# Patient Record
Sex: MALE | Race: WHITE | NOT HISPANIC OR LATINO | Employment: PART TIME | URBAN - METROPOLITAN AREA
[De-identification: names, ages, dates, MRNs, and addresses within clinical notes are randomized per-mention and may not be internally consistent; named-entity substitution may affect disease eponyms.]

---

## 2018-12-05 ENCOUNTER — OFFICE VISIT (OUTPATIENT)
Dept: PODIATRY | Facility: CLINIC | Age: 63
End: 2018-12-05
Payer: MEDICARE

## 2018-12-05 VITALS
BODY MASS INDEX: 31.15 KG/M2 | DIASTOLIC BLOOD PRESSURE: 83 MMHG | SYSTOLIC BLOOD PRESSURE: 146 MMHG | HEART RATE: 93 BPM | HEIGHT: 72 IN | WEIGHT: 230 LBS

## 2018-12-05 DIAGNOSIS — L84 CALLUS: Primary | ICD-10-CM

## 2018-12-05 PROCEDURE — 99999 PR PBB SHADOW E&M-NEW PATIENT-LVL III: CPT | Mod: PBBFAC,,, | Performed by: PODIATRIST

## 2018-12-05 PROCEDURE — 3008F BODY MASS INDEX DOCD: CPT | Mod: CPTII,,, | Performed by: PODIATRIST

## 2018-12-05 PROCEDURE — 17999 UNLISTD PX SKN MUC MEMB SUBQ: CPT | Mod: CSM,S$GLB,, | Performed by: PODIATRIST

## 2018-12-05 PROCEDURE — 99203 OFFICE O/P NEW LOW 30 MIN: CPT | Mod: ,,, | Performed by: PODIATRIST

## 2018-12-05 RX ORDER — CICLOPIROX 80 MG/ML
SOLUTION TOPICAL NIGHTLY
Qty: 6.6 ML | Refills: 11 | Status: SHIPPED | OUTPATIENT
Start: 2018-12-05 | End: 2024-02-29

## 2018-12-05 NOTE — PROGRESS NOTES
Subjective:      Patient ID: Harrison Haywood is a 63 y.o. male.    Chief Complaint: Ingrown Toenail and Callouses    Thick hard callus right forefoot.  Gradual onset, worsening over past several weeks, aggravated by increased weight bearing, shoe gear, pressure.  No previous medical treatment.  OTC pain med not helping. Denies trauma, surgery.    Review of Systems   Constitution: Negative for chills, diaphoresis, fever, malaise/fatigue and night sweats.   Cardiovascular: Negative for claudication, cyanosis, leg swelling and syncope.   Skin: Positive for suspicious lesions. Negative for color change, dry skin, nail changes, rash and unusual hair distribution.   Musculoskeletal: Negative for falls, joint pain, joint swelling, muscle cramps, muscle weakness and stiffness.   Gastrointestinal: Negative for constipation, diarrhea, nausea and vomiting.   Neurological: Negative for brief paralysis, disturbances in coordination, focal weakness, numbness, paresthesias, sensory change and tremors.           Objective:      Physical Exam   Constitutional: He is oriented to person, place, and time. He appears well-developed and well-nourished. He is cooperative. No distress.   Cardiovascular:   Pulses:       Popliteal pulses are 2+ on the right side, and 2+ on the left side.        Dorsalis pedis pulses are 2+ on the right side, and 2+ on the left side.        Posterior tibial pulses are 2+ on the right side, and 2+ on the left side.   Capillary refill 3 seconds all toes/distal feet, all toes/both feet warm to touch.      Negative lymphadenopathy bilateral popliteal fossa and tarsal tunnel.      Negavie lower extremity edema bilateral.     Musculoskeletal:        Right ankle: He exhibits normal range of motion, no swelling, no ecchymosis, no deformity, no laceration and normal pulse. Achilles tendon normal. Achilles tendon exhibits no pain, no defect and normal Flores's test results.   Right hemiparesis.    Uses motorized chair  for mobility.    Otherwise, All ten toes without clubbing, cyanosis, or signs of ischemia.  No pain to palpation bilateral lower extremities.  Range of motion, stability,  normal bilateral feet and legs.     Strength tone normal lle below knee.   Lymphadenopathy: No inguinal adenopathy noted on the right or left side.   Negative lymphadenopathy bilateral popliteal fossa and tarsal tunnel.    Negative lymphangitic streaking bilateral feet/ankles/legs.   Neurological: He is alert and oriented to person, place, and time. He has normal strength. He displays no atrophy and no tremor. No sensory deficit. He exhibits normal muscle tone. Gait normal.   Reflex Scores:       Patellar reflexes are 2+ on the right side and 2+ on the left side.       Achilles reflexes are 2+ on the right side and 2+ on the left side.  Negative tinel sign to percussion sural, superficial peroneal, deep peroneal, saphenous, and posterior tibial nerves right and left ankles and feet.     Skin: Skin is warm, dry and intact. Capillary refill takes 2 to 3 seconds. No abrasion, no bruising, no burn, no ecchymosis, no laceration, no lesion and no rash noted. He is not diaphoretic. No cyanosis or erythema. No pallor. Nails show no clubbing.     Skin is normal age and health appropriate color, turgor, texture, and temperature bilateral lower extremities without ulceration, hyperpigmentation, discoloration, masses nodules or cords palpated.  No ecchymosis, erythema, edema, or cardinal signs of infection bilateral lower extremities.     Psychiatric: He has a normal mood and affect.             Assessment:       Encounter Diagnosis   Name Primary?    Callus Yes         Plan:       Harrison was seen today for ingrown toenail and callouses.    Diagnoses and all orders for this visit:    Callus  -     ORTHOTIC DEVICE (DME)    Other orders  -     ciclopirox (PENLAC) 8 % Soln; Apply topically nightly.      I counseled the patient on his conditions, their  implications and medical management.    Non covered foot care:    With the patient's permission, I debrided hyperkeratotic lesion(s) as above totaling      1          to, not  Including dermis with sterile #15 blade.  Patient tolerated the procedure well and related significant relief.    Rx custom orthotics, lac hydrin.    Recommend pedicures, otc pumice stone prn.          Follow-up if symptoms worsen or fail to improve.

## 2024-02-29 ENCOUNTER — HOSPITAL ENCOUNTER (INPATIENT)
Facility: HOSPITAL | Age: 69
LOS: 2 days | Discharge: HOME | DRG: 193 | End: 2024-03-03
Attending: EMERGENCY MEDICINE | Admitting: INTERNAL MEDICINE
Payer: MEDICARE

## 2024-02-29 DIAGNOSIS — J96.01 ACUTE RESPIRATORY FAILURE WITH HYPOXIA: ICD-10-CM

## 2024-02-29 DIAGNOSIS — R07.9 CHEST PAIN: ICD-10-CM

## 2024-02-29 DIAGNOSIS — R06.02 SHORTNESS OF BREATH: ICD-10-CM

## 2024-02-29 DIAGNOSIS — C90.00 MULTIPLE MYELOMA NOT HAVING ACHIEVED REMISSION: ICD-10-CM

## 2024-02-29 DIAGNOSIS — J18.9 PNEUMONIA: ICD-10-CM

## 2024-02-29 DIAGNOSIS — R09.02 HYPOXIA: Primary | ICD-10-CM

## 2024-02-29 DIAGNOSIS — R79.89 ELEVATED TROPONIN: ICD-10-CM

## 2024-02-29 PROBLEM — E87.6 HYPOKALEMIA: Status: ACTIVE | Noted: 2024-02-29

## 2024-02-29 PROBLEM — E78.49 OTHER HYPERLIPIDEMIA: Status: ACTIVE | Noted: 2024-02-29

## 2024-02-29 PROBLEM — L89.154 SACRAL DECUBITUS ULCER, STAGE IV: Status: ACTIVE | Noted: 2024-02-29

## 2024-02-29 PROBLEM — I10 PRIMARY HYPERTENSION: Status: ACTIVE | Noted: 2024-02-29

## 2024-02-29 PROBLEM — J45.902 REACTIVE AIRWAY DISEASE WITH STATUS ASTHMATICUS: Status: ACTIVE | Noted: 2024-02-29

## 2024-02-29 LAB
ADENOVIRUS: NOT DETECTED
ALBUMIN SERPL BCP-MCNC: 2.7 G/DL (ref 3.5–5.2)
ALP SERPL-CCNC: 112 U/L (ref 55–135)
ALT SERPL W/O P-5'-P-CCNC: 13 U/L (ref 10–44)
ANION GAP SERPL CALC-SCNC: 13 MMOL/L (ref 8–16)
ANISOCYTOSIS BLD QL SMEAR: SLIGHT
AST SERPL-CCNC: 39 U/L (ref 10–40)
BASOPHILS NFR BLD: 0 % (ref 0–1.9)
BILIRUB SERPL-MCNC: 0.6 MG/DL (ref 0.1–1)
BNP SERPL-MCNC: 95 PG/ML (ref 0–99)
BORDETELLA PARAPERTUSSIS (IS1001): NOT DETECTED
BORDETELLA PERTUSSIS (PTXP): NOT DETECTED
BUN SERPL-MCNC: 18 MG/DL (ref 8–23)
CALCIUM SERPL-MCNC: 8.6 MG/DL (ref 8.7–10.5)
CHLAMYDIA PNEUMONIAE: NOT DETECTED
CHLORIDE SERPL-SCNC: 104 MMOL/L (ref 95–110)
CHOLEST SERPL-MCNC: 121 MG/DL (ref 120–199)
CHOLEST/HDLC SERPL: 3 {RATIO} (ref 2–5)
CO2 SERPL-SCNC: 21 MMOL/L (ref 23–29)
CORONAVIRUS 229E, COMMON COLD VIRUS: NOT DETECTED
CORONAVIRUS HKU1, COMMON COLD VIRUS: NOT DETECTED
CORONAVIRUS NL63, COMMON COLD VIRUS: NOT DETECTED
CORONAVIRUS OC43, COMMON COLD VIRUS: NOT DETECTED
CREAT SERPL-MCNC: 0.8 MG/DL (ref 0.5–1.4)
CTP QC/QA: YES
CTP QC/QA: YES
DIFFERENTIAL METHOD BLD: ABNORMAL
EOSINOPHIL NFR BLD: 0 % (ref 0–8)
ERYTHROCYTE [DISTWIDTH] IN BLOOD BY AUTOMATED COUNT: 16.5 % (ref 11.5–14.5)
EST. GFR  (NO RACE VARIABLE): >60 ML/MIN/1.73 M^2
ESTIMATED AVG GLUCOSE: 103 MG/DL (ref 68–131)
FIO2: 21 %
FLUBV RNA NPH QL NAA+NON-PROBE: NOT DETECTED
GLUCOSE SERPL-MCNC: 102 MG/DL (ref 70–110)
HBA1C MFR BLD: 5.2 % (ref 4–5.6)
HCT VFR BLD AUTO: 25.6 % (ref 40–54)
HDLC SERPL-MCNC: 41 MG/DL (ref 40–75)
HDLC SERPL: 33.9 % (ref 20–50)
HGB BLD-MCNC: 8.5 G/DL (ref 14–18)
HPIV1 RNA NPH QL NAA+NON-PROBE: NOT DETECTED
HPIV2 RNA NPH QL NAA+NON-PROBE: NOT DETECTED
HPIV3 RNA NPH QL NAA+NON-PROBE: NOT DETECTED
HPIV4 RNA NPH QL NAA+NON-PROBE: NOT DETECTED
HUMAN METAPNEUMOVIRUS: NOT DETECTED
HYPOCHROMIA BLD QL SMEAR: ABNORMAL
IMM GRANULOCYTES # BLD AUTO: ABNORMAL K/UL (ref 0–0.04)
IMM GRANULOCYTES NFR BLD AUTO: ABNORMAL % (ref 0–0.5)
INFLUENZA A (SUBTYPES H1,H1-2009,H3): NOT DETECTED
LACTATE SERPL-SCNC: 1.2 MMOL/L (ref 0.5–2.2)
LDLC SERPL CALC-MCNC: 49.2 MG/DL (ref 63–159)
LYMPHOCYTES NFR BLD: 11 % (ref 18–48)
MAGNESIUM SERPL-MCNC: 2 MG/DL (ref 1.6–2.6)
MCH RBC QN AUTO: 32 PG (ref 27–31)
MCHC RBC AUTO-ENTMCNC: 33.2 G/DL (ref 32–36)
MCV RBC AUTO: 96 FL (ref 82–98)
MONOCYTES NFR BLD: 3 % (ref 4–15)
MYCOPLASMA PNEUMONIAE: NOT DETECTED
NEUTROPHILS NFR BLD: 83 % (ref 38–73)
NEUTS BAND NFR BLD MANUAL: 3 %
NONHDLC SERPL-MCNC: 80 MG/DL
NRBC BLD-RTO: 0 /100 WBC
OHS QRS DURATION: 106 MS
OHS QTC CALCULATION: 501 MS
PCO2 BLDA: 41.9 MMHG (ref 35–45)
PH SMN: 7.32 [PH] (ref 7.35–7.45)
PLATELET # BLD AUTO: 95 K/UL (ref 150–450)
PLATELET BLD QL SMEAR: ABNORMAL
PMV BLD AUTO: 13 FL (ref 9.2–12.9)
PO2 BLDA: 33.6 MMHG (ref 40–60)
POC BASE DEFICIT: -4.1 MMOL/L (ref -2–2)
POC HCO3: 21.7 MMOL/L (ref 24–28)
POC MOLECULAR INFLUENZA A AGN: NEGATIVE
POC MOLECULAR INFLUENZA B AGN: NEGATIVE
POC PERFORMED BY: ABNORMAL
POC SATURATED O2: 56.5 % (ref 95–100)
POLYCHROMASIA BLD QL SMEAR: ABNORMAL
POTASSIUM SERPL-SCNC: 3.1 MMOL/L (ref 3.5–5.1)
PROCALCITONIN SERPL IA-MCNC: 0.6 NG/ML
PROT SERPL-MCNC: 6.2 G/DL (ref 6–8.4)
RBC # BLD AUTO: 2.66 M/UL (ref 4.6–6.2)
RESPIRATORY INFECTION PANEL SOURCE: ABNORMAL
RSV RNA NPH QL NAA+NON-PROBE: DETECTED
RV+EV RNA NPH QL NAA+NON-PROBE: NOT DETECTED
SARS-COV-2 RDRP RESP QL NAA+PROBE: NEGATIVE
SARS-COV-2 RNA RESP QL NAA+PROBE: NOT DETECTED
SODIUM SERPL-SCNC: 138 MMOL/L (ref 136–145)
SPECIMEN SOURCE: ABNORMAL
TRIGL SERPL-MCNC: 154 MG/DL (ref 30–150)
TROPONIN I SERPL DL<=0.01 NG/ML-MCNC: 0.03 NG/ML (ref 0–0.03)
TROPONIN I SERPL DL<=0.01 NG/ML-MCNC: 0.04 NG/ML (ref 0–0.03)
WBC # BLD AUTO: 2.7 K/UL (ref 3.9–12.7)

## 2024-02-29 PROCEDURE — 99900035 HC TECH TIME PER 15 MIN (STAT)

## 2024-02-29 PROCEDURE — 96365 THER/PROPH/DIAG IV INF INIT: CPT

## 2024-02-29 PROCEDURE — 85027 COMPLETE CBC AUTOMATED: CPT | Performed by: EMERGENCY MEDICINE

## 2024-02-29 PROCEDURE — 80053 COMPREHEN METABOLIC PANEL: CPT | Performed by: EMERGENCY MEDICINE

## 2024-02-29 PROCEDURE — 87077 CULTURE AEROBIC IDENTIFY: CPT | Performed by: STUDENT IN AN ORGANIZED HEALTH CARE EDUCATION/TRAINING PROGRAM

## 2024-02-29 PROCEDURE — G0378 HOSPITAL OBSERVATION PER HR: HCPCS

## 2024-02-29 PROCEDURE — 93005 ELECTROCARDIOGRAM TRACING: CPT

## 2024-02-29 PROCEDURE — 83735 ASSAY OF MAGNESIUM: CPT | Performed by: EMERGENCY MEDICINE

## 2024-02-29 PROCEDURE — 96366 THER/PROPH/DIAG IV INF ADDON: CPT

## 2024-02-29 PROCEDURE — 25000003 PHARM REV CODE 250: Performed by: INTERNAL MEDICINE

## 2024-02-29 PROCEDURE — 84145 PROCALCITONIN (PCT): CPT | Performed by: STUDENT IN AN ORGANIZED HEALTH CARE EDUCATION/TRAINING PROGRAM

## 2024-02-29 PROCEDURE — 96372 THER/PROPH/DIAG INJ SC/IM: CPT | Performed by: INTERNAL MEDICINE

## 2024-02-29 PROCEDURE — 25000003 PHARM REV CODE 250: Performed by: STUDENT IN AN ORGANIZED HEALTH CARE EDUCATION/TRAINING PROGRAM

## 2024-02-29 PROCEDURE — 87070 CULTURE OTHR SPECIMN AEROBIC: CPT | Performed by: STUDENT IN AN ORGANIZED HEALTH CARE EDUCATION/TRAINING PROGRAM

## 2024-02-29 PROCEDURE — 94644 CONT INHLJ TX 1ST HOUR: CPT

## 2024-02-29 PROCEDURE — 25000003 PHARM REV CODE 250: Performed by: EMERGENCY MEDICINE

## 2024-02-29 PROCEDURE — 99285 EMERGENCY DEPT VISIT HI MDM: CPT | Mod: 25

## 2024-02-29 PROCEDURE — 80061 LIPID PANEL: CPT | Performed by: STUDENT IN AN ORGANIZED HEALTH CARE EDUCATION/TRAINING PROGRAM

## 2024-02-29 PROCEDURE — 93010 ELECTROCARDIOGRAM REPORT: CPT | Mod: ,,, | Performed by: STUDENT IN AN ORGANIZED HEALTH CARE EDUCATION/TRAINING PROGRAM

## 2024-02-29 PROCEDURE — 87798 DETECT AGENT NOS DNA AMP: CPT | Performed by: STUDENT IN AN ORGANIZED HEALTH CARE EDUCATION/TRAINING PROGRAM

## 2024-02-29 PROCEDURE — 25000242 PHARM REV CODE 250 ALT 637 W/ HCPCS: Performed by: EMERGENCY MEDICINE

## 2024-02-29 PROCEDURE — 96367 TX/PROPH/DG ADDL SEQ IV INF: CPT

## 2024-02-29 PROCEDURE — 63600175 PHARM REV CODE 636 W HCPCS: Performed by: EMERGENCY MEDICINE

## 2024-02-29 PROCEDURE — 83605 ASSAY OF LACTIC ACID: CPT | Performed by: EMERGENCY MEDICINE

## 2024-02-29 PROCEDURE — 63600175 PHARM REV CODE 636 W HCPCS: Performed by: STUDENT IN AN ORGANIZED HEALTH CARE EDUCATION/TRAINING PROGRAM

## 2024-02-29 PROCEDURE — 87040 BLOOD CULTURE FOR BACTERIA: CPT | Performed by: EMERGENCY MEDICINE

## 2024-02-29 PROCEDURE — 27000221 HC OXYGEN, UP TO 24 HOURS

## 2024-02-29 PROCEDURE — 83036 HEMOGLOBIN GLYCOSYLATED A1C: CPT | Performed by: STUDENT IN AN ORGANIZED HEALTH CARE EDUCATION/TRAINING PROGRAM

## 2024-02-29 PROCEDURE — 25000242 PHARM REV CODE 250 ALT 637 W/ HCPCS: Performed by: STUDENT IN AN ORGANIZED HEALTH CARE EDUCATION/TRAINING PROGRAM

## 2024-02-29 PROCEDURE — 94640 AIRWAY INHALATION TREATMENT: CPT | Mod: XB

## 2024-02-29 PROCEDURE — 25500020 PHARM REV CODE 255: Performed by: INTERNAL MEDICINE

## 2024-02-29 PROCEDURE — 63600175 PHARM REV CODE 636 W HCPCS: Performed by: INTERNAL MEDICINE

## 2024-02-29 PROCEDURE — 94645 CONT INHLJ TX EACH ADDL HOUR: CPT

## 2024-02-29 PROCEDURE — 87635 SARS-COV-2 COVID-19 AMP PRB: CPT | Performed by: EMERGENCY MEDICINE

## 2024-02-29 PROCEDURE — 96376 TX/PRO/DX INJ SAME DRUG ADON: CPT

## 2024-02-29 PROCEDURE — 96365 THER/PROPH/DIAG IV INF INIT: CPT | Mod: 59

## 2024-02-29 PROCEDURE — 83880 ASSAY OF NATRIURETIC PEPTIDE: CPT | Performed by: EMERGENCY MEDICINE

## 2024-02-29 PROCEDURE — 97163 PT EVAL HIGH COMPLEX 45 MIN: CPT

## 2024-02-29 PROCEDURE — 96375 TX/PRO/DX INJ NEW DRUG ADDON: CPT

## 2024-02-29 PROCEDURE — 85007 BL SMEAR W/DIFF WBC COUNT: CPT | Performed by: EMERGENCY MEDICINE

## 2024-02-29 PROCEDURE — 87186 SC STD MICRODIL/AGAR DIL: CPT | Performed by: STUDENT IN AN ORGANIZED HEALTH CARE EDUCATION/TRAINING PROGRAM

## 2024-02-29 PROCEDURE — 84484 ASSAY OF TROPONIN QUANT: CPT | Mod: 91 | Performed by: EMERGENCY MEDICINE

## 2024-02-29 PROCEDURE — 93010 ELECTROCARDIOGRAM REPORT: CPT | Mod: ,,, | Performed by: INTERNAL MEDICINE

## 2024-02-29 PROCEDURE — 97165 OT EVAL LOW COMPLEX 30 MIN: CPT

## 2024-02-29 PROCEDURE — 63600175 PHARM REV CODE 636 W HCPCS

## 2024-02-29 PROCEDURE — 87502 INFLUENZA DNA AMP PROBE: CPT

## 2024-02-29 PROCEDURE — 84484 ASSAY OF TROPONIN QUANT: CPT | Performed by: STUDENT IN AN ORGANIZED HEALTH CARE EDUCATION/TRAINING PROGRAM

## 2024-02-29 RX ORDER — BISACODYL 5 MG
10 TABLET, DELAYED RELEASE (ENTERIC COATED) ORAL DAILY PRN
COMMUNITY

## 2024-02-29 RX ORDER — ENOXAPARIN SODIUM 100 MG/ML
40 INJECTION SUBCUTANEOUS EVERY 24 HOURS
Status: DISCONTINUED | OUTPATIENT
Start: 2024-02-29 | End: 2024-02-29

## 2024-02-29 RX ORDER — CELECOXIB 100 MG/1
200 CAPSULE ORAL DAILY
Status: DISCONTINUED | OUTPATIENT
Start: 2024-03-01 | End: 2024-03-03 | Stop reason: HOSPADM

## 2024-02-29 RX ORDER — CALCIUM CARBONATE 600 MG
1 TABLET ORAL 2 TIMES DAILY WITH MEALS
COMMUNITY
Start: 2023-11-09 | End: 2024-02-29 | Stop reason: SDUPTHER

## 2024-02-29 RX ORDER — DIPHENHYDRAMINE HYDROCHLORIDE 12.5 MG/5ML
LIQUID ORAL
COMMUNITY
Start: 2023-11-22 | End: 2024-02-29

## 2024-02-29 RX ORDER — PANTOPRAZOLE SODIUM 40 MG/1
40 TABLET, DELAYED RELEASE ORAL DAILY
Status: DISCONTINUED | OUTPATIENT
Start: 2024-02-29 | End: 2024-03-03 | Stop reason: HOSPADM

## 2024-02-29 RX ORDER — CHOLECALCIFEROL (VITAMIN D3) 10 MCG
800 TABLET ORAL NIGHTLY
COMMUNITY
Start: 2024-02-02

## 2024-02-29 RX ORDER — ROSUVASTATIN CALCIUM 20 MG/1
20 TABLET, COATED ORAL DAILY
Status: ON HOLD | COMMUNITY
End: 2024-03-03 | Stop reason: HOSPADM

## 2024-02-29 RX ORDER — ZINC SULFATE 50(220)MG
220 CAPSULE ORAL DAILY
COMMUNITY

## 2024-02-29 RX ORDER — ALBUTEROL SULFATE 2.5 MG/.5ML
5 SOLUTION RESPIRATORY (INHALATION)
Status: DISCONTINUED | OUTPATIENT
Start: 2024-02-29 | End: 2024-02-29

## 2024-02-29 RX ORDER — TIZANIDINE 4 MG/1
2 TABLET ORAL
COMMUNITY
Start: 2023-11-02 | End: 2024-02-29 | Stop reason: DRUGHIGH

## 2024-02-29 RX ORDER — SULFAMETHOXAZOLE AND TRIMETHOPRIM 800; 160 MG/1; MG/1
1 TABLET ORAL
COMMUNITY
Start: 2024-01-18 | End: 2024-02-29

## 2024-02-29 RX ORDER — POTASSIUM CHLORIDE 20 MEQ/1
20 TABLET, EXTENDED RELEASE ORAL
Status: COMPLETED | OUTPATIENT
Start: 2024-02-29 | End: 2024-02-29

## 2024-02-29 RX ORDER — IPRATROPIUM BROMIDE AND ALBUTEROL SULFATE 2.5; .5 MG/3ML; MG/3ML
3 SOLUTION RESPIRATORY (INHALATION) EVERY 4 HOURS PRN
Status: DISCONTINUED | OUTPATIENT
Start: 2024-02-29 | End: 2024-03-03 | Stop reason: HOSPADM

## 2024-02-29 RX ORDER — METFORMIN HYDROCHLORIDE 500 MG/1
500 TABLET ORAL 2 TIMES DAILY
COMMUNITY
Start: 2023-10-06 | End: 2024-02-29

## 2024-02-29 RX ORDER — ENOXAPARIN SODIUM 100 MG/ML
40 INJECTION SUBCUTANEOUS EVERY 24 HOURS
Status: DISCONTINUED | OUTPATIENT
Start: 2024-02-29 | End: 2024-03-03 | Stop reason: HOSPADM

## 2024-02-29 RX ORDER — TIZANIDINE 2 MG/1
2 TABLET ORAL 2 TIMES DAILY
Status: ON HOLD | COMMUNITY
End: 2024-03-03 | Stop reason: HOSPADM

## 2024-02-29 RX ORDER — PANTOPRAZOLE SODIUM 40 MG/1
40 TABLET, DELAYED RELEASE ORAL
Status: ON HOLD | COMMUNITY
Start: 2023-11-10 | End: 2024-03-03 | Stop reason: HOSPADM

## 2024-02-29 RX ORDER — ROCURONIUM BROMIDE 10 MG/ML
INJECTION, SOLUTION INTRAVENOUS
Status: DISPENSED
Start: 2024-02-29 | End: 2024-03-01

## 2024-02-29 RX ORDER — ALBUTEROL SULFATE 2.5 MG/.5ML
15 SOLUTION RESPIRATORY (INHALATION)
Status: COMPLETED | OUTPATIENT
Start: 2024-02-29 | End: 2024-02-29

## 2024-02-29 RX ORDER — OXYCODONE HYDROCHLORIDE 5 MG/1
TABLET ORAL
COMMUNITY
Start: 2023-11-09 | End: 2024-02-29

## 2024-02-29 RX ORDER — MULTIVITAMIN
1 TABLET ORAL DAILY
COMMUNITY

## 2024-02-29 RX ORDER — AMLODIPINE BESYLATE 5 MG/1
5 TABLET ORAL DAILY
Status: DISCONTINUED | OUTPATIENT
Start: 2024-02-29 | End: 2024-03-03 | Stop reason: HOSPADM

## 2024-02-29 RX ORDER — MENTHOL AND ZINC OXIDE .44; 20.625 G/100G; G/100G
OINTMENT TOPICAL EVERY OTHER DAY
Status: ON HOLD | COMMUNITY
End: 2024-03-03 | Stop reason: HOSPADM

## 2024-02-29 RX ORDER — GLUCAGON 1 MG
1 KIT INJECTION
Status: DISCONTINUED | OUTPATIENT
Start: 2024-02-29 | End: 2024-02-29

## 2024-02-29 RX ORDER — CELECOXIB 200 MG/1
1 CAPSULE ORAL 2 TIMES DAILY
COMMUNITY
End: 2024-02-29

## 2024-02-29 RX ORDER — TIZANIDINE 2 MG/1
2 TABLET ORAL 2 TIMES DAILY
Status: DISCONTINUED | OUTPATIENT
Start: 2024-02-29 | End: 2024-03-03 | Stop reason: HOSPADM

## 2024-02-29 RX ORDER — VALACYCLOVIR HYDROCHLORIDE 500 MG/1
500 TABLET, FILM COATED ORAL DAILY
COMMUNITY

## 2024-02-29 RX ORDER — FENOFIBRATE 145 MG/1
145 TABLET, FILM COATED ORAL DAILY
Status: ON HOLD | COMMUNITY
End: 2024-03-03 | Stop reason: HOSPADM

## 2024-02-29 RX ORDER — ALBUTEROL SULFATE 2.5 MG/.5ML
2.5 SOLUTION RESPIRATORY (INHALATION) EVERY 4 HOURS PRN
Status: DISCONTINUED | OUTPATIENT
Start: 2024-03-01 | End: 2024-03-03 | Stop reason: HOSPADM

## 2024-02-29 RX ORDER — FENOFIBRATE 145 MG/1
1 TABLET, FILM COATED ORAL DAILY
COMMUNITY
End: 2024-02-29 | Stop reason: DRUGHIGH

## 2024-02-29 RX ORDER — FLUOXETINE 10 MG/1
1 CAPSULE ORAL DAILY
COMMUNITY
End: 2024-02-29 | Stop reason: DRUGHIGH

## 2024-02-29 RX ORDER — ASCORBIC ACID 500 MG
500 TABLET ORAL 2 TIMES DAILY
COMMUNITY

## 2024-02-29 RX ORDER — GUAIFENESIN 100 MG/5ML
200 SOLUTION ORAL EVERY 4 HOURS PRN
COMMUNITY

## 2024-02-29 RX ORDER — PREDNISONE 20 MG/1
TABLET ORAL
COMMUNITY
End: 2024-02-29

## 2024-02-29 RX ORDER — ATORVASTATIN CALCIUM 20 MG/1
TABLET, FILM COATED ORAL
COMMUNITY
End: 2024-02-29

## 2024-02-29 RX ORDER — ALENDRONATE SODIUM 70 MG/1
70 TABLET ORAL
COMMUNITY
Start: 2023-11-02 | End: 2024-02-29

## 2024-02-29 RX ORDER — FLUOXETINE HYDROCHLORIDE 40 MG/1
40 CAPSULE ORAL DAILY
COMMUNITY
Start: 2024-02-14

## 2024-02-29 RX ORDER — AMLODIPINE BESYLATE 2.5 MG/1
1 TABLET ORAL NIGHTLY
COMMUNITY
End: 2024-02-29 | Stop reason: DRUGHIGH

## 2024-02-29 RX ORDER — LEVETIRACETAM 750 MG/1
500 TABLET ORAL DAILY
Status: ON HOLD | COMMUNITY
End: 2024-03-03 | Stop reason: HOSPADM

## 2024-02-29 RX ORDER — METHYLPREDNISOLONE SOD SUCC 125 MG
125 VIAL (EA) INJECTION ONCE
Status: COMPLETED | OUTPATIENT
Start: 2024-02-29 | End: 2024-02-29

## 2024-02-29 RX ORDER — LIDOCAINE HYDROCHLORIDE 20 MG/ML
SOLUTION ORAL; TOPICAL
COMMUNITY
Start: 2023-11-22 | End: 2024-02-29 | Stop reason: ALTCHOICE

## 2024-02-29 RX ORDER — ALLOPURINOL 100 MG/1
300 TABLET ORAL DAILY
Status: DISCONTINUED | OUTPATIENT
Start: 2024-03-01 | End: 2024-03-03 | Stop reason: HOSPADM

## 2024-02-29 RX ORDER — ALBUTEROL SULFATE 2.5 MG/.5ML
10 SOLUTION RESPIRATORY (INHALATION) CONTINUOUS
Status: DISCONTINUED | OUTPATIENT
Start: 2024-02-29 | End: 2024-02-29

## 2024-02-29 RX ORDER — FUROSEMIDE 10 MG/ML
INJECTION INTRAMUSCULAR; INTRAVENOUS
Status: COMPLETED
Start: 2024-02-29 | End: 2024-02-29

## 2024-02-29 RX ORDER — FUROSEMIDE 10 MG/ML
40 INJECTION INTRAMUSCULAR; INTRAVENOUS ONCE
Status: COMPLETED | OUTPATIENT
Start: 2024-02-29 | End: 2024-02-29

## 2024-02-29 RX ORDER — IPRATROPIUM BROMIDE AND ALBUTEROL SULFATE 2.5; .5 MG/3ML; MG/3ML
3 SOLUTION RESPIRATORY (INHALATION)
Status: COMPLETED | OUTPATIENT
Start: 2024-02-29 | End: 2024-02-29

## 2024-02-29 RX ORDER — SODIUM CHLORIDE 0.9 % (FLUSH) 0.9 %
10 SYRINGE (ML) INJECTION EVERY 12 HOURS PRN
Status: DISCONTINUED | OUTPATIENT
Start: 2024-02-29 | End: 2024-03-03 | Stop reason: HOSPADM

## 2024-02-29 RX ORDER — VALSARTAN AND HYDROCHLOROTHIAZIDE 320; 12.5 MG/1; MG/1
TABLET, FILM COATED ORAL
COMMUNITY
End: 2024-02-29

## 2024-02-29 RX ORDER — IBUPROFEN 200 MG
16 TABLET ORAL
Status: DISCONTINUED | OUTPATIENT
Start: 2024-02-29 | End: 2024-02-29

## 2024-02-29 RX ORDER — IBUPROFEN 200 MG
24 TABLET ORAL
Status: DISCONTINUED | OUTPATIENT
Start: 2024-02-29 | End: 2024-02-29

## 2024-02-29 RX ORDER — HYDROCODONE BITARTRATE AND ACETAMINOPHEN 5; 325 MG/1; MG/1
1 TABLET ORAL EVERY 6 HOURS PRN
COMMUNITY
End: 2024-02-29

## 2024-02-29 RX ORDER — VALACYCLOVIR HYDROCHLORIDE 500 MG/1
500 TABLET, FILM COATED ORAL DAILY
Status: DISCONTINUED | OUTPATIENT
Start: 2024-02-29 | End: 2024-03-03 | Stop reason: HOSPADM

## 2024-02-29 RX ORDER — CELECOXIB 200 MG/1
200 CAPSULE ORAL DAILY
COMMUNITY
End: 2024-02-29

## 2024-02-29 RX ORDER — TALC
6 POWDER (GRAM) TOPICAL NIGHTLY PRN
Status: DISCONTINUED | OUTPATIENT
Start: 2024-02-29 | End: 2024-03-03 | Stop reason: HOSPADM

## 2024-02-29 RX ORDER — ALBUTEROL SULFATE 2.5 MG/.5ML
SOLUTION RESPIRATORY (INHALATION)
Status: DISPENSED
Start: 2024-02-29 | End: 2024-03-01

## 2024-02-29 RX ORDER — AMLODIPINE BESYLATE 5 MG/1
5 TABLET ORAL DAILY
COMMUNITY

## 2024-02-29 RX ORDER — RISPERIDONE 1 MG/ML
SOLUTION ORAL
COMMUNITY
Start: 2023-11-22 | End: 2024-02-29 | Stop reason: ALTCHOICE

## 2024-02-29 RX ORDER — MAGNESIUM SULFATE HEPTAHYDRATE 40 MG/ML
2 INJECTION, SOLUTION INTRAVENOUS ONCE
Status: COMPLETED | OUTPATIENT
Start: 2024-02-29 | End: 2024-02-29

## 2024-02-29 RX ORDER — ALBUTEROL SULFATE 2.5 MG/.5ML
2.5 SOLUTION RESPIRATORY (INHALATION) EVERY 4 HOURS
Status: DISCONTINUED | OUTPATIENT
Start: 2024-02-29 | End: 2024-02-29

## 2024-02-29 RX ORDER — FLUOXETINE HYDROCHLORIDE 20 MG/1
40 CAPSULE ORAL DAILY
Status: DISCONTINUED | OUTPATIENT
Start: 2024-02-29 | End: 2024-03-03 | Stop reason: HOSPADM

## 2024-02-29 RX ORDER — FENOFIBRATE 145 MG/1
145 TABLET, FILM COATED ORAL DAILY
Status: DISCONTINUED | OUTPATIENT
Start: 2024-03-01 | End: 2024-03-03 | Stop reason: HOSPADM

## 2024-02-29 RX ORDER — ALLOPURINOL 300 MG/1
300 TABLET ORAL DAILY
COMMUNITY
Start: 2024-02-02

## 2024-02-29 RX ORDER — FLUOXETINE HYDROCHLORIDE 20 MG/1
1 CAPSULE ORAL DAILY
COMMUNITY
End: 2024-02-29 | Stop reason: DRUGHIGH

## 2024-02-29 RX ORDER — CALCIUM CARBONATE 200(500)MG
1500 TABLET,CHEWABLE ORAL NIGHTLY
Status: DISCONTINUED | OUTPATIENT
Start: 2024-02-29 | End: 2024-03-03 | Stop reason: HOSPADM

## 2024-02-29 RX ORDER — TRAMADOL HYDROCHLORIDE 50 MG/1
50 TABLET ORAL EVERY 8 HOURS PRN
Status: ON HOLD | COMMUNITY
Start: 2024-02-22 | End: 2024-03-03 | Stop reason: HOSPADM

## 2024-02-29 RX ORDER — ACETAMINOPHEN 650 MG/1
650 SUPPOSITORY RECTAL EVERY 4 HOURS PRN
COMMUNITY
End: 2024-02-29

## 2024-02-29 RX ORDER — ONDANSETRON 4 MG/1
4 TABLET, FILM COATED ORAL 4 TIMES DAILY PRN
COMMUNITY

## 2024-02-29 RX ORDER — TIZANIDINE HYDROCHLORIDE 2 MG/1
1 CAPSULE, GELATIN COATED ORAL 2 TIMES DAILY
COMMUNITY
End: 2024-02-29 | Stop reason: SDUPTHER

## 2024-02-29 RX ORDER — ETOMIDATE 2 MG/ML
INJECTION INTRAVENOUS
Status: DISPENSED
Start: 2024-02-29 | End: 2024-03-01

## 2024-02-29 RX ORDER — CARVEDILOL 3.12 MG/1
1 TABLET ORAL 2 TIMES DAILY
COMMUNITY
End: 2024-02-29

## 2024-02-29 RX ORDER — CHOLECALCIFEROL (VITAMIN D3) 25 MCG
1000 TABLET ORAL DAILY
Status: DISCONTINUED | OUTPATIENT
Start: 2024-03-01 | End: 2024-03-03 | Stop reason: HOSPADM

## 2024-02-29 RX ORDER — SODIUM CHLORIDE 9 MG/ML
INJECTION, SOLUTION INTRAVENOUS
Status: DISCONTINUED | OUTPATIENT
Start: 2024-02-29 | End: 2024-03-03 | Stop reason: HOSPADM

## 2024-02-29 RX ORDER — CHOLECALCIFEROL (VITAMIN D3) 25 MCG
1000 TABLET ORAL DAILY
COMMUNITY
Start: 2023-11-09 | End: 2024-02-29 | Stop reason: DRUGHIGH

## 2024-02-29 RX ORDER — LENALIDOMIDE 25 MG/1
25 CAPSULE ORAL
COMMUNITY
Start: 2024-02-27 | End: 2024-02-29

## 2024-02-29 RX ORDER — DEXAMETHASONE 4 MG/1
40 TABLET ORAL
Status: ON HOLD | COMMUNITY
Start: 2024-01-08 | End: 2024-03-03 | Stop reason: HOSPADM

## 2024-02-29 RX ORDER — FUROSEMIDE 20 MG/1
20 TABLET ORAL DAILY
Status: ON HOLD | COMMUNITY
End: 2024-03-03 | Stop reason: HOSPADM

## 2024-02-29 RX ORDER — GABAPENTIN 100 MG/1
100 CAPSULE ORAL
COMMUNITY
Start: 2023-11-02 | End: 2024-02-29

## 2024-02-29 RX ORDER — DEXTROMETHORPHAN HYDROBROMIDE, GUAIFENESIN 5; 100 MG/5ML; MG/5ML
650 LIQUID ORAL EVERY 8 HOURS PRN
COMMUNITY

## 2024-02-29 RX ORDER — CALCIUM CARBONATE 600 MG
1200 TABLET ORAL NIGHTLY
COMMUNITY

## 2024-02-29 RX ADMIN — IPRATROPIUM BROMIDE AND ALBUTEROL SULFATE 3 ML: 2.5; .5 SOLUTION RESPIRATORY (INHALATION) at 12:02

## 2024-02-29 RX ADMIN — SODIUM CHLORIDE: 9 INJECTION, SOLUTION INTRAVENOUS at 05:02

## 2024-02-29 RX ADMIN — AZITHROMYCIN MONOHYDRATE 500 MG: 500 INJECTION, POWDER, LYOPHILIZED, FOR SOLUTION INTRAVENOUS at 03:02

## 2024-02-29 RX ADMIN — METHYLPREDNISOLONE SODIUM SUCCINATE 60 MG: 40 INJECTION, POWDER, FOR SOLUTION INTRAMUSCULAR; INTRAVENOUS at 05:02

## 2024-02-29 RX ADMIN — IOHEXOL 100 ML: 350 INJECTION, SOLUTION INTRAVENOUS at 04:02

## 2024-02-29 RX ADMIN — POTASSIUM CHLORIDE 20 MEQ: 1500 TABLET, EXTENDED RELEASE ORAL at 12:02

## 2024-02-29 RX ADMIN — CEFTRIAXONE SODIUM 1 G: 1 INJECTION, POWDER, FOR SOLUTION INTRAMUSCULAR; INTRAVENOUS at 12:02

## 2024-02-29 RX ADMIN — MAGNESIUM SULFATE HEPTAHYDRATE 2 G: 40 INJECTION, SOLUTION INTRAVENOUS at 05:02

## 2024-02-29 RX ADMIN — ALBUTEROL SULFATE 15 MG: 2.5 SOLUTION RESPIRATORY (INHALATION) at 10:02

## 2024-02-29 RX ADMIN — POTASSIUM BICARBONATE 35 MEQ: 391 TABLET, EFFERVESCENT ORAL at 03:02

## 2024-02-29 RX ADMIN — ENOXAPARIN SODIUM 40 MG: 40 INJECTION SUBCUTANEOUS at 05:02

## 2024-02-29 RX ADMIN — FLUOXETINE HYDROCHLORIDE 40 MG: 20 CAPSULE ORAL at 03:02

## 2024-02-29 RX ADMIN — POTASSIUM BICARBONATE 35 MEQ: 391 TABLET, EFFERVESCENT ORAL at 05:02

## 2024-02-29 RX ADMIN — METHYLPREDNISOLONE SODIUM SUCCINATE 125 MG: 125 INJECTION, POWDER, FOR SOLUTION INTRAMUSCULAR; INTRAVENOUS at 01:02

## 2024-02-29 RX ADMIN — PANTOPRAZOLE SODIUM 40 MG: 40 TABLET, DELAYED RELEASE ORAL at 03:02

## 2024-02-29 RX ADMIN — CALCIUM CARBONATE (ANTACID) CHEW TAB 500 MG 1500 MG: 500 CHEW TAB at 09:02

## 2024-02-29 RX ADMIN — ALBUTEROL SULFATE 10 MG: 2.5 SOLUTION RESPIRATORY (INHALATION) at 01:02

## 2024-02-29 RX ADMIN — AMLODIPINE BESYLATE 5 MG: 5 TABLET ORAL at 03:02

## 2024-02-29 RX ADMIN — TIZANIDINE 2 MG: 2 TABLET ORAL at 09:02

## 2024-02-29 RX ADMIN — FUROSEMIDE 40 MG: 10 INJECTION INTRAMUSCULAR; INTRAVENOUS at 05:02

## 2024-02-29 RX ADMIN — FUROSEMIDE 40 MG: 10 INJECTION, SOLUTION INTRAVENOUS at 05:02

## 2024-02-29 RX ADMIN — ALBUTEROL SULFATE 10 MG: 2.5 SOLUTION RESPIRATORY (INHALATION) at 03:02

## 2024-02-29 NOTE — ED PROVIDER NOTES
Encounter Date: 2/29/2024       History     Chief Complaint   Patient presents with    Shortness of Breath     Nursing home reports recently dx with pneumonia. This morning c/o SOB with decreased O2 sats.      Patient is a 68-year-old male brought in by EMS from his nursing home for shortness of breath.  Patient was reportedly recently diagnosed with pneumonia.  He complains of a congested cough, but unable to produce any sputum.  No fever.      Review of patient's allergies indicates:  No Known Allergies  No past medical history on file.  No past surgical history on file.  No family history on file.  Social History     Tobacco Use    Smoking status: Never     Review of Systems   HENT:  Positive for congestion.    Respiratory:  Positive for cough and shortness of breath.    Cardiovascular:  Negative for chest pain.   All other systems reviewed and are negative.      Physical Exam     Initial Vitals [02/29/24 1003]   BP Pulse Resp Temp SpO2   (!) 153/68 104 20 99.3 °F (37.4 °C) 98 %      MAP       --         Physical Exam    Nursing note and vitals reviewed.  Constitutional: He appears distressed.   HENT:   Head: Atraumatic.   Eyes:   Slightly pale conjunctiva.   Neck: Neck supple.   Cardiovascular:            Tachycardic.   Pulmonary/Chest:   Coarse breath sounds with expiratory wheezing in all lung fields.   Abdominal: Abdomen is soft. There is no abdominal tenderness.   Musculoskeletal:         General: No edema. Normal range of motion.      Cervical back: Neck supple.     Neurological: He is alert.   Skin: Skin is dry.   Psychiatric: His behavior is normal.         ED Course   Critical Care    Date/Time: 2/29/2024 12:13 PM    Performed by: Maurisio Medellin MD  Authorized by: Maurisio Medellin MD  Direct patient critical care time: 90 minutes  Ordering / reviewing critical care time: 15 minutes  Documentation critical care time: 15 minutes  Consulting other physicians critical care time: 5  minutes  Total critical care time (exclusive of procedural time) : 125 minutes  Critical care was time spent personally by me on the following activities: examination of patient, obtaining history from patient or surrogate, ordering and performing treatments and interventions, ordering and review of laboratory studies, ordering and review of radiographic studies, pulse oximetry, re-evaluation of patient's condition and discussions with primary provider.        Labs Reviewed   CBC W/ AUTO DIFFERENTIAL - Abnormal; Notable for the following components:       Result Value    WBC 2.70 (*)     RBC 2.66 (*)     Hemoglobin 8.5 (*)     Hematocrit 25.6 (*)     MCH 32.0 (*)     RDW 16.5 (*)     Platelets 95 (*)     MPV 13.0 (*)     Gran % 83.0 (*)     Lymph % 11.0 (*)     Mono % 3.0 (*)     Platelet Estimate Decreased (*)     All other components within normal limits   COMPREHENSIVE METABOLIC PANEL - Abnormal; Notable for the following components:    Potassium 3.1 (*)     CO2 21 (*)     Calcium 8.6 (*)     Albumin 2.7 (*)     All other components within normal limits   TROPONIN I - Abnormal; Notable for the following components:    Troponin I 0.034 (*)     All other components within normal limits   CULTURE, BLOOD   CULTURE, BLOOD   LACTIC ACID, PLASMA   MAGNESIUM   B-TYPE NATRIURETIC PEPTIDE   SARS-COV-2 RDRP GENE   POCT INFLUENZA A/B MOLECULAR        ECG Results              EKG 12-lead (In process)        Collection Time Result Time QRS Duration OHS QTC Calculation    02/29/24 10:10:10 02/29/24 10:57:07 106 501                     In process by Interface, Lab In [facility] (02/29/24 10:57:33)                   Narrative:    Test Reason : R06.02,    Vent. Rate : 107 BPM     Atrial Rate : 107 BPM     P-R Int : 178 ms          QRS Dur : 106 ms      QT Int : 376 ms       P-R-T Axes : 052 035 010 degrees     QTc Int : 501 ms    Sinus tachycardia with occasional Premature ventricular complexes  Otherwise normal ECG  No previous ECGs  available    Referred By: [email or web address]   SELF           Confirmed By:                       In process by Interface, Lab In [facility] (02/29/24 10:28:34)                   Narrative:    Test Reason : R06.02,    Vent. Rate : 107 BPM     Atrial Rate : 107 BPM     P-R Int : 178 ms          QRS Dur : 106 ms      QT Int : 376 ms       P-R-T Axes : 052 035 010 degrees     QTc Int : 501 ms    Sinus tachycardia with occasional Premature ventricular complexes  Otherwise normal ECG  No previous ECGs available    Referred By: AAAREFERR   SELF           Confirmed By:                                   Imaging Results              X-Ray Chest 1 View (Final result)  Result time 02/29/24 11:13:41      Final result by Ean Loya DO (02/29/24 11:13:41)                   Impression:      No acute cardiopulmonary abnormality.      Electronically signed by: Ean Loya  Date:    02/29/2024  Time:    11:13               Narrative:    EXAMINATION:  XR CHEST 1 VIEW    CLINICAL HISTORY:  Shortness of breath;    TECHNIQUE:  Single frontal view of the chest was performed.    COMPARISON:  None    FINDINGS:  The lungs are well expanded and clear.  No focal opacities are seen.  The pleural spaces are clear the cardiac silhouette is enlarged.  Osseous structures demonstrate degenerative changes.                                       Medications   potassium chloride SA CR tablet 20 mEq (has no administration in time range)   albuterol-ipratropium 2.5 mg-0.5 mg/3 mL nebulizer solution 3 mL (has no administration in time range)   cefTRIAXone (Rocephin) 1 g in dextrose 5 % in water (D5W) 100 mL IVPB (MB+) (has no administration in time range)   albuterol sulfate nebulizer solution 15 mg (15 mg Nebulization Given 2/29/24 1015)     Medical Decision Making  DDx :  Including but not limited to :  Bronchitis, asthma exacerbation, pneumonia, COVID-19 infection, influenza, pleural effusion, pulmonary embolism.    Emergent evaluation of 68-year-old male  sent from his nursing home with shortness of breath.  Patient reportedly has a recent diagnosis of pneumonia.  He has extremely coarse breath sounds on presentation with low oxygen saturations.  Patient was placed on oxygen and giving breathing treatments.  He showed somewhat of an improvement after treatments but I feel he will require admission.  When he is taken off oxygen oxygen saturations drop into the upper 80s to low 90s.  This has been discussed with the [facility] internal medicine resident, who will admit.    Amount and/or Complexity of Data Reviewed  Labs: ordered.     Details: CBC with a white blood cell count of 2.7, hemoglobin of 8.5, hematocrit of 25.6.  CMP shows a potassium of 3.1.  Troponin is slightly elevated at 0.034.  Radiology: ordered.     Details: Chest x-ray with possible left lower infiltrate.  Discussion of management or test interpretation with external provider(s): Patient's symptoms, chest x-ray and pertinent lab work discussed with [facility] internal medicine resident.    Risk  Prescription drug management.                                      Clinical Impression:  Final diagnoses:  [R06.02] Shortness of breath  [R09.02] Hypoxia (Primary)          ED Disposition Condition    Observation Stable                Maurisio Medellin MD  02/29/24 1210

## 2024-02-29 NOTE — PT/OT/SLP EVAL
Occupational Therapy   Evaluation    Name: Harrison Haywood  MRN: [id number]  Admitting Diagnosis: <principal problem not specified>  Recent Surgery: * No surgery found *      Recommendations:     Discharge Recommendations: Moderate Intensity Therapy  Discharge Equipment Recommendations:  to be determined by next level of care  Barriers to discharge:  None    Assessment:     Harrison Haywood is a 68 y.o. male with a medical diagnosis of <principal problem not specified>.  He presents with The primary encounter diagnosis was Hypoxia. Diagnoses of Shortness of breath, Elevated troponin, and Chest pain were also pertinent to this visit.  . Performance deficits affecting function: weakness, impaired endurance, impaired self care skills, impaired functional mobility, gait instability, impaired balance, decreased safety awareness, decreased lower extremity function, decreased upper extremity function, pain, impaired skin, decreased coordination, impaired coordination, decreased ROM.      Pt would benefit from cont OT services in order to maximize functional independence. Recommending moderate intensity therapy at d/c    Rehab Prognosis: Good; patient would benefit from acute skilled OT services to address these deficits and reach maximum level of function.       Plan:     Patient to be seen 5 x/week to address the above listed problems via self-care/home management, therapeutic activities, therapeutic exercises  Plan of Care Expires: 03/29/24  Plan of Care Reviewed with: patient, sibling    Subjective     Chief Complaint: SOB; labored breathing; being hungry   Patient/Family Comments/goals: none stated     Occupational Profile:  Living Environment: pt resident of NH   Previous level of function: sister present and providing info 2/2 pt with labored breathing and increased RR during session ; reports pt was able to ambulate with RW bed<>bathroom for toileting; t/f to w/c and self propel; nurse 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  Patient found supine with   upon OT entry to room.    General Precautions: Standard, fall  Orthopedic Precautions: N/A  Braces: N/A  Respiratory Status: Nasal cannula, flow 2 L/min    Occupational Performance:    Bed Mobility:    Patient completed Rolling/Turning to Left with  minimum assistance and moderate assistance  Patient completed Rolling/Turning to Right with minimum assistance and moderate assistance  Patient completed Scooting/Bridging with dependent and 2 persons    Functional Mobility/Transfers:  N/A     Activities of Daily Living:  N/A     Cognitive/Visual Perceptual:   Pt appearing oriented   Appears in slight respiratory distress and with labored breathing throughout session   Impaired insight   Anxious appearing   Limited follow through with PLB following education       Physical Exam:  Balance:    -       n/a   Skin integrity: Wound sacrum   Dominant hand:    -       left   Upper Extremity Range of Motion:   limited assessment 2/2 pt supine throughout session; LUE appears WFL for pt's needs; pt with history of spinal meningitis causing deficits to RUE   Upper Extremity Strength:  LUE appears WFL for pt's needs; RUE grossly diminished, but able to use as gross assist    Strength: 2+ to 3/5 R hand; L WFL     Fine Motor Coordination:  impaired R hand     AMPAC 6 Click ADL:  AMPAC Total Score: 13    Treatment & Education:  Pt found supine; increased RR throughout session - mid 30's up to 45 while supine; HR in 120's at rest and up to 135   Pt appearing anxious and sweating throughout session; significant use of accessory muscles for breathing   Pt declining EOB axs despite being significantly scooted down in  stretcher  Rolling performing in order to replace linens/drawsheet  Scooted to HOB   Increased time for education on PLB and decreasing RR   Provided with cool wash cloths for face and neck   Encouraged weight shifting in bed to prevent further skin breakdown     Patient left supine with all lines intact, call button in reach, nsg notified, and sister  present    GOALS:   Multidisciplinary Problems       Occupational Therapy Goals          Problem: Occupational Therapy    Goal Priority Disciplines Outcome Interventions   Occupational Therapy Goal     OT, PT/OT Ongoing, Progressing    Description: Goals to be met by: 3/29/24     Patient will increase functional independence with ADLs by performing:    Grooming while seated with Stand-by Assistance.  Toileting from bedside commode with Moderate Assistance for hygiene and clothing management.   Sitting at edge of bed x10 minutes with Stand-by Assistance.  Rolling to Bilateral with Stand-by Assistance.   Supine to sit with Stand-by Assistance.  Stand pivot transfers with Minimal Assistance.  Increased functional strength to WFL for self care skills and functional mobility .  Upper extremity exercise program x10 reps per handout, with independence.                         History:     No past medical history on file.    No past surgical history on file.    Time Tracking:     OT Date of Treatment: 02/29/24  OT Start Time: 1457  OT Stop Time: 1514  OT Total Time (min): 17 min    Billable Minutes:Evaluation 17    2/29/2024

## 2024-02-29 NOTE — H&P
[facility] Medicine H&P Note     Admitting Team: [facility] Hospitalist Team A  Attending Physician: Ayden Yi MD  Resident: Mukesh  Intern: Drew    Date of Admit: 2/29/2024    Chief Complaint     Shortness of Breath (Nursing home reports recently dx with pneumonia. This morning c/o SOB with decreased O2 sats. )   for 3 days    Subjective:      History of Present Illness:  Harrison Haywood is a 68 y.o. 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 The patient presented to [facility] on 2/29/2024 with a primary complaint of Shortness of Breath (Nursing home reports recently dx with pneumonia. This morning c/o SOB with decreased O2 sats. )  The patient was in their usual state of health until 3 days ago.Patients sister at [facility] who provides history. She reports that on Tuesday he was noted to have an increased cough and some shortness of breath, so they did a CXR at the NH ([facility]) and started treating him for a pneumonia with levofloxacin which he started on Wednesday per nursing home and lasix 20 mg daily for some fluid in his lungs. The shortness of breath have persisted however it was worse today.   Per the patient he feels short of breath and very anxious. He has never felt this way before. He notes having a dry cough however occasionally has some clear phlegm. He denies a choking episode however per his sister he has a sacral would and he tries to eat laying down because his back hurts from the lytic lesions and his sacral wound. Per the NH he had a low grade fever to 100 yesterday. He has had decreased ambulation recently but they have been doing physical therapy and he sits up in the wheel chiar every day.         Past Medical History:  Meningitis as a child with resultant R hemiplegia  Multiple myeloma with lytic spinal lesions on  chemotherapy infusions  HTN  HLD  Anxiety  Sacral decubitus ulcer    Past Surgical History:  No past surgical history on file.    Allergies:  Review of patient's allergies indicates:  No Known Allergies    Home Medications:  Prior to Admission medications    Medication Sig Start Date End Date Taking? Authorizing Provider   allopurinoL (ZYLOPRIM) 300 MG tablet Take 300 mg by mouth once daily. 2/2/24  Yes Provider, Historical   FLUoxetine 40 MG capsule Take 40 mg by mouth. 2/14/24  Yes Provider, Historical   pantoprazole (PROTONIX) 40 MG tablet Take 40 mg by mouth once daily. 11/10/23  Yes Provider, Historical   vitamin D (VITAMIN D3) 1000 units Tab Take 1,000 Units by mouth once daily. 11/9/23  Yes Provider, Historical   amLODIPine (NORVASC) 5 MG tablet Take 1 tablet by mouth once daily.    Provider, Historical   calcium carbonate (OS-SONIA) 600 mg calcium (1,500 mg) Tab Take 1,200 mg by mouth every evening.    Provider, Historical   celecoxib (CELEBREX) 200 MG capsule Take 200 mg by mouth once daily.    Provider, Historical   ciclopirox (PENLAC) 8 % Soln Apply topically nightly. 12/5/18   Chris Cotton, DPM   fenofibrate (TRICOR) 145 MG tablet Take 1 tablet by mouth once daily.    Provider, Historical   rosuvastatin (CRESTOR) 20 MG tablet Take 20 mg by mouth once daily.    Provider, Historical   tiZANidine 2 mg Cap Take 1 capsule by mouth 2 (two) times daily.    Provider, Historical   valACYclovir (VALTREX) 500 MG tablet Take 500 mg by mouth once daily.    Provider, Historical       Family History:  Non-contributory    Social History:  Social History     Tobacco Use    Smoking status: Never       Review of Systems:  Denies headache, lightheadedness or dizziness  Denies chest pain, swelling of legs or feet, + shortness of breath, cough  Denies abdominal pain, nausea, vomiting, constipation, or diarrhea  MSK: reports back pain  Endo: denies polyuria or polydypsia    Health Maintaince :   Primary Care Physician:  [facility]    Immunizations:   TDap unknown    Flu: not UTD  Pna not received per sister    Cancer Screening:  Colonoscopy: not UTD     Objective:   Last 24 Hour Vital Signs:  BP  Min: 121/64  Max: 153/68  Temp  Av.3 °F (37.4 °C)  Min: 99.3 °F (37.4 °C)  Max: 99.3 °F (37.4 °C)  Pulse  Av.4  Min: 103  Max: 110  Resp  Av.7  Min: 20  Max: 29  SpO2  Av.1 %  Min: 92 %  Max: 98 %  There is no height or weight on file to calculate BMI.  No intake/output data recorded.    Physical Examination:  General: patient appeared in significant distress upon entering room, intermittently patient would be calm and respiratory status would improve, appeared anxious  HEENT: NC, AT, EOM intact, NC in place, O2 sat 95% on 1L NC, tacky mucous membranes, normal ROM of neck  CV: tachycardic, normal S1/S2  Pulm: diffuse inspiratory and expiratory wheezing with decreased air movement throughout  Abd: soft, distended, BS present, no fluid wave or shifting dullness  MSK: R leg smaller than L but no pitting edema, no redness or tenderness to palpation, decreased ability to flex R hand, 5/5 strength in b/l upper and lower extremities  Neuro; able to follow commands    Laboratory:  Most Recent Data:  CBC:   Lab Results   Component Value Date    WBC 2.70 (L) 2024    HGB 8.5 (L) 2024    HCT 25.6 (L) 2024    PLT 95 (L) 2024    MCV 96 2024    RDW 16.5 (H) 2024     WBC Differential: 83 % N, 3 % Bands, 11 % L, 3 % M, 0 % Eo, 0 % Baso, 0 additional cells seen  BMP:   Lab Results   Component Value Date     2024    K 3.1 (L) 2024     2024    CO2 21 (L) 2024    BUN 18 2024    CREATININE 0.8 2024     2024    CALCIUM 8.6 (L) 2024    MG 2.0 2024     LFTs:   Lab Results   Component Value Date    PROT 6.2 2024    ALBUMIN 2.7 (L) 2024    BILITOT 0.6 2024    AST 39 2024    ALKPHOS 112 2024    ALT 13  "02/29/2024     Coags: No results found for: "INR", "PROTIME", "PTT"  FLP: No results found for: "CHOL", "HDL", "LDLCALC", "TRIG", "CHOLHDL"  DM:   Lab Results   Component Value Date    CREATININE 0.8 02/29/2024     Thyroid: No results found for: "TSH", "FREET4", "A0MHWOM", "L5RKCUU", "THYROIDAB"  Anemia: No results found for: "IRON", "TIBC", "FERRITIN", "CTTGMNXF77", "FOLATE"  Cardiac:   Lab Results   Component Value Date    TROPONINI 0.037 (H) 02/29/2024    BNP 95 02/29/2024     Urinalysis:   Lab Results   Component Value Date    COLORU Yellow 11/03/2023    UROBILINOGEN 1.0 11/03/2023       Trended Lab Data:  Recent Labs   Lab 02/29/24  1039   WBC 2.70*   HGB 8.5*   HCT 25.6*   PLT 95*   MCV 96   RDW 16.5*      K 3.1*      CO2 21*   BUN 18   CREATININE 0.8      PROT 6.2   ALBUMIN 2.7*   BILITOT 0.6   AST 39   ALKPHOS 112   ALT 13       Trended Cardiac Data:  Recent Labs   Lab 02/29/24  1039 02/29/24  1307   TROPONINI 0.034* 0.037*   BNP 95  --        Microbiology Data:  Respiratory viral panel: RSV +  MRSA nares pending  Blood culture X 2 (02/29) pending    Other Results:  EKG (my interpretation):   Sinus tachycarida, poor baseline, intermittent PVC    Radiology:  Imaging Results              X-Ray Chest 1 View (Final result)  Result time 02/29/24 11:13:41      Final result by Ean Loya DO (02/29/24 11:13:41)                   Impression:      No acute cardiopulmonary abnormality.      Electronically signed by: aEn Loya  Date:    02/29/2024  Time:    11:13               Narrative:    EXAMINATION:  XR CHEST 1 VIEW    CLINICAL HISTORY:  Shortness of breath;    TECHNIQUE:  Single frontal view of the chest was performed.    COMPARISON:  None    FINDINGS:  The lungs are well expanded and clear.  No focal opacities are seen.  The pleural spaces are clear the cardiac silhouette is enlarged.  Osseous structures demonstrate degenerative changes.                                    CTPE " (02/29):   No evidence of acute PE, patchy opacities in gravity dependent portions of both lower lobes, atelectasis         Assessment:     Harrison Haywood is a 68 y.o. male with:  Patient Active Problem List    Diagnosis Date Noted    Shortness of breath 02/29/2024    Acute respiratory failure with hypoxia 02/29/2024    Pneumonia of left lower lobe due to infectious organism 02/29/2024    Reactive airway disease with status asthmaticus 02/29/2024    Multiple myeloma not having achieved remission 02/29/2024    Hypokalemia 02/29/2024    Elevated troponin 02/29/2024    Primary hypertension 02/29/2024    Other hyperlipidemia 02/29/2024    Sacral decubitus ulcer, stage IV 02/29/2024   Due to acute worsening and significant wheezing requiring Q1H albuterol concerning for reactive airway disease with status asthmaticus. Admitted to ICU for closer monitoring and frequent albuterol.      Plan:     Reactive airway with status asthmaticus 2/2 RSV and possible R pneumonia  - satting 90-93% on RA, satting 95% on 1L NC, will continue to wean as tolerated  - s/p duonebs X3, multiple 1 hour continuous, IV magnesium  - no evidence of PE  - will treat with rocephin and azithromycin due to to concern for possible pneumonia of R lung  - albuterol Q1H, will space as tolerated   - solumedrol 60 mg Q6H  - continuous pulse oximetry  - incentive spirometry    Pancytopenia 2/2 Multiple myeloma  - at baseline  - will continue to monitor    Hypokalemia  - K 3.1  - will replete PRN    Mild troponinemia  - denies chest pain, EKG stable  - 0.034-0.037 stable therefore will not repeat unless endorsing chest pain  - no evidence of PE on CT PE    Prolonged Qtc  - Qtc 506  - will avoid QTC prolonging medications    Stage 4 sacral decubitus ulcer, POA  - wound care consulted  - turn Q2H    Multiple myeloma  - contineu allopurinol 300 daily and valacyclovir 500 po daily for ppx  - continue calcium and vitamin D for bony invvolvement  On off week of  chemotherapy medications:   Revlimid 25 mg PO daily x 14 days.   Velcade 1.3 mg/m2 subcutaneous on days 1, 4, 8, 11  Decadron 40 mg PO daily on days 1, 8, 15     Ppx: lovenox  Diet: regular    Dispo: admit to ICU for closer respiratory monitoring and frequent albuterol      Code Status:     full    Tracee Mayorga MD  [facility] Internal Medicine HO-4    [facility] Medicine Hospitalist Pager numbers:   [facility] Hospitalist Medicine Team A (Kassidy/Iris): [phone]  [facility] Hospitalist Medicine Team B (Mel/Eduardo):  [phone]

## 2024-02-29 NOTE — PLAN OF CARE
Problem: Occupational Therapy  Goal: Occupational Therapy Goal  Description: Goals to be met by: 3/29/24     Patient will increase functional independence with ADLs by performing:    Grooming while seated with Stand-by Assistance.  Toileting from bedside commode with Moderate Assistance for hygiene and clothing management.   Sitting at edge of bed x10 minutes with Stand-by Assistance.  Rolling to Bilateral with Stand-by Assistance.   Supine to sit with Stand-by Assistance.  Stand pivot transfers with Minimal Assistance.  Increased functional strength to WFL for self care skills and functional mobility .  Upper extremity exercise program x10 reps per handout, with independence.    Outcome: Ongoing, Progressing       Pt would benefit from cont OT services in order to maximize functional independence. Recommending moderate intensity therapy at d/c

## 2024-02-29 NOTE — PHARMACY MED REC
"  Admission Medication History     The home medication history was taken by Jeanette Cardozo CPhT.    Medication history obtained from, [facility] List Verified     You may go to "Admission" then "Reconcile Home Medications" tabs to review and/or act upon these items.     The home medication list has been updated by the Pharmacy department.   Please read ALL comments highlighted in yellow.   Please address this information as you see fit.    Feel free to contact us if you have any questions or require assistance.      The medications listed below were removed from the home medication list.  Please reorder if appropriate:  Patient reports no longer taking the following medication(s):  Alendronate 70 mg  Apixaban 5 mg  Atorvastatin 20 mg  Bactrim -160 mg  Carvedilol 3.125 mg  Celecoxib 200 mg  Diovan -12.5 mg  Gabapentin 100 mg  Lenalidomide 25 mg  M-Dryl 12.5 mg/5ml liquid  Metformin 500 mg  Norco 5-325 mg  Oxycodone 5 mg  Penlac 8% solution  Prednisone 20 mg          Jeanette Cardozo CPhT.  Ext [phone]             .          "

## 2024-02-29 NOTE — PLAN OF CARE
PT evaluation completed. Pt anxious and tachypneic throughout session. RR is 40s throughout session. Patient accessory muscle breathing throughout session. Assisted with rolling L/R with mod a for repositioning in the bed. Pt dependently boosted with a x 2 toward HOB to improve upright postioning for improved oxygenation. Spo2 88-92% on 2 L and -136 bpm during bed level evaluation. Pt admitted from NH and was ambulatory with RW for short distances to/from bathroom.

## 2024-02-29 NOTE — PT/OT/SLP EVAL
Physical Therapy Evaluation    Patient Name:  Harrison Haywood   MRN:  [id number]    Recommendations:     Discharge Recommendations: Moderate Intensity Therapy   Discharge Equipment Recommendations: none   Barriers to discharge: None    Assessment:     Harrison Haywood is a 68 y.o. male admitted with a medical diagnosis of <principal problem not specified>.  He presents with the following impairments/functional limitations: weakness, impaired endurance, impaired functional mobility, gait instability, decreased lower extremity function, decreased upper extremity function, impaired balance, impaired cardiopulmonary response to activity, impaired muscle length. PT evaluation completed. Pt anxious and tachypneic throughout session. RR is in the 40s throughout and patient accessory muscle breathing throughout  Assisted with rolling L/R with mod a for repositioning in the bed. Pt dependently boosted with a x 2 toward HOB to improve upright postioning for improved oxygenation. Spo2 88-92% on 2 L and -136 bpm during bed level evaluation. Pt having difficulty tolerating HOB to 30 degrees as patient states it is uncomfortable. Pt also with stage 4 sacral wound per RN report. Pt admitted from NH and was ambulatory with RW for short distances to/from bathroom.     Rehab Prognosis: Fair; patient would benefit from acute skilled PT services to address these deficits and reach maximum level of function.    Recent Surgery: * No surgery found *      Plan:     During this hospitalization, patient to be seen 3 x/week to address the identified rehab impairments via gait training, therapeutic activities, therapeutic exercises, neuromuscular re-education and progress toward the following goals:    Plan of Care Expires:  3/14/24     Subjective     Chief Complaint: shortness of breath   Patient/Family Comments/goals: to breathe better   Pain/Comfort:  Pain Rating 1: 0/10    Patients cultural, spiritual, Hindu conflicts given the current  situation:      Living Environment:  Pt resides in NH at baseline.   Prior to admission, patients level of function was mod I with RW for short distances to/from bathroom. Occasionally, pt transfers to [facility] and self propels in [facility] around facility.   Equipment used at home: walker, rolling, wheelchair.  DME owned (not currently used): none.  Upon discharge, patient will have assistance from NH staff .    Objective:     Communicated with RN prior to session.  Patient found supine with blood pressure cuff, oxygen, pulse ox (continuous), telemetry  upon PT entry to room.    General Precautions: Standard, fall  Orthopedic Precautions:N/A   Braces: N/A  Respiratory Status: Nasal cannula, flow 2 L/min    Exams:  Cognitive Exam:  Patient is oriented to Person and Place  Fine Motor Coordination:    -       Impaired  Right hand thumb/finger opposition skills unable to perform.   Sensation:    -       Intact  RLE ROM: WFL except R ankle DF/PF, limited at baseline. Sister reports residual deficit from hx of having spinal meningitis as a baby.   RLE Strength: WFL except R ankle DF/PF 2/5.   LLE ROM: WFL  LLE Strength: WFL    Functional Mobility:  Bed Mobility:     Rolling Left:  moderate assistance  Rolling Right: moderate assistance      AM-PAC 6 CLICK MOBILITY  Total Score:10       Treatment & Education:  Educated patient on strategies to decrease RR and PLB. PT provided demonstration of PLB and counted for pt inhale for 2, exhale for 5. Discussed role of acute care PT with pt and sister at bedside.Pt with mild diaphoresis in bed intermittently throughout session. Pt's vitals responsive to relaxation techniques as HR decreased and Spo2 increased to low 90s. Discussed expectations for therapy POC with sister moving forward. Recommend [facility] at time of dc.     Patient left supine with all lines intact, call button in reach, RN notified, and daughter present.    GOALS:   Multidisciplinary Problems       Physical Therapy Goals           Problem: Physical Therapy    Goal Priority Disciplines Outcome Goal Variances Interventions   Physical Therapy Goal     PT, PT/OT      Description: Goals to be met by: 3/14/14     Patient will increase functional independence with mobility by performin. Supine to sit with MInimal Assistance  2. Sit to supine with MInimal Assistance  3. Sit to stand transfer with Stand-by Assistance w/RW  4. Bed to chair transfer with Minimal Assist using Rolling Walker                         History:     No past medical history on file.    No past surgical history on file.    Time Tracking:     PT Received On: 24  PT Start Time: 1457     PT Stop Time: 1514  PT Total Time (min): 17 min     Billable Minutes: Evaluation 17        2024

## 2024-02-29 NOTE — CONSULTS
[facility] Medicine Note    Primary Attending Physician: Kassidy  ICU Attending: Tg    Reason for Consult:     Shortness of breath    Subjective:      History of Present Illness:  69 yo with history of multiple myeloma, developmental delay presents from nursing home with shortness of breath. Symptoms started 2 days ago with a new cough productive of yellow sputum and shortness of breath on exertion. He was placed on levoquin and when his symptoms did not improve brought to the hospital.     Patient denies any history of lung disease or using inhalers at home. He is a never smoker. He is on Velcade injections for his multiple myeloma and Xgeva.     In the ER he was given solumedrol, duonebs, ceftriaxone, magnesium.     When patient was transported to the ICU, he went from breathing 40 times a minute with accessory muscle use and appearing distressed to breathing 18 times a minute without any intervention in the 5 minutes between.     Past Medical History:  Multiple myeloma to cervical and thoracic spine  Major depressive Disorder      Past Surgical History:  No past surgical history on file.    Allergies:  Review of patient's allergies indicates:  No Known Allergies    Medications:   In-Hospital Scheduled Medications:   albuterol sulfate        albuterol sulfate  5 mg Nebulization Q1H    [START ON 3/1/2024] allopurinoL  300 mg Oral Daily    amLODIPine  5 mg Oral Daily    azithromycin  500 mg Intravenous Q24H    calcium carbonate  1,500 mg Oral QHS    [START ON 3/1/2024] cefTRIAXone (Rocephin) IV (PEDS and ADULTS)  1 g Intravenous Q24H    [START ON 3/1/2024] celecoxib  200 mg Oral Daily    enoxparin  40 mg Subcutaneous Daily    etomidate        [START ON 3/1/2024] fenofibrate  145 mg Oral Daily    FLUoxetine  40 mg Oral Daily    lactated ringers  500 mL Intravenous Once    magnesium sulfate IVPB  2 g Intravenous Once    methylPREDNISolone sodium succinate injection  60 mg Intravenous Q6H     pantoprazole  40 mg Oral Daily    potassium bicarbonate  35 mEq Oral Q2H    rocuronium        tiZANidine  2 mg Oral BID    valACYclovir  500 mg Oral Daily    [START ON 3/1/2024] vitamin D  1,000 Units Oral Daily      In-Hospital PRN Medications:  sodium chloride 0.9%, albuterol sulfate, etomidate, melatonin, rocuronium, sodium chloride 0.9%   In-Hospital IV Infusion Medications:     Home Medications:  Prior to Admission medications    Medication Sig Start Date End Date Taking? Authorizing Provider   acetaminophen (TYLENOL) 650 MG TbSR Take 650 mg by mouth every 8 (eight) hours as needed.   Yes Provider, Historical   allopurinoL (ZYLOPRIM) 300 MG tablet Take 300 mg by mouth once daily. 2/2/24  Yes Provider, Historical   amLODIPine (NORVASC) 5 MG tablet Take 5 mg by mouth once daily.   Yes Provider, Historical   ascorbic acid, vitamin C, (VITAMIN C) 500 MG tablet Take 500 mg by mouth once daily.   Yes Provider, Historical   calcium carbonate (OS-SONIA) 600 mg calcium (1,500 mg) Tab Take 1,200 mg by mouth every evening.   Yes Provider, Historical   dexAMETHasone (DECADRON) 4 MG Tab Take 40 mg by mouth every Monday. 1/8/24  Yes Provider, Historical   fenofibrate (TRICOR) 145 MG tablet Take 145 mg by mouth once daily.   Yes Provider, Historical   FLUoxetine 40 MG capsule Take 40 mg by mouth once daily. 2/14/24  Yes Provider, Historical   guaiFENesin 100 mg/5 ml (MCKENNA-TUSSIN) 100 mg/5 mL syrup Take 200 mg by mouth every 4 (four) hours as needed for Cough.   Yes Provider, Historical   levETIRAcetam (KEPPRA) 750 MG Tab Take 500 mg by mouth once daily.   Yes Provider, Historical   magic mouthwash diphen/antac/lidoc Swish and spit 10 mLs every 6 (six) hours as needed.   Yes Provider, Historical   menthol-zinc oxide (CALMOSEPTINE) 0.44-20.6 % Oint Apply topically every other day. And as needed   Yes Provider, Historical   multivitamin (ONE DAILY MULTIVITAMIN) per tablet Take 1 tablet by mouth once daily.   Yes Provider,  Historical   ondansetron (ZOFRAN) 4 MG tablet Take 4 mg by mouth 4 (four) times daily as needed.   Yes Provider, Historical   pantoprazole (PROTONIX) 40 MG tablet Take 40 mg by mouth before breakfast. 11/10/23  Yes Provider, Historical   rosuvastatin (CRESTOR) 20 MG tablet Take 20 mg by mouth once daily.   Yes Provider, Historical   tiZANidine (ZANAFLEX) 2 MG tablet Take 2 mg by mouth 2 (two) times a day.   Yes Provider, Historical   traMADoL (ULTRAM) 50 mg tablet Take 50 mg by mouth every 8 (eight) hours as needed for Pain. 2/22/24  Yes Provider, Historical   valACYclovir (VALTREX) 500 MG tablet Take 500 mg by mouth once daily.   Yes Provider, Historical   VITAMIN D3 10 mcg (400 unit) tablet Take 2 tablets by mouth every evening. 2/2/24  Yes Provider, Historical   zinc sulfate (ZINCATE) 50 mg zinc (220 mg) capsule Take 220 mg by mouth once daily.   Yes Provider, Historical   alendronate (FOSAMAX) 70 MG tablet Take 70 mg by mouth every 7 days. 11/2/23 2/29/24 Yes Provider, Historical   calcium carbonate (OS-SONIA) 600 mg calcium (1,500 mg) Tab Take 1 tablet by mouth 2 (two) times daily with meals. 11/9/23 2/29/24 Yes Provider, Historical   gabapentin (NEURONTIN) 100 MG capsule Take 100 mg by mouth. 11/2/23 2/29/24 Yes Provider, Historical   MCKENNA-LANTA 200-200-20 mg/5 mL Susp Take by mouth. 11/22/23 2/29/24 Yes Provider, Historical   lenalidomide 25 mg Cap Take 25 mg by mouth. 2/27/24 2/29/24 Yes Provider, Historical   LIDOCAINE VISCOUS 2 % solution Take by mouth. 11/22/23 2/29/24 Yes Provider, Historical   M-DRYL 12.5 mg/5 mL liquid Take by mouth. 11/22/23 2/29/24 Yes Provider, Historical   metFORMIN (GLUCOPHAGE) 500 MG tablet Take 500 mg by mouth 2 (two) times daily. 10/6/23 2/29/24 Yes Provider, Historical   oxyCODONE (ROXICODONE) 5 MG immediate release tablet Take by mouth. 11/9/23 2/29/24 Yes Provider, Historical   sulfamethoxazole-trimethoprim 800-160mg (BACTRIM DS) 800-160 mg Tab Take 1 tablet by mouth. 1/18/24  2/29/24 Yes Provider, Historical   tiZANidine (ZANAFLEX) 4 MG tablet Take 2 mg by mouth. 11/2/23 2/29/24 Yes Provider, Historical   vitamin D (VITAMIN D3) 1000 units Tab Take 1,000 Units by mouth once daily. 11/9/23 2/29/24 Yes Provider, Historical   bisacodyL (DULCOLAX) 5 mg EC tablet Take 5 mg by mouth daily as needed for Constipation.    Provider, Historical   furosemide (LASIX) 20 MG tablet Take 20 mg by mouth once daily.    Provider, Historical   acetaminophen (TYLENOL) 650 MG Supp Place 650 mg rectally every 4 (four) hours as needed.  2/29/24  Provider, Historical   amLODIPine (NORVASC) 2.5 MG tablet Take 1 tablet by mouth every evening.  2/29/24  Provider, Historical   apixaban (ELIQUIS) 5 mg Tab Take 1 tablet by mouth 2 (two) times daily.  2/29/24  Provider, Historical   atorvastatin (LIPITOR) 20 MG tablet TK 1 T PO QD HS  2/29/24  Provider, Historical   carvediloL (COREG) 3.125 MG tablet Take 1 tablet by mouth 2 (two) times daily.  2/29/24  Provider, Historical   celecoxib (CELEBREX) 200 MG capsule Take 200 mg by mouth once daily.  2/29/24  Provider, Historical   celecoxib (CELEBREX) 200 MG capsule Take 1 capsule by mouth 2 (two) times daily.  2/29/24  Provider, Historical   ciclopirox (PENLAC) 8 % Soln Apply topically nightly. 12/5/18 2/29/24  Chris Cotton, DPM   fenofibrate (TRICOR) 145 MG tablet Take 1 tablet by mouth once daily.  2/29/24  Provider, Historical   FLUoxetine 10 MG capsule Take 1 capsule by mouth once daily.  2/29/24  Provider, Historical   FLUoxetine 20 MG capsule Take 1 capsule by mouth once daily.  2/29/24  Provider, Historical   HYDROcodone-acetaminophen (NORCO) 5-325 mg per tablet Take 1 tablet by mouth every 6 (six) hours as needed.  2/29/24  Provider, Historical   predniSONE (DELTASONE) 20 MG tablet TAKE 3 TABLETS BY MOUTH ONCE DAILY FOR 5 DAYS  2/29/24  Provider, Historical   tiZANidine 2 mg Cap Take 1 capsule by mouth 2 (two) times daily.  2/29/24  Provider, [place]    valsartan-hydrochlorothiazide (DIOVAN-HCT) 320-12.5 mg per tablet   24  Provider, Historical       Family History:  No family history on file.    Social History:  Social History     Tobacco Use    Smoking status: Never       Review of Systems:  Pertinent items are noted in HPI. All other systems are reviewed and are negative.     Objective:   Last 24 Hour Vital Signs:  BP  Min: 109/62  Max: 153/68  Temp  Av.6 °F (37 °C)  Min: 97.9 °F (36.6 °C)  Max: 99.3 °F (37.4 °C)  Pulse  Av.6  Min: 103  Max: 124  Resp  Av.5  Min: 20  Max: 34  SpO2  Av.6 %  Min: 92 %  Max: 98 %  Height  Av' (182.9 cm)  Min: 6' (182.9 cm)  Max: 6' (182.9 cm)  Weight  Av.4 kg (214 lb 13.4 oz)  Min: 81.5 kg (179 lb 10.8 oz)  Max: 113.4 kg (250 lb)  No intake/output data recorded.    Physical Examination:  General: Awake, alert, speaking in 3 word sentences  HEENT: large neck circumference  CV: warm extrmeities, no MRG  Lungs: Tachypneic, no wheezes, no rales  Extremities: no pretibial edema, no jessica's sign  Skin: Warm, dry, sacral ulcer noted  Neuro: awake, alert, moving all extremities  Psych: Slow to respond to questions    Laboratory:  Trended Lab Data:  Recent Labs     24  1039   WBC 2.70*   HGB 8.5*   HCT 25.6*   PLT 95*      K 3.1*      CO2 21*   BUN 18   CREATININE 0.8      BILITOT 0.6   AST 39   ALT 13   ALKPHOS 112   CALCIUM 8.6*   ALBUMIN 2.7*   PROT 6.2   MG 2.0       Cardiac:   Recent Labs   Lab 24  1039 24  1307   TROPONINI 0.034* 0.037*   BNP 95  --        Urinalysis:   Lab Results   Component Value Date    COLORU Yellow 2023    UROBILINOGEN 1.0 2023       Microbiology:  Microbiology Results (last 7 days)       Procedure Component Value Units Date/Time    Respiratory Infection Panel (PCR), Nasopharyngeal [[id number]] Collected: 24 1637    Order Status: Completed Specimen: Nasopharyngeal Swab Updated: 24 1649     Respiratory Infection  Panel Source NP Swab    Narrative:      For all other respiratory sources, order [id number] -  Respiratory Viral Panel by PCR    Nasal culture [[id number]] Collected: 02/29/24 1637    Order Status: Sent Specimen: Nasopharyngeal from Nose Updated: 02/29/24 1648    Blood Culture #1 **CANNOT BE ORDERED STAT** [[id number]] Collected: 02/29/24 1039    Order Status: Sent Specimen: Blood from Peripheral, Hand, Right Updated: 02/29/24 1534    Blood Culture #2 **CANNOT BE ORDERED STAT** [[id number]] Collected: 02/29/24 1039    Order Status: Sent Specimen: Blood from Peripheral, Hand, Left Updated: 02/29/24 1534            Radiology:   2/29:  CXR: No focal infiltrates, normal cardiac siloutte, no effusions    CTPE (my read): No central PE, poor contrast bolus timing, some atelectasis no significant airway disease     I have personally reviewed the above labs and imaging.    Current Medications:     Infusions:       Scheduled:   albuterol sulfate        albuterol sulfate  5 mg Nebulization Q1H    [START ON 3/1/2024] allopurinoL  300 mg Oral Daily    amLODIPine  5 mg Oral Daily    azithromycin  500 mg Intravenous Q24H    calcium carbonate  1,500 mg Oral QHS    [START ON 3/1/2024] cefTRIAXone (Rocephin) IV (PEDS and ADULTS)  1 g Intravenous Q24H    [START ON 3/1/2024] celecoxib  200 mg Oral Daily    enoxparin  40 mg Subcutaneous Daily    etomidate        [START ON 3/1/2024] fenofibrate  145 mg Oral Daily    FLUoxetine  40 mg Oral Daily    lactated ringers  500 mL Intravenous Once    magnesium sulfate IVPB  2 g Intravenous Once    methylPREDNISolone sodium succinate injection  60 mg Intravenous Q6H    pantoprazole  40 mg Oral Daily    potassium bicarbonate  35 mEq Oral Q2H    rocuronium        tiZANidine  2 mg Oral BID    valACYclovir  500 mg Oral Daily    [START ON 3/1/2024] vitamin D  1,000 Units Oral Daily        PRN:  sodium chloride 0.9%, albuterol sulfate, etomidate, melatonin, rocuronium, sodium chloride 0.9%     Assessment:      Harrison Haywood is a 68 y.o. male with:  Patient Active Problem List    Diagnosis Date Noted    Shortness of breath 02/29/2024    Acute respiratory failure with hypoxia 02/29/2024    Pneumonia of left lower lobe due to infectious organism 02/29/2024    Reactive airway disease with status asthmaticus 02/29/2024    Multiple myeloma not having achieved remission 02/29/2024    Hypokalemia 02/29/2024    Elevated troponin 02/29/2024    Primary hypertension 02/29/2024    Other hyperlipidemia 02/29/2024    Sacral decubitus ulcer, stage IV 02/29/2024        Plan:     69 yo with history of multiple myeloma, sacral ulcer presents with 2 days of cough and shortness of breath. Admitted with hypoxia secondary to community acquired pneumonia.       Neuro:  #Major Depressive Disorder  - Continue home Prozac 20    #Bone Pain  - Continue multimodal pain control with flexeril 2 BID, celecoxib 200 QD    Cardiovascular/Hemodynamics:  #Hypertension  - Hold home Norvasc 5    Respiratory:   #Community acquired pneumonia  - CAP coverage with ceftriaxone 1g QD x 5 days and Azithro 500 QD x3 days  - Would continue lasix 40 IV QD  - Duonebs Q6 PRN   - Respiratory biofire   - Given he is a never smoker, no history of asthma or lung disease, this presentation does not favor reactive airway disease. Would hold off on steroids (except for his decadron for MM) as no objective evidence of obstructive/reactive airway disease     GI/FEN:  F: Lasix  E: K>4, Mg>2  N: Regular diet    No acute issues    ID:   #Community acquired Pneumonia  - CAP coverage as above    #Infection Prophylaxis  - Continue valacyclovir 500 QD    Renal:   - Renally dose all medication, avoid nephrotoxic agents  - Strict I/Os, daily weights    Heme/Onc:   #Multiple Myeloma  - Monitor while inpatient    Endocrine:  - SSI + Accuchecks  - Goal glucose 140-180 while in ICU    Rheum/MSK:  - No active issues     Critical Care Daily Checklist:    A: Awake: RASS Goal/Actual Goal:  0-(-1)  Actual: Yu Agitation Sedation Scale (RASS): 0   B: Spontaneous Breathing Trial Performed? N/A   C: SAT & SBT Coordinated?  N/A                 D: Delirium: CAM-ICU Overall CAM-ICU: Negative   E: Early Mobility Performed? Yes   F: Feeding Goal: Regular diet  Status: same     AS: Analgesia/Sedation None   T: Thromboembolic Prophylaxis Lovenox   H: HOB > 300 Yes   U: Stress Ulcer Prophylaxis (if needed) Not indicated   G: Glucose Control 140-180   B: Bowel Function Stool Occurrence: none ; Regimen: none   I: Indwelling Catheter (Lines & Small) Necessity PIVs   D: De-escalation of Antimicrobials/Pharmacotherapies Complete course for CAP    Plan for the day/ETD Monitor in ICU    Code Status:  Family/Goals of Care: Full  Sister is JAYE Dangelo MD  [facility] Medicine HO-5  [phone]  5:43 PM 2/29/2024

## 2024-03-01 PROBLEM — F41.9 ANXIETY AND DEPRESSION: Status: ACTIVE | Noted: 2024-03-01

## 2024-03-01 PROBLEM — C79.49 METASTASIS OF NEOPLASM TO SPINAL CANAL: Status: ACTIVE | Noted: 2024-03-01

## 2024-03-01 PROBLEM — F32.A ANXIETY AND DEPRESSION: Status: ACTIVE | Noted: 2024-03-01

## 2024-03-01 PROBLEM — M54.9 CHRONIC BACK PAIN: Status: ACTIVE | Noted: 2024-03-01

## 2024-03-01 PROBLEM — R06.02 SHORTNESS OF BREATH: Status: RESOLVED | Noted: 2024-02-29 | Resolved: 2024-03-01

## 2024-03-01 PROBLEM — D61.818 PANCYTOPENIA: Status: ACTIVE | Noted: 2024-03-01

## 2024-03-01 PROBLEM — G89.29 CHRONIC BACK PAIN: Status: ACTIVE | Noted: 2024-03-01

## 2024-03-01 LAB
ALBUMIN SERPL BCP-MCNC: 2.3 G/DL (ref 3.5–5.2)
ALP SERPL-CCNC: 93 U/L (ref 55–135)
ALT SERPL W/O P-5'-P-CCNC: 13 U/L (ref 10–44)
ANION GAP SERPL CALC-SCNC: 9 MMOL/L (ref 8–16)
ANISOCYTOSIS BLD QL SMEAR: SLIGHT
AST SERPL-CCNC: 35 U/L (ref 10–40)
BASOPHILS NFR BLD: 0 % (ref 0–1.9)
BILIRUB SERPL-MCNC: 0.4 MG/DL (ref 0.1–1)
BUN SERPL-MCNC: 20 MG/DL (ref 8–23)
CALCIUM SERPL-MCNC: 8.6 MG/DL (ref 8.7–10.5)
CHLORIDE SERPL-SCNC: 103 MMOL/L (ref 95–110)
CO2 SERPL-SCNC: 25 MMOL/L (ref 23–29)
CREAT SERPL-MCNC: 0.8 MG/DL (ref 0.5–1.4)
DIFFERENTIAL METHOD BLD: ABNORMAL
EOSINOPHIL NFR BLD: 0 % (ref 0–8)
ERYTHROCYTE [DISTWIDTH] IN BLOOD BY AUTOMATED COUNT: 16.4 % (ref 11.5–14.5)
EST. GFR  (NO RACE VARIABLE): >60 ML/MIN/1.73 M^2
GLUCOSE SERPL-MCNC: 183 MG/DL (ref 70–110)
HCT VFR BLD AUTO: 23.4 % (ref 40–54)
HGB BLD-MCNC: 7.6 G/DL (ref 14–18)
HYPOCHROMIA BLD QL SMEAR: ABNORMAL
IMM GRANULOCYTES # BLD AUTO: ABNORMAL K/UL (ref 0–0.04)
IMM GRANULOCYTES NFR BLD AUTO: ABNORMAL % (ref 0–0.5)
LYMPHOCYTES NFR BLD: 11 % (ref 18–48)
MCH RBC QN AUTO: 30.9 PG (ref 27–31)
MCHC RBC AUTO-ENTMCNC: 32.5 G/DL (ref 32–36)
MCV RBC AUTO: 95 FL (ref 82–98)
MONOCYTES NFR BLD: 3 % (ref 4–15)
NEUTROPHILS NFR BLD: 82 % (ref 38–73)
NEUTS BAND NFR BLD MANUAL: 4 %
NRBC BLD-RTO: 0 /100 WBC
PLATELET # BLD AUTO: 95 K/UL (ref 150–450)
PLATELET BLD QL SMEAR: ABNORMAL
PMV BLD AUTO: 12.2 FL (ref 9.2–12.9)
POCT GLUCOSE: 142 MG/DL (ref 70–110)
POCT GLUCOSE: 159 MG/DL (ref 70–110)
POCT GLUCOSE: 198 MG/DL (ref 70–110)
POCT GLUCOSE: 98 MG/DL (ref 70–110)
POLYCHROMASIA BLD QL SMEAR: ABNORMAL
POTASSIUM SERPL-SCNC: 3.8 MMOL/L (ref 3.5–5.1)
PROT SERPL-MCNC: 5.5 G/DL (ref 6–8.4)
RBC # BLD AUTO: 2.46 M/UL (ref 4.6–6.2)
SODIUM SERPL-SCNC: 137 MMOL/L (ref 136–145)
TOXIC GRANULES BLD QL SMEAR: PRESENT
WBC # BLD AUTO: 1.2 K/UL (ref 3.9–12.7)

## 2024-03-01 PROCEDURE — 94640 AIRWAY INHALATION TREATMENT: CPT

## 2024-03-01 PROCEDURE — 63600175 PHARM REV CODE 636 W HCPCS: Performed by: INTERNAL MEDICINE

## 2024-03-01 PROCEDURE — 25000003 PHARM REV CODE 250: Performed by: STUDENT IN AN ORGANIZED HEALTH CARE EDUCATION/TRAINING PROGRAM

## 2024-03-01 PROCEDURE — 85027 COMPLETE CBC AUTOMATED: CPT | Performed by: STUDENT IN AN ORGANIZED HEALTH CARE EDUCATION/TRAINING PROGRAM

## 2024-03-01 PROCEDURE — 80053 COMPREHEN METABOLIC PANEL: CPT | Performed by: STUDENT IN AN ORGANIZED HEALTH CARE EDUCATION/TRAINING PROGRAM

## 2024-03-01 PROCEDURE — 97535 SELF CARE MNGMENT TRAINING: CPT

## 2024-03-01 PROCEDURE — 25000003 PHARM REV CODE 250: Performed by: INTERNAL MEDICINE

## 2024-03-01 PROCEDURE — 97530 THERAPEUTIC ACTIVITIES: CPT

## 2024-03-01 PROCEDURE — 63600175 PHARM REV CODE 636 W HCPCS: Performed by: STUDENT IN AN ORGANIZED HEALTH CARE EDUCATION/TRAINING PROGRAM

## 2024-03-01 PROCEDURE — 94761 N-INVAS EAR/PLS OXIMETRY MLT: CPT

## 2024-03-01 PROCEDURE — 11000001 HC ACUTE MED/SURG PRIVATE ROOM

## 2024-03-01 PROCEDURE — 96376 TX/PRO/DX INJ SAME DRUG ADON: CPT

## 2024-03-01 PROCEDURE — 85007 BL SMEAR W/DIFF WBC COUNT: CPT | Performed by: STUDENT IN AN ORGANIZED HEALTH CARE EDUCATION/TRAINING PROGRAM

## 2024-03-01 PROCEDURE — 36415 COLL VENOUS BLD VENIPUNCTURE: CPT | Performed by: STUDENT IN AN ORGANIZED HEALTH CARE EDUCATION/TRAINING PROGRAM

## 2024-03-01 PROCEDURE — 27000207 HC ISOLATION

## 2024-03-01 PROCEDURE — 25000242 PHARM REV CODE 250 ALT 637 W/ HCPCS: Performed by: STUDENT IN AN ORGANIZED HEALTH CARE EDUCATION/TRAINING PROGRAM

## 2024-03-01 RX ORDER — IBUPROFEN 200 MG
24 TABLET ORAL
Status: DISCONTINUED | OUTPATIENT
Start: 2024-03-01 | End: 2024-03-03 | Stop reason: HOSPADM

## 2024-03-01 RX ORDER — MUPIROCIN 20 MG/G
OINTMENT TOPICAL 2 TIMES DAILY
Status: DISCONTINUED | OUTPATIENT
Start: 2024-03-01 | End: 2024-03-03 | Stop reason: HOSPADM

## 2024-03-01 RX ORDER — IBUPROFEN 200 MG
16 TABLET ORAL
Status: DISCONTINUED | OUTPATIENT
Start: 2024-03-01 | End: 2024-03-03 | Stop reason: HOSPADM

## 2024-03-01 RX ORDER — PREDNISONE 20 MG/1
40 TABLET ORAL DAILY
Status: DISCONTINUED | OUTPATIENT
Start: 2024-03-01 | End: 2024-03-03 | Stop reason: HOSPADM

## 2024-03-01 RX ORDER — INSULIN ASPART 100 [IU]/ML
0-5 INJECTION, SOLUTION INTRAVENOUS; SUBCUTANEOUS
Status: DISCONTINUED | OUTPATIENT
Start: 2024-03-01 | End: 2024-03-03 | Stop reason: HOSPADM

## 2024-03-01 RX ORDER — GLUCAGON 1 MG
1 KIT INJECTION
Status: DISCONTINUED | OUTPATIENT
Start: 2024-03-01 | End: 2024-03-03 | Stop reason: HOSPADM

## 2024-03-01 RX ORDER — HYDROCODONE BITARTRATE AND ACETAMINOPHEN 5; 325 MG/1; MG/1
1 TABLET ORAL EVERY 6 HOURS PRN
Status: DISCONTINUED | OUTPATIENT
Start: 2024-03-01 | End: 2024-03-03 | Stop reason: HOSPADM

## 2024-03-01 RX ORDER — IPRATROPIUM BROMIDE AND ALBUTEROL SULFATE 2.5; .5 MG/3ML; MG/3ML
3 SOLUTION RESPIRATORY (INHALATION) EVERY 4 HOURS
Status: DISCONTINUED | OUTPATIENT
Start: 2024-03-01 | End: 2024-03-03 | Stop reason: HOSPADM

## 2024-03-01 RX ADMIN — IPRATROPIUM BROMIDE AND ALBUTEROL SULFATE 3 ML: 2.5; .5 SOLUTION RESPIRATORY (INHALATION) at 03:03

## 2024-03-01 RX ADMIN — FENOFIBRATE 145 MG: 145 TABLET ORAL at 08:03

## 2024-03-01 RX ADMIN — CALCIUM CARBONATE (ANTACID) CHEW TAB 500 MG 1500 MG: 500 CHEW TAB at 09:03

## 2024-03-01 RX ADMIN — CEFTRIAXONE SODIUM 1 G: 1 INJECTION, POWDER, FOR SOLUTION INTRAMUSCULAR; INTRAVENOUS at 12:03

## 2024-03-01 RX ADMIN — PANTOPRAZOLE SODIUM 40 MG: 40 TABLET, DELAYED RELEASE ORAL at 08:03

## 2024-03-01 RX ADMIN — ALLOPURINOL 300 MG: 100 TABLET ORAL at 08:03

## 2024-03-01 RX ADMIN — METHYLPREDNISOLONE SODIUM SUCCINATE 60 MG: 40 INJECTION, POWDER, FOR SOLUTION INTRAMUSCULAR; INTRAVENOUS at 12:03

## 2024-03-01 RX ADMIN — CHOLECALCIFEROL TAB 25 MCG (1000 UNIT) 1000 UNITS: 25 TAB at 08:03

## 2024-03-01 RX ADMIN — MUPIROCIN: 20 OINTMENT TOPICAL at 09:03

## 2024-03-01 RX ADMIN — IPRATROPIUM BROMIDE AND ALBUTEROL SULFATE 3 ML: 2.5; .5 SOLUTION RESPIRATORY (INHALATION) at 07:03

## 2024-03-01 RX ADMIN — FLUOXETINE HYDROCHLORIDE 40 MG: 20 CAPSULE ORAL at 08:03

## 2024-03-01 RX ADMIN — TIZANIDINE 2 MG: 2 TABLET ORAL at 09:03

## 2024-03-01 RX ADMIN — TIZANIDINE 2 MG: 2 TABLET ORAL at 08:03

## 2024-03-01 RX ADMIN — CELECOXIB 200 MG: 100 CAPSULE ORAL at 08:03

## 2024-03-01 RX ADMIN — AZITHROMYCIN MONOHYDRATE 500 MG: 500 INJECTION, POWDER, LYOPHILIZED, FOR SOLUTION INTRAVENOUS at 03:03

## 2024-03-01 RX ADMIN — PREDNISONE 40 MG: 20 TABLET ORAL at 08:03

## 2024-03-01 RX ADMIN — VALACYCLOVIR HYDROCHLORIDE 500 MG: 500 TABLET, FILM COATED ORAL at 08:03

## 2024-03-01 RX ADMIN — MUPIROCIN: 20 OINTMENT TOPICAL at 12:03

## 2024-03-01 RX ADMIN — COLLAGENASE SANTYL: 250 OINTMENT TOPICAL at 02:03

## 2024-03-01 RX ADMIN — AMLODIPINE BESYLATE 5 MG: 5 TABLET ORAL at 08:03

## 2024-03-01 RX ADMIN — METHYLPREDNISOLONE SODIUM SUCCINATE 60 MG: 40 INJECTION, POWDER, FOR SOLUTION INTRAMUSCULAR; INTRAVENOUS at 06:03

## 2024-03-01 RX ADMIN — ENOXAPARIN SODIUM 40 MG: 40 INJECTION SUBCUTANEOUS at 04:03

## 2024-03-01 NOTE — PROGRESS NOTES
[facility] Medicine Progress Note    Primary Team: [facility] Hospitalist Team a  Attending Physician: Ayden Yi MD  Resident: Mukesh/Adrianna  Intern: Anna    Subjective:      Patient reports that he is feeling much better today. He denies feeling shortness of breath, chest pain, abdominal pain, N/V. He reports having good urine output.      Objective:     Last 24 Hour Vital Signs:  BP  Min: 91/55  Max: 153/68  Temp  Av.5 °F (36.9 °C)  Min: 97.9 °F (36.6 °C)  Max: 99.3 °F (37.4 °C)  Pulse  Av.5  Min: 65  Max: 124  Resp  Av.1  Min: 14  Max: 39  SpO2  Av.1 %  Min: 92 %  Max: 100 %  Height  Av' (182.9 cm)  Min: 6' (182.9 cm)  Max: 6' (182.9 cm)  Weight  Av.4 kg (214 lb 13.4 oz)  Min: 81.5 kg (179 lb 10.8 oz)  Max: 113.4 kg (250 lb)  I/O last 3 completed shifts:  In: 1089.2 [P.O.:650; I.V.:97.3; IV Piggyback:341.9]  Out: 1045 [Urine:1045]    Physical Examination:  General: no acute distress, appears comfortable  HEENT: NC/AT, EOM intact, normal sclera  CV: regular rate and rhythm, no murmurs  Lungs: coarse breath sounds bilaterally, crackles R lung base, improved air movement, scant expiratory wheezing  Abdomen: soft, non-tender, non-distended    Laboratory:  Laboratory Data Reviewed: yes  Pertinent Findings:  WBC 1.2  H/H 7.6/23.4  Plt 95  K 3.8    Microbiology Data Reviewed: yes  Pertinent Findings:  RSV + on PCR  Blood cx (): NGTD  MRSA nasal culture pending    Other Results:  EKG (my interpretation): no new EKG    Radiology Data Reviewed: yes  Pertinent Findings:  CTA PE:   mpression:       1. Limited exam due to streak artifact and motion..  2. No findings to suggest central pulmonary arterial thrombus.  3. Streaky patchy opacities in both lower lobes likely representing atelectasis.  4. Marrow replacement throughout the spine and sternum.  Correlate for small round cell tumor such as myeloma or lymphoma or lytic metastatic process.  5. This report was flagged in Epic as  abnormal.     CXR  FINDINGS:   The lungs are well expanded and clear.  No focal opacities are seen.  The pleural spaces are clear the cardiac silhouette is enlarged.  Osseous structures demonstrate degenerative changes.     Current Medications:     Infusions:       Scheduled:   allopurinoL  300 mg Oral Daily    amLODIPine  5 mg Oral Daily    azithromycin  500 mg Intravenous Q24H    calcium carbonate  1,500 mg Oral QHS    cefTRIAXone (Rocephin) IV (PEDS and ADULTS)  1 g Intravenous Q24H    celecoxib  200 mg Oral Daily    enoxparin  40 mg Subcutaneous Daily    fenofibrate  145 mg Oral Daily    FLUoxetine  40 mg Oral Daily    lactated ringers  500 mL Intravenous Once    methylPREDNISolone sodium succinate injection  60 mg Intravenous Q6H    pantoprazole  40 mg Oral Daily    tiZANidine  2 mg Oral BID    valACYclovir  500 mg Oral Daily    vitamin D  1,000 Units Oral Daily        PRN:  sodium chloride 0.9%, albuterol sulfate, albuterol-ipratropium, melatonin, sodium chloride 0.9%    Antibiotics and Day Number of Therapy:  Rocephin D2  Azithromycin D2    Lines and Day Number of Therapy:  PIV    Assessment:     Harrison Haywood is a 68 y.o.male with  Patient Active Problem List    Diagnosis Date Noted    Shortness of breath 02/29/2024    Acute respiratory failure with hypoxia 02/29/2024    Pneumonia of left lower lobe due to infectious organism 02/29/2024    Reactive airway disease with status asthmaticus 02/29/2024    Multiple myeloma not having achieved remission 02/29/2024    Hypokalemia 02/29/2024    Elevated troponin 02/29/2024    Primary hypertension 02/29/2024    Other hyperlipidemia 02/29/2024    Sacral decubitus ulcer, stage IV 02/29/2024   Initially admitted ot ICU for worsening respiratory distress requiring closer monitoring and frequent albuterol. Patient improved with steroids, saturations improved, now on RA, will step down to floor today. Continue treatment for CAP and symptomatic treatment for RSV viral URI.       Plan:     Reactive airway with status asthmaticus 2/2 RSV and possible R pneumonia  - initialy satting 90-93% on RA, satting 95% on 1L NC, now on Room air this morning  - s/p duonebs X3, multiple 1 hour continuous, IV magnesium  - no evidence of PE  - will treat with rocephin and azithromycin due to to concern for possible pneumonia of R lung  - albuterol Q4H PRN  - solumedrol 60 mg Q6H -> will transition to 40 mg Prednisone X 5 days  - continuous pulse oximetry  - incentive spirometry     Pancytopenia 2/2 Multiple myeloma  - decreased H/H 7.6/23.4  - will continue to monitor  - may need transfusion if contiues to decline, no evidence of bleeding, likely dilutional     Hypokalemia, resolved  - K 3.1 -> 3.8 (03/1)  - will replete PRN     Mild troponinemia  - denies chest pain, EKG stable  - 0.034-0.037 stable therefore will not repeat unless endorsing chest pain  - no evidence of PE on CT PE     Prolonged Qtc  - Qtc 506  - will avoid QTC prolonging medications     Stage 4 sacral decubitus ulcer, POA  - wound care consulted  - turn Q2H     Multiple myeloma  - contineu allopurinol 300 daily and valacyclovir 500 po daily for ppx  - continue calcium and vitamin D for bony invvolvement  On off week of chemotherapy medications:   Revlimid 25 mg PO daily x 14 days.   Velcade 1.3 mg/m2 subcutaneous on days 1, 4, 8, 11  Decadron 40 mg PO daily on days 1, 8, 15      Ppx: lovenox  Diet: regular    Dispo: stable for stepdown to floor, will continue to monitor for another 24 hours to ensure no worsening respiratory status and to treat for CAP    Trcaee Mayorga MD  [facility] Internal Medicine HO-4    [facility] Medicine Hospitalist Pager numbers:   [facility] Hospitalist Medicine Team A (Kassidy/Iris): [phone]  [facility] Hospitalist Medicine Team B (Mel/Eduardo):  [phone]      ,

## 2024-03-01 NOTE — PLAN OF CARE
Problem: Occupational Therapy  Goal: Occupational Therapy Goal  Description: Goals to be met by: 3/29/24     Patient will increase functional independence with ADLs by performing:    Grooming while seated with mod I .  Toileting from bedside commode with Mod I  for hygiene and clothing management.   Sitting at edge of bed x10 minutes with mod i.  Rolling to Bilateral with mod I .   Supine to sit with mod I.  Stand pivot transfers with Mod I .  Increased functional strength to WFL for self care skills and functional mobility .  Upper extremity exercise program x10 reps per handout, with independence.    Outcome: Ongoing, Progressing     Goals updated today's date. Pt with significant improvement in functional mobility and performance of ADLs today's date. Urged impt of weight shifting and repositioning for wound management. Encouraged pt to participate in OOB axs as possible.

## 2024-03-01 NOTE — PLAN OF CARE
Pt remains free from falls and injuries. PIVc, condom cath, 1L NC remain. VSS. No acute issues overnight.

## 2024-03-01 NOTE — CONSULTS
[facility] - Intensive Care  Adult Nutrition  Consult Note    SUMMARY     Recommendations  Recommendation:   1. Continue Regular Diet   2. Add Ron BID  3. Monitor need for addition supplements   4. Monitor weight and labs    Goals: Pt will consume 50-75% of meals by RD follow up    Nutrition Goal Status: new  Communication of RD Recs: other (comment) (POC)    Assessment and Plan  Nutrition Problem  Increased protein needs     Related to (etiology):   Wound healing     Signs and Symptoms (as evidenced by):   Rosalio- 13/decubitus ulcer to sacral spine stage 4     Interventions:  Collaboration with other providers   Increased protein diet     Nutrition Diagnosis Status:   New    Reason for Assessment  Reason For Assessment: consult (Rinku of pressure Ulcer)  Diagnosis: other (see comments)  Relevant Medical History: No PMH  Interdisciplinary Rounds: did not attend  General Information Comments: Pt is currently on a regular diet. Rosalio-13/decubitus ulcer to sacral spine. Noted 100% meal intake. Pt has no recent weight history per chart review. Unable to conduct RSV d/t pt with RSV.  Nutrition Discharge Planning: d/c on regular diet    Nutrition Risk Screen  Nutrition Risk Screen: large or nonhealing wound, burn or pressure injury    Nutrition/Diet History  Patient Reported Diet/Restrictions/Preferences: general  Food Preferences: [facility]  Factors Affecting Nutritional Intake: None identified at this time    Anthropometrics  Temp: 98.6 °F (37 °C)  Height Method: Stated  Height: 6' (182.9 cm)  Height (inches): 72 in  Weight Method: Bed Scale  Weight: 81.5 kg (179 lb 10.8 oz)  Weight (lb): 179.68 lb  Ideal Body Weight (IBW), Male: 178 lb  % Ideal Body Weight, Male (lb): 100.94 %  BMI (Calculated): 24.4  BMI Grade: 18.5-24.9 - normal  Usual Body Weight (UBW), kg:  (No recent weight history)     Lab/Procedures/Meds  Pertinent Labs Reviewed: reviewed  Pertinent Labs Comments: Ca 8.6L, Glucose 186H  Pertinent Medications Reviewed:  reviewed  Pertinent Medications Comments: calcium carbonate, enoxaparin, lactated ringers, pantoprazole, prednisone, Vit D    Estimated/Assessed Needs  Weight Used For Calorie Calculations: 81.5 kg (179 lb 10.8 oz)  Energy Calorie Requirements (kcal): 2445 kcals (30 kcals/kg)  Energy Need Method: Kcal/kg  Protein Requirements: 98g (1.2 g/kg)  Weight Used For Protein Calculations: 81.5 kg (179 lb 10.8 oz)     Estimated Fluid Requirement Method: RDA Method  RDA Method (mL): 2445  CHO Requirement: 300g    Nutrition Prescription Ordered  Current Diet Order: Regular Diet    Evaluation of Received Nutrient/Fluid Intake  I/O: 429/275  Energy Calories Required: meeting needs  Protein Required: not meeting needs  Fluid Required: meeting needs  Comments: LBM-2/28/24  Tolerance: tolerating  % Intake of Estimated Energy Needs: 75 - 100 %  % Meal Intake: 75 - 100 %    Nutrition Risk  Level of Risk/Frequency of Follow-up: low - moderate (1x/weekly)     Monitor and Evaluation  Food and Nutrient Intake: food and beverage intake, energy intake  Food and Nutrient Adminstration: diet order  Anthropometric Measurements: weight, weight change, body mass index  Biochemical Data, Medical Tests and Procedures: electrolyte and renal panel, gastrointestinal profile, glucose/endocrine profile, lipid profile     Nutrition Follow-Up  RD Follow-up?: Yes

## 2024-03-01 NOTE — PLAN OF CARE
The sw faxed the pt's info to [facility] via [employer] b[facility]c [facility].        03/01/24 0967   Post-Acute Status   Post-Acute Authorization Placement   Post-Acute Placement Status Referrals Sent   Discharge Plan   Discharge Plan A Return to nursing home   Discharge Plan B Other  (TBD)

## 2024-03-01 NOTE — PT/OT/SLP PROGRESS
Physical Therapy Treatment    Patient Name:  Harrison Haywood   MRN:  [id number]    Recommendations:     Discharge Recommendations: Moderate Intensity Therapy  Discharge Equipment Recommendations: to be determined by next level of care  Barriers to discharge: None    Assessment:     Harrison Haywood is a 68 y.o. male admitted with a medical diagnosis of <principal problem not specified>.  He presents with the following impairments/functional limitations: weakness, impaired endurance, impaired functional mobility, gait instability, impaired balance, decreased lower extremity function, decreased safety awareness, impaired cardiopulmonary response to activity, impaired muscle length. Pt with improved respiratory status this date and demonstrates less use of accessory muscles for breathing this date. Pt requires max a for bed mobility and able to progress transfers and ambulation with RW. Pt requires cga x 1-2 for transfers and ambulation and vc's for safety awareness. Decreased safety awareness noted and patient unsteady with gait with RW. Educated patient on need for pressure relief in bed and use of wedge pillow to offload bottom d/t skin breakdown. Discussed pressure relief via rolling in bed every 2 hours. Educated pt and family. Additionally discussed keeping HOB elevated d/t PNA diagnosis.     Rehab Prognosis: Good; patient would benefit from acute skilled PT services to address these deficits and reach maximum level of function.    Recent Surgery: * No surgery found *      Plan:     During this hospitalization, patient to be seen 3 x/week to address the identified rehab impairments via therapeutic activities, gait training, therapeutic exercises, wheelchair management/training, neuromuscular re-education and progress toward the following goals:    Plan of Care Expires:  03/15/24    Subjective     Chief Complaint: Shortness of breath  Patient/Family Comments/goals: to walk  Pain/Comfort:  Pain Rating 1:  0/10      Objective:     Communicated with RN prior to session.  Patient found supine with bed alarm, blood pressure cuff, Condom Catheter, telemetry, pulse ox (continuous) upon PT entry to room.     General Precautions: Standard, fall  Orthopedic Precautions: N/A  Braces: N/A  Respiratory Status: Room air     Functional Mobility:  Bed Mobility:     Supine to Sit: maximal assistance  Sit to Supine: stand by assistance  Transfers:     Sit to Stand:  contact guard assistance and of 2 persons with rolling walker  Gait: Patient ambulated x 20 ft with RW and cga-min a. Several posterior and lateral losses of balance requiring occasional min a.   Balance: Good static and dynamic sitting balance, Good static standing balance with RW, fair dynamic standing balance with RW.       AM-PAC 6 CLICK MOBILITY  Turning over in bed (including adjusting bedclothes, sheets and blankets)?: 2  Sitting down on and standing up from a chair with arms (e.g., wheelchair, bedside commode, etc.): 3  Moving from lying on back to sitting on the side of the bed?: 3  Moving to and from a bed to a chair (including a wheelchair)?: 3  Need to walk in hospital room?: 3  Climbing 3-5 steps with a railing?: 2  Basic Mobility Total Score: 16       Treatment & Education:  Patient able to progress this date, goals updated to reflect clinical progress. Patient remains with balance deficits, generalized weakness, and decreased endurance. Mild JAIMES noted and patient with increased RR throughout. VSS throughout session. See assist levels above, educated family about positioning and pressure relief to offload sacral wound.     Patient left left sidelying with all lines intact, call button in reach, RN notified, and family present..    GOALS:   Multidisciplinary Problems       Physical Therapy Goals          Problem: Physical Therapy    Goal Priority Disciplines Outcome Goal Variances Interventions   Physical Therapy Goal     PT, PT/OT      Description: Goals to  be met by: 3/14/14     Patient will increase functional independence with mobility by performin. Supine to sit with MInimal Assistance  2. Sit to supine with MInimal Assistance (goal met)  3. Sit to stand transfer with Stand-by Assistance w/RW  4. Bed to chair transfer with Minimal Assist using Rolling Walker (goal met)    The following goals added on 3/1/24 and to be met by 3/15/24  - Patient will ambulate x 200 ft mod I with RW  -Bed to chair transfer with RW modified independence.                            Time Tracking:     PT Received On: 24  PT Start Time: 1113     PT Stop Time: 1144  PT Total Time (min): 31 min     Billable Minutes: Therapeutic Activity 31    Treatment Type: Treatment  PT/PTA: PTA     Number of PTA visits since last PT visit: 0     2024

## 2024-03-01 NOTE — PT/OT/SLP PROGRESS
Occupational Therapy   Treatment    Name: Harrison Haywood  MRN: [id number]  Admitting Diagnosis:  <principal problem not specified>       Recommendations:     Discharge Recommendations: Moderate Intensity Therapy  Discharge Equipment Recommendations:  to be determined by next level of care  Barriers to discharge:  None    Assessment:     Harrison Haywood is a 68 y.o. male with a medical diagnosis of <principal problem not specified>.  He presents with The primary encounter diagnosis was Hypoxia. Diagnoses of Shortness of breath, Elevated troponin, Chest pain, and Pneumonia were also pertinent to this visit.  . Performance deficits affecting function are weakness, impaired endurance, impaired cognition, decreased ROM, impaired self care skills, impaired functional mobility, gait instability, impaired balance, impaired cardiopulmonary response to activity, decreased safety awareness, edema, decreased lower extremity function, decreased upper extremity function, impaired fine motor, impaired skin, impaired coordination.     Goals updated today's date. Pt with significant improvement in functional mobility and performance of ADLs today's date. Urged impt of weight shifting and repositioning for wound management. Encouraged pt to participate in OOB axs as possible.     Rehab Prognosis:  Good; patient would benefit from acute skilled OT services to address these deficits and reach maximum level of function.       Plan:     Patient to be seen 5 x/week to address the above listed problems via self-care/home management, therapeutic activities, therapeutic exercises  Plan of Care Expires: 03/29/24  Plan of Care Reviewed with: patient, family, sibling    Subjective     Chief Complaint: pt anxious throughout session   Patient/Family Comments/goals: family would like pt to increase ax and move more  Pain/Comfort:  Pain Rating 1:  (no pain)    Objective:     Communicated with: nsg prior to session.  Patient found supine with  (ICU  monitoring) upon OT entry to room.    General Precautions: Standard, fall    Orthopedic Precautions:N/A  Braces: N/A  Respiratory Status: Room air     Occupational Performance:     Bed Mobility:    Patient completed Rolling/Turning to Left with  maximal assistance  Patient completed Scooting/Bridging with moderate assistance  Patient completed Supine to Sit with maximal assistance  Patient completed Sit to Supine with stand by assistance     Functional Mobility/Transfers:  Patient completed Sit <> Stand Transfer with contact guard assistance  with  rolling walker   Patient completed Toilet Transfer Step Transfer technique with contact guard assistance and minimum assistance with  rolling walker  Functional Mobility: CGa- Min A ; anxious and impaired safety awareness with axs and mobility ; 1 major LOB during mobility requiring increased assist to prevent fall     Activities of Daily Living:  Upper Body Dressing: moderate assistance cintia gown as robe   Lower Body Dressing: total assistance    Toileting: moderate assistance clothing management; pt able to assist with hygiene in stance       [facility] 6 Click ADL: 17    Treatment & Education:  Pt found supine ; decreased WOB noted from yesterday's date   Increased assist and cues for bed mobility  Assisted with EOB scooting   Pt able to stand with RW; cues for hand placement   Functional mobility x 2 trials ; poor safety awareness throughout session   T/f to C; pt holding breath and significant straining noted; educated on safety and proper breathing   Hygiene performed in stance   Returned to supine   Educated pt and family on impt of repositioning and weight shifting; placed wedge under R side in order to have pt in L side lying     Patient left left sidelying with all lines intact, call button in reach, and nsg notified    GOALS:   Multidisciplinary Problems       Occupational Therapy Goals          Problem: Occupational Therapy    Goal Priority Disciplines Outcome  Interventions   Occupational Therapy Goal     OT, PT/OT Ongoing, Progressing    Description: Goals to be met by: 3/29/24     Patient will increase functional independence with ADLs by performing:    Grooming while seated with mod I .  Toileting from bedside commode with Mod I  for hygiene and clothing management.   Sitting at edge of bed x10 minutes with mod i.  Rolling to Bilateral with mod I .   Supine to sit with mod I.  Stand pivot transfers with Mod I .  Increased functional strength to [facility] for self care skills and functional mobility .  Upper extremity exercise program x10 reps per handout, with independence.                         Time Tracking:     OT Date of Treatment: 03/01/24  OT Start Time: 1113  OT Stop Time: 1144  OT Total Time (min): 31 min    Billable Minutes:Self Care/Home Management 23  Therapeutic Activity 8    OT/BREEZY: OT     Number of BREEZY visits since last OT visit: 0    3/1/2024

## 2024-03-01 NOTE — PLAN OF CARE
Recommendations  Recommendation:   1. Continue Regular Diet   2. Add Ron BID  3. Monitor need for addition supplements   4. Monitor weight and labs    Goals: Pt will consume 50-75% of meals by RD follow up    Nutrition Goal Status: new  Communication of RD Recs: other (comment) (POC)

## 2024-03-01 NOTE — CONSULTS
[facility] - Intensive Care  Wound Care    Patient Name:  Harrison Haywood   MRN:  [id number]  Date: 3/1/2024  Diagnosis: <principal problem not specified>    History:     History reviewed. No pertinent past medical history.    Social History     Socioeconomic History    Marital status: Single   Tobacco Use    Smoking status: Never       Precautions:     Allergies as of 02/29/2024    (No Known Allergies)       [facility] Assessment Details/Treatment       Sacrum- unstageable pressure injury- present on admit- 100% slough- 3.5x4x0.2cm- small amount of tan drainage- no odor- excoriated periwound      Bilateral heels intact with no redness.    Recommendations discussed with pt, nurse and Dr Mayorga:  - Pressure injury prevention interventions   - Santyl ointment daily to sacrum wound    03/01/2024

## 2024-03-01 NOTE — NURSING
Arrived on the unit AAOX4, accompanied by RN, placed on a waffle mattress,  Dressing on the sacral dressed CDI per Piero Abdi wound done today. Plan of care explained, safety measures in place, call bell in place instructed to call for assistance, verbalized understanding.

## 2024-03-01 NOTE — PLAN OF CARE
The sw met with the pt and Arminda Garduno(sister/POA)[phone] who was at bedside during the assessment. The pt has been a intermediate resident at [facility] since 11/9/23. The pt is w/c bound at the nh. The pt has a very supportive family. The staff assist the pt with his ADL's as needed and he uses the dme at the nh. The pt will return to [facility] at d/c. Halie in admissions at [facility]([phone]) called to check on the pt while the sw was in the pt's room. The sw gave the pt a d/c brochure with her contact info on it. The sw completed the white board in the pt's room with her name and contact info. The sw encouraged them to call if they have any further questions or concerns. The sw will continue to follow the pt throughout his transitions of care and will assist with any d/c needs.     [facility] - Intensive Care  Initial Discharge Assessment       Primary Care Provider: Jhon Mcnulty Jr., MD    Admission Diagnosis: Shortness of breath [R06.02]  Hypoxia [R09.02]  Elevated troponin [R79.89]  Chest pain [R07.9]  Pneumonia [J18.9]    Admission Date: 2/29/2024  Expected Discharge Date:     Transition of Care Barriers: (P) None    Payor: [employer] [facility] / Plan: [employer] [facility] / Product Type: Medicare Advantage /     Extended Emergency Contact Information  Primary Emergency Contact: Arminda Garduno  Mobile Phone: [phone]  Relation: Sister  Preferred language: English   needed? No    Discharge Plan A: (P) Return to nursing home ([facility])  Discharge Plan B: (P) Skilled Nursing Facility      [id number] [address] [facility] [address] [place]  [address]  [place] [address]  Phone: [phone] Fax: [phone]      Initial Assessment (most recent)       Adult Discharge Assessment - 03/01/24 1322          Discharge Assessment    Assessment Type Discharge Planning Assessment (P)      Confirmed/corrected address, phone number  and insurance Yes (P)      Confirmed Demographics Correct on Facesheet (P)      Source of Information patient;family;facility verbal report (P)      Communicated WILLEM with patient/caregiver Date not available/Unable to determine (P)      Reason For Admission Shortness of breath (P)      [facility] facility resident (P)      Facility Arrived From: [facility] (P)      Do you expect to return to your current living situation? Yes (P)      Do you have help at home or someone to help you manage your care at home? Yes (P)      Who are your caregiver(s) and their phone number(s)? the staff at the nh (P)      Prior to hospitilization cognitive status: Alert/Oriented (P)      Current cognitive status: Alert/Oriented (P)      Walking or Climbing Stairs Difficulty yes (P)      Walking or Climbing Stairs ambulation difficulty, requires equipment;stair climbing difficulty, requires equipment;transferring difficulty, requires equipment (P)      Dressing/Bathing Difficulty yes (P)      Dressing/Bathing bathing difficulty, requires equipment (P)      Home Accessibility wheelchair accessible (P)      Home Layout Able to live on 1st floor (P)      Equipment Currently Used at Home oxygen (P)    the pt's been on o2 since his pna dx,the pt uses the dme at the nh    Readmission within 30 days? No (P)      Patient currently being followed by outpatient case management? No (P)      Do you take prescription medications? Yes (P)      Do you have prescription coverage? Yes (P)      Coverage PH[place] (P)      Do you have any problems affording any of your prescribed medications? No (P)      Is the patient taking medications as prescribed? yes (P)      Who is going to help you get home at discharge? Case Mangement will arrnage transport for the pt to return to the nh (P)      How do you get to doctors appointments? other (see comments) (P)    nh arranges    Are you on dialysis? No (P)      Do you take coumadin? No (P)       Discharge Plan A Return to nursing home (P)    Stormy Caro    Discharge Plan B Skilled Nursing Facility (P)      DME Needed Upon Discharge  none (P)      Discharge Plan discussed with: Patient;Sibling (P)      Name(s) and Number(s) Arminda Garduno(sister/POA)[phone] (P)      Transition of Care Barriers None (P)         Physical Activity    On average, how many days per week do you engage in moderate to strenuous exercise (like a brisk walk)? 2 days (P)      On average, how many minutes do you engage in exercise at this level? 30 min (P)         Financial Resource Strain    How hard is it for you to pay for the very basics like food, housing, medical care, and heating? Not hard at all (P)         Housing Stability    In the last 12 months, was there a time when you were not able to pay the mortgage or rent on time? No (P)      In the last 12 months, how many places have you lived? 2 (P)      In the last 12 months, was there a time when you did not have a steady place to sleep or slept in a shelter (including now)? No (P)         Transportation Needs    In the past 12 months, has lack of transportation kept you from medical appointments or from getting medications? No (P)      In the past 12 months, has lack of transportation kept you from meetings, work, or from getting things needed for daily living? No (P)         Food Insecurity    Within the past 12 months, you worried that your food would run out before you got the money to buy more. Never true (P)      Within the past 12 months, the food you bought just didn't last and you didn't have money to get more. Never true (P)         Stress    Do you feel stress - tense, restless, nervous, or anxious, or unable to sleep at night because your mind is troubled all the time - these days? Only a little (P)         Social Connections    In a typical week, how many times do you talk on the phone with family, friends, or neighbors? More than three times a week (P)       How often do you get together with friends or relatives? More than three times a week (P)      How often do you attend Yarsanism or Synagogue services? Never (P)      Do you belong to any clubs or organizations such as Yarsanism groups, unions, fraternal or athletic groups, or school groups? No (P)      How often do you attend meetings of the clubs or organizations you belong to? Never (P)      Are you , , , , never , or living with a partner? Never  (P)         Alcohol Use    Q1: How often do you have a drink containing alcohol? Never (P)      Q2: How many drinks containing alcohol do you have on a typical day when you are drinking? Patient does not drink (P)      Q3: How often do you have six or more drinks on one occasion? Never (P)         OTHER    Name(s) of [facility] Home nh resident (P)

## 2024-03-02 LAB
ALBUMIN SERPL BCP-MCNC: 2.4 G/DL (ref 3.5–5.2)
ALP SERPL-CCNC: 89 U/L (ref 55–135)
ALT SERPL W/O P-5'-P-CCNC: 25 U/L (ref 10–44)
ANION GAP SERPL CALC-SCNC: 9 MMOL/L (ref 8–16)
AST SERPL-CCNC: 56 U/L (ref 10–40)
BASOPHILS # BLD AUTO: 0 K/UL (ref 0–0.2)
BASOPHILS NFR BLD: 0 % (ref 0–1.9)
BILIRUB SERPL-MCNC: 0.3 MG/DL (ref 0.1–1)
BUN SERPL-MCNC: 32 MG/DL (ref 8–23)
CALCIUM SERPL-MCNC: 8.5 MG/DL (ref 8.7–10.5)
CHLORIDE SERPL-SCNC: 105 MMOL/L (ref 95–110)
CO2 SERPL-SCNC: 24 MMOL/L (ref 23–29)
CREAT SERPL-MCNC: 0.8 MG/DL (ref 0.5–1.4)
DIFFERENTIAL METHOD BLD: ABNORMAL
EOSINOPHIL # BLD AUTO: 0 K/UL (ref 0–0.5)
EOSINOPHIL NFR BLD: 0 % (ref 0–8)
ERYTHROCYTE [DISTWIDTH] IN BLOOD BY AUTOMATED COUNT: 16.6 % (ref 11.5–14.5)
EST. GFR  (NO RACE VARIABLE): >60 ML/MIN/1.73 M^2
GLUCOSE SERPL-MCNC: 110 MG/DL (ref 70–110)
HCT VFR BLD AUTO: 22.6 % (ref 40–54)
HGB BLD-MCNC: 7.6 G/DL (ref 14–18)
IMM GRANULOCYTES # BLD AUTO: 0.02 K/UL (ref 0–0.04)
IMM GRANULOCYTES NFR BLD AUTO: 0.9 % (ref 0–0.5)
LYMPHOCYTES # BLD AUTO: 0.2 K/UL (ref 1–4.8)
LYMPHOCYTES NFR BLD: 10.1 % (ref 18–48)
MCH RBC QN AUTO: 31.8 PG (ref 27–31)
MCHC RBC AUTO-ENTMCNC: 33.6 G/DL (ref 32–36)
MCV RBC AUTO: 95 FL (ref 82–98)
MONOCYTES # BLD AUTO: 0.2 K/UL (ref 0.3–1)
MONOCYTES NFR BLD: 9.7 % (ref 4–15)
NEUTROPHILS # BLD AUTO: 1.8 K/UL (ref 1.8–7.7)
NEUTROPHILS NFR BLD: 79.3 % (ref 38–73)
NRBC BLD-RTO: 0 /100 WBC
PLATELET # BLD AUTO: 155 K/UL (ref 150–450)
PMV BLD AUTO: 11.9 FL (ref 9.2–12.9)
POCT GLUCOSE: 109 MG/DL (ref 70–110)
POCT GLUCOSE: 116 MG/DL (ref 70–110)
POCT GLUCOSE: 123 MG/DL (ref 70–110)
POCT GLUCOSE: 155 MG/DL (ref 70–110)
POTASSIUM SERPL-SCNC: 3.7 MMOL/L (ref 3.5–5.1)
PROT SERPL-MCNC: 5.4 G/DL (ref 6–8.4)
RBC # BLD AUTO: 2.39 M/UL (ref 4.6–6.2)
SODIUM SERPL-SCNC: 138 MMOL/L (ref 136–145)
WBC # BLD AUTO: 2.27 K/UL (ref 3.9–12.7)

## 2024-03-02 PROCEDURE — 63600175 PHARM REV CODE 636 W HCPCS: Performed by: STUDENT IN AN ORGANIZED HEALTH CARE EDUCATION/TRAINING PROGRAM

## 2024-03-02 PROCEDURE — 94664 DEMO&/EVAL PT USE INHALER: CPT

## 2024-03-02 PROCEDURE — 97116 GAIT TRAINING THERAPY: CPT | Mod: CQ

## 2024-03-02 PROCEDURE — 99900035 HC TECH TIME PER 15 MIN (STAT)

## 2024-03-02 PROCEDURE — 94640 AIRWAY INHALATION TREATMENT: CPT

## 2024-03-02 PROCEDURE — 25000003 PHARM REV CODE 250: Performed by: STUDENT IN AN ORGANIZED HEALTH CARE EDUCATION/TRAINING PROGRAM

## 2024-03-02 PROCEDURE — 94761 N-INVAS EAR/PLS OXIMETRY MLT: CPT

## 2024-03-02 PROCEDURE — 97530 THERAPEUTIC ACTIVITIES: CPT | Mod: CQ

## 2024-03-02 PROCEDURE — 11000001 HC ACUTE MED/SURG PRIVATE ROOM

## 2024-03-02 PROCEDURE — 27000646 HC AEROBIKA DEVICE

## 2024-03-02 PROCEDURE — 27000207 HC ISOLATION

## 2024-03-02 PROCEDURE — 63600175 PHARM REV CODE 636 W HCPCS: Performed by: INTERNAL MEDICINE

## 2024-03-02 PROCEDURE — 25000242 PHARM REV CODE 250 ALT 637 W/ HCPCS: Performed by: STUDENT IN AN ORGANIZED HEALTH CARE EDUCATION/TRAINING PROGRAM

## 2024-03-02 PROCEDURE — 36415 COLL VENOUS BLD VENIPUNCTURE: CPT | Performed by: STUDENT IN AN ORGANIZED HEALTH CARE EDUCATION/TRAINING PROGRAM

## 2024-03-02 PROCEDURE — 25000003 PHARM REV CODE 250

## 2024-03-02 PROCEDURE — 80053 COMPREHEN METABOLIC PANEL: CPT | Performed by: STUDENT IN AN ORGANIZED HEALTH CARE EDUCATION/TRAINING PROGRAM

## 2024-03-02 PROCEDURE — 85025 COMPLETE CBC W/AUTO DIFF WBC: CPT | Performed by: STUDENT IN AN ORGANIZED HEALTH CARE EDUCATION/TRAINING PROGRAM

## 2024-03-02 RX ORDER — ACETAMINOPHEN 325 MG/1
650 TABLET ORAL EVERY 6 HOURS PRN
Status: DISCONTINUED | OUTPATIENT
Start: 2024-03-02 | End: 2024-03-03 | Stop reason: HOSPADM

## 2024-03-02 RX ADMIN — CELECOXIB 200 MG: 100 CAPSULE ORAL at 09:03

## 2024-03-02 RX ADMIN — CALCIUM CARBONATE (ANTACID) CHEW TAB 500 MG 1500 MG: 500 CHEW TAB at 10:03

## 2024-03-02 RX ADMIN — ALLOPURINOL 300 MG: 100 TABLET ORAL at 09:03

## 2024-03-02 RX ADMIN — IPRATROPIUM BROMIDE AND ALBUTEROL SULFATE 3 ML: 2.5; .5 SOLUTION RESPIRATORY (INHALATION) at 07:03

## 2024-03-02 RX ADMIN — MUPIROCIN: 20 OINTMENT TOPICAL at 10:03

## 2024-03-02 RX ADMIN — CEFTRIAXONE SODIUM 1 G: 1 INJECTION, POWDER, FOR SOLUTION INTRAMUSCULAR; INTRAVENOUS at 12:03

## 2024-03-02 RX ADMIN — METHYLPREDNISOLONE SODIUM SUCCINATE 60 MG: 40 INJECTION, POWDER, FOR SOLUTION INTRAMUSCULAR; INTRAVENOUS at 10:03

## 2024-03-02 RX ADMIN — FENOFIBRATE 145 MG: 145 TABLET ORAL at 09:03

## 2024-03-02 RX ADMIN — FLUOXETINE HYDROCHLORIDE 40 MG: 20 CAPSULE ORAL at 09:03

## 2024-03-02 RX ADMIN — ENOXAPARIN SODIUM 40 MG: 40 INJECTION SUBCUTANEOUS at 04:03

## 2024-03-02 RX ADMIN — IPRATROPIUM BROMIDE AND ALBUTEROL SULFATE 3 ML: 2.5; .5 SOLUTION RESPIRATORY (INHALATION) at 12:03

## 2024-03-02 RX ADMIN — ACETAMINOPHEN 650 MG: 325 TABLET ORAL at 10:03

## 2024-03-02 RX ADMIN — PREDNISONE 40 MG: 20 TABLET ORAL at 09:03

## 2024-03-02 RX ADMIN — IPRATROPIUM BROMIDE AND ALBUTEROL SULFATE 3 ML: 2.5; .5 SOLUTION RESPIRATORY (INHALATION) at 04:03

## 2024-03-02 RX ADMIN — TIZANIDINE 2 MG: 2 TABLET ORAL at 09:03

## 2024-03-02 RX ADMIN — VALACYCLOVIR HYDROCHLORIDE 500 MG: 500 TABLET, FILM COATED ORAL at 09:03

## 2024-03-02 RX ADMIN — COLLAGENASE SANTYL: 250 OINTMENT TOPICAL at 10:03

## 2024-03-02 RX ADMIN — TIZANIDINE 2 MG: 2 TABLET ORAL at 10:03

## 2024-03-02 RX ADMIN — CHOLECALCIFEROL TAB 25 MCG (1000 UNIT) 1000 UNITS: 25 TAB at 09:03

## 2024-03-02 RX ADMIN — PANTOPRAZOLE SODIUM 40 MG: 40 TABLET, DELAYED RELEASE ORAL at 09:03

## 2024-03-02 RX ADMIN — IPRATROPIUM BROMIDE AND ALBUTEROL SULFATE 3 ML: 2.5; .5 SOLUTION RESPIRATORY (INHALATION) at 03:03

## 2024-03-02 RX ADMIN — AZITHROMYCIN MONOHYDRATE 500 MG: 500 INJECTION, POWDER, LYOPHILIZED, FOR SOLUTION INTRAVENOUS at 02:03

## 2024-03-02 RX ADMIN — AMLODIPINE BESYLATE 5 MG: 5 TABLET ORAL at 09:03

## 2024-03-02 NOTE — PT/OT/SLP PROGRESS
"Physical Therapy Treatment    Patient Name:  Harrison Haywood   MRN:  [id number]    Recommendations:     Discharge Recommendations: Moderate Intensity Therapy  Discharge Equipment Recommendations:  (TBD at next level of care)  Barriers to discharge: None    Assessment:     Harrison Haywood is a 68 y.o. male admitted with a medical diagnosis of Acute respiratory failure with hypoxia.  He presents with the following impairments/functional limitations: weakness, impaired endurance, impaired self care skills, impaired functional mobility, gait instability, impaired balance, decreased coordination, decreased upper extremity function, decreased lower extremity function, decreased safety awareness, decreased ROM, impaired coordination, edema, impaired cardiopulmonary response to activity. Pt able to perform 2 ambulation training trials ~15-18 ft each with RW requiring CGA/min A. 1 loss of balance noted at end of 2nd ambulation trial which pt was able to self correct. Would benefit from continued PT services to increase pt's out of bed therapeutic activities and exercises.    Rehab Prognosis: Good; patient would benefit from acute skilled PT services to address these deficits and reach maximum level of function.    Recent Surgery: * No surgery found *      Plan:     During this hospitalization, patient to be seen 3 x/week to address the identified rehab impairments via gait training, therapeutic activities, therapeutic exercises, neuromuscular re-education, wheelchair management/training and progress toward the following goals:    Plan of Care Expires:  03/15/24    Subjective     Chief Complaint: None Expressed  Patient/Family Comments/goals: "I feel better today".  Pain/Comfort:  Pain Rating 1: 0/10 (Did not complain of pain)  Pain Rating Post-Intervention 1: 0/10 (Did not complain of pain)      Objective:     Communicated with nurse prior to session.  Patient found HOB elevated with bed alarm, Condom Catheter, telemetry upon PT " entry to room.     General Precautions: Standard, fall, contact  Orthopedic Precautions: N/A  Braces: N/A  Respiratory Status: Room air     Functional Mobility:  Bed Mobility:     Rolling Left:  contact guard assistance  Scooting: stand by assistance, contact guard assistance, and to EOB  Supine to Sit: minimum assistance  Sit to Supine: stand by assistance and contact guard assistance  Transfers:     Sit to Stand:  contact guard assistance with rolling walker  Gait: 2 trials ~15-18 ft each with RW requiring CGA/min A. 1 loss of balance noted at end of 2nd trial which pt was able to self correct      AM-PAC 6 CLICK MOBILITY  Turning over in bed (including adjusting bedclothes, sheets and blankets)?: 3  Sitting down on and standing up from a chair with arms (e.g., wheelchair, bedside commode, etc.): 3  Moving from lying on back to sitting on the side of the bed?: 3  Moving to and from a bed to a chair (including a wheelchair)?: 3  Need to walk in hospital room?: 3  Climbing 3-5 steps with a railing?: 2  Basic Mobility Total Score: 17       Treatment & Education:  Pt instructed in an performed 1 x 10 sit to stand transfer trials for exercise with RW placed in front of him requiring CGA. Instructed in and performed 1 x 10 hip/knee flexion (Marches) within RW boundaries requiring CGA/SBA. Also, able to perform 2 ambulation training trials within room with RW requiring CGA/min A. 1 loss of balance noted at end of 2nd trial with pt able to self correct. Pt required seated rest breaks of ~30 seconds to a minute between all exercises and activities performed. Verbal cueing needed during standing trials as well as ambulating to decrease trunk flexion. Pt also required verbal/visual cueing reminders for pursed lipped breathing as pt SOB while performing all therapeutic activities.     Patient left HOB elevated with all lines intact, call button in reach, bed alarm on, nurse notified, and set up to eat breakfast ..    GOALS:    Multidisciplinary Problems       Physical Therapy Goals          Problem: Physical Therapy    Goal Priority Disciplines Outcome Goal Variances Interventions   Physical Therapy Goal     PT, PT/OT Ongoing, Progressing     Description: Goals to be met by: 3/14/14     Patient will increase functional independence with mobility by performin. Supine to sit with MInimal Assistance  2. Sit to supine with MInimal Assistance (goal met)  3. Sit to stand transfer with Stand-by Assistance w/RW  4. Bed to chair transfer with Minimal Assist using Rolling Walker (goal met)    The following goals added on 3/1/24 and to be met by 3/15/24  - Patient will ambulate x 200 ft mod I with RW  -Bed to chair transfer with RW modified independence.                            Time Tracking:     PT Received On: 24  PT Start Time: 819     PT Stop Time: 848  PT Total Time (min): 29 min     Billable Minutes: Gait Training 15 and Therapeutic Activity 14    Treatment Type: Treatment  PT/PTA: PTA     Number of PTA visits since last PT visit: 2024

## 2024-03-02 NOTE — PROGRESS NOTES
[facility] Medicine Progress Note    Primary Team: [facility] Hospitalist Team a  Attending Physician: Ayden Yi MD  Resident: Mukesh/Adrianna  Intern: Anna    Subjective:      Pt resting in bed eating breakfast. States shortness of breath is improved. Denies chest pain, abdominal pain. Eating and drinking appropriately. Endorses that back pain is intermittent, however becomes very uncomfortable with positioning during exam. States sacral wound is bothering him; encouraged pt to ask for PRN pain medications.      Objective:     Last 24 Hour Vital Signs:  BP  Min: 94/51  Max: 121/78  Temp  Av.2 °F (36.8 °C)  Min: 97.9 °F (36.6 °C)  Max: 98.5 °F (36.9 °C)  Pulse  Av.9  Min: 65  Max: 99  Resp  Av.1  Min: 15  Max: 39  SpO2  Av.2 %  Min: 85 %  Max: 97 %  Height  Av' (182.9 cm)  Min: 6' (182.9 cm)  Max: 6' (182.9 cm)  Weight  Av.4 kg (186 lb 1.1 oz)  Min: 81.5 kg (179 lb 10.8 oz)  Max: 87.3 kg (192 lb 7.4 oz)  I/O last 3 completed shifts:  In: 1743.7 [P.O.:1550; I.V.:93.6; IV Piggyback:100.1]  Out: 1321 [Urine:1320; Stool:1]    Physical Examination:  General: no acute distress, appears comfortable  HEENT: NC/AT, EOM intact, normal sclera  CV: regular rate and rhythm, no murmurs  Lungs: satting well on room air. Normal work breathing. Diffuse wheezing throughout all lung fields.   Abdomen: soft, non-tender, non-distended  Extremities: well-perfused. No pitting edema bilaterally. LLE larger in circumference > RLE (per pt, consistent with baseline given RSW). No tenderness to palpation bilateral LE      Laboratory:  Laboratory Data Reviewed: yes  Pertinent Findings:  Recent Labs   Lab 24  1039 24  0453 24  0641   WBC 2.70* 1.20* 2.27*   HGB 8.5* 7.6* 7.6*   HCT 25.6* 23.4* 22.6*   PLT 95* 95* 155   MCV 96 95 95   RDW 16.5* 16.4* 16.6*    137 138   K 3.1* 3.8 3.7    103 105   CO2 21* 25 24   BUN 18 20 32*   CREATININE 0.8 0.8 0.8    183* 110   PROT 6.2  5.5* 5.4*   ALBUMIN 2.7* 2.3* 2.4*   BILITOT 0.6 0.4 0.3   AST 39 35 56*   ALKPHOS 112 93 89   ALT 13 13 25       Microbiology Data Reviewed: yes  Pertinent Findings:  RSV + on PCR  Blood cx (02/29): NGTD  MRSA nasal culture pending    Other Results:  EKG (my interpretation): no new EKG    Radiology Data Reviewed: yes  Pertinent Findings:  CTA PE:   mpression:       1. Limited exam due to streak artifact and motion..  2. No findings to suggest central pulmonary arterial thrombus.  3. Streaky patchy opacities in both lower lobes likely representing atelectasis.  4. Marrow replacement throughout the spine and sternum.  Correlate for small round cell tumor such as myeloma or lymphoma or lytic metastatic process.  5. This report was flagged in Epic as abnormal.     CXR  FINDINGS:   The lungs are well expanded and clear.  No focal opacities are seen.  The pleural spaces are clear the cardiac silhouette is enlarged.  Osseous structures demonstrate degenerative changes.     Current Medications:     Infusions:       Scheduled:   albuterol-ipratropium  3 mL Nebulization Q4H    allopurinoL  300 mg Oral Daily    amLODIPine  5 mg Oral Daily    azithromycin  500 mg Intravenous Q24H    calcium carbonate  1,500 mg Oral QHS    cefTRIAXone (Rocephin) IV (PEDS and ADULTS)  1 g Intravenous Q24H    celecoxib  200 mg Oral Daily    collagenase   Topical (Top) Daily    enoxparin  40 mg Subcutaneous Daily    fenofibrate  145 mg Oral Daily    FLUoxetine  40 mg Oral Daily    lactated ringers  500 mL Intravenous Once    mupirocin   Nasal BID    pantoprazole  40 mg Oral Daily    predniSONE  40 mg Oral Daily    tiZANidine  2 mg Oral BID    valACYclovir  500 mg Oral Daily    vitamin D  1,000 Units Oral Daily        PRN:  sodium chloride 0.9%, albuterol sulfate, albuterol-ipratropium, dextrose 10%, glucagon (human recombinant), glucose, glucose, HYDROcodone-acetaminophen, insulin aspart U-100, melatonin, sodium chloride 0.9%    Antibiotics and  Day Number of Therapy:  Rocephin + Azithromycin (2/29- present)      Assessment:     Harrison Haywood is a 68 y.o.male with PMHX of Multiple Myeloma (with lytic spine lesions), HTN, HLD, Stage 4 Sacral Decubitus Ulcer, developmental delay (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 Upon arrival pt with positive RSV respiratory PCR and admitted to ICU for respiratory distress and frequent albuterol. Respiratory status improved s/p steroids and weaned to room air. Started on CAP treatment in addition to supportive care for RSV infection.      Plan:     Reactive airway with status asthmaticus 2/2 RSV   Community Acquired Pneumonia  - presented with SOB, cough x 3 days. Treated at NH with levofloxacin for presumed PNA given abnormal chest x-ray at facility.   - RSV PCR positive on arrival   - s/p duonebs X3, multiple 1 hour continuous, IV magnesium  - CTPE negative for PE.   - s/p methylprednisolone 60mg IV q6 hours (2/29-3/1)  PLAN:   - Continue CTX + Azithromycin for CAP coverage   - DuoNebs scheduled [id number]  - Prednisone 40mg daily x 5 days  - Incentive spirometry     Multiple Myeloma with Lytic Spine Lesions   Complicated by Pancytopenia  - on admit, WBC 2.7, H/H 8.5/25, Plt 95   - On off week of chemotherapy medications receiving VR(d), following with Heme/onc   - endorses occasional back discomfort 2/2 spinal lytic lesions   PLAN:  - CTM CBC, transfuse to Hgb goal >7, Plt >50  - continue allopurinol 300 daily and valacyclovir 500 po daily for ppx  - continue calcium and vitamin D for bony invvolvement  - Back pain: Celecoxib 200mg daily, floxetine 40mg daily, tizanidine 2mg BID   - PRN Norco 5 q6hr PRN    Stage 4 sacral decubitus ulcer, POA  - Present on admission. Pt reports contributing to discomfort  PLAN:  - wound care consulted  - pain control as above     Hypokalemia, resolved  - K 3.1 on  admit, improving to wnl s/p repletion     Mild troponinemia  - denied chest pain, EKG stable  - Troponin 0.034-0.037 stable on repeat   - no evidence of PE on CT PE     Prolonged Qtc  - Qtc 506 on admission   PLAN:  - replete electrolytes as indicated  - will avoid QTC prolonging medications     Ppx: lovenox  Diet: regular + Ron BID    Dispo: pending clinical improvement in examination, further treatment with DuoNebs, steroids, CAP coverage.    Gillian Phillips MD  [facility] Internal Medicine HO-I    [facility] Medicine Hospitalist Pager numbers:   [facility] Hospitalist Medicine Team A (Kassidy/Iris): [phone]  [facility] Hospitalist Medicine Team B (Mel/Eduardo):  [phone]

## 2024-03-02 NOTE — PLAN OF CARE
Problem: Physical Therapy  Goal: Physical Therapy Goal  Description: Goals to be met by: 3/14/14     Patient will increase functional independence with mobility by performin. Supine to sit with MInimal Assistance  2. Sit to supine with MInimal Assistance (goal met)  3. Sit to stand transfer with Stand-by Assistance w/RW  4. Bed to chair transfer with Minimal Assist using Rolling Walker (goal met)    The following goals added on 3/1/24 and to be met by 3/15/24  - Patient will ambulate x 200 ft mod I with RW  -Bed to chair transfer with RW modified independence.       Outcome: Ongoing, Progressing   Pt continues to work toward all goals. Able to perform 2 ambulation training trials ~15-18 ft each with RW requiring CGA/min A. 1 loss of balance noted at end of 2nd ambulation trial which pt was able to self correct.

## 2024-03-03 VITALS
BODY MASS INDEX: 26.07 KG/M2 | HEIGHT: 72 IN | RESPIRATION RATE: 21 BRPM | SYSTOLIC BLOOD PRESSURE: 109 MMHG | OXYGEN SATURATION: 94 % | DIASTOLIC BLOOD PRESSURE: 61 MMHG | TEMPERATURE: 98 F | HEART RATE: 73 BPM | WEIGHT: 192.44 LBS

## 2024-03-03 PROBLEM — R79.89 ELEVATED TROPONIN: Status: RESOLVED | Noted: 2024-02-29 | Resolved: 2024-03-03

## 2024-03-03 PROBLEM — J96.01 ACUTE RESPIRATORY FAILURE WITH HYPOXIA: Status: RESOLVED | Noted: 2024-02-29 | Resolved: 2024-03-03

## 2024-03-03 PROBLEM — E87.6 HYPOKALEMIA: Status: RESOLVED | Noted: 2024-02-29 | Resolved: 2024-03-03

## 2024-03-03 PROBLEM — J45.902 REACTIVE AIRWAY DISEASE WITH STATUS ASTHMATICUS: Status: RESOLVED | Noted: 2024-02-29 | Resolved: 2024-03-03

## 2024-03-03 LAB
ALBUMIN SERPL BCP-MCNC: 2.4 G/DL (ref 3.5–5.2)
ALP SERPL-CCNC: 90 U/L (ref 55–135)
ALT SERPL W/O P-5'-P-CCNC: 24 U/L (ref 10–44)
ANION GAP SERPL CALC-SCNC: 10 MMOL/L (ref 8–16)
AST SERPL-CCNC: 39 U/L (ref 10–40)
BASOPHILS # BLD AUTO: 0 K/UL (ref 0–0.2)
BASOPHILS NFR BLD: 0 % (ref 0–1.9)
BILIRUB SERPL-MCNC: 0.3 MG/DL (ref 0.1–1)
BUN SERPL-MCNC: 30 MG/DL (ref 8–23)
CALCIUM SERPL-MCNC: 8.4 MG/DL (ref 8.7–10.5)
CHLORIDE SERPL-SCNC: 104 MMOL/L (ref 95–110)
CO2 SERPL-SCNC: 24 MMOL/L (ref 23–29)
CREAT SERPL-MCNC: 0.8 MG/DL (ref 0.5–1.4)
DIFFERENTIAL METHOD BLD: ABNORMAL
EOSINOPHIL # BLD AUTO: 0 K/UL (ref 0–0.5)
EOSINOPHIL NFR BLD: 0 % (ref 0–8)
ERYTHROCYTE [DISTWIDTH] IN BLOOD BY AUTOMATED COUNT: 16.4 % (ref 11.5–14.5)
EST. GFR  (NO RACE VARIABLE): >60 ML/MIN/1.73 M^2
GLUCOSE SERPL-MCNC: 155 MG/DL (ref 70–110)
HCT VFR BLD AUTO: 22 % (ref 40–54)
HGB BLD-MCNC: 7.4 G/DL (ref 14–18)
IMM GRANULOCYTES # BLD AUTO: 0.02 K/UL (ref 0–0.04)
IMM GRANULOCYTES NFR BLD AUTO: 1.1 % (ref 0–0.5)
LYMPHOCYTES # BLD AUTO: 0.1 K/UL (ref 1–4.8)
LYMPHOCYTES NFR BLD: 6.8 % (ref 18–48)
MCH RBC QN AUTO: 31.6 PG (ref 27–31)
MCHC RBC AUTO-ENTMCNC: 33.6 G/DL (ref 32–36)
MCV RBC AUTO: 94 FL (ref 82–98)
MONOCYTES # BLD AUTO: 0.1 K/UL (ref 0.3–1)
MONOCYTES NFR BLD: 6.3 % (ref 4–15)
NEUTROPHILS # BLD AUTO: 1.6 K/UL (ref 1.8–7.7)
NEUTROPHILS NFR BLD: 85.8 % (ref 38–73)
NRBC BLD-RTO: 0 /100 WBC
PLATELET # BLD AUTO: 179 K/UL (ref 150–450)
PMV BLD AUTO: 11.8 FL (ref 9.2–12.9)
POCT GLUCOSE: 121 MG/DL (ref 70–110)
POCT GLUCOSE: 132 MG/DL (ref 70–110)
POTASSIUM SERPL-SCNC: 4.1 MMOL/L (ref 3.5–5.1)
PROT SERPL-MCNC: 5.4 G/DL (ref 6–8.4)
RBC # BLD AUTO: 2.34 M/UL (ref 4.6–6.2)
SODIUM SERPL-SCNC: 138 MMOL/L (ref 136–145)
WBC # BLD AUTO: 1.9 K/UL (ref 3.9–12.7)

## 2024-03-03 PROCEDURE — 63600175 PHARM REV CODE 636 W HCPCS: Performed by: STUDENT IN AN ORGANIZED HEALTH CARE EDUCATION/TRAINING PROGRAM

## 2024-03-03 PROCEDURE — 27000646 HC AEROBIKA DEVICE

## 2024-03-03 PROCEDURE — 85025 COMPLETE CBC W/AUTO DIFF WBC: CPT | Performed by: STUDENT IN AN ORGANIZED HEALTH CARE EDUCATION/TRAINING PROGRAM

## 2024-03-03 PROCEDURE — 94640 AIRWAY INHALATION TREATMENT: CPT

## 2024-03-03 PROCEDURE — 94664 DEMO&/EVAL PT USE INHALER: CPT

## 2024-03-03 PROCEDURE — 99900035 HC TECH TIME PER 15 MIN (STAT)

## 2024-03-03 PROCEDURE — 80053 COMPREHEN METABOLIC PANEL: CPT | Performed by: STUDENT IN AN ORGANIZED HEALTH CARE EDUCATION/TRAINING PROGRAM

## 2024-03-03 PROCEDURE — 25000242 PHARM REV CODE 250 ALT 637 W/ HCPCS: Performed by: STUDENT IN AN ORGANIZED HEALTH CARE EDUCATION/TRAINING PROGRAM

## 2024-03-03 PROCEDURE — 36415 COLL VENOUS BLD VENIPUNCTURE: CPT | Performed by: STUDENT IN AN ORGANIZED HEALTH CARE EDUCATION/TRAINING PROGRAM

## 2024-03-03 PROCEDURE — 94761 N-INVAS EAR/PLS OXIMETRY MLT: CPT

## 2024-03-03 PROCEDURE — 25000003 PHARM REV CODE 250: Performed by: STUDENT IN AN ORGANIZED HEALTH CARE EDUCATION/TRAINING PROGRAM

## 2024-03-03 RX ORDER — FENOFIBRATE 145 MG/1
145 TABLET, FILM COATED ORAL DAILY
Qty: 35 TABLET | Refills: 0 | Status: SHIPPED | OUTPATIENT
Start: 2024-03-03 | End: 2024-04-07

## 2024-03-03 RX ORDER — PREDNISONE 10 MG/1
TABLET ORAL
Qty: 30 TABLET | Refills: 0 | Status: SHIPPED | OUTPATIENT
Start: 2024-03-03 | End: 2024-03-15

## 2024-03-03 RX ORDER — PANTOPRAZOLE SODIUM 40 MG/1
40 TABLET, DELAYED RELEASE ORAL DAILY
Qty: 30 TABLET | Refills: 11 | Status: SHIPPED | OUTPATIENT
Start: 2024-03-04 | End: 2025-03-04

## 2024-03-03 RX ORDER — CHOLECALCIFEROL (VITAMIN D3) 25 MCG
1000 TABLET ORAL DAILY
Qty: 30 TABLET | Refills: 2 | Status: SHIPPED | OUTPATIENT
Start: 2024-03-03 | End: 2024-03-06 | Stop reason: DRUGHIGH

## 2024-03-03 RX ORDER — LEVOFLOXACIN 750 MG/1
750 TABLET ORAL DAILY
Qty: 3 TABLET | Refills: 0 | Status: ON HOLD | OUTPATIENT
Start: 2024-03-03 | End: 2024-03-08 | Stop reason: HOSPADM

## 2024-03-03 RX ORDER — TIZANIDINE 2 MG/1
2 TABLET ORAL 2 TIMES DAILY
Qty: 20 TABLET | Refills: 0 | Status: SHIPPED | OUTPATIENT
Start: 2024-03-03 | End: 2024-03-13

## 2024-03-03 RX ORDER — CELECOXIB 200 MG/1
200 CAPSULE ORAL DAILY
Qty: 30 CAPSULE | Refills: 1 | Status: SHIPPED | OUTPATIENT
Start: 2024-03-03 | End: 2024-04-07

## 2024-03-03 RX ORDER — HYDROCODONE BITARTRATE AND ACETAMINOPHEN 5; 325 MG/1; MG/1
1 TABLET ORAL EVERY 6 HOURS PRN
Qty: 28 TABLET | Refills: 0 | Status: SHIPPED | OUTPATIENT
Start: 2024-03-03 | End: 2024-03-03

## 2024-03-03 RX ORDER — HYDROCODONE BITARTRATE AND ACETAMINOPHEN 5; 325 MG/1; MG/1
1 TABLET ORAL EVERY 6 HOURS PRN
Qty: 28 TABLET | Refills: 0 | Status: SHIPPED | OUTPATIENT
Start: 2024-03-03 | End: 2024-03-10

## 2024-03-03 RX ORDER — TALC
6 POWDER (GRAM) TOPICAL NIGHTLY PRN
Qty: 30 TABLET | Refills: 7 | Status: SHIPPED | OUTPATIENT
Start: 2024-03-03 | End: 2024-07-01

## 2024-03-03 RX ADMIN — IPRATROPIUM BROMIDE AND ALBUTEROL SULFATE 3 ML: 2.5; .5 SOLUTION RESPIRATORY (INHALATION) at 04:03

## 2024-03-03 RX ADMIN — TIZANIDINE 2 MG: 2 TABLET ORAL at 09:03

## 2024-03-03 RX ADMIN — MUPIROCIN: 20 OINTMENT TOPICAL at 09:03

## 2024-03-03 RX ADMIN — IPRATROPIUM BROMIDE AND ALBUTEROL SULFATE 3 ML: 2.5; .5 SOLUTION RESPIRATORY (INHALATION) at 07:03

## 2024-03-03 RX ADMIN — CEFTRIAXONE SODIUM 1 G: 1 INJECTION, POWDER, FOR SOLUTION INTRAMUSCULAR; INTRAVENOUS at 01:03

## 2024-03-03 RX ADMIN — AMLODIPINE BESYLATE 5 MG: 5 TABLET ORAL at 09:03

## 2024-03-03 RX ADMIN — FENOFIBRATE 145 MG: 145 TABLET ORAL at 09:03

## 2024-03-03 RX ADMIN — PREDNISONE 40 MG: 20 TABLET ORAL at 09:03

## 2024-03-03 RX ADMIN — ALLOPURINOL 300 MG: 100 TABLET ORAL at 09:03

## 2024-03-03 RX ADMIN — IPRATROPIUM BROMIDE AND ALBUTEROL SULFATE 3 ML: 2.5; .5 SOLUTION RESPIRATORY (INHALATION) at 03:03

## 2024-03-03 RX ADMIN — CELECOXIB 200 MG: 100 CAPSULE ORAL at 09:03

## 2024-03-03 RX ADMIN — FLUOXETINE HYDROCHLORIDE 40 MG: 20 CAPSULE ORAL at 09:03

## 2024-03-03 RX ADMIN — CHOLECALCIFEROL TAB 25 MCG (1000 UNIT) 1000 UNITS: 25 TAB at 09:03

## 2024-03-03 RX ADMIN — IPRATROPIUM BROMIDE AND ALBUTEROL SULFATE 3 ML: 2.5; .5 SOLUTION RESPIRATORY (INHALATION) at 12:03

## 2024-03-03 RX ADMIN — COLLAGENASE SANTYL: 250 OINTMENT TOPICAL at 09:03

## 2024-03-03 RX ADMIN — VALACYCLOVIR HYDROCHLORIDE 500 MG: 500 TABLET, FILM COATED ORAL at 09:03

## 2024-03-03 RX ADMIN — PANTOPRAZOLE SODIUM 40 MG: 40 TABLET, DELAYED RELEASE ORAL at 09:03

## 2024-03-03 NOTE — NURSING
Pt verbalized does not want both the flu and pneumonia shot. Both flu and pneumonia shots not administered pt refused.

## 2024-03-03 NOTE — PROGRESS NOTES
[facility] Medicine Progress Note    Primary Team: [facility] Hospitalist Team a  Attending Physician: Ayden Yi MD  Resident: Mukesh/Adrianna  Intern: Kory/Jacqueline    Subjective:      Pt resting in bed on his side today to relieve sacral wound discomfort. Endorses stable, improved breathing overnight. Continues to have coughing. Denies chest pain, abdominal pain. Encouraged pt to ask for PRN pain neds as needed for back discomfort.      Objective:     Last 24 Hour Vital Signs:  BP  Min: 106/59  Max: 117/66  Temp  Av.3 °F (36.8 °C)  Min: 98 °F (36.7 °C)  Max: 98.5 °F (36.9 °C)  Pulse  Av.2  Min: 69  Max: 83  Resp  Av.8  Min: 18  Max: 21  SpO2  Av.8 %  Min: 91 %  Max: 98 %  I/O last 3 completed shifts:  In: -   Out: 1250 [Urine:1250]    Physical Examination:  General: no acute distress, appears comfortable  HEENT: NC/AT, EOM intact, normal sclera  CV: regular rate and rhythm, no murmurs  Lungs: satting well on room air. Normal work breathing. Largely improved air movement bilaterally. Faint expiratory wheezes in bibasilar lung fields, much improved from prior.   Abdomen: soft, non-tender, non-distended  Extremities: well-perfused. No pitting edema bilaterally. LLE larger in circumference > RLE (per pt, consistent with baseline given RSW). No tenderness to palpation bilateral LE      Laboratory:  Laboratory Data Reviewed: yes  Pertinent Findings:  Recent Labs   Lab 24  0453 24  0641 24  0313   WBC 1.20* 2.27* 1.90*   HGB 7.6* 7.6* 7.4*   HCT 23.4* 22.6* 22.0*   PLT 95* 155 179   MCV 95 95 94   RDW 16.4* 16.6* 16.4*    138 138   K 3.8 3.7 4.1    105 104   CO2 25 24 24   BUN 20 32* 30*   CREATININE 0.8 0.8 0.8   * 110 155*   PROT 5.5* 5.4* 5.4*   ALBUMIN 2.3* 2.4* 2.4*   BILITOT 0.4 0.3 0.3   AST 35 56* 39   ALKPHOS 93 89 90   ALT 13 25 24       Microbiology Data Reviewed: yes  Pertinent Findings:  RSV + on PCR  Blood cx (): NGTD  MRSA nasal culture: staph  aureus, pending susceptibility    Other Results:  EKG (my interpretation): no new EKG    Radiology Data Reviewed: yes  Pertinent Findings:  CTA PE:   mpression:       1. Limited exam due to streak artifact and motion..  2. No findings to suggest central pulmonary arterial thrombus.  3. Streaky patchy opacities in both lower lobes likely representing atelectasis.  4. Marrow replacement throughout the spine and sternum.  Correlate for small round cell tumor such as myeloma or lymphoma or lytic metastatic process.  5. This report was flagged in Epic as abnormal.     CXR  FINDINGS:   The lungs are well expanded and clear.  No focal opacities are seen.  The pleural spaces are clear the cardiac silhouette is enlarged.  Osseous structures demonstrate degenerative changes.     Current Medications:     Infusions:       Scheduled:   albuterol-ipratropium  3 mL Nebulization Q4H    allopurinoL  300 mg Oral Daily    amLODIPine  5 mg Oral Daily    calcium carbonate  1,500 mg Oral QHS    cefTRIAXone (Rocephin) IV (PEDS and ADULTS)  1 g Intravenous Q24H    celecoxib  200 mg Oral Daily    collagenase   Topical (Top) Daily    enoxparin  40 mg Subcutaneous Daily    fenofibrate  145 mg Oral Daily    FLUoxetine  40 mg Oral Daily    lactated ringers  500 mL Intravenous Once    mupirocin   Nasal BID    pantoprazole  40 mg Oral Daily    predniSONE  40 mg Oral Daily    tiZANidine  2 mg Oral BID    valACYclovir  500 mg Oral Daily    vitamin D  1,000 Units Oral Daily        PRN:  sodium chloride 0.9%, acetaminophen, albuterol sulfate, albuterol-ipratropium, dextrose 10%, glucagon (human recombinant), glucose, glucose, HYDROcodone-acetaminophen, insulin aspart U-100, melatonin, sodium chloride 0.9%    Antibiotics and Day Number of Therapy:  Rocephin + Azithromycin (2/29- present)    Assessment:     Harrison Haywood is a 68 y.o.male with PMHX of Multiple Myeloma (with lytic spine lesions), HTN, HLD, Stage 4 Sacral Decubitus Ulcer, developmental  delay (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 Upon arrival pt with positive RSV respiratory PCR and admitted to ICU for respiratory distress and frequent albuterol. Respiratory status improved s/p steroids and weaned to room air. Started on CAP treatment in addition to supportive care for RSV infection.      Plan:     Reactive airway with status asthmaticus 2/2 RSV   Community Acquired Pneumonia  - presented with SOB, cough x 3 days. Treated at NH with levofloxacin for presumed PNA given abnormal chest x-ray at facility.   - RSV PCR positive on arrival   - s/p duonebs X3, multiple 1 hour continuous, IV magnesium  - CTPE negative for PE.   - s/p methylprednisolone 60mg IV x5 doses  PLAN:   - s/p 1x additional dose of IV methylpred overnight with improvement on exam.   - Prednisone 40mg daily. Will discuss prolonged duration on rounds  - Continue CTX + Azithromycin for CAP coverage   - DuoNebs scheduled [id number]  - Incentive spirometry     Multiple Myeloma with Lytic Spine Lesions   Complicated by Pancytopenia  - on admit, WBC 2.7, H/H 8.5/25, Plt 95   - On off week of chemotherapy medications receiving VR(d), following with Heme/onc   - endorses occasional back discomfort 2/2 spinal lytic lesions   PLAN:  - CTM CBC, transfuse to Hgb goal >7, Plt >50  - continue allopurinol 300 daily and valacyclovir 500 po daily for ppx  - continue calcium and vitamin D for bony invvolvement  - Back pain: Celecoxib 200mg daily, floxetine 40mg daily, tizanidine 2mg BID   - PRN Norco 5 q6hr PRN    Stage 4 sacral decubitus ulcer, POA  - Present on admission. Pt reports contributing to discomfort  PLAN:  - wound care consulted  - pain control as above     Hypokalemia, resolved  - K 3.1 on admit, improving to wnl s/p repletion     Mild troponinemia  - denied chest pain, EKG stable  - Troponin 0.034-0.037 stable on  repeat   - no evidence of PE on CT PE     Prolonged Qtc  - Qtc 506 on admission   PLAN:  - replete electrolytes as indicated  - will avoid QTC prolonging medications     Ppx: lovenox  Diet: regular + Ron BID    Dispo: potential discharge back to [facility] today pending continual symptomatic improvement on exam.     Gillian Phillips MD  [facility] Internal Medicine HO-I    [facility] Medicine Hospitalist Pager numbers:   [facility] Hospitalist Medicine Team A (Kassidy/Iris): [phone]  [facility] Hospitalist Medicine Team B (Mel/Eduardo):  [phone]

## 2024-03-03 NOTE — PLAN OF CARE
[facility] faxed facility transfer orders. SW awaiting report and room number. [facility] spoke with nurse Gautam who provided clearance for pt to return to facility today. [facility] contacted pts sister Arminda Garduno (Sister)[phone] and made her aware of pts dc/return to NH today. Pts sister expressed understanding. Hard script placed in transport packet. Nurse Gautam made aware.     Report: [phone]   Room:438 A  Transportation:[facility] setup ambulance transport for 4:00pm ETA pending.   Transport packet:At nursing station  Facility: [facility]  Address: [address] [place] [address] [place] [address]     No additional cm needs.     Cleared from CM . Bedside Nurse and VN notified.     03/03/24 1332   Final Note   Assessment Type Final Discharge Note   Anticipated Discharge Disposition   (Return to NH)   Hospital Resources/Appts/Education Provided Appointments scheduled and added to AVS   Post-Acute Status   Discharge Delays None known at this time

## 2024-03-03 NOTE — PLAN OF CARE
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 [C79.49]  Yes    Pancytopenia [D61.818]  Yes    Multiple myeloma not having achieved remission [C90.00]  Yes    Primary hypertension [I10]  Yes    Other hyperlipidemia [E78.49]  Yes    Sacral decubitus ulcer, stage IV [L89.154]  Yes    Pneumonia of left lower lobe due to infectious organism [J18.9]  Yes      Resolved Hospital Problems    Diagnosis Date Resolved POA    *Acute respiratory failure with hypoxia [J96.01] 03/03/2024 Yes    Shortness of breath [R06.02] 03/01/2024 Yes    Reactive airway disease with status asthmaticus [J45.902] 03/03/2024 Yes    Hypokalemia [E87.6] 03/03/2024 Unknown    Elevated troponin [R79.89] 03/03/2024 Yes       Allergies:Review of patient's allergies indicates:  No Known Allergies    Diet: renal diet    Activities: Activity as tolerated    Goals of Care Treatment Preferences:  Code Status: Full Code      Nursing: assistance     Labs:  as indicated        CONSULTS:    Physical Therapy to evaluate and treat.  and Occupational Therapy to evaluate and treat.    MISCELLANEOUS CARE:  None    WOUND CARE ORDERS  Yes: Sacral wounds    Medications: Review discharge medications with patient and family and provide education.      Current Discharge Medication List        START taking these medications    Details   collagenase (SANTYL) ointment Apply topically once daily.  Qty: 2 g, Refills: 6      HYDROcodone-acetaminophen (NORCO) 5-325 mg per tablet Take 1 tablet by mouth  every 6 (six) hours as needed for Pain.  Qty: 28 tablet, Refills: 0    Comments: Quantity prescribed more than 7 day supply? No      levoFLOXacin (LEVAQUIN) 750 MG tablet Take 1 tablet (750 mg total) by mouth once daily. for 3 days  Qty: 3 tablet, Refills: 0      melatonin (MELATIN) 3 mg tablet Take 2 tablets (6 mg total) by mouth nightly as needed for Insomnia.  Qty: 30 tablet, Refills: 7      predniSONE (DELTASONE) 10 MG tablet Take 4 tablets (40 mg total) by mouth once daily for 3 days, THEN 3 tablets (30 mg total) once daily for 3 days, THEN 2 tablets (20 mg total) once daily for 3 days, THEN 1 tablet (10 mg total) once daily for 3 days.  Qty: 30 tablet, Refills: 0           CONTINUE these medications which have CHANGED    Details   celecoxib (CELEBREX) 200 MG capsule Take 1 capsule (200 mg total) by mouth once daily.  Qty: 30 capsule, Refills: 1      fenofibrate (TRICOR) 145 MG tablet Take 1 tablet (145 mg total) by mouth once daily.  Qty: 35 tablet, Refills: 0      pantoprazole (PROTONIX) 40 MG tablet Take 1 tablet (40 mg total) by mouth once daily.  Qty: 30 tablet, Refills: 11      tiZANidine (ZANAFLEX) 2 MG tablet Take 1 tablet (2 mg total) by mouth 2 (two) times a day. for 10 days  Qty: 20 tablet, Refills: 0      !! vitamin D (VITAMIN D3) 1000 units Tab Take 1 tablet (1,000 Units total) by mouth once daily.  Qty: 30 tablet, Refills: 2       !! - Potential duplicate medications found. Please discuss with provider.        CONTINUE these medications which have NOT CHANGED    Details   acetaminophen (TYLENOL) 650 MG TbSR Take 650 mg by mouth every 8 (eight) hours as needed.      allopurinoL (ZYLOPRIM) 300 MG tablet Take 300 mg by mouth once daily.      amLODIPine (NORVASC) 5 MG tablet Take 5 mg by mouth once daily.      ascorbic acid, vitamin C, (VITAMIN C) 500 MG tablet Take 500 mg by mouth once daily.      calcium carbonate (OS-SONIA) 600 mg calcium (1,500 mg) Tab Take 1,200 mg by mouth every evening.       FLUoxetine 40 MG capsule Take 40 mg by mouth once daily.      guaiFENesin 100 mg/5 ml (MCKENNA-TUSSIN) 100 mg/5 mL syrup Take 200 mg by mouth every 4 (four) hours as needed for Cough.      multivitamin (ONE DAILY MULTIVITAMIN) per tablet Take 1 tablet by mouth once daily.      ondansetron (ZOFRAN) 4 MG tablet Take 4 mg by mouth 4 (four) times daily as needed.      valACYclovir (VALTREX) 500 MG tablet Take 500 mg by mouth once daily.      !! VITAMIN D3 10 mcg (400 unit) tablet Take 2 tablets by mouth every evening.      zinc sulfate (ZINCATE) 50 mg zinc (220 mg) capsule Take 220 mg by mouth once daily.      bisacodyL (DULCOLAX) 5 mg EC tablet Take 5 mg by mouth daily as needed for Constipation.       !! - Potential duplicate medications found. Please discuss with provider.        STOP taking these medications       dexAMETHasone (DECADRON) 4 MG Tab Comments:   Reason for Stopping:         levETIRAcetam (KEPPRA) 750 MG Tab Comments:   Reason for Stopping:         magic mouthwash diphen/antac/lidoc Comments:   Reason for Stopping:         menthol-zinc oxide (CALMOSEPTINE) 0.44-20.6 % Oint Comments:   Reason for Stopping:         rosuvastatin (CRESTOR) 20 MG tablet Comments:   Reason for Stopping:         traMADoL (ULTRAM) 50 mg tablet Comments:   Reason for Stopping:         furosemide (LASIX) 20 MG tablet Comments:   Reason for Stopping:         tiZANidine 2 mg Cap Comments:   Reason for Stopping:                  Immunizations Administered as of 3/3/2024       No immunizations on file.            Has one covid shot    Some patients may experience side effects after vaccination.  These may include fever, headache, muscle or joint aches.  Most symptoms resolve with 24-48 hours and do not require urgent medical evaluation unless they persist for more than 72 hours or symptoms are concerning for an unrelated medical condition.          _________________________________  Viet Rodas MD  03/03/2024

## 2024-03-03 NOTE — PLAN OF CARE
Problem: Adult Inpatient Plan of Care  Goal: Plan of Care Review  Outcome: Ongoing, Progressing  Goal: Patient-Specific Goal (Individualized)  Outcome: Ongoing, Progressing  Goal: Absence of Hospital-Acquired Illness or Injury  Outcome: Ongoing, Progressing  Goal: Optimal Comfort and Wellbeing  Outcome: Ongoing, Progressing  Goal: Readiness for Transition of Care  Outcome: Ongoing, Progressing     Problem: Respiratory Compromise (Pneumonia)  Goal: Effective Oxygenation and Ventilation  Outcome: Ongoing, Progressing     Problem: Fall Injury Risk  Goal: Absence of Fall and Fall-Related Injury  Outcome: Ongoing, Progressing     Problem: Skin Injury Risk Increased  Goal: Skin Health and Integrity  Outcome: Ongoing, Progressing     Problem: Impaired Wound Healing  Goal: Optimal Wound Healing  Outcome: Ongoing, Progressing

## 2024-03-04 ENCOUNTER — TELEPHONE (OUTPATIENT)
Dept: PULMONOLOGY | Facility: CLINIC | Age: 69
End: 2024-03-04
Payer: MEDICARE

## 2024-03-04 DIAGNOSIS — R06.02 SOB (SHORTNESS OF BREATH): Primary | ICD-10-CM

## 2024-03-04 LAB
BACTERIA NPH AEROBE CULT: ABNORMAL
OHS QRS DURATION: 114 MS
OHS QTC CALCULATION: 506 MS

## 2024-03-04 NOTE — DISCHARGE SUMMARY
[facility] Hospital Medicine Discharge Summary    Primary Team: [facility] Hospitalist Team A  Attending Physician: Dr. 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 Atelectasis in bilateral lower lobes.   - CXR: no focal opacities, pleural spaces cleared.     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 Upon arrival pt with positive RSV respiratory PCR and admitted to ICU for respiratory distress and frequent albuterol. CTPE negative for pulmonary embolism. Respiratory status improved s/p IV solumedrol x4 doses and pt weaned to room air. Started on CAP treatment in addition to supportive care for RSV infection including scheduled duonebs, PO prednisone. On 3/3, pt with marked improvement on exam and stable for discharge home to Nursing Home. Pt discharged with PO Prednisone taper x 10 days and PO Levofloxacin to complete course of antibiotics for CAP coverage.     For the full HPI please refer to the History & Physical from this admission.    Hospital Course By Problem with Pertinent Findings     Reactive  airway with status asthmaticus 2/2 RSV   Community Acquired Pneumonia  - presented with SOB, cough x 3 days. Treated at NH with levofloxacin for presumed PNA given abnormal chest x-ray at facility.   - RSV PCR positive on arrival   - s/p duonebs X3, multiple 1 hour continuous, IV magnesium  - CTPE negative for PE.   - s/p methylprednisolone 60mg IV x5 doses while admitted  - Received scheduled duoNebs [id number]  - Started on PO Prendisone 40mg daily while inpatient.   - Discharge with PO Prednisone taper over 10 days  - Started on CTX + Azithromycin for CAP coverage while inpatient.   - Discharged with PO Levofloxacin.      Multiple Myeloma with Lytic Spine Lesions   Complicated by Pancytopenia  - on admit, WBC 2.7, H/H 8.5/25, Plt 95   - On off week of chemotherapy medications receiving VR(d), following with Heme/onc   - endorses occasional back discomfort 2/2 spinal lytic lesions   - continued allopurinol 300 daily and valacyclovir 500 po daily for ppx  - continued calcium and vitamin D for bony invvolvement  - Back pain: Celecoxib 200mg daily, floxetine 40mg daily, tizanidine 2mg BID and PRN Norco 5 q6hr PRN     Stage 4 sacral decubitus ulcer, POA  - Present on admission. Pt reports contributing to discomfort  - wound care consulted     Hypokalemia, resolved  - K 3.1 on admit, improving to wnl s/p repletion     Mild troponinemia  - denied chest pain, EKG stable  - Troponin 0.034-0.037 stable on repeat   - no evidence of PE on CT PE      Discharge Medications        Medication List        START taking these medications      collagenase ointment  Commonly known as: SANTYL  Apply topically once daily.  Start taking on: March 4, 2024     HYDROcodone-acetaminophen 5-325 mg per tablet  Commonly known as: NORCO  Take 1 tablet by mouth every 6 (six) hours as needed for Pain.     levoFLOXacin 750 MG tablet  Commonly known as: LEVAQUIN  Take 1 tablet (750 mg total) by mouth once daily. for 3 days     melatonin 3 mg  tablet  Commonly known as: MELATIN  Take 2 tablets (6 mg total) by mouth nightly as needed for Insomnia.     predniSONE 10 MG tablet  Commonly known as: DELTASONE  Take 4 tablets (40 mg total) by mouth once daily for 3 days, THEN 3 tablets (30 mg total) once daily for 3 days, THEN 2 tablets (20 mg total) once daily for 3 days, THEN 1 tablet (10 mg total) once daily for 3 days.  Start taking on: March 3, 2024     tiZANidine 2 MG tablet  Commonly known as: ZANAFLEX  Take 1 tablet (2 mg total) by mouth 2 (two) times a day. for 10 days  Replaces: tiZANidine 2 mg Cap            CHANGE how you take these medications      fenofibrate 145 MG tablet  Commonly known as: TRICOR  Take 1 tablet (145 mg total) by mouth once daily.  What changed:   when to take this  Another medication with the same name was removed. Continue taking this medication, and follow the directions you see here.     pantoprazole 40 MG tablet  Commonly known as: PROTONIX  Take 1 tablet (40 mg total) by mouth once daily.  Start taking on: March 4, 2024  What changed: when to take this            CONTINUE taking these medications      acetaminophen 650 MG Tbsr  Commonly known as: TYLENOL     allopurinoL 300 MG tablet  Commonly known as: ZYLOPRIM     amLODIPine 5 MG tablet  Commonly known as: NORVASC     bisacodyL 5 mg EC tablet  Commonly known as: DULCOLAX     calcium carbonate 600 mg calcium (1,500 mg) Tab  Commonly known as: OS-SONIA     celecoxib 200 MG capsule  Commonly known as: CeleBREX  Take 1 capsule (200 mg total) by mouth once daily.     FLUoxetine 40 MG capsule     MCKENNA-TUSSIN 100 mg/5 mL syrup  Generic drug: guaiFENesin 100 mg/5 ml     ondansetron 4 MG tablet  Commonly known as: ZOFRAN     ONE DAILY MULTIVITAMIN per tablet  Generic drug: multivitamin     valACYclovir 500 MG tablet  Commonly known as: VALTREX     VITAMIN C 500 MG tablet  Generic drug: ascorbic acid (vitamin C)     * VITAMIN D3 10 mcg (400 unit) Tab  Generic drug: cholecalciferol  (vitamin D3)     * vitamin D 1000 units Tab  Commonly known as: VITAMIN D3  Take 1 tablet (1,000 Units total) by mouth once daily.     zinc sulfate 50 mg zinc (220 mg) capsule  Commonly known as: ZINCATE           * This list has 2 medication(s) that are the same as other medications prescribed for you. Read the directions carefully, and ask your doctor or other care provider to review them with you.                STOP taking these medications      CALMOSEPTINE 0.44-20.6 % Oint  Generic drug: menthol-zinc oxide     dexAMETHasone 4 MG Tab  Commonly known as: DECADRON     furosemide 20 MG tablet  Commonly known as: LASIX     levETIRAcetam 750 MG Tab  Commonly known as: KEPPRA     magic mouthwash diphen/antac/lidoc     rosuvastatin 20 MG tablet  Commonly known as: CRESTOR     tiZANidine 2 mg Cap  Replaced by: tiZANidine 2 MG tablet     traMADoL 50 mg tablet  Commonly known as: ULTRAM               Where to Get Your Medications        You can get these medications from any pharmacy    Bring a paper prescription for each of these medications  HYDROcodone-acetaminophen 5-325 mg per tablet       Information about where to get these medications is not yet available    Ask your nurse or doctor about these medications  celecoxib 200 MG capsule  collagenase ointment  fenofibrate 145 MG tablet  levoFLOXacin 750 MG tablet  melatonin 3 mg tablet  pantoprazole 40 MG tablet  predniSONE 10 MG tablet  tiZANidine 2 MG tablet  vitamin D 1000 units Tab         Discharge Information:   Diet:  Regular    Physical Activity:  As tolerated             Instructions:  1. Take all medications as prescribed  2. Keep all follow-up appointments  3. Return to the hospital or call your primary care physicians if any worsening symptoms such as fever, chest pain, shortness of breath, return of symptoms, or any other concerns.    Follow-Up Appointments:  - Follow up with PCP   - Follow up with Heme/Onc tomorrow 3/4    Gillian Phillips MD  [facility], -I

## 2024-03-05 LAB
BACTERIA BLD CULT: NORMAL
BACTERIA BLD CULT: NORMAL

## 2024-03-06 ENCOUNTER — HOSPITAL ENCOUNTER (INPATIENT)
Facility: HOSPITAL | Age: 69
LOS: 1 days | Discharge: NURSING, REHAB OR LONG-TERM CARE | DRG: 064 | End: 2024-03-08
Attending: EMERGENCY MEDICINE | Admitting: INTERNAL MEDICINE
Payer: MEDICARE

## 2024-03-06 DIAGNOSIS — I63.9 STROKE: ICD-10-CM

## 2024-03-06 DIAGNOSIS — L89.154 SACRAL DECUBITUS ULCER, STAGE IV: ICD-10-CM

## 2024-03-06 DIAGNOSIS — G45.9 TIA (TRANSIENT ISCHEMIC ATTACK): ICD-10-CM

## 2024-03-06 DIAGNOSIS — R29.818 ACUTE FOCAL NEUROLOGICAL DEFICIT: ICD-10-CM

## 2024-03-06 DIAGNOSIS — R07.9 CHEST PAIN: ICD-10-CM

## 2024-03-06 DIAGNOSIS — R53.1 LEFT-SIDED WEAKNESS: Primary | ICD-10-CM

## 2024-03-06 DIAGNOSIS — I63.9 CVA (CEREBRAL VASCULAR ACCIDENT): ICD-10-CM

## 2024-03-06 LAB
ALBUMIN SERPL BCP-MCNC: 2.8 G/DL (ref 3.5–5.2)
ALLENS TEST: NORMAL
ALLENS TEST: NORMAL
ALP SERPL-CCNC: 100 U/L (ref 55–135)
ALT SERPL W/O P-5'-P-CCNC: 19 U/L (ref 10–44)
ANION GAP SERPL CALC-SCNC: 14 MMOL/L (ref 8–16)
AST SERPL-CCNC: 40 U/L (ref 10–40)
BASOPHILS # BLD AUTO: 0.03 K/UL (ref 0–0.2)
BASOPHILS NFR BLD: 0.5 % (ref 0–1.9)
BILIRUB SERPL-MCNC: 0.3 MG/DL (ref 0.1–1)
BUN SERPL-MCNC: 32 MG/DL (ref 8–23)
CALCIUM SERPL-MCNC: 8.6 MG/DL (ref 8.7–10.5)
CHLORIDE SERPL-SCNC: 102 MMOL/L (ref 95–110)
CHOLEST SERPL-MCNC: 147 MG/DL (ref 120–199)
CHOLEST/HDLC SERPL: 3.1 {RATIO} (ref 2–5)
CO2 SERPL-SCNC: 21 MMOL/L (ref 23–29)
CREAT SERPL-MCNC: 0.8 MG/DL (ref 0.5–1.4)
CREAT SERPL-MCNC: 0.9 MG/DL (ref 0.5–1.4)
DIFFERENTIAL METHOD BLD: ABNORMAL
EOSINOPHIL # BLD AUTO: 0 K/UL (ref 0–0.5)
EOSINOPHIL NFR BLD: 0 % (ref 0–8)
ERYTHROCYTE [DISTWIDTH] IN BLOOD BY AUTOMATED COUNT: 17.2 % (ref 11.5–14.5)
EST. GFR  (NO RACE VARIABLE): >60 ML/MIN/1.73 M^2
GLUCOSE SERPL-MCNC: 105 MG/DL (ref 70–110)
HCT VFR BLD AUTO: 28.8 % (ref 40–54)
HDLC SERPL-MCNC: 48 MG/DL (ref 40–75)
HDLC SERPL: 32.7 % (ref 20–50)
HGB BLD-MCNC: 9.4 G/DL (ref 14–18)
IMM GRANULOCYTES # BLD AUTO: 0.27 K/UL (ref 0–0.04)
IMM GRANULOCYTES NFR BLD AUTO: 4.4 % (ref 0–0.5)
INR PPP: 1.5 (ref 0.8–1.2)
LDLC SERPL CALC-MCNC: 66.2 MG/DL (ref 63–159)
LYMPHOCYTES # BLD AUTO: 0.3 K/UL (ref 1–4.8)
LYMPHOCYTES NFR BLD: 4.4 % (ref 18–48)
MCH RBC QN AUTO: 31.9 PG (ref 27–31)
MCHC RBC AUTO-ENTMCNC: 32.6 G/DL (ref 32–36)
MCV RBC AUTO: 98 FL (ref 82–98)
MONOCYTES # BLD AUTO: 0.6 K/UL (ref 0.3–1)
MONOCYTES NFR BLD: 9.5 % (ref 4–15)
NEUTROPHILS # BLD AUTO: 5 K/UL (ref 1.8–7.7)
NEUTROPHILS NFR BLD: 81.2 % (ref 38–73)
NONHDLC SERPL-MCNC: 99 MG/DL
NRBC BLD-RTO: 0 /100 WBC
PLATELET # BLD AUTO: 317 K/UL (ref 150–450)
PMV BLD AUTO: 11.2 FL (ref 9.2–12.9)
POC PTINR: 1.1 (ref 0.9–1.2)
POCT GLUCOSE: 106 MG/DL (ref 70–110)
POCT GLUCOSE: 170 MG/DL (ref 70–110)
POTASSIUM SERPL-SCNC: 4.8 MMOL/L (ref 3.5–5.1)
PROT SERPL-MCNC: 6 G/DL (ref 6–8.4)
PROTHROMBIN TIME: 15.8 SEC (ref 9–12.5)
RBC # BLD AUTO: 2.95 M/UL (ref 4.6–6.2)
SAMPLE: NORMAL
SAMPLE: NORMAL
SITE: NORMAL
SITE: NORMAL
SODIUM SERPL-SCNC: 137 MMOL/L (ref 136–145)
T4 FREE SERPL-MCNC: 1.15 NG/DL (ref 0.71–1.51)
TRIGL SERPL-MCNC: 164 MG/DL (ref 30–150)
TSH SERPL DL<=0.005 MIU/L-ACNC: 0.2 UIU/ML (ref 0.4–4)
WBC # BLD AUTO: 6.12 K/UL (ref 3.9–12.7)

## 2024-03-06 PROCEDURE — 25000003 PHARM REV CODE 250: Performed by: EMERGENCY MEDICINE

## 2024-03-06 PROCEDURE — 99285 EMERGENCY DEPT VISIT HI MDM: CPT | Mod: 25

## 2024-03-06 PROCEDURE — 96375 TX/PRO/DX INJ NEW DRUG ADDON: CPT

## 2024-03-06 PROCEDURE — 93005 ELECTROCARDIOGRAM TRACING: CPT

## 2024-03-06 PROCEDURE — G0378 HOSPITAL OBSERVATION PER HR: HCPCS

## 2024-03-06 PROCEDURE — 84439 ASSAY OF FREE THYROXINE: CPT | Performed by: EMERGENCY MEDICINE

## 2024-03-06 PROCEDURE — 96374 THER/PROPH/DIAG INJ IV PUSH: CPT

## 2024-03-06 PROCEDURE — 84443 ASSAY THYROID STIM HORMONE: CPT | Performed by: EMERGENCY MEDICINE

## 2024-03-06 PROCEDURE — 80053 COMPREHEN METABOLIC PANEL: CPT | Performed by: EMERGENCY MEDICINE

## 2024-03-06 PROCEDURE — 85025 COMPLETE CBC W/AUTO DIFF WBC: CPT | Performed by: EMERGENCY MEDICINE

## 2024-03-06 PROCEDURE — 85610 PROTHROMBIN TIME: CPT

## 2024-03-06 PROCEDURE — 25500020 PHARM REV CODE 255: Performed by: EMERGENCY MEDICINE

## 2024-03-06 PROCEDURE — 85610 PROTHROMBIN TIME: CPT | Performed by: EMERGENCY MEDICINE

## 2024-03-06 PROCEDURE — 63600175 PHARM REV CODE 636 W HCPCS: Performed by: EMERGENCY MEDICINE

## 2024-03-06 PROCEDURE — G0426 INPT/ED TELECONSULT50: HCPCS | Mod: 95,,, | Performed by: STUDENT IN AN ORGANIZED HEALTH CARE EDUCATION/TRAINING PROGRAM

## 2024-03-06 PROCEDURE — 80061 LIPID PANEL: CPT | Performed by: EMERGENCY MEDICINE

## 2024-03-06 PROCEDURE — 82565 ASSAY OF CREATININE: CPT

## 2024-03-06 PROCEDURE — 82962 GLUCOSE BLOOD TEST: CPT

## 2024-03-06 PROCEDURE — 99900035 HC TECH TIME PER 15 MIN (STAT)

## 2024-03-06 PROCEDURE — 93010 ELECTROCARDIOGRAM REPORT: CPT | Mod: ,,, | Performed by: INTERNAL MEDICINE

## 2024-03-06 PROCEDURE — 25000003 PHARM REV CODE 250

## 2024-03-06 RX ORDER — NUT.TX.GLUCOSE INTOLERANCE,SOY
30 BAR ORAL 2 TIMES DAILY
COMMUNITY

## 2024-03-06 RX ORDER — MUPIROCIN 20 MG/G
OINTMENT TOPICAL 2 TIMES DAILY
Status: DISCONTINUED | OUTPATIENT
Start: 2024-03-06 | End: 2024-03-08 | Stop reason: HOSPADM

## 2024-03-06 RX ORDER — DIPHENHYDRAMINE HYDROCHLORIDE 50 MG/ML
12.5 INJECTION INTRAMUSCULAR; INTRAVENOUS
Status: COMPLETED | OUTPATIENT
Start: 2024-03-06 | End: 2024-03-06

## 2024-03-06 RX ORDER — ARGININE/ASCORBATE SOD/VITE AC 4.5 G/9.2G
1 POWDER IN PACKET (EA) ORAL 2 TIMES DAILY
COMMUNITY

## 2024-03-06 RX ORDER — INSULIN ASPART 100 [IU]/ML
0-5 INJECTION, SOLUTION INTRAVENOUS; SUBCUTANEOUS
Status: DISCONTINUED | OUTPATIENT
Start: 2024-03-06 | End: 2024-03-08 | Stop reason: HOSPADM

## 2024-03-06 RX ORDER — IBUPROFEN 200 MG
24 TABLET ORAL
Status: DISCONTINUED | OUTPATIENT
Start: 2024-03-06 | End: 2024-03-08 | Stop reason: HOSPADM

## 2024-03-06 RX ORDER — ALLOPURINOL 100 MG/1
300 TABLET ORAL DAILY
Status: DISCONTINUED | OUTPATIENT
Start: 2024-03-07 | End: 2024-03-08 | Stop reason: HOSPADM

## 2024-03-06 RX ORDER — CHOLECALCIFEROL (VITAMIN D3) 25 MCG
1000 TABLET ORAL DAILY
Status: DISCONTINUED | OUTPATIENT
Start: 2024-03-07 | End: 2024-03-08 | Stop reason: HOSPADM

## 2024-03-06 RX ORDER — IPRATROPIUM BROMIDE AND ALBUTEROL SULFATE 2.5; .5 MG/3ML; MG/3ML
3 SOLUTION RESPIRATORY (INHALATION) EVERY 4 HOURS PRN
Status: DISCONTINUED | OUTPATIENT
Start: 2024-03-06 | End: 2024-03-06

## 2024-03-06 RX ORDER — ACETAMINOPHEN 325 MG/1
650 TABLET ORAL EVERY 6 HOURS PRN
Status: DISCONTINUED | OUTPATIENT
Start: 2024-03-06 | End: 2024-03-08 | Stop reason: HOSPADM

## 2024-03-06 RX ORDER — FENOFIBRATE 145 MG/1
145 TABLET, FILM COATED ORAL DAILY
Status: DISCONTINUED | OUTPATIENT
Start: 2024-03-07 | End: 2024-03-08 | Stop reason: HOSPADM

## 2024-03-06 RX ORDER — SODIUM CHLORIDE 0.9 % (FLUSH) 0.9 %
10 SYRINGE (ML) INJECTION EVERY 12 HOURS PRN
Status: DISCONTINUED | OUTPATIENT
Start: 2024-03-06 | End: 2024-03-08 | Stop reason: HOSPADM

## 2024-03-06 RX ORDER — IBUPROFEN 200 MG
16 TABLET ORAL
Status: DISCONTINUED | OUTPATIENT
Start: 2024-03-06 | End: 2024-03-08 | Stop reason: HOSPADM

## 2024-03-06 RX ORDER — CLOPIDOGREL BISULFATE 75 MG/1
300 TABLET ORAL
Status: COMPLETED | OUTPATIENT
Start: 2024-03-06 | End: 2024-03-06

## 2024-03-06 RX ORDER — ALBUTEROL SULFATE 2.5 MG/.5ML
2.5 SOLUTION RESPIRATORY (INHALATION) EVERY 4 HOURS PRN
Status: DISCONTINUED | OUTPATIENT
Start: 2024-03-06 | End: 2024-03-08 | Stop reason: HOSPADM

## 2024-03-06 RX ORDER — VALACYCLOVIR HYDROCHLORIDE 500 MG/1
500 TABLET, FILM COATED ORAL DAILY
Status: DISCONTINUED | OUTPATIENT
Start: 2024-03-07 | End: 2024-03-08 | Stop reason: HOSPADM

## 2024-03-06 RX ORDER — GLUCAGON 1 MG
1 KIT INJECTION
Status: DISCONTINUED | OUTPATIENT
Start: 2024-03-06 | End: 2024-03-08 | Stop reason: HOSPADM

## 2024-03-06 RX ORDER — SODIUM CHLORIDE 0.9 % (FLUSH) 0.9 %
10 SYRINGE (ML) INJECTION
Status: CANCELLED | OUTPATIENT
Start: 2024-03-06

## 2024-03-06 RX ORDER — FLUOXETINE HYDROCHLORIDE 20 MG/1
40 CAPSULE ORAL DAILY
Status: DISCONTINUED | OUTPATIENT
Start: 2024-03-07 | End: 2024-03-08 | Stop reason: HOSPADM

## 2024-03-06 RX ORDER — TALC
6 POWDER (GRAM) TOPICAL NIGHTLY PRN
Status: DISCONTINUED | OUTPATIENT
Start: 2024-03-06 | End: 2024-03-08 | Stop reason: HOSPADM

## 2024-03-06 RX ORDER — SODIUM CHLORIDE 9 MG/ML
INJECTION, SOLUTION INTRAVENOUS
Status: DISCONTINUED | OUTPATIENT
Start: 2024-03-06 | End: 2024-03-08 | Stop reason: HOSPADM

## 2024-03-06 RX ORDER — HYDROCODONE BITARTRATE AND ACETAMINOPHEN 5; 325 MG/1; MG/1
1 TABLET ORAL EVERY 6 HOURS PRN
Status: DISCONTINUED | OUTPATIENT
Start: 2024-03-06 | End: 2024-03-08 | Stop reason: HOSPADM

## 2024-03-06 RX ORDER — ATORVASTATIN CALCIUM 40 MG/1
40 TABLET, FILM COATED ORAL DAILY
Status: DISCONTINUED | OUTPATIENT
Start: 2024-03-07 | End: 2024-03-08 | Stop reason: HOSPADM

## 2024-03-06 RX ORDER — CALCIUM CARBONATE 200(500)MG
1500 TABLET,CHEWABLE ORAL NIGHTLY
Status: DISCONTINUED | OUTPATIENT
Start: 2024-03-06 | End: 2024-03-08 | Stop reason: HOSPADM

## 2024-03-06 RX ORDER — PANTOPRAZOLE SODIUM 40 MG/1
40 TABLET, DELAYED RELEASE ORAL DAILY
Status: DISCONTINUED | OUTPATIENT
Start: 2024-03-07 | End: 2024-03-08 | Stop reason: HOSPADM

## 2024-03-06 RX ORDER — DEXAMETHASONE 4 MG/1
40 TABLET ORAL WEEKLY
Status: ON HOLD | COMMUNITY
End: 2024-03-08 | Stop reason: HOSPADM

## 2024-03-06 RX ORDER — ASPIRIN 325 MG
325 TABLET ORAL
Status: COMPLETED | OUTPATIENT
Start: 2024-03-06 | End: 2024-03-06

## 2024-03-06 RX ORDER — CYANOCOBALAMIN (VITAMIN B-12) 500 MCG
800 TABLET ORAL DAILY
COMMUNITY
End: 2024-03-06 | Stop reason: SDUPTHER

## 2024-03-06 RX ADMIN — CLOPIDOGREL BISULFATE 300 MG: 75 TABLET ORAL at 05:03

## 2024-03-06 RX ADMIN — ASPIRIN 325 MG ORAL TABLET 325 MG: 325 PILL ORAL at 05:03

## 2024-03-06 RX ADMIN — MUPIROCIN: 20 OINTMENT TOPICAL at 09:03

## 2024-03-06 RX ADMIN — METHYLPREDNISOLONE SODIUM SUCCINATE 80 MG: 40 INJECTION, POWDER, FOR SOLUTION INTRAMUSCULAR; INTRAVENOUS at 05:03

## 2024-03-06 RX ADMIN — IOHEXOL 100 ML: 350 INJECTION, SOLUTION INTRAVENOUS at 04:03

## 2024-03-06 RX ADMIN — CALCIUM CARBONATE (ANTACID) CHEW TAB 500 MG 1500 MG: 500 CHEW TAB at 09:03

## 2024-03-06 RX ADMIN — DIPHENHYDRAMINE HYDROCHLORIDE 12.5 MG: 50 INJECTION INTRAMUSCULAR; INTRAVENOUS at 05:03

## 2024-03-06 NOTE — TELEMEDICINE CONSULT
[facility]  Vascular Neurology  Comprehensive Stroke Center  TeleVascular Neurology Acute Consultation Note        Consult Information  Consult to Telemedicine - Acute Stroke  Consult performed by: Kristen Vickers MD  Consult ordered by: Nico Blue MD          Consulting Provider: NICO BLUE   Current Providers  No providers found    Patient Location:  [facility] EMERGENCY DEPARTMENT Emergency Department    Spoke hospital nurse at bedside with patient assisting consultant.  Patient information was obtained from patient.       Stroke Documentation  Acute Stroke Times   Last Known Normal Date: 03/06/24  Last Known Normal Time: 1500  Symptom Onset Date: 03/06/24  Symptom Onset Time: 1500  Stroke Team Called Date: 03/06/24  Stroke Team Called Time: 1616  Stroke Team Arrival Date: 03/06/24  Stroke Team Arrival Time: 1617 (Patient in CT until 1643)  CT Interpretation Time: 1647  Thrombolytic Therapy Recommended: No    NIH Scale:  Interval: baseline  1a. Level of Consciousness: 0-->Alert, keenly responsive  1b. LOC Questions: 1-->Answers one question correctly  1c. LOC Commands: 0-->Performs both tasks correctly  2. Best Gaze: 1-->Partial gaze palsy, gaze is abnormal in one or both eyes, but forced deviation or total gaze paresis is not present  3. Visual: 2-->Complete hemianopia  4. Facial Palsy: 0-->Normal symmetrical movements  5a. Motor Arm, Left: 1-->Drift, limb holds 90 (or 45) degrees, but drifts down before full 10 seconds, does not hit bed or other support  5b. Motor Arm, Right: 0-->No drift, limb holds 90 (or 45) degrees for full 10 secs  6a. Motor Leg, Left: 0-->No drift, leg holds 30 degree position for full 5 secs  6b. Motor Leg, Right: 0-->No drift, leg holds 30 degree position for full 5 secs  7. Limb Ataxia: 0-->Absent  8. Sensory: 0-->Normal, no sensory loss  9. Best Language: 0-->No aphasia, normal  10. Dysarthria: 0-->Normal  11. Extinction and Inattention (formerly Neglect):  0-->No abnormality  Total (NIH Stroke Scale): 5      Modified [place]: Score: 0  [place] Coma Scale:     ABCD2 Score:    [id number] Score:    HAS -BLED Score:    ICH Score:    Hunt & Parsons Classification:      Blood pressure (!) 140/80, pulse 90, temperature 97.2 °F (36.2 °C), temperature source Oral, resp. rate (!) 23, height 6' (1.829 m), weight 94.8 kg (209 lb), SpO2 98 %.    Van Positive    Medical Decision Making  HPI:  68 y.o. male wihh a PMHx significant for HTN, HLD, multiple myeloma, pancytopenia presenting with left-sided weakness. LKN around 1500. Family states that both sides appeared stiff and his left hand was curled into a fist. Then he was unresponsive and had weakness to the LUE. This has improved since onset.       No AP/AC medications.     On exam, patient has right gaze preference, but able to cross midline. Left HH. Antigravity with subtle drift LUE (no drift on repeat examination). No sensory deficits.     Images personally reviewed and interpreted:  Study: Head CT, CTA head/neck   Study Interpretation: No acute intracranial hemorrhage. Encephalomalacia left MCA territory. No LVO or high-grade stenosis.      Assessment and plan:  # Left-sided weakness, right gaze preference. Overall concern for ischemic stroke vs TIA vs seizure. Plan to defer IV lytics given rapidly improving exam. CTA head/neck with no LVO.      Lytics recommendation: Thrombolytic therapy not recommended due to Mild Non-Disabling Symptoms, rapidly improving symptoms  Thrombectomy recommendation: No; No large vessel occlusion identified on imaging   Placement recommendation: pending further studies    Imaging:   - Recommend follow-up MRI brain without contrast to evaluate for acute ischemia.  - If above studies are abnormal, please load images to [employer] imaging system and contact us to review.    Antithrombotics for secondary stroke prevention: Load clopidogrel 300mg, aspirin 325mg x 1 now. Then start dual-antiplatelet  therapy with ASA 81mg and clopidogrel 75mg daily for 21 days. Then continue ASA 81mg indefinitely.     Statins for secondary stroke prevention and hyperlipidemia, if present: Recommend initiation or continuation of a high-intensity statin for goal LDL < 70mg/dL.    Aggressive risk factor modification: HTN, HLD     Rehab efforts: The patient has been evaluated by a stroke team provider and the therapy needs have been fully considered based off the presenting complaints and exam findings. The following therapy evaluations are needed: PT evaluate and treat, OT evaluate and treat, SLP evaluate and treat, PM&R evaluate for appropriate placement    Diagnostics recommended:   - MRI brain without contrast   - TTE without bubble study, monitor on telemetry while admitted  - Labs: hemoglobin A1c (goal <7%), lipid panel (LDL goal <70mg/dL), TSH  - Treat hyperglycemia to achieve a blood glucose level in a range of 140-180 mg/dL and to closely monitor to prevent hypoglycemia (Class IIa, Level C-LD).    VTE prophylaxis: Enoxaparin 40 mg SQ every 24 hours  Mechanical prophylaxis: Place SCDs    BP parameters: Infarct: No intervention, SBP <220    Please contact us with any further questions or concerns or if patient has any acute neurological changes (new symptoms, worsening deficits).              ROS  Physical Exam  No past medical history on file.  No past surgical history on file.  No family history on file.    Diagnoses  Problem Noted   Left-Sided Weakness 3/6/2024       Kristen Vickers MD      Emergent/Acute neurological consultation requested by spoke provider due to critical concerns for possible cerebrovascular event that could result in permanent loss of neurologic/bodily function, severe disability or death of this patient.  Immediate/timely evaluation by a highly prepared expert is paramount for optimal outcomes  High risk for neurological deterioration if not properly diagnosed  High risk for neurological deterioration if not  treated promplty/as soon as possible  Complex diagnostic evaluation may be required (advanced imaging)  High risk treatment options (thrombolytics and/or thrombectomy)    Patient care was coordinated with spoke provider, including but not limted to    Discussing likely diagnosis/etiology of symptoms  Making recommendations for further diagnostic studies  Making recommendations for intravenous thrombolytics or other advanced therapies  Making recommendations for disposition (admission/transfer for higher level of care)

## 2024-03-06 NOTE — ED PROVIDER NOTES
Emergency Department Encounter  Provider Note    Harrison Haywood  [id number]  3/6/2024    Evaluation:    History Acquisition:     Chief Complaint   Patient presents with    Weakness     Pt arrived via [facility] from [place] with c/o heaviness to left side that started this am and has subsided then had another exposed around 3pm that has also subsided       History of Present Illness:  Harrison Haywood is a 68 y.o. male who has no past medical history on file.    The patient presents to the ED due to L-sided weakness.    Patient presents via EMS, who states he was complaining of LUE weakness that had resolved prior to their arrival. He reports symptoms started about an hour prior to arrival. He denies any associated headache, vision changes, abdominal pain, N/V, or any other concerns. No prior stroke. No similar episodes in the past.     Additional historians utilized:  EMS report - see HPI  Family member at bedside. States patient suddenly became unresponsive for a few minutes. He woke up and was speaking gibberish and difficult to understand. His speech gradually improved, but he was unable to move his LUE. On arrival his symptoms are improving but his speech is still abnormal.     Prior medical records were reviewed:   [facility]J visit 3/6 for f/u multiple myeloma.  Admitted 2/29-3/3 for pneumonia  NSGY visit 2/22 for f/u cervical pain    The patient's list of active medical history, family/social history, medications, and allergies as documented has been reviewed.     No past medical history on file.  No past surgical history on file.  No family history on file.  Social History     Socioeconomic History    Marital status: Single   Tobacco Use    Smoking status: Never     Social Determinants of Health     Financial Resource Strain: Low Risk  (3/1/2024)    Overall Financial Resource Strain (CARDIA)     Difficulty of Paying Living Expenses: Not hard at all   Food Insecurity: No Food Insecurity (3/1/2024)    Hunger Vital Sign     Worried  About Running Out of Food in the Last Year: Never true     Ran Out of Food in the Last Year: Never true   Transportation Needs: No Transportation Needs (3/1/2024)    PRAPARE - Transportation     Lack of Transportation (Medical): No     Lack of Transportation (Non-Medical): No   Physical Activity: Insufficiently Active (3/1/2024)    Exercise Vital Sign     Days of Exercise per Week: 2 days     Minutes of Exercise per Session: 30 min   Stress: No Stress Concern Present (3/1/2024)    [employer] - Occupational Stress Questionnaire     Feeling of Stress : Only a little   Social Connections: Socially Isolated (3/1/2024)    Social Connection and Isolation Panel [NHANES]     Frequency of Communication with Friends and Family: More than three times a week     Frequency of Social Gatherings with Friends and Family: More than three times a week     Attends Jewish Services: Never     Active Member of Clubs or Organizations: No     Attends Club or Organization Meetings: Never     Marital Status: Never    Housing Stability: Low Risk  (3/1/2024)    Housing Stability Vital Sign     Unable to Pay for Housing in the Last Year: No     Number of Places Lived in the Last Year: 2     Unstable Housing in the Last Year: No       Medications:  Current Discharge Medication List        CONTINUE these medications which have NOT CHANGED    Details   acetaminophen (TYLENOL) 650 MG TbSR Take 650 mg by mouth every 8 (eight) hours as needed.      allopurinoL (ZYLOPRIM) 300 MG tablet Take 300 mg by mouth once daily.      amLODIPine (NORVASC) 5 MG tablet Take 5 mg by mouth once daily.      arginine-vitamin C-vitamin E (ARGINAID) 4.5 gram-156 mg/9.2 gram PwPk Take 1 packet by mouth 2 (two) times a day. 8 ounces water      ascorbic acid, vitamin C, (VITAMIN C) 500 MG tablet Take 500 mg by mouth 2 (two) times daily.      calcium carbonate (OS-SONIA) 600 mg calcium (1,500 mg) Tab Take 1,200 mg by mouth every evening.       celecoxib (CELEBREX) 200 MG capsule Take 1 capsule (200 mg total) by mouth once daily.  Qty: 30 capsule, Refills: 1      collagenase (SANTYL) ointment Apply topically once daily.  Qty: 2 g, Refills: 6      dexAMETHasone (DECADRON) 4 MG Tab Take 40 mg by mouth once a week. 4 tablets once a week with breakfast      fenofibrate (TRICOR) 145 MG tablet Take 1 tablet (145 mg total) by mouth once daily.  Qty: 35 tablet, Refills: 0      FLUoxetine 40 MG capsule Take 40 mg by mouth once daily.      guaiFENesin 100 mg/5 ml (MCKENNA-TUSSIN) 100 mg/5 mL syrup Take 200 mg by mouth every 4 (four) hours as needed for Cough.      HYDROcodone-acetaminophen (NORCO) 5-325 mg per tablet Take 1 tablet by mouth every 6 (six) hours as needed for Pain.  Qty: 28 tablet, Refills: 0    Comments: Quantity prescribed more than 7 day supply? No      melatonin (MELATIN) 3 mg tablet Take 2 tablets (6 mg total) by mouth nightly as needed for Insomnia.  Qty: 30 tablet, Refills: 7      multivitamin (ONE DAILY MULTIVITAMIN) per tablet Take 1 tablet by mouth once daily.      ondansetron (ZOFRAN) 4 MG tablet Take 4 mg by mouth 4 (four) times daily as needed for Nausea (vomiting).      pantoprazole (PROTONIX) 40 MG tablet Take 1 tablet (40 mg total) by mouth once daily.  Qty: 30 tablet, Refills: 11      predniSONE (DELTASONE) 10 MG tablet Take 4 tablets (40 mg total) by mouth once daily for 3 days, THEN 3 tablets (30 mg total) once daily for 3 days, THEN 2 tablets (20 mg total) once daily for 3 days, THEN 1 tablet (10 mg total) once daily for 3 days.  Qty: 30 tablet, Refills: 0      protein supplement (PROMOD PROTEIN) Liqd Take 30 mLs by mouth 2 (two) times a day.      tiZANidine (ZANAFLEX) 2 MG tablet Take 1 tablet (2 mg total) by mouth 2 (two) times a day. for 10 days  Qty: 20 tablet, Refills: 0      valACYclovir (VALTREX) 500 MG tablet Take 500 mg by mouth once daily.      VITAMIN D3 10 mcg (400 unit) tablet Take 800 Units by mouth every evening.       zinc sulfate (ZINCATE) 50 mg zinc (220 mg) capsule Take 220 mg by mouth once daily.      bisacodyL (DULCOLAX) 5 mg EC tablet Take 10 mg by mouth daily as needed for Constipation.           STOP taking these medications       levoFLOXacin (LEVAQUIN) 750 MG tablet Comments:   Reason for Stopping:               Allergies:  Review of patient's allergies indicates:   Allergen Reactions    Aspirin Hives    Penicillins Hives       Review of Systems   Neurological:  Positive for speech difficulty and weakness.         Physical Exam:     Initial Vitals [03/06/24 1555]   BP Pulse Resp Temp SpO2   127/76 94 17 97.2 °F (36.2 °C) 97 %      MAP       --         Physical Exam    Nursing note and vitals reviewed.  Constitutional: He appears well-developed and well-nourished. He is not diaphoretic. No distress.   HENT:   Head: Normocephalic and atraumatic.   Mouth/Throat: Oropharynx is clear and moist.   Eyes: EOM are normal. Pupils are equal, round, and reactive to light.   Neck: No tracheal deviation present.   Cardiovascular:  Normal rate, regular rhythm, normal heart sounds and intact distal pulses.           Pulmonary/Chest: Breath sounds normal. No stridor. No respiratory distress.   Abdominal: Abdomen is soft. He exhibits no distension and no mass. There is no abdominal tenderness.   Musculoskeletal:         General: No edema. Normal range of motion.      Comments: Muscle atrophy to RLE.     Neurological: He is alert and oriented to person, place, and time. He has normal strength. He is not disoriented. A cranial nerve deficit is present. No sensory deficit. He exhibits normal muscle tone. GCS eye subscore is 4. GCS verbal subscore is 5. GCS motor subscore is 6.   Unable to track finger to the L past midline.    Skin: Skin is warm and dry. Capillary refill takes less than 2 seconds. No rash noted.   Psychiatric: He has a normal mood and affect. His behavior is normal. Thought content normal.         Differential Diagnoses:    Based on available information and initial assessment, Differential Diagnosis includes, but is not limited to:  CVA/TIA, intracranial mass/hemorrhage, head trauma, seizure, status epilepticus, post-ictal state, meningitis/encephalitis, sepsis, MI/ACS, arrhythmia, syncope,   anaphylaxis, thyroid disease, neuroleptic malignant syndrome, serotonin syndrome, CO poisoning, hypoxia/hypercapnea, uremic/hepatic encephalopathy, medication reaction, intentional overdose, metabolic derangement, psychiatric disturbance, substance abuse, alcohol intoxication/withdrawal, hypoglycemia/hyperglycemia, complicated migraine.      ED Management:   Procedures    Orders Placed This Encounter    CTA Head and Neck (xpd)    CBC W/ AUTO DIFFERENTIAL    Comprehensive metabolic panel    Protime-INR    TSH    LDL - Lipid Panel    T4, Free    Diet Adult Regular (IDDSI Level 7) Standard Tray    Cardiac Monitoring - Adult    Vital signs    Neuro checks:  LOC/AVPU, Orientation, GCS if LOC altered, Pupils if LOC altered or GCS <10, Speech/Language, Facial Symmetry, Motor    Complete Mcgill Screening Assessment No eating, drinking, or oral medications until patient passes the screen. Notify MD of results.    Complete NIH Stroke Scale PRN with any deterioration or worsening of neurologic condition.    Vital signs    Bladder scan    Notify Physician    Place sequential compression device    Recheck Blood Glucose:    Place ENOC hose    Skin assessment    Elevate heels off of bed    If any glucose result is less than 50 or greater than 400:    If 2nd result is less than 50 or greater than 400:    If glucose greater than 400 mg/dL treat per correction scale.  If glucose remains elevated above 400 mg/dL at next scheduled check, notify provider    Do not admin Aspart correction between scheduled prandial Aspart    Recheck Blood Glucose:    Nursing communication    Wound care routine (specify)    For altered skin integrity covered by slough and or eschar -  keep pressure off affected area    For altered skin integrity covered by slough and or eschar - Apply silicone bordered foam dressing now: cleanse area with normal saline, pack with Hydrofiber, apply silicone bordered foam dressing    For altered skin integrity covered by slough and or eschar - Change foam dressing every other day: cleanse area with normal saline, pack with Hydrofiber, apply silicone bordered foam dressing    For altered skin integrity covered by slough and or eschar - Notify MD of skin injury    Full code    Consult to Telemedicine - Acute Stroke    IP Wound Care consult Nurse    Inpatient consult to Registered Dietitian/Nutritionist    Contact isolation status    Dietary nutrition supplements Ron - Fruit Punch    OT evaluate and treat    OT evaluate and treat    PT evaluate and treat    PT evaluate and treat    Pulse Oximetry Continuous    Oxygen Continuous    Oxygen Continuous    CPAP PRN    Inhalation Treatment Q4H PRN    Inhalation Treatment Q4H    ACAPELLA TREATMENT Q4H    SLP Evaluate and treat    ECG 12 lead    EKG 12-lead    Echo Saline Bubble? No    EEG    Insert peripheral IV    Discontinue IV    Possible Hospitalization    Place in Observation    POCT glucose    ISTAT PROCEDURE    ISTAT CREATININE    POCT glucose    POCT glucose    iohexoL (OMNIPAQUE 350) injection 100 mL    aspirin tablet 325 mg    clopidogreL tablet 300 mg    methylPREDNISolone sodium succinate injection 80 mg    diphenhydrAMINE injection 12.5 mg    sodium chloride 0.9% flush 10 mL    melatonin tablet 6 mg    allopurinoL tablet 300 mg    calcium carbonate 200 mg calcium (500 mg) chewable tablet 1,500 mg    fenofibrate tablet 145 mg    FLUoxetine capsule 40 mg    pantoprazole EC tablet 40 mg    valACYclovir tablet 500 mg    vitamin D 1000 units tablet 1,000 Units    0.9%  NaCl infusion    albuterol sulfate nebulizer solution 2.5 mg    glucose chewable tablet 16 g    glucose chewable tablet 24 g    glucagon (human  recombinant) injection 1 mg    insulin aspart U-100 pen 0-5 Units    dextrose 10% bolus 125 mL 125 mL    mupirocin 2 % ointment    HYDROcodone-acetaminophen 5-325 mg per tablet 1 tablet    acetaminophen tablet 650 mg    collagenase ointment    atorvastatin tablet 40 mg    Progressive Mobility Protocol (mobilize patient to their highest level of functioning at least twice daily)    Turn patient          EKG:   EKG interpretation by ED attending physician:  NSR, rate 87, no ST changes, no ischemia, normal intervals.  Compared with prior EKG dated 02/2024, rate has improved.       Labs:     Labs Reviewed   CBC W/ AUTO DIFFERENTIAL - Abnormal; Notable for the following components:       Result Value    RBC 2.95 (*)     Hemoglobin 9.4 (*)     Hematocrit 28.8 (*)     MCH 31.9 (*)     RDW 17.2 (*)     Immature Granulocytes 4.4 (*)     Immature Grans (Abs) 0.27 (*)     Lymph # 0.3 (*)     Gran % 81.2 (*)     Lymph % 4.4 (*)     All other components within normal limits   COMPREHENSIVE METABOLIC PANEL - Abnormal; Notable for the following components:    CO2 21 (*)     BUN 32 (*)     Calcium 8.6 (*)     Albumin 2.8 (*)     All other components within normal limits   PROTIME-INR - Abnormal; Notable for the following components:    Prothrombin Time 15.8 (*)     INR 1.5 (*)     All other components within normal limits   TSH - Abnormal; Notable for the following components:    TSH 0.203 (*)     All other components within normal limits   LIPID PANEL - Abnormal; Notable for the following components:    Triglycerides 164 (*)     All other components within normal limits   T4, FREE   POCT GLUCOSE   ISTAT PROCEDURE   ISTAT CREATININE   POCT GLUCOSE MONITORING CONTINUOUS     Independent review of the labs ordered include:   Labs including CBC, CMP INR, TSH, T4 unremarkable     Imaging:     Imaging Results              CTA Head and Neck (xpd) (Final result)  Result time 03/06/24 17:14:17      Final result by Ean Loya DO  (03/06/24 17:14:17)                   Impression:      1. Remote left MCA territory infarct with encephalomalacia and volume loss in the MCA territory, diffuse atrophy of the left MCA, and chronic short-segment occlusion of the M1 segment.  2. Otherwise unremarkable CTA head and neck, without evidence of an acute large vessel occlusion.  3. Paranasal sinus fluid as above.  4. Right lower lobe pulmonary nodule measuring 4 mm.  Per 2017 Fleischner criteria, recommend follow-up CT in 12 months if the patient is high risk.      Electronically signed by: Ean Loya  Date:    03/06/2024  Time:    17:14               Narrative:    EXAMINATION:  CTA HEAD AND NECK (XPD)    CLINICAL HISTORY:  Neuro deficit, acute, stroke suspected;Stroke/TIA, determine embolic source;    TECHNIQUE:  Non contrast low dose axial images were obtained though the head. CT angiogram was performed from the level of the alfred to the top of the head following the IV administration of 100mL of Omnipaque 350.   Sagittal and coronal reconstructions and maximum intensity projection reconstructions were performed. Arterial stenosis percentages are based on NASCET measurement criteria. An immediate post-contrast CT head was also performed. RapidAI LVO was utilized to measure arterial blood flow in the brain.    COMPARISON:  CTA chest from 02/29/2024.    FINDINGS:  There is motion and streak artifact, limiting evaluation.    CT head: The ventricles are normal in size without evidence of hydrocephalus. There is encephalomalacia within the left frontal lobe and insula, compatible with a remote MCA territory infarct.  No parenchymal mass, edema or acute major vascular distribution infarct. There is no intracranial hemorrhage (agree with rapid AI assessment).  No extra-axial blood or fluid collection.    The cranium is intact.  There are multiple air-fluid levels in the bilateral maxillary sinuses and sphenoid sinuses, with mucosal thickening of the ethmoid  sinuses.  The mastoid air cells are clear.      CTA head:    Anterior circulation: There is mild atherosclerosis of the bilateral intracranial ICAs.  There is short-segment chronic occlusion of the M1 segment (series 5, image 477), with diffuse atrophy of the left MCA, compatible with a remote MCA territory infarct.    Posterior circulation: Vertebrobasilar system is within normal limits without focal abnormality.  There is a persistent fetal origin of the left PCA.  The bilateral posterior cerebral arteries are within normal limits, without hemodynamically significant stenosis or occlusion.    There is no intracranial aneurysm.    CTA neck:    The aortic arch maintains a normal branching pattern.  There is mild atherosclerosis of the aortic arch.    There is streak and motion artifact, limiting evaluation of the left common carotid artery.  The carotid arteries take a retropharyngeal course, a normal variant.  The common and internal carotid arteries are otherwise normal in course and caliber. No significant stenosis in either carotid bifurcation.    The vertebral origins are patent. The cervical vertebral arteries are normal in course and caliber.    There is a subcentimeter hypodensity in the right thyroid lobe which can be further evaluated with nonemergent thyroid ultrasound if clinically indicated.  There is a 4 mm right lower lobe pulmonary nodule (series 5, image 59).  There is presumed atelectasis or scarring in the superior segment left lower lobe.    There is no acute fracture or subluxation of the cervical spine.                                    X-Rays:   Independently Interpreted Readings:   Other Readings:  CT head and CTA head/neck independently interpreted: no intracranial hemorrhage, mass effect, or midline shift. No LVO.   Agree with radiologist interpretation.         Medications Given:     Medications   sodium chloride 0.9% flush 10 mL (has no administration in time range)   melatonin tablet 6  mg (has no administration in time range)   allopurinoL tablet 300 mg (has no administration in time range)   calcium carbonate 200 mg calcium (500 mg) chewable tablet 1,500 mg (1,500 mg Oral Given 3/6/24 2119)   fenofibrate tablet 145 mg (has no administration in time range)   FLUoxetine capsule 40 mg (has no administration in time range)   pantoprazole EC tablet 40 mg (has no administration in time range)   valACYclovir tablet 500 mg (has no administration in time range)   vitamin D 1000 units tablet 1,000 Units (has no administration in time range)   0.9%  NaCl infusion (has no administration in time range)   albuterol sulfate nebulizer solution 2.5 mg (has no administration in time range)   glucose chewable tablet 16 g (has no administration in time range)   glucose chewable tablet 24 g (has no administration in time range)   glucagon (human recombinant) injection 1 mg (has no administration in time range)   insulin aspart U-100 pen 0-5 Units (has no administration in time range)   dextrose 10% bolus 125 mL 125 mL (has no administration in time range)   mupirocin 2 % ointment ( Nasal Given 3/6/24 2119)   HYDROcodone-acetaminophen 5-325 mg per tablet 1 tablet (has no administration in time range)   acetaminophen tablet 650 mg (has no administration in time range)   collagenase ointment (has no administration in time range)   atorvastatin tablet 40 mg (has no administration in time range)   iohexoL (OMNIPAQUE 350) injection 100 mL (100 mLs Intravenous Given 3/6/24 1640)   aspirin tablet 325 mg (325 mg Oral Given 3/6/24 1736)   clopidogreL tablet 300 mg (300 mg Oral Given 3/6/24 1736)   methylPREDNISolone sodium succinate injection 80 mg (80 mg Intravenous Given 3/6/24 1746)   diphenhydrAMINE injection 12.5 mg (12.5 mg Intravenous Given 3/6/24 1747)        Medical Decision Making:    Additional Consideration:   Additional testing considered during clinical course: none    Social determinants of health considered  during development of treatment plan include: poor access to care    Current co-morbidities considered which impacted clinical decision making: multiple myeloma with lytic spine lesions, HTN, HLD, stage IV decubitus ulcer, 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  [facility] IM service for admission and further management of stroke symptoms.     ED Course as of 03/07/24 0158   Wed Mar 06, 2024   1614 Patient is a 68 y.o. male presenting to ED with symptoms concerning for possible acute stroke. Symptoms started 45 min prior to arrival.  On arrival, physical exam with L-sided neglect and LUE weakness.  Code stroke activated on patient arrival, and patient was taken directly to CT.    Plan to obtain EKG, labs, and discuss with Vascular Neurology for further recommendations. [SS]   1657 Discussed with Dr. Vickers, Vascular Neuro, feels symptoms continuing to improve, recommends deferring TNK, agrees with admission and further management of acute stroke.  [SS]   1740 Nurse informed by patient's family that he is allergic to aspirin after receiving loading dose. Will order steroids/Benadryl and continue to closely monitor.  [SS]   1740 BP: 127/76 [SS]   1741 Temp: 97.2 °F (36.2 °C) [SS]   1741 Temp Source: Oral [SS]   1741 Pulse: 94 [SS]   1741 Resp: 17 [SS]   1741 SpO2: 97 %  Vitals reassuring on arrival. Normal pulse ox.  [SS]      ED Course User Index  [SS] Nico Blue MD       Additional MDM:     NIH Stroke Scale:   Interval = baseline (upon arrival/admit)  Level of consciousness = 0 - alert  LOC questions = 1 - answers one correctly  LOC commands = 0 - performs both correctly  Best gaze = 1 - partial gaze palsy  Visual = 1 - partial hemianopia  Facial palsy = 0 - normal  Motor left arm =  1 - drift  Motor right arm =  0 - no drift  Motor left leg = 0 - no drift  Motor right leg =  0 - no  drift  Limb ataxia = 0 - absent  Sensory = 0 - normal  Best language = 0 - no aphasia  Dysarthria = 0 - normal articulation  Extinction and inattention = 0 - no neglect  NIH Stroke Scale Total = 4          Medical Decision Making  67 yo M with acute stroke symptoms including speech difficulty, LUE weakness, and L-sided neglect. Symptoms improving on arrival.  Per vascular neurology, not a TNK candidate.  Labs unremarkable.  [facility] IM to admit for further stroke workup.     Problems Addressed:  Acute focal neurological deficit: acute illness or injury  Left-sided weakness: acute illness or injury  Stroke: acute illness or injury    Amount and/or Complexity of Data Reviewed  Independent Historian: guardian and EMS  External Data Reviewed: notes.  Labs: ordered. Decision-making details documented in ED Course.  Radiology: ordered and independent interpretation performed. Decision-making details documented in ED Course.  ECG/medicine tests: ordered and independent interpretation performed. Decision-making details documented in ED Course.    Risk  OTC drugs.  Prescription drug management.  Decision regarding hospitalization.  Diagnosis or treatment significantly limited by social determinants of health.    Critical Care  Total time providing critical care: 60 minutes        Clinical Impression:       ICD-10-CM ICD-9-CM   1. Left-sided weakness  R53.1 728.87   2. Acute focal neurological deficit  R29.818 781.99   3. Stroke  I63.9 434.91   4. Chest pain  R07.9 786.50   5. TIA (transient ischemic attack)  G45.9 435.9   6. Sacral decubitus ulcer, stage IV  L89.154 707.03     707.24         Follow-up Information    None          ED Disposition Condition    Observation               On re-evaluation, the patient's status has remained stable.  At this time, I believe the patient should be admitted to the hospital for further evaluation and management.  The consulting physician/team agrees with plan and will admit under their  service.   The patient and family were updated with test results, overall impression, and further plan of care. All questions were answered.       Nico Blue MD  03/07/24 0158

## 2024-03-06 NOTE — ED NOTES
MD notified that after patient swallowed asa, sister reported patient is allergic to aspirin. Sister reports that she believes that patient has hives as a reaction to ASA. Orders received.   Earlier patient reported that he does not have any allergies. At time of CT, patient denied allergies.       Sister also reports allergy to penicillin and gabapentin.

## 2024-03-06 NOTE — SUBJECTIVE & OBJECTIVE
HPI:  68 y.o. male wihh a PMHx significant for HTN, HLD, multiple myeloma, pancytopenia presenting with left-sided weakness. LKN around 1500. Family states that both sides appeared stiff and his left hand was curled into a fist. Then he was unresponsive and had weakness to the LUE. This has improved since onset.       No AP/AC medications.     On exam, patient has right gaze preference, but able to cross midline. Left HH. Antigravity with subtle drift LUE. No sensory deficits.     Images personally reviewed and interpreted:  Study: Head CT, CTA head/neck   Study Interpretation: No acute intracranial hemorrhage. Encephalomalacia left MCA territory. No LVO or high-grade stenosis.      Assessment and plan:  # Left-sided weakness, right gaze preference. Overall concern for ischemic stroke vs TIA vs seizure. Plan to defer IV lytics given rapidly improving exam. CTA head/neck with no LVO.      Lytics recommendation: Thrombolytic therapy not recommended due to Mild Non-Disabling Symptoms, rapidly improving symptoms  Thrombectomy recommendation: No; No large vessel occlusion identified on imaging   Placement recommendation: pending further studies    Imaging:   - Recommend follow-up MRI brain without contrast to evaluate for acute ischemia.  - If above studies are abnormal, please load images to teleneurology imaging system and contact us to review.    Antithrombotics for secondary stroke prevention: Load clopidogrel 300mg, aspirin 325mg x 1 now. Then start dual-antiplatelet therapy with ASA 81mg and clopidogrel 75mg daily for 21 days. Then continue ASA 81mg indefinitely.     Statins for secondary stroke prevention and hyperlipidemia, if present: Recommend initiation or continuation of a high-intensity statin for goal LDL < 70mg/dL.    Aggressive risk factor modification: HTN, HLD     Rehab efforts: The patient has been evaluated by a stroke team provider and the therapy needs have been fully considered based off the  presenting complaints and exam findings. The following therapy evaluations are needed: PT evaluate and treat, OT evaluate and treat, SLP evaluate and treat, PM&R evaluate for appropriate placement    Diagnostics recommended:   - MRI brain without contrast   - TTE without bubble study, monitor on telemetry while admitted  - Labs: hemoglobin A1c (goal <7%), lipid panel (LDL goal <70mg/dL), TSH  - Treat hyperglycemia to achieve a blood glucose level in a range of 140-180 mg/dL and to closely monitor to prevent hypoglycemia (Class IIa, Level C-LD).    VTE prophylaxis: Enoxaparin 40 mg SQ every 24 hours  Mechanical prophylaxis: Place SCDs    BP parameters: Infarct: No intervention, SBP <220    Please contact us with any further questions or concerns or if patient has any acute neurological changes (new symptoms, worsening deficits).

## 2024-03-07 ENCOUNTER — DOCUMENTATION ONLY (OUTPATIENT)
Dept: NEUROLOGY | Facility: CLINIC | Age: 69
End: 2024-03-07
Payer: MEDICARE

## 2024-03-07 ENCOUNTER — PATIENT OUTREACH (OUTPATIENT)
Dept: ADMINISTRATIVE | Facility: CLINIC | Age: 69
End: 2024-03-07
Payer: MEDICARE

## 2024-03-07 LAB
ALBUMIN SERPL BCP-MCNC: 2.4 G/DL (ref 3.5–5.2)
ALP SERPL-CCNC: 93 U/L (ref 55–135)
ALT SERPL W/O P-5'-P-CCNC: 15 U/L (ref 10–44)
ANION GAP SERPL CALC-SCNC: 8 MMOL/L (ref 8–16)
ASCENDING AORTA: 2.96 CM
AST SERPL-CCNC: 23 U/L (ref 10–40)
AV INDEX (PROSTH): 0.83
AV MEAN GRADIENT: 7 MMHG
AV PEAK GRADIENT: 11 MMHG
AV VALVE AREA BY VELOCITY RATIO: 3.04 CM²
AV VALVE AREA: 3.2 CM²
AV VELOCITY RATIO: 0.79
BILIRUB SERPL-MCNC: 0.3 MG/DL (ref 0.1–1)
BSA FOR ECHO PROCEDURE: 2.13 M2
BUN SERPL-MCNC: 24 MG/DL (ref 8–23)
CALCIUM SERPL-MCNC: 8.4 MG/DL (ref 8.7–10.5)
CHLORIDE SERPL-SCNC: 104 MMOL/L (ref 95–110)
CO2 SERPL-SCNC: 23 MMOL/L (ref 23–29)
CREAT SERPL-MCNC: 0.7 MG/DL (ref 0.5–1.4)
CV ECHO LV RWT: 0.46 CM
DOP CALC AO PEAK VEL: 1.68 M/S
DOP CALC AO VTI: 35.6 CM
DOP CALC LVOT AREA: 3.8 CM2
DOP CALC LVOT DIAMETER: 2.21 CM
DOP CALC LVOT PEAK VEL: 1.33 M/S
DOP CALC LVOT STROKE VOLUME: 113.87 CM3
DOP CALC MV VTI: 40 CM
DOP CALCLVOT PEAK VEL VTI: 29.7 CM
E WAVE DECELERATION TIME: 222.48 MSEC
E/A RATIO: 1.03
E/E' RATIO: 10.89 M/S
ECHO LV POSTERIOR WALL: 1.17 CM (ref 0.6–1.1)
EST. GFR  (NO RACE VARIABLE): >60 ML/MIN/1.73 M^2
FRACTIONAL SHORTENING: 30 % (ref 28–44)
GLUCOSE SERPL-MCNC: 122 MG/DL (ref 70–110)
INTERVENTRICULAR SEPTUM: 1.21 CM (ref 0.6–1.1)
IVC DIAMETER: 1.49 CM
LA MAJOR: 6.99 CM
LA MINOR: 7.15 CM
LA WIDTH: 4.4 CM
LEFT ATRIUM SIZE: 3.1 CM
LEFT ATRIUM VOLUME INDEX MOD: 39.6 ML/M2
LEFT ATRIUM VOLUME INDEX: 38.5 ML/M2
LEFT ATRIUM VOLUME MOD: 84.43 CM3
LEFT ATRIUM VOLUME: 81.96 CM3
LEFT INTERNAL DIMENSION IN SYSTOLE: 3.52 CM (ref 2.1–4)
LEFT VENTRICLE DIASTOLIC VOLUME INDEX: 57.01 ML/M2
LEFT VENTRICLE DIASTOLIC VOLUME: 121.43 ML
LEFT VENTRICLE MASS INDEX: 111 G/M2
LEFT VENTRICLE SYSTOLIC VOLUME INDEX: 24.2 ML/M2
LEFT VENTRICLE SYSTOLIC VOLUME: 51.64 ML
LEFT VENTRICULAR INTERNAL DIMENSION IN DIASTOLE: 5.06 CM (ref 3.5–6)
LEFT VENTRICULAR MASS: 235.45 G
LV LATERAL E/E' RATIO: 9.8 M/S
LV SEPTAL E/E' RATIO: 12.25 M/S
LVOT MG: 4.18 MMHG
LVOT MV: 0.99 CM/S
MAGNESIUM SERPL-MCNC: 2.1 MG/DL (ref 1.6–2.6)
MV MEAN GRADIENT: 2 MMHG
MV PEAK A VEL: 0.95 M/S
MV PEAK E VEL: 0.98 M/S
MV PEAK GRADIENT: 4 MMHG
MV STENOSIS PRESSURE HALF TIME: 64.52 MS
MV VALVE AREA BY CONTINUITY EQUATION: 2.85 CM2
MV VALVE AREA P 1/2 METHOD: 3.41 CM2
OHS LV EJECTION FRACTION SIMPSONS BIPLANE MOD: 61 %
PISA AR MAX VEL: 3.19 M/S
PISA MRMAX VEL: 5.32 M/S
PISA TR MAX VEL: 2.86 M/S
POCT GLUCOSE: 126 MG/DL (ref 70–110)
POCT GLUCOSE: 142 MG/DL (ref 70–110)
POCT GLUCOSE: 157 MG/DL (ref 70–110)
POCT GLUCOSE: 88 MG/DL (ref 70–110)
POTASSIUM SERPL-SCNC: 4.3 MMOL/L (ref 3.5–5.1)
PROT SERPL-MCNC: 5 G/DL (ref 6–8.4)
PULM VEIN S/D RATIO: 1.13
PV PEAK D VEL: 0.62 M/S
PV PEAK GRADIENT: 4 MMHG
PV PEAK S VEL: 0.7 M/S
PV PEAK VELOCITY: 1.04 M/S
RA MAJOR: 5.99 CM
RA PRESSURE ESTIMATED: 3 MMHG
RA WIDTH: 3.94 CM
RIGHT VENTRICLE DIASTOLIC MID DIMENSION: 4 CM
RIGHT VENTRICULAR END-DIASTOLIC DIMENSION: 3.07 CM
RV TB RVSP: 6 MMHG
RV TISSUE DOPPLER FREE WALL SYSTOLIC VELOCITY 1 (APICAL 4 CHAMBER VIEW): 17.08 CM/S
SINUS: 3.35 CM
SODIUM SERPL-SCNC: 135 MMOL/L (ref 136–145)
STJ: 2.82 CM
TDI LATERAL: 0.1 M/S
TDI SEPTAL: 0.08 M/S
TDI: 0.09 M/S
TR MAX PG: 33 MMHG
TRICUSPID ANNULAR PLANE SYSTOLIC EXCURSION: 2.01 CM
TV REST PULMONARY ARTERY PRESSURE: 36 MMHG
Z-SCORE OF LEFT VENTRICULAR DIMENSION IN END DIASTOLE: -2.94
Z-SCORE OF LEFT VENTRICULAR DIMENSION IN END SYSTOLE: -1.29

## 2024-03-07 PROCEDURE — 25000003 PHARM REV CODE 250

## 2024-03-07 PROCEDURE — 97530 THERAPEUTIC ACTIVITIES: CPT

## 2024-03-07 PROCEDURE — 97165 OT EVAL LOW COMPLEX 30 MIN: CPT

## 2024-03-07 PROCEDURE — 27000646 HC AEROBIKA DEVICE

## 2024-03-07 PROCEDURE — 36415 COLL VENOUS BLD VENIPUNCTURE: CPT

## 2024-03-07 PROCEDURE — 95819 EEG AWAKE AND ASLEEP: CPT | Mod: 26,,, | Performed by: PSYCHIATRY & NEUROLOGY

## 2024-03-07 PROCEDURE — 94761 N-INVAS EAR/PLS OXIMETRY MLT: CPT

## 2024-03-07 PROCEDURE — 94664 DEMO&/EVAL PT USE INHALER: CPT

## 2024-03-07 PROCEDURE — 83735 ASSAY OF MAGNESIUM: CPT

## 2024-03-07 PROCEDURE — 27000221 HC OXYGEN, UP TO 24 HOURS

## 2024-03-07 PROCEDURE — 27000207 HC ISOLATION

## 2024-03-07 PROCEDURE — 97162 PT EVAL MOD COMPLEX 30 MIN: CPT

## 2024-03-07 PROCEDURE — 63600175 PHARM REV CODE 636 W HCPCS

## 2024-03-07 PROCEDURE — 95819 EEG AWAKE AND ASLEEP: CPT

## 2024-03-07 PROCEDURE — 80053 COMPREHEN METABOLIC PANEL: CPT

## 2024-03-07 PROCEDURE — 97535 SELF CARE MNGMENT TRAINING: CPT

## 2024-03-07 PROCEDURE — 99900035 HC TECH TIME PER 15 MIN (STAT)

## 2024-03-07 PROCEDURE — 11000001 HC ACUTE MED/SURG PRIVATE ROOM

## 2024-03-07 PROCEDURE — 92610 EVALUATE SWALLOWING FUNCTION: CPT

## 2024-03-07 RX ORDER — ENOXAPARIN SODIUM 100 MG/ML
40 INJECTION SUBCUTANEOUS EVERY 24 HOURS
Status: DISCONTINUED | OUTPATIENT
Start: 2024-03-07 | End: 2024-03-08 | Stop reason: HOSPADM

## 2024-03-07 RX ORDER — CLOPIDOGREL BISULFATE 75 MG/1
75 TABLET ORAL DAILY
Status: DISCONTINUED | OUTPATIENT
Start: 2024-03-07 | End: 2024-03-08 | Stop reason: HOSPADM

## 2024-03-07 RX ORDER — PREDNISONE 20 MG/1
40 TABLET ORAL DAILY
Status: COMPLETED | OUTPATIENT
Start: 2024-03-07 | End: 2024-03-08

## 2024-03-07 RX ORDER — BENZONATATE 100 MG/1
100 CAPSULE ORAL 3 TIMES DAILY PRN
Status: DISCONTINUED | OUTPATIENT
Start: 2024-03-07 | End: 2024-03-08 | Stop reason: HOSPADM

## 2024-03-07 RX ADMIN — CLOPIDOGREL BISULFATE 75 MG: 75 TABLET ORAL at 10:03

## 2024-03-07 RX ADMIN — PREDNISONE 40 MG: 20 TABLET ORAL at 10:03

## 2024-03-07 RX ADMIN — PANTOPRAZOLE SODIUM 40 MG: 40 TABLET, DELAYED RELEASE ORAL at 10:03

## 2024-03-07 RX ADMIN — FENOFIBRATE 145 MG: 145 TABLET ORAL at 10:03

## 2024-03-07 RX ADMIN — COLLAGENASE SANTYL: 250 OINTMENT TOPICAL at 06:03

## 2024-03-07 RX ADMIN — ENOXAPARIN SODIUM 40 MG: 40 INJECTION SUBCUTANEOUS at 04:03

## 2024-03-07 RX ADMIN — CALCIUM CARBONATE (ANTACID) CHEW TAB 500 MG 1500 MG: 500 CHEW TAB at 08:03

## 2024-03-07 RX ADMIN — VALACYCLOVIR HYDROCHLORIDE 500 MG: 500 TABLET, FILM COATED ORAL at 10:03

## 2024-03-07 RX ADMIN — FLUOXETINE HYDROCHLORIDE 40 MG: 20 CAPSULE ORAL at 10:03

## 2024-03-07 RX ADMIN — ATORVASTATIN CALCIUM 40 MG: 40 TABLET, FILM COATED ORAL at 10:03

## 2024-03-07 RX ADMIN — ALLOPURINOL 300 MG: 100 TABLET ORAL at 10:03

## 2024-03-07 RX ADMIN — BENZONATATE 100 MG: 100 CAPSULE ORAL at 08:03

## 2024-03-07 RX ADMIN — MUPIROCIN: 20 OINTMENT TOPICAL at 08:03

## 2024-03-07 RX ADMIN — MUPIROCIN: 20 OINTMENT TOPICAL at 10:03

## 2024-03-07 RX ADMIN — CHOLECALCIFEROL TAB 25 MCG (1000 UNIT) 1000 UNITS: 25 TAB at 10:03

## 2024-03-07 NOTE — PLAN OF CARE
Recommendation:  1. Add beneprotein TID to promote wound healing.   2. Encourage intake of meals & Ron as tolerated.   3. Monitor weight/labs.   4. RD to follow to monitor po intake    Goals:  Pt will tolerate diet with at least 50-75% intake at meals by RD follow up  Nutrition Goal Status: new

## 2024-03-07 NOTE — ED NOTES
Patient resting in bed comfortably. Pillow and blanket given to patient. Side rails up x 2. Call light within reach. Family at bedside.

## 2024-03-07 NOTE — PLAN OF CARE
SW met with pt and pt's sister Arminda [phone] at bedside to complete final d/c assessment. Pt stated that at he has been at [facility] Phone: [phone] since November of last year. Pt has a WC and RW at the facility that he will use to ambulate. Pt sees his PCP at [facility]. SW will call sister with updates. Pt will return to [facility] at time of d/c. White board updated with CM name and contact information.  Discharge brochure provided.  Pt encouraged to call with any questions or concerns.  Cm will continue to follow pt through transitions of care and assist with any discharge needs.    Roger Myles, TIM  [phone]    Future Appointments   Date Time Provider Department Center   3/7/2024  3:30 PM EEGJAEL [facility] EEG [facility]   5/9/2024 11:40 AM SIX, MINUTE WALK [facility] PUL [facility]   5/9/2024 12:00 PM PULMONARY FUNCTION [facility]   5/9/2024 12:15 PM PULMONARY FUNCTION [facility]   5/9/2024 12:30 PM PULMONARY FUNCTION [facility]   5/9/2024  1:00 PM Marin Ortiz MD [facility] PUL[facility]        03/07/24 1107   Discharge Planning   Assessment Type Discharge Planning Brief Assessment   Support Systems Spouse/significant other   Equipment Currently Used at Home wheelchair;walker, rolling   Current Living Arrangements residential facility   Care Facility Name [facility]   DME Needed Upon Discharge  none   Discharge Plan A Return to nursing home

## 2024-03-07 NOTE — PT/OT/SLP EVAL
Occupational Therapy   Evaluation/Treatment diagp      Name: Harrison Haywood  MRN: [id number]  Admitting Diagnosis: Left-sided weakness  Recent Surgery: * No surgery found *      Recommendations:     Discharge Recommendations: Moderate Intensity Therapy  Discharge Equipment Recommendations:   (per NH)  Barriers to discharge:  None    Assessment:     Harrison Haywood is a 68 y.o. male with a medical diagnosis of Left-sided weakness.  He presents with The primary encounter diagnosis was Left-sided weakness. Diagnoses of Acute focal neurological deficit, Stroke, Chest pain, TIA (transient ischemic attack), Sacral decubitus ulcer, stage IV, and CVA (cerebral vascular accident) were also pertinent to this visit.  . Performance deficits affecting function: weakness, impaired endurance, impaired balance, decreased safety awareness, edema, impaired skin, impaired self care skills, impaired cognition, abnormal tone, decreased ROM, impaired joint extensibility, decreased coordination, impaired functional mobility, gait instability, decreased upper extremity function, decreased lower extremity function, impaired coordination, impaired fine motor.      Pt would benefit from cont OT services in order to maximize functional independence. Recommending moderate intensity therapy at d/c. Pt requires assist with ADLs and functional mobility. Known to therapy from previous admit. Pt with impaired balance and weakness, RUE with residual weakness from previous medical issues; LUE grossly 3+ to 4-/5 , however, appears at baseline strength    Rehab Prognosis: Good; patient would benefit from acute skilled OT services to address these deficits and reach maximum level of function.       Plan:     Patient to be seen 3 x/week to address the above listed problems via self-care/home management, therapeutic activities, therapeutic exercises  Plan of Care Expires: 04/07/24  Plan of Care Reviewed with: patient    Subjective     Chief Complaint: wants to  reposition   Patient/Family Comments/goals: none stated     Occupational Profile:  Living Environment: pt resident of NH   Previous level of function: pt requires assist with all axs   Equipment Used at Home: walker, rolling, wheelchair  Assistance upon Discharge: from NH     Pain/Comfort:  Pain Rating 1: 0/10    Patients cultural, spiritual, Jain conflicts given the current situation:      Objective:     Communicated with: marshall prior to session.  Patient found supine with   upon OT entry to room.    General Precautions: Standard, fall, contact, droplet  Orthopedic Precautions: N/A  Braces: N/A  Respiratory Status: Nasal cannula    Occupational Performance:    Bed Mobility:    Patient completed Rolling/Turning to Left with  minimum assistance  Patient completed Scooting/Bridging with minimum assistance  Patient completed Supine to Sit with moderate assistance  Patient completed Sit to Supine with moderate assistance    Functional Mobility/Transfers:  Patient completed Sit <> Stand Transfer with minimum assistance  with  rolling walker   Functional Mobility: Min/mod A with RW; LOB x 3 at sink; RW management and balance assist     Activities of Daily Living:  Feeding:  set up assist opening containers, peeling banana   Grooming: contact guard assistance at sink to [facility] hands; 3 LOB at sink   Lower Body Dressing: total assistance poor sitting balance this date and inability to hold self upright to perform task     Cognitive/Visual Perceptual:  Pt oriented to person and place; not oriented to time and/or situation   Impaired insight, LTM/STM, recall   Impaired safety awareness     Physical Exam:  Balance:    -       fair -/poor seated; fair -/poor standing   Postural examination/scapula alignment:    -       Rounded shoulders  -       Forward head  -       Abnormal trunk flexion  -       Kyphosis  Skin integrity: Wound sacral stage IV  Edema:  Mild BLEs   Dominant hand:    -       left 2/2 impaired use of RUE    Upper Extremity Range of Motion:   RUE impaired 2/2 previous medical issues- spinal meningitis- grossly limited at shoulder and hand ; LUE grossly WFL for pt's needs   Upper Extremity Strength: RUe grossly 2+5- can use as gross assist; LUE grossly 3+/5    Strength:  impaired R hand   Fine Motor Coordination:  impaired R hand     AMPAC 6 Click ADL:  AMPAC Total Score: 15    Treatment & Education:  Pt found supine; requesting to be pulled up in bed   Bed mobility as above  Impaired sitting balance while EOB; increased assist to maintain midline   Stood x 1 trial from EOB with RW; cues for hand placement on RW   Functional mobility limited in room with RW; impaired balance, RW management   Stood at sink for G&H axs; 3 LOB at sink ; reporting dizziness while standing   Returned to supine and set up with breakfast tray; pt able to feed self following set up    Patient left supine with all lines intact, call button in reach, bed alarm on, and nsg notified    GOALS:   Multidisciplinary Problems       Occupational Therapy Goals          Problem: Occupational Therapy    Goal Priority Disciplines Outcome Interventions   Occupational Therapy Goal     OT, PT/OT Ongoing, Progressing    Description: Goals to be met by: 4/7/24     Patient will increase functional independence with ADLs by performing:    LE Dressing with Minimal Assistance.  Grooming while standing with Contact Guard Assistance.  Toileting from toilet with Minimal Assistance for hygiene and clothing management.   Supine to sit with Contact Guard Assistance.  Step transfer with Contact Guard Assistance  Toilet transfer to toilet with Contact Guard Assistance.  Increased functional strength to WFL for self care skills and functional mobility.  Upper extremity exercise program x10 reps per handout, with assist as needed .                         History:     No past medical history on file.    No past surgical history on file.    Time Tracking:     OT Date of  Treatment: 03/07/24  OT Start Time: 0922  OT Stop Time: 0947  OT Total Time (min): 25 min    Billable Minutes:Evaluation 8  Self Care/Home Management 9  Therapeutic Activity 8    3/7/2024

## 2024-03-07 NOTE — PT/OT/SLP EVAL
"Physical Therapy Evaluation    Patient Name:  Harrison Haywood   MRN:  [id number]    Recommendations:     Discharge Recommendations: Moderate Intensity Therapy   Discharge Equipment Recommendations: none   Barriers to discharge: None    Assessment:     Harrison Haywood is a 68 y.o. male admitted with a medical diagnosis of Left-sided weakness.  He presents with the following impairments/functional limitations: weakness, impaired endurance, impaired functional mobility, gait instability, impaired balance, decreased lower extremity function, decreased ROM, pain, impaired cardiopulmonary response to activity, impaired muscle length, impaired coordination. PT evaluation completed this date. Pt is a NH resident admitted with LUE weakness. At baseline, pt ambulatory with RW around his facility.  He subjectively reports LLE weakness during ambulation, however on formal MMT pt with 4/5 LLE strength. Decreased motor control noted in LLE during gait with RW.  Recommend [facility] at time of dc.     Rehab Prognosis: Good; patient would benefit from acute skilled PT services to address these deficits and reach maximum level of function.    Recent Surgery: * No surgery found *      Plan:     During this hospitalization, patient to be seen 3 x/week to address the identified rehab impairments via gait training, therapeutic activities, neuromuscular re-education, therapeutic exercises, wheelchair management/training and progress toward the following goals:    Plan of Care Expires:  03/21/24    Subjective     Chief Complaint: weakness LLE/LUE  Patient/Family Comments/goals: to return to walking normal. Pt states "I used to walk better than this"  Pain/Comfort:  Pain Rating 1:  (unrated pain at sacral wound site at end of session. Wound care RN at bedside and aware at end of session.)    Patients cultural, spiritual, Jewish conflicts given the current situation:      Living Environment:  Pt resides at nursing home.   Prior to admission, " patients level of function was mod I with RW for variable distances.  Equipment used at home: walker, rolling, wheelchair.  DME owned (not currently used): none.  Upon discharge, patient will have assistance from NH staff.    Objective:     Communicated with RN prior to session.  Patient found supine with PureWick, peripheral IV, pressure relief boots  upon PT entry to room.    General Precautions: Standard, fall, droplet, contact  Orthopedic Precautions:N/A   Braces: N/A  Respiratory Status: Nasal cannula, flow 2 L/min    Exams:  Cognitive Exam:  Patient is oriented to Person, Place, and Time  Postural Exam:  Patient presented with the following abnormalities:    -       Rounded shoulders  Sensation:    -       Intact  RLE ROM: WFL except R ankle, decreased at baseline d/t hx of meningitis as a child. AROM decreased into both PF/DF.   RLE Strength: WFL except R ankle, R ankle grossly 2/5. Baseline for patient given hx of meningitis as a child.   LLE ROM: WFL  LLE Strength: Grossly 4/5     Functional Mobility:  Bed Mobility:     Supine to Sit: contact guard assistance,requires increased time to complete task and cues to push with BUEs to assist with scooting.   Sit to Supine: stand by assistance  Transfers:     Sit to Stand:  minimum assistance with rolling walker  Gait: x 20 ft RW, min a. Cues for sequencing of gait, decreased motor control noted in LLE.   Balance: Pt demonstrates good static and dynamic sitting balance, fair static and dynamic standing balance with RW.      AM-PAC 6 CLICK MOBILITY  Total Score:17       Treatment & Education:  Educated pt on acute role of PT. Discussed s/s of stroke and TIA. Patient required min vc's for sequencing of task during transfers and ambulation with RW. See above for further details regarding functional mobility. Discussed safety precautions and need for repositioning to prevent further skin breakdown on his sacrum and to prevent pressure sores on his heels.     Patient  left supine with all lines intact, call button in reach, bed alarm on, and RN notified.    GOALS:   Multidisciplinary Problems       Physical Therapy Goals          Problem: Physical Therapy    Goal Priority Disciplines Outcome Goal Variances Interventions   Physical Therapy Goal     PT, PT/OT Ongoing, Progressing     Description: Goals to be met by: 3/21/24     Patient will increase functional independence with mobility by performin. Supine to sit with [place]  2. Sit to supine with [place]  3. Sit to stand transfer with Modified [place] w/RW  4. Bed to chair transfer with Modified [place] using Rolling Walker  5. Gait  x 150 feet with Modified [place] using Rolling Walker.                          History:     No past medical history on file.    No past surgical history on file.    Time Tracking:     PT Received On: 24  PT Start Time: 1035     PT Stop Time: 1050  PT Total Time (min): 15 min     Billable Minutes: Evaluation 15      2024

## 2024-03-07 NOTE — PLAN OF CARE
SW spoke with PHN, SNF is approved for pt at time of dc to return to [facility]. N auth number is [id number]. TORY will follow.     Rogermiri Bueno, MSLACHO  [phone]    Future Appointments   Date Time Provider Department Center   5/9/2024 11:40 AM SIX, MINUTE WALK [facility] PUL WLK [facility]   5/9/2024 12:00 PM PULMONARY FUNCTION [facility] PULMLAB [facility]   5/9/2024 12:15 PM PULMONARY FUNCTION [facility] P[facility]   5/9/2024 12:30 PM PULMONARY FUNCTION [facility] PUL[facility]   5/9/2024  1:00 PM Marin Ortiz MD [facility] PULMSVC [facility]        03/07/24 1532   Post-Acute Status   Post-Acute Authorization Placement   Post-Acute Placement Status Referrals Sent   Discharge Plan   Discharge Plan A Skilled Nursing Facility

## 2024-03-07 NOTE — CONSULTS
"  [facility] - Telemetry  Adult Nutrition  Consult Note    SUMMARY     Recommendations    Recommendation:  1. Add beneprotein TID to promote wound healing.   2. Encourage intake of meals & Ron as tolerated.   3. Monitor weight/labs.   4. RD to follow to monitor po intake    Goals:  Pt will tolerate diet with at least 50-75% intake at meals by RD follow up  Nutrition Goal Status: new  Communication of RD Recs: reviewed with RN    Assessment and Plan  Nutrition Problem  Increased protein needs    Related to (etiology):   Wound healing    Signs and Symptoms (as evidenced by):   Stg IV sacrum decubitus     Interventions:  Collaboration with other providers  Increased protein diet    Nutrition Diagnosis Status:   New      Malnutrition Assessment  Unable to assess NFPE 2/2 pt on contact isolation for RSV    Reason for Assessment  Reason For Assessment: consult (non healing wound)  Diagnosis:  (L sided weakness)  Relevant Medical History: multiple myeloma, HLD, meningitits, brain abscess  General Information Comments: Pt admitted from [facility] with L sided weakness. Pt on Regular diet with Ron. Seen by SLP. Rosalio 17- sacral spine wound. No recent weight loss noted. Unable to assess NFPE 2/2 pt on contact isolation for RSV  Nutrition Discharge Planning: pt to d/c on Regular diet    Nutrition Risk Screen  Nutrition Risk Screen: large or nonhealing wound, burn or pressure injury    Nutrition/Diet History  Food Preferences: unable to assess  Spiritual, Cultural Beliefs, Christianity Practices, Values that Affect Care: no  Factors Affecting Nutritional Intake: None identified at this time    Anthropometrics  Temp: 98.1 °F (36.7 °C)  Height Method: Stated  Height: 6' 1" (185.4 cm)  Height (inches): 73 in  Weight Method: Bed Scale  Weight: 88.5 kg (195 lb 1.7 oz)  Weight (lb): 195.11 lb  Ideal Body Weight (IBW), Male: 184 lb  % Ideal Body Weight, Male (lb): 106.04 %  BMI (Calculated): 25.7  BMI Grade: 25 - 29.9 - overweight   "   Lab/Procedures/Meds  Pertinent Labs Reviewed: reviewed  Pertinent Labs Comments: Na 135L, BUN 24H, Glu 122H, Ca 8.4L, ALb 2.4L  Pertinent Medications Reviewed: reviewed  Pertinent Medications Comments: pantoprazole, prednisone, Vit D    Estimated/Assessed Needs  Weight Used For Calorie Calculations: 88.5 kg (195 lb 1.7 oz)  Energy Calorie Requirements (kcal): 2478 (28 kcal/kg)  Energy Need Method: Kcal/kg  Protein Requirements: 106g (1.2g/kg)  Weight Used For Protein Calculations: 88.5 kg (195 lb 1.7 oz)  Estimated Fluid Requirement Method: RDA Method  RDA Method (mL): 2478     Nutrition Prescription Ordered  Current Diet Order: Regular  Oral Nutrition Supplement: Ron    Evaluation of Received Nutrient/Fluid Intake  I/O: 0/1350  Energy Calories Required: not meeting needs  Protein Required: not meeting needs  Fluid Required: not meeting needs  Comments: LBM 3/6  % Intake of Estimated Energy Needs: 50 - 75 %  % Meal Intake: 50 - 75 %    Nutrition Risk  Level of Risk/Frequency of Follow-up:  (2xweekly)     Monitor and Evaluation  Food and Nutrient Intake: food and beverage intake  Food and Nutrient Adminstration: diet order  Physical Activity and Function: nutrition-related ADLs and IADLs  Anthropometric Measurements: weight  Biochemical Data, Medical Tests and Procedures: electrolyte and renal panel  Nutrition-Focused Physical Findings: overall appearance     Nutrition Related Social Determinants of Health: SDOH: Unable to assess at this time.      Nutrition Follow-Up  RD Follow-up?: Yes

## 2024-03-07 NOTE — NURSING
Pt admitted to room 456 from ED. Admission assessment complete. VS on graphic. BG checked. Waffle mattress applied and pt turned with pillow. Bed in lowest position, locked, and side rails up x 3. Call light and belongings in reach. Bed alarm on.

## 2024-03-07 NOTE — PLAN OF CARE
Problem: Physical Therapy  Goal: Physical Therapy Goal  Description: Goals to be met by: 3/21/24     Patient will increase functional independence with mobility by performin. Supine to sit with [place]  2. Sit to supine with [place]  3. Sit to stand transfer with Modified [place] w/RW  4. Bed to chair transfer with Modified [place] using Rolling Walker  5. Gait  x 150 feet with Modified [place] using Rolling Walker.     Outcome: Ongoing, Progressing    PT evaluation completed this date. Pt is a NH resident admitted with LUE weakness. At baseline, pt ambulatory with RW around his facility.  He subjectively reports LLE weakness during ambulation, however on formal MMT pt with 4+/5 LLE strength. Decreased motor control noted in LLE during gait with RW.  Recommend [facility] at time of dc.

## 2024-03-07 NOTE — PT/OT/SLP EVAL
Speech Language Pathology Evaluation  Bedside Swallow    Patient Name:  Harrison Haywood   MRN:  [id number]  Admitting Diagnosis: Left-sided weakness    Recommendations:                 Diet recommendations:  Regular Diet - IDDSI Level 7, Thin liquids - IDDSI Level 0   Aspiration Precautions: standard precautions, slow rate, tray set-up, alternate sips and bites    General Precautions: Standard, fall, droplet, contact  Communication strategies:  none     Assessment:     Harrison Haywood is a 68 y.o. male admitted with left sided weakness who presents with baseline swallowing and speech.     History  per MD      Harrison Haywood is a 68 y.o. male who has PMHx of multiple myeloma with spinal lytic lesions, HTN, HLD, Stage 4 Sacral Decubitus Ulcer and Prior CVA with RSW who [place] [facility] [place] chief complaint of LUE weakness.      The patient was in their usual state of health until today around 3pm when he developed sudden onset LUE weakness, blank staring, and unresponsiveness for about 2-3 minutes. Per sister at bedside, after event pt with confusion,babbling, inattention to L visual field. Pt states this confusion lasted until presentation at ED. Since in ED, pt with improvement of LUE weakness and confusion. Stroke activated however not given thrombolytics due to rapid improvement in exam.      Otherwise, pt states he has been better since recent discharge. Endorses continual productive cough however improved from prior. No chest pains or shortness of breath.       No past medical history on file.    No past surgical history on file.    Social History: Patient lives at [facility].    Prior Intubation HX:  n/a    Modified Barium Swallow: none per EMR    Chest X-Rays: The lungs are well expanded and clear. No focal opacities are seen. The pleural spaces are clear the cardiac silhouette is enlarged.     MRI: Acute ischemic change involving the predominantly high RIGHT cerebral convexity, multifocal, possibly embolic  "given the distribution   Chronic LEFT MCA infarction.     Prior diet: reg/thin.    Subjective     Consult received for clinical swallow eval this date, SLP did communicate with RN prior to eval/treat.    Patient goals: "I am ready to eat my breakfast."     Pain/Comfort:  Pain Rating 1: 0/10    Respiratory Status: Room air    Objective:   Pt seen in room, finished eval with OT. Pt was asking to eat his breakfast.   Pt oriented to self and place.   Pt did follow commands.   Oral Musculature Evaluation  Oral Musculature: general weakness  Dentition: present and adequate  Secretion Management: adequate  Mucosal Quality: adequate, good  Mandibular Strength and Mobility: WFL  Oral Labial Strength and Mobility: functional coordination, functional seal  Lingual Strength and Mobility: functional strength  Buccal Strength and Mobility: WFL  Volitional Cough: elicited  Volitional Swallow: timely swallow  Voice Prior to PO Intake: clear voice    Bedside Swallow Eval: ate whole breakfast   Consistencies Assessed:  Thin liquids juice and milk by straw   Puree grits   Mixed Solids- cereal mixed with milk   Solids- scrambled eggs     Oral Phase:   WFL    Pharyngeal Phase:   no overt clinical signs/symptoms of aspiration  no overt clinical signs/symptoms of pharyngeal dysphagia    Compensatory Strategies  None    Treatment: SLP provided patient education on SLP recommendations, SLP role, s/s and risks of aspiration, safe swallow precautions, and POC. The patient v/u of all discussed and is in agreement with POC.       Goals:   Multidisciplinary Problems       SLP Goals       Not on file                    Plan:     Patient to be seen:      Plan of Care expires:     Plan of Care reviewed with:  patient   SLP Follow-Up:  No       Discharge recommendations:  Moderate Intensity Therapy   Barriers to Discharge:  None    Time Tracking:     SLP Treatment Date:   03/07/24  Speech Start Time:  0950  Speech Stop Time:  1022     Speech Total " Time (min):  32 min    Billable Minutes: Eval Swallow and Oral Function 20 and Self Care/Home Management Training 12    03/07/2024

## 2024-03-07 NOTE — PLAN OF CARE
Pt seen at bedside following OT eval. SLP assisted and helped feed patient his whole breakfast tray. Pt with some incoordination when holding utensil but able to swallow with no issues. REC: H[facility]ARY back to facility.

## 2024-03-07 NOTE — PROCEDURES
Date of service  03/07/2024    Introduction  Electroencephalographic (EEG) recording is performed with electrodes placed according to the International 10-20 placement system. Thirty two (32) channels of digital signal (sampling rate of 512/sec) including T1 and T2 was simultaneously recorded from the scalp and may include EKG, EMG, and/or eye monitors. Recording band pass was 0.1 to 512 Hz. Digital video recording of the patient is simultaneously recorded with the EEG. The patient is instructed to report clinical symptoms which may occur during the recording session. EEG and video recording is stored and archived in digital format. Activation procedures which include photic stimulation, hyperventilation and instructing patients to perform simple tasks are done in selected patients.    The EEG is displayed on a monitor screen and can be reviewed using different montages. Computer assisted analysis is employed to detect spike and electrographic seizure activity. The entire record is submitted for computer analysis. The entire recording is visually reviewed and, the times identified by computer analysis as being spikes or seizures are reviewed again.     Compressed spectral analysis (CSA) is also performed on the activity recorded from each individual channel. This is displayed as a power display of frequencies from 0 to 30 Hz over time. The CSA is reviewed looking for asymmetries in power between homologous areas of the scalp and then compared with the original EEG recording.     Findings  The short-term video EEG recording during wakefulness contains 8 Hz alpha activity over the posterior head regions. There is diffuse slowing in the 5-7 Hz range.                    Photic stimulation and hyperventilation were not performed.     During the recording, the patient fell asleep spontaneously.  No abnormal activity occurred during sleep or at the time of arousal.    The EKG channel showed regular  rhythm.    Interpretation  The short-term video EEG shows diffuse nonspecific slowing of the background.  No potentially epileptiform activity was seen.

## 2024-03-07 NOTE — PHARMACY MED REC
"[facility]           Pharmacy  Admission Medication History     The home medication history was taken by Radha Ojeda.      Medication history obtained from Medications listed below were obtained from: Nursing home    Based on information gathered for medication list, you may go to "Admission" then "Reconcile Home Medications" tabs to review and/or act upon those items.     The home medication list has been updated by the Pharmacy department.   Please read ALL comments highlighted in yellow.   Please address this information as you see fit.    Feel free to contact us if you have any questions or require assistance.      No current facility-administered medications on file prior to encounter.     Current Outpatient Medications on File Prior to Encounter   Medication Sig Dispense Refill    acetaminophen (TYLENOL) 650 MG TbSR Take 650 mg by mouth every 8 (eight) hours as needed.      allopurinoL (ZYLOPRIM) 300 MG tablet Take 300 mg by mouth once daily.      amLODIPine (NORVASC) 5 MG tablet Take 5 mg by mouth once daily.      arginine-vitamin C-vitamin E (ARGINAID) 4.5 gram-156 mg/9.2 gram PwPk Take 1 packet by mouth 2 (two) times a day. 8 ounces water      ascorbic acid, vitamin C, (VITAMIN C) 500 MG tablet Take 500 mg by mouth 2 (two) times daily.      calcium carbonate (OS-SONIA) 600 mg calcium (1,500 mg) Tab Take 1,200 mg by mouth every evening.      celecoxib (CELEBREX) 200 MG capsule Take 1 capsule (200 mg total) by mouth once daily. 30 capsule 1    collagenase (SANTYL) ointment Apply topically once daily. 2 g 6    dexAMETHasone (DECADRON) 4 MG Tab Take 40 mg by mouth once a week. 4 tablets once a week with breakfast      fenofibrate (TRICOR) 145 MG tablet Take 1 tablet (145 mg total) by mouth once daily. 35 tablet 0    FLUoxetine 40 MG capsule Take 40 mg by mouth once daily.      guaiFENesin 100 mg/5 ml (MCKENNA-TUSSIN) 100 mg/5 mL syrup Take 200 mg by mouth every 4 (four) hours as needed for " Cough.      HYDROcodone-acetaminophen (NORCO) 5-325 mg per tablet Take 1 tablet by mouth every 6 (six) hours as needed for Pain. 28 tablet 0    levoFLOXacin (LEVAQUIN) 750 MG tablet Take 1 tablet (750 mg total) by mouth once daily. for 3 days 3 tablet 0    melatonin (MELATIN) 3 mg tablet Take 2 tablets (6 mg total) by mouth nightly as needed for Insomnia. 30 tablet 7    multivitamin (ONE DAILY MULTIVITAMIN) per tablet Take 1 tablet by mouth once daily.      ondansetron (ZOFRAN) 4 MG tablet Take 4 mg by mouth 4 (four) times daily as needed for Nausea (vomiting).      pantoprazole (PROTONIX) 40 MG tablet Take 1 tablet (40 mg total) by mouth once daily. 30 tablet 11    predniSONE (DELTASONE) 10 MG tablet Take 4 tablets (40 mg total) by mouth once daily for 3 days, THEN 3 tablets (30 mg total) once daily for 3 days, THEN 2 tablets (20 mg total) once daily for 3 days, THEN 1 tablet (10 mg total) once daily for 3 days. 30 tablet 0    protein supplement (PROMOD PROTEIN) Liqd Take 30 mLs by mouth 2 (two) times a day.      tiZANidine (ZANAFLEX) 2 MG tablet Take 1 tablet (2 mg total) by mouth 2 (two) times a day. for 10 days 20 tablet 0    valACYclovir (VALTREX) 500 MG tablet Take 500 mg by mouth once daily.      VITAMIN D3 10 mcg (400 unit) tablet Take 800 Units by mouth every evening.      zinc sulfate (ZINCATE) 50 mg zinc (220 mg) capsule Take 220 mg by mouth once daily.      bisacodyL (DULCOLAX) 5 mg EC tablet Take 10 mg by mouth daily as needed for Constipation.         Please address this information as you see fit.  Feel free to contact us if you have any questions or require assistance.    Radha Ojeda  [phone]                  .

## 2024-03-07 NOTE — ED TRIAGE NOTES
Patient arrived to ED via EMS from [facility] with complaints of L sided heaviness and weakness.    Family reports that around 1500 patient went unresponsive, whole body stiffened up. After about a minute, patient came too with inability to move L side and garbled speech. Patient arrived awake, alert, oriented x 4. Able ot move L side with L sided weakness. + drift to LUE and LLE. + Hemidiploplia. Visual loss to the L side. Patient reports mobility to L side is improving since onset of symptoms.     Patient denies hx of stroke. Reports recently hospitalized for pneumonia. Arrived on 3 L NC. EMS reports patient does not wear oxygen all the time. RA sat of 85% upon arrival. Dyspnea upon exertion.  +congested cough. Denies NVD, numbness and tingling, pain, CP. Denies recent injury or trauma, changes in medication. Denies blood thinners and allergies.

## 2024-03-07 NOTE — H&P
[facility] Medicine H&P Note     Admitting Team: [facility] Hospitalist Team A  Attending Physician: Ayden Yi MD  Resident: pamela Rodas  Intern: Gillian Phillips     Date of Admit: 3/6/2024    Chief Complaint     LUE weakness     Subjective:      History of Present Illness:  Harrison Haywood is a 68 y.o. male who has PMHx of multiple myeloma with spinal lytic lesions, HTN, HLD, Stage 4 Sacral Decubitus Ulcer and Prior CVA with RSW who presented to [facility] on 3/6/24 for chief complaint of LUE weakness.     The patient was in their usual state of health until today around 3pm when he developed sudden onset LUE weakness, blank staring, and unresponsiveness for about 2-3 minutes. Per sister at bedside, after event pt with confusion,babbling, inattention to L visual field. Pt states this confusion lasted until presentation at ED. Since in ED, pt with improvement of LUE weakness and confusion. Stroke activated however not given thrombolytics due to rapid improvement in exam.     Otherwise, pt states he has been better since recent discharge. Endorses continual productive cough however improved from prior. No chest pains or shortness of breath.     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    Allergies:  Review of patient's allergies indicates:   Allergen Reactions    Aspirin Hives    Penicillins Hives       Home Medications:  Prior to Admission medications    Medication Sig Start Date End Date Taking? Authorizing Provider   acetaminophen (TYLENOL) 650 MG TbSR Take 650 mg by mouth every 8 (eight) hours as needed.    Provider, Historical   allopurinoL (ZYLOPRIM) 300 MG tablet Take 300 mg by mouth once daily. 2/2/24   Provider, Historical   amLODIPine (NORVASC) 5 MG tablet Take 5 mg by mouth once daily.     Provider, Historical   ascorbic acid, vitamin C, (VITAMIN C) 500 MG tablet Take 500 mg by mouth once daily.    Provider, Historical   bisacodyL (DULCOLAX) 5 mg EC tablet Take 5 mg by mouth daily as needed for Constipation.    Provider, Historical   calcium carbonate (OS-SONIA) 600 mg calcium (1,500 mg) Tab Take 1,200 mg by mouth every evening.    Provider, Historical   celecoxib (CELEBREX) 200 MG capsule Take 1 capsule (200 mg total) by mouth once daily. 3/3/24 4/7/24  Viet Rodas MD   collagenase (SANTYL) ointment Apply topically once daily. 3/4/24 4/7/24  Viet Rodas MD   fenofibrate (TRICOR) 145 MG tablet Take 1 tablet (145 mg total) by mouth once daily. 3/3/24 4/7/24  Viet Rodas MD   FLUoxetine 40 MG capsule Take 40 mg by mouth once daily. 2/14/24   Provider, Historical   guaiFENesin 100 mg/5 ml (MCKENNA-TUSSIN) 100 mg/5 mL syrup Take 200 mg by mouth every 4 (four) hours as needed for Cough.    Provider, Historical   HYDROcodone-acetaminophen (NORCO) 5-325 mg per tablet Take 1 tablet by mouth every 6 (six) hours as needed for Pain. 3/3/24 3/10/24  Viet Rodas MD   levoFLOXacin (LEVAQUIN) 750 MG tablet Take 1 tablet (750 mg total) by mouth once daily. for 3 days 3/3/24 3/6/24  Viet Rodas MD   melatonin (MELATIN) 3 mg tablet Take 2 tablets (6 mg total) by mouth nightly as needed for Insomnia. 3/3/24 7/1/24  Viet Rodas MD   multivitamin (ONE DAILY MULTIVITAMIN) per tablet Take 1 tablet by mouth once daily.    Provider, Historical   ondansetron (ZOFRAN) 4 MG tablet Take 4 mg by mouth 4 (four) times daily as needed.    Provider, Historical   pantoprazole (PROTONIX) 40 MG tablet Take 1 tablet (40 mg total) by mouth once daily. 3/4/24 3/4/25  Viet Rodas MD   predniSONE (DELTASONE) 10 MG tablet Take 4 tablets (40 mg total) by mouth once daily for 3 days, THEN 3 tablets (30 mg total) once daily for 3 days, THEN 2 tablets (20 mg total) once daily for 3 days, THEN 1 tablet (10 mg total) once daily for 3  days. 3/3/24 3/15/24  Viet Rodas MD   tiZANidine (ZANAFLEX) 2 MG tablet Take 1 tablet (2 mg total) by mouth 2 (two) times a day. for 10 days 3/3/24 3/13/24  Viet Rodas MD   valACYclovir (VALTREX) 500 MG tablet Take 500 mg by mouth once daily.    Provider, Historical   vitamin D (VITAMIN D3) 1000 units Tab Take 1 tablet (1,000 Units total) by mouth once daily. 3/3/24 5/5/24  Viet Rodas MD   VITAMIN D3 10 mcg (400 unit) tablet Take 2 tablets by mouth every evening. 24   Provider, Historical   zinc sulfate (ZINCATE) 50 mg zinc (220 mg) capsule Take 220 mg by mouth once daily.    Provider, Historical       Family History:  No family history on file.    Social History:  Social History     Tobacco Use    Smoking status: Never       Review of Systems:  Pertinent items are noted in HPI. All other systems are reviewed and are negative.    Health Maintaince :   Primary Care Physician: Dr. Stein    TDap unknown    Flu: not UTD  Pna not received per sister    Cancer Screening:  Colonoscopy: NUTD     Objective:   Last 24 Hour Vital Signs:  BP  Min: 125/76  Max: 140/80  Temp  Av.2 °F (36.2 °C)  Min: 97.2 °F (36.2 °C)  Max: 97.2 °F (36.2 °C)  Pulse  Av.3  Min: 70  Max: 94  Resp  Av.8  Min: 17  Max: 23  SpO2  Av.3 %  Min: 86 %  Max: 100 %  Height  Av' (182.9 cm)  Min: 6' (182.9 cm)  Max: 6' (182.9 cm)  Weight  Av.8 kg (209 lb)  Min: 94.8 kg (209 lb)  Max: 94.8 kg (209 lb)  Body mass index is 28.35 kg/m².  No intake/output data recorded.    Physical Examination:  General: A&Ox3, resting comfortably in bed.   HEENT: PERRL, moist mucus membranes w/ no erythema noted, no facial asymmetry noted  Neck: Trachea midline, no masses, no lymphadenopathy  Cardiovascular: Regular rate and rhythm, normal S1 and S2, no murmurs, rubs or gallops  Pulm: CTAB, no wheezes or crackles; no respiratory distress. Satting well on room air. Coughing, improved from prior.   Abdomen: Soft, non-tender,  "non-distended; no organomegaly  Skin: No rashes or erythema noted, no ecchymosis  Extremities: Atraumatic, no cyanosis or edema. LLE incr. Size > RLE due to RSW from childhood.  Pulses: 2+ and symmetric  Neurological: A&Ox3, 4/5 strength RUE, 3/5 strength LUE. 4/5 strength bilateral lower extremities. PERRL. EOM testing notable for pt unable to deviate eyes to the left. Turns head to the left in order to visualize left visual field.   Psychiatric: Normal mood and affect, thought content normal    Laboratory:  Most Recent Data:  CBC:   Lab Results   Component Value Date    WBC 6.12 03/06/2024    HGB 9.4 (L) 03/06/2024    HCT 28.8 (L) 03/06/2024     03/06/2024    MCV 98 03/06/2024    RDW 17.2 (H) 03/06/2024     BMP:   Lab Results   Component Value Date     03/06/2024    K 4.8 03/06/2024     03/06/2024    CO2 21 (L) 03/06/2024    BUN 32 (H) 03/06/2024    CREATININE 0.8 03/06/2024     03/06/2024    CALCIUM 8.6 (L) 03/06/2024    MG 2.0 02/29/2024     LFTs:   Lab Results   Component Value Date    PROT 6.0 03/06/2024    ALBUMIN 2.8 (L) 03/06/2024    BILITOT 0.3 03/06/2024    AST 40 03/06/2024    ALKPHOS 100 03/06/2024    ALT 19 03/06/2024     Coags:   Lab Results   Component Value Date    INR 1.1 03/06/2024     FLP:   Lab Results   Component Value Date    CHOL 147 03/06/2024    HDL 48 03/06/2024    LDLCALC 66.2 03/06/2024    TRIG 164 (H) 03/06/2024    CHOLHDL 32.7 03/06/2024     DM:   Lab Results   Component Value Date    HGBA1C 5.2 02/29/2024    LDLCALC 66.2 03/06/2024    CREATININE 0.8 03/06/2024     Thyroid:   Lab Results   Component Value Date    TSH 0.203 (L) 03/06/2024    FREET4 1.15 03/06/2024     Anemia: No results found for: "IRON", "TIBC", "FERRITIN", "BDOMMODD22", "FOLATE"  Cardiac:   Lab Results   Component Value Date    TROPONINI 0.037 (H) 02/29/2024    BNP 95 02/29/2024     Urinalysis:   Lab Results   Component Value Date    COLORU Yellow 11/03/2023    UROBILINOGEN 1.0 11/03/2023 "         Microbiology Data:  none    Other Results:  EKG (my interpretation): normal sinus rhythm    Radiology:  Imaging Results              CTA Head and Neck (xpd) (Final result)  Result time 03/06/24 17:14:17      Final result by Ean Loya DO (03/06/24 17:14:17)                   Impression:      1. Remote left MCA territory infarct with encephalomalacia and volume loss in the MCA territory, diffuse atrophy of the left MCA, and chronic short-segment occlusion of the M1 segment.  2. Otherwise unremarkable CTA head and neck, without evidence of an acute large vessel occlusion.  3. Paranasal sinus fluid as above.  4. Right lower lobe pulmonary nodule measuring 4 mm.  Per 2017 Fleischner criteria, recommend follow-up CT in 12 months if the patient is high risk.      Electronically signed by: Ean Loya  Date:    03/06/2024  Time:    17:14               Narrative:    EXAMINATION:  CTA HEAD AND NECK (XPD)    CLINICAL HISTORY:  Neuro deficit, acute, stroke suspected;Stroke/TIA, determine embolic source;    TECHNIQUE:  Non contrast low dose axial images were obtained though the head. CT angiogram was performed from the level of the alfred to the top of the head following the IV administration of 100mL of Omnipaque 350.   Sagittal and coronal reconstructions and maximum intensity projection reconstructions were performed. Arterial stenosis percentages are based on NASCET measurement criteria. An immediate post-contrast CT head was also performed. RapidAI LVO was utilized to measure arterial blood flow in the brain.    COMPARISON:  CTA chest from 02/29/2024.    FINDINGS:  There is motion and streak artifact, limiting evaluation.    CT head: The ventricles are normal in size without evidence of hydrocephalus. There is encephalomalacia within the left frontal lobe and insula, compatible with a remote MCA territory infarct.  No parenchymal mass, edema or acute major vascular distribution infarct. There is no  intracranial hemorrhage (agree with rapid AI assessment).  No extra-axial blood or fluid collection.    The cranium is intact.  There are multiple air-fluid levels in the bilateral maxillary sinuses and sphenoid sinuses, with mucosal thickening of the ethmoid sinuses.  The mastoid air cells are clear.      CTA head:    Anterior circulation: There is mild atherosclerosis of the bilateral intracranial ICAs.  There is short-segment chronic occlusion of the M1 segment (series 5, image 477), with diffuse atrophy of the left MCA, compatible with a remote MCA territory infarct.    Posterior circulation: Vertebrobasilar system is within normal limits without focal abnormality.  There is a persistent fetal origin of the left PCA.  The bilateral posterior cerebral arteries are within normal limits, without hemodynamically significant stenosis or occlusion.    There is no intracranial aneurysm.    CTA neck:    The aortic arch maintains a normal branching pattern.  There is mild atherosclerosis of the aortic arch.    There is streak and motion artifact, limiting evaluation of the left common carotid artery.  The carotid arteries take a retropharyngeal course, a normal variant.  The common and internal carotid arteries are otherwise normal in course and caliber. No significant stenosis in either carotid bifurcation.    The vertebral origins are patent. The cervical vertebral arteries are normal in course and caliber.    There is a subcentimeter hypodensity in the right thyroid lobe which can be further evaluated with nonemergent thyroid ultrasound if clinically indicated.  There is a 4 mm right lower lobe pulmonary nodule (series 5, image 59).  There is presumed atelectasis or scarring in the superior segment left lower lobe.    There is no acute fracture or subluxation of the cervical spine.                                       Assessment:     Harrison Haywood is a 68 y.o. male with PMHx of multiple myeloma with spinal lytic  lesions, HLD, Stage 4 Sacral Decubitus ulcer, and prior meningitis/Brain abscess with residual R sided weakness (not present on exam) who presented on 3/6/24 as stroke activation for acute onset LUE weakness and subsequent confusion found to have L hemineglect on exam. CTA negative for acute stroke or LVO. Pt with rapid improvement in deficits thus TNK not administered. S/p ASA and plavix load and admitted to [facility] medicine for further stroke vs seizure workup.     Plan:     Acute LUE weakness + L hemineglect   Acute Stroke vs TIA vs Seizure like Activity   Hx of Childhood Brain Abscess with residual RSW  - presented with LUE weakness, unresponsiveness x 2-3 minutes followed by babling, L hemineglect, persistent LUE weakness. Rapidly improvement deficits on arrival with persistent L hemineglect on exam.   - No thrombolysis given as deficits rapidly improving on exam  - mental status returning to baseline on my evaluation   - s/p ASA + Plavix load with IV benadryl due to reported allergy to asa (hives)   - CTA without LVO and noted L MCA encephalomalacia (2/2 prior brain abscess as child with residual RSW however not appreciated on exam)   PLAN:   - MRI brain tomorrow for further evaluation   - TTE ordered  - Continue Lipitor 40mg daily   - On tele   - will consider EEG for further seizure evaluation     Multiple Myeloma with Lytic Spinal Lesions   Pancytopenia - improved  - following with oncology   - endorses occasional back discomfort 2/2 spinal lytic lesions   - previously with pancytopenia on last admit, WBC improved 6, Hgb 9.4, Plt 317  PLAN:  - continue allopurinol 300 daily and valacyclovir 500 po daily for ppx  - continue calcium and vitamin D for bony involvement    Hyperlipidemia   - Lipid panel 3/6: Tchol 147, , LDL 66.2   PLAN:  - Continue Lipitor 40mg daily, Fenofibrate 145mg daily    Stage 4 sacral decubitus ulcer, POA  - Present prior to admission. Pt with discomfort with repositioning   PLAN:  -  wound care consulted     Recent RSV Infection and CAP   - recent admit, 3/3/24 with SOB, cough. RSV positive   - discharged with course of Levofloxacin (to be completed today) and Prednisone taper   - s/p Methylpred 80mg IV x 1 in ED  - Satting well on room air currently with lungs CTAB    Diet: regular   DVT Ppx: Lovenox   Code: Full     Dispo: pending further stroke vs seizure workup.     Gillian Phillips MD  [facility] Internal Medicine HO-I    [facility] Medicine Hospitalist Pager numbers:   [facility] Hospitalist Medicine Team A (Kassidy/Iris): [phone]  [facility] Hospitalist Medicine Team B (Mel/Eduardo):  [phone]

## 2024-03-07 NOTE — CONSULTS
[facility] - Telemetry  Wound Care    Patient Name:  Harrison Haywood   MRN:  [id number]  Date: 3/7/2024  Diagnosis: Left-sided weakness    History:     No past medical history on file.    Social History     Socioeconomic History    Marital status: Single   Tobacco Use    Smoking status: Never     Social Determinants of Health     Financial Resource Strain: Low Risk  (3/1/2024)    Overall Financial Resource Strain (CARDIA)     Difficulty of Paying Living Expenses: Not hard at all   Food Insecurity: No Food Insecurity (3/1/2024)    Hunger Vital Sign     Worried About Running Out of Food in the Last Year: Never true     Ran Out of Food in the Last Year: Never true   Transportation Needs: No Transportation Needs (3/1/2024)    PRAPARE - Transportation     Lack of Transportation (Medical): No     Lack of Transportation (Non-Medical): No   Physical Activity: Insufficiently Active (3/1/2024)    Exercise Vital Sign     Days of Exercise per Week: 2 days     Minutes of Exercise per Session: 30 min   Stress: No Stress Concern Present (3/1/2024)    [employer] [place] [employer] - Occupational Stress Questionnaire     Feeling of Stress : Only a little   Social Connections: Socially Isolated (3/1/2024)    Social Connection and Isolation Panel [NHANES]     Frequency of Communication with Friends and Family: More than three times a week     Frequency of Social Gatherings with Friends and Family: More than three times a week     Attends Mu-ism Services: Never     Active Member of Clubs or Organizations: No     Attends Club or Organization Meetings: Never     Marital Status: Never    Housing Stability: Low Risk  (3/1/2024)    Housing Stability Vital Sign     Unable to Pay for Housing in the Last Year: No     Number of Places Lived in the Last Year: 2     Unstable Housing in the Last Year: No       Precautions:     Allergies as of 03/06/2024 - Reviewed 03/06/2024   Allergen Reaction Noted    Aspirin Hives 03/06/2024     Penicillins Hives 03/06/2024       [facility] Assessment Details/Treatment       Sacrum- unstageable pressure injury- present on admit- 90% slough/ 10%slough- 3.5x3x0.2cm- small amount of tan drainage- no odor- satellite ulcer to L        Bilateral heels intact with no redness    Recommendations discussed with pt, nurse and Dr. Phillips:  - Pressure injury prevention interventions - waffle overlay and heel boots  - Santyl ointment daily to sacrum wound      03/07/2024

## 2024-03-07 NOTE — PLAN OF CARE
Problem: Occupational Therapy  Goal: Occupational Therapy Goal  Description: Goals to be met by: 4/7/24     Patient will increase functional independence with ADLs by performing:    LE Dressing with Minimal Assistance.  Grooming while standing with Contact Guard Assistance.  Toileting from toilet with Minimal Assistance for hygiene and clothing management.   Supine to sit with Contact Guard Assistance.  Step transfer with Contact Guard Assistance  Toilet transfer to toilet with Contact Guard Assistance.  Increased functional strength to WFL for self care skills and functional mobility.  Upper extremity exercise program x10 reps per handout, with assist as needed .    Outcome: Ongoing, Progressing       Pt would benefit from cont OT services in order to maximize functional independence. Recommending moderate intensity therapy at d/c. Pt requires assist with ADLs and functional mobility. Known to therapy from previous admit. Pt with impaired balance and weakness, RUE with residual weakness from previous medical issues; LUE grossly 3+ to 4-/5 , however, appears at baseline strength

## 2024-03-07 NOTE — PROGRESS NOTES
"[facility] Hospital Medicine Progress Note    Primary Team: [facility] Hospitalist Team A  Attending Physician: Selwyn Leal MD  Resident: Viet Rodas  Intern: Gillian Phillips    Subjective:      Pt off the floor for MRI vs TTE this morning.      Objective:     Last 24 Hour Vital Signs:  BP  Min: 119/80  Max: 141/66  Temp  Av.9 °F (36.6 °C)  Min: 97.2 °F (36.2 °C)  Max: 98.6 °F (37 °C)  Pulse  Av.7  Min: 63  Max: 94  Resp  Av.8  Min: 17  Max: 23  SpO2  Av.1 %  Min: 86 %  Max: 100 %  Height  Av' 0.5" (184.2 cm)  Min: 6' (182.9 cm)  Max: 6' 1" (185.4 cm)  Weight  Av.9 kg (202 lb 7.9 oz)  Min: 88.9 kg (195 lb 15.8 oz)  Max: 94.8 kg (209 lb)  I/O last 3 completed shifts:  In: -   Out: 400 [Urine:400]    Physical Examination:  Unable to examine d/t pt off the floor for MRI/TTE.     Laboratory:  Laboratory Data Reviewed: yes  Pertinent Findings:  Recent Labs   Lab 24  0641 24  0313 24  1613   WBC 2.27* 1.90* 6.12   HGB 7.6* 7.4* 9.4*   HCT 22.6* 22.0* 28.8*    179 317   MCV 95 94 98   RDW 16.6* 16.4* 17.2*    138 137   K 3.7 4.1 4.8    104 102   CO2 24 24 21*   BUN 32* 30* 32*   CREATININE 0.8 0.8 0.8    155* 105   PROT 5.4* 5.4* 6.0   ALBUMIN 2.4* 2.4* 2.8*   BILITOT 0.3 0.3 0.3   AST 56* 39 40   ALKPHOS 89 90 100   ALT 25 24 19       Microbiology Data Reviewed: yes  Pertinent Findings:  none    Other Results:  EKG (my interpretation): no new tracings    Radiology Data Reviewed: yes  Pertinent Findings:  CTA 3/6/24: remote L MCA infarct with encephalomalacia and volume loss. Otherwise, no LVO and unremarkable. R LL pulmonary nodule measuring 4mm, follow up in 12 months.     Current Medications:     Infusions:       Scheduled:   allopurinoL  300 mg Oral Daily    atorvastatin  40 mg Oral Daily    calcium carbonate  1,500 mg Oral QHS    collagenase   Topical (Top) Daily    enoxparin  40 mg Subcutaneous Q24H (prophylaxis, 1700)    fenofibrate  145 mg Oral Daily    " FLUoxetine  40 mg Oral Daily    mupirocin   Nasal BID    pantoprazole  40 mg Oral Daily    [START ON 3/9/2024] predniSONE  30 mg Oral Daily    predniSONE  40 mg Oral Daily    valACYclovir  500 mg Oral Daily    vitamin D  1,000 Units Oral Daily        PRN:  sodium chloride 0.9%, acetaminophen, albuterol sulfate, dextrose 10%, glucagon (human recombinant), glucose, glucose, HYDROcodone-acetaminophen, insulin aspart U-100, melatonin, sodium chloride 0.9%      Assessment:     Harrison Haywood is a 68 y.o. 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 CTA negative for acute stroke or LVO. Pt with rapid improvement in deficits thus TNK not administered. S/p ASA and plavix load and admitted to [facility] medicine for further stroke vs seizure workup.       Plan:     Acute LUE weakness + L hemineglect   Acute Stroke vs TIA vs Seizure like Activity   Hx of Childhood Brain Abscess with residual RSW  - presented with LUE weakness, unresponsiveness x 2-3 minutes followed by babling, L hemineglect, persistent LUE weakness. Rapidly improvement deficits on arrival with persistent L hemineglect on exam.   - No thrombolysis given as deficits rapidly improving on exam  - NIHSS 5. ABCD2 score 4.   - mental status returning to baseline on my evaluation   - s/p ASA + Plavix load with IV benadryl due to reported allergy to asa (hives)   - CTA without LVO and noted L MCA encephalomalacia (2/2 prior brain abscess as child with residual RSW however not appreciated on exam)   PLAN:   - Plavix monotherapy + Lipitor 40 given NIHSS >3 and allergy to ASA  - MRI brain ordered today.   - TTE ordered. On tele  - will consider EEG for further seizure evaluation      Multiple Myeloma with  Lytic Spinal Lesions   Pancytopenia - improved  - following with oncology   - endorses occasional back discomfort 2/2 spinal lytic lesions   - previously with pancytopenia on last admit, WBC improved 6, Hgb 9.4, Plt 317  PLAN:  - continue allopurinol 300 daily and valacyclovir 500 po daily for ppx  - continue calcium and vitamin D for bony involvement     Hyperlipidemia   - Lipid panel 3/6: Tchol 147, , LDL 66.2   PLAN:  - Continue Lipitor 40mg daily, Fenofibrate 145mg daily     Stage 4 sacral decubitus ulcer, POA  - Present prior to admission. Pt with discomfort with repositioning   PLAN:  - wound care consulted     Recent RSV Infection and CAP   - recent admit, 3/3/24 with SOB, cough. RSV positive   - discharged with course of Levofloxacin (to be completed today) and Prednisone taper   - s/p Methylpred 80mg IV x 1 in ED  - Satting well on room air currently with lungs CTAB  PLAN:   - resume Prednisone taper: Prednisone 40mg today then prednisone 30mg daily x 3 days  - tessalon perles PRN    Diet: regular   DVT Ppx: lovenox   Code: Full     Dispo: pending MRI brain, further stroke vs seizure workup,    Gillian Phillips MD  [facility] HO-I    [facility] Medicine Hospitalist Pager numbers:   [facility] Hospitalist Medicine Team A (Kassidy/Iris): [phone]  [facility] Hospitalist Medicine Team B (Mel/Eduardo):  [phone]

## 2024-03-07 NOTE — PLAN OF CARE
03/06/24 2303   Admission   Initial VN Admission Questions Complete   Communication Issues? None   Shift   Virtual Nurse - Rounding Complete   Pain Management Interventions quiet environment facilitated;relaxation techniques promoted   Virtual Nurse - Patient Verbalized Approval Of VN Rounding;Camera Use   Type of Frequent Check   Type Patient Rounds;Telemetry Monitoring   Safety/Activity   Patient Rounds bed in low position;bed wheels locked;call light in patient/parent reach;clutter free environment maintained;ID band on;visualized patient;placement of personal items at bedside   Safety Promotion/Fall Prevention assistive device/personal item within reach;bed alarm set;side rails raised x 2;nonskid shoes/socks when out of bed;medications reviewed;room near unit station;instructed to call staff for mobility;Fall Risk reviewed with patient/family   Safety Precautions emergency equipment at bedside   Safety Bands on Patient Fall Risk Band   Activity Management Rolling - L1   Activity Assistance Provided assistance, 2 people   Positioning   Body Position side-lying   Head of Bed (HOB) Positioning HOB elevated   Positioning/Transfer Devices pillows;in use      VN cued into room to complete admit assessment. VIP model introduced; VN working alongside bedside treatment team.  Plan of care reviewed with patient. Patient informed of fall risk, fall precautions, call light within reach, side rails x2 elevated. Patient notified to ask staff for assistance. Patient verbalized complete understanding. Time allowed for questions. Will continue to monitor and intervene as needed.

## 2024-03-08 ENCOUNTER — TELEPHONE (OUTPATIENT)
Dept: CARDIOLOGY | Facility: CLINIC | Age: 69
End: 2024-03-08

## 2024-03-08 ENCOUNTER — ANESTHESIA EVENT (OUTPATIENT)
Dept: CARDIOLOGY | Facility: HOSPITAL | Age: 69
DRG: 064 | End: 2024-03-08
Payer: MEDICARE

## 2024-03-08 ENCOUNTER — ANESTHESIA (OUTPATIENT)
Dept: CARDIOLOGY | Facility: HOSPITAL | Age: 69
DRG: 064 | End: 2024-03-08
Payer: MEDICARE

## 2024-03-08 VITALS
BODY MASS INDEX: 25.39 KG/M2 | TEMPERATURE: 98 F | SYSTOLIC BLOOD PRESSURE: 135 MMHG | RESPIRATION RATE: 18 BRPM | DIASTOLIC BLOOD PRESSURE: 61 MMHG | HEIGHT: 73 IN | HEART RATE: 70 BPM | WEIGHT: 191.56 LBS | OXYGEN SATURATION: 96 %

## 2024-03-08 PROBLEM — I63.9 CVA (CEREBRAL VASCULAR ACCIDENT): Status: ACTIVE | Noted: 2024-03-08

## 2024-03-08 LAB
ALBUMIN SERPL BCP-MCNC: 2.4 G/DL (ref 3.5–5.2)
ALP SERPL-CCNC: 86 U/L (ref 55–135)
ALT SERPL W/O P-5'-P-CCNC: 12 U/L (ref 10–44)
ANION GAP SERPL CALC-SCNC: 7 MMOL/L (ref 8–16)
AST SERPL-CCNC: 21 U/L (ref 10–40)
BILIRUB SERPL-MCNC: 0.3 MG/DL (ref 0.1–1)
BSA FOR ECHO PROCEDURE: 2.13 M2
BUN SERPL-MCNC: 24 MG/DL (ref 8–23)
CALCIUM SERPL-MCNC: 8.3 MG/DL (ref 8.7–10.5)
CHLORIDE SERPL-SCNC: 104 MMOL/L (ref 95–110)
CO2 SERPL-SCNC: 25 MMOL/L (ref 23–29)
CREAT SERPL-MCNC: 0.7 MG/DL (ref 0.5–1.4)
EST. GFR  (NO RACE VARIABLE): >60 ML/MIN/1.73 M^2
GLUCOSE SERPL-MCNC: 89 MG/DL (ref 70–110)
POCT GLUCOSE: 103 MG/DL (ref 70–110)
POCT GLUCOSE: 126 MG/DL (ref 70–110)
POCT GLUCOSE: 248 MG/DL (ref 70–110)
POCT GLUCOSE: 70 MG/DL (ref 70–110)
POTASSIUM SERPL-SCNC: 3.9 MMOL/L (ref 3.5–5.1)
PROT SERPL-MCNC: 4.9 G/DL (ref 6–8.4)
SODIUM SERPL-SCNC: 136 MMOL/L (ref 136–145)

## 2024-03-08 PROCEDURE — 97530 THERAPEUTIC ACTIVITIES: CPT

## 2024-03-08 PROCEDURE — D9220A PRA ANESTHESIA: Mod: ANES,,, | Performed by: STUDENT IN AN ORGANIZED HEALTH CARE EDUCATION/TRAINING PROGRAM

## 2024-03-08 PROCEDURE — 63600175 PHARM REV CODE 636 W HCPCS: Performed by: NURSE ANESTHETIST, CERTIFIED REGISTERED

## 2024-03-08 PROCEDURE — 36415 COLL VENOUS BLD VENIPUNCTURE: CPT

## 2024-03-08 PROCEDURE — 97530 THERAPEUTIC ACTIVITIES: CPT | Mod: CO

## 2024-03-08 PROCEDURE — 97110 THERAPEUTIC EXERCISES: CPT | Mod: CO

## 2024-03-08 PROCEDURE — 63600175 PHARM REV CODE 636 W HCPCS

## 2024-03-08 PROCEDURE — 99900035 HC TECH TIME PER 15 MIN (STAT)

## 2024-03-08 PROCEDURE — 94761 N-INVAS EAR/PLS OXIMETRY MLT: CPT

## 2024-03-08 PROCEDURE — 27000221 HC OXYGEN, UP TO 24 HOURS

## 2024-03-08 PROCEDURE — 37000009 HC ANESTHESIA EA ADD 15 MINS: Performed by: STUDENT IN AN ORGANIZED HEALTH CARE EDUCATION/TRAINING PROGRAM

## 2024-03-08 PROCEDURE — 97535 SELF CARE MNGMENT TRAINING: CPT | Mod: CO

## 2024-03-08 PROCEDURE — 94664 DEMO&/EVAL PT USE INHALER: CPT

## 2024-03-08 PROCEDURE — 80053 COMPREHEN METABOLIC PANEL: CPT

## 2024-03-08 PROCEDURE — 25000003 PHARM REV CODE 250: Performed by: NURSE ANESTHETIST, CERTIFIED REGISTERED

## 2024-03-08 PROCEDURE — 25000003 PHARM REV CODE 250

## 2024-03-08 PROCEDURE — D9220A PRA ANESTHESIA: Mod: CRNA,,, | Performed by: NURSE ANESTHETIST, CERTIFIED REGISTERED

## 2024-03-08 PROCEDURE — 37000008 HC ANESTHESIA 1ST 15 MINUTES: Performed by: STUDENT IN AN ORGANIZED HEALTH CARE EDUCATION/TRAINING PROGRAM

## 2024-03-08 PROCEDURE — G0426 INPT/ED TELECONSULT50: HCPCS | Mod: GT,,, | Performed by: PSYCHIATRY & NEUROLOGY

## 2024-03-08 PROCEDURE — 27000646 HC AEROBIKA DEVICE

## 2024-03-08 RX ORDER — PANTOPRAZOLE SODIUM 40 MG/1
40 TABLET, DELAYED RELEASE ORAL DAILY
Qty: 30 TABLET | Refills: 11 | Status: SHIPPED | OUTPATIENT
Start: 2024-03-09 | End: 2024-03-08 | Stop reason: HOSPADM

## 2024-03-08 RX ORDER — PHENYLEPHRINE HYDROCHLORIDE 10 MG/ML
INJECTION INTRAVENOUS
Status: DISCONTINUED | OUTPATIENT
Start: 2024-03-08 | End: 2024-03-08

## 2024-03-08 RX ORDER — CLOPIDOGREL BISULFATE 75 MG/1
75 TABLET ORAL DAILY
Qty: 30 TABLET | Refills: 11 | Status: SHIPPED | OUTPATIENT
Start: 2024-03-09 | End: 2025-03-09

## 2024-03-08 RX ORDER — DEXMEDETOMIDINE HYDROCHLORIDE 100 UG/ML
INJECTION, SOLUTION INTRAVENOUS
Status: DISCONTINUED | OUTPATIENT
Start: 2024-03-08 | End: 2024-03-08

## 2024-03-08 RX ORDER — LIDOCAINE HYDROCHLORIDE 20 MG/ML
INJECTION, SOLUTION EPIDURAL; INFILTRATION; INTRACAUDAL; PERINEURAL
Status: DISCONTINUED | OUTPATIENT
Start: 2024-03-08 | End: 2024-03-08

## 2024-03-08 RX ORDER — PROPOFOL 10 MG/ML
VIAL (ML) INTRAVENOUS
Status: DISCONTINUED | OUTPATIENT
Start: 2024-03-08 | End: 2024-03-08

## 2024-03-08 RX ORDER — ATORVASTATIN CALCIUM 40 MG/1
40 TABLET, FILM COATED ORAL DAILY
Qty: 90 TABLET | Refills: 3 | Status: SHIPPED | OUTPATIENT
Start: 2024-03-09 | End: 2025-03-09

## 2024-03-08 RX ADMIN — PROPOFOL 20 MG: 10 INJECTION, EMULSION INTRAVENOUS at 11:03

## 2024-03-08 RX ADMIN — DEXMEDETOMIDINE HCL 4 MCG: 100 INJECTION INTRAVENOUS at 11:03

## 2024-03-08 RX ADMIN — DEXMEDETOMIDINE HCL 50 MCG: 100 INJECTION INTRAVENOUS at 11:03

## 2024-03-08 RX ADMIN — ATORVASTATIN CALCIUM 40 MG: 40 TABLET, FILM COATED ORAL at 09:03

## 2024-03-08 RX ADMIN — PHENYLEPHRINE HYDROCHLORIDE 100 MCG: 10 INJECTION INTRAVENOUS at 11:03

## 2024-03-08 RX ADMIN — MUPIROCIN: 20 OINTMENT TOPICAL at 09:03

## 2024-03-08 RX ADMIN — PANTOPRAZOLE SODIUM 40 MG: 40 TABLET, DELAYED RELEASE ORAL at 09:03

## 2024-03-08 RX ADMIN — PHENYLEPHRINE HYDROCHLORIDE 100 MCG: 10 INJECTION INTRAVENOUS at 12:03

## 2024-03-08 RX ADMIN — FLUOXETINE HYDROCHLORIDE 40 MG: 20 CAPSULE ORAL at 09:03

## 2024-03-08 RX ADMIN — VALACYCLOVIR HYDROCHLORIDE 500 MG: 500 TABLET, FILM COATED ORAL at 09:03

## 2024-03-08 RX ADMIN — GLYCOPYRROLATE 0.2 MG: 0.2 INJECTION, SOLUTION INTRAMUSCULAR; INTRAVITREAL at 11:03

## 2024-03-08 RX ADMIN — CLOPIDOGREL BISULFATE 75 MG: 75 TABLET ORAL at 09:03

## 2024-03-08 RX ADMIN — PREDNISONE 40 MG: 20 TABLET ORAL at 09:03

## 2024-03-08 RX ADMIN — PROPOFOL 20 MG: 10 INJECTION, EMULSION INTRAVENOUS at 12:03

## 2024-03-08 RX ADMIN — CHOLECALCIFEROL TAB 25 MCG (1000 UNIT) 1000 UNITS: 25 TAB at 09:03

## 2024-03-08 RX ADMIN — DEXTROSE MONOHYDRATE 125 ML: 100 INJECTION, SOLUTION INTRAVENOUS at 06:03

## 2024-03-08 RX ADMIN — LIDOCAINE HYDROCHLORIDE 50 MG: 20 INJECTION, SOLUTION EPIDURAL; INFILTRATION; INTRACAUDAL; PERINEURAL at 11:03

## 2024-03-08 RX ADMIN — ALLOPURINOL 300 MG: 100 TABLET ORAL at 09:03

## 2024-03-08 RX ADMIN — FENOFIBRATE 145 MG: 145 TABLET ORAL at 09:03

## 2024-03-08 RX ADMIN — PROPOFOL 50 MG: 10 INJECTION, EMULSION INTRAVENOUS at 11:03

## 2024-03-08 RX ADMIN — SODIUM CHLORIDE: 0.9 INJECTION, SOLUTION INTRAVENOUS at 11:03

## 2024-03-08 RX ADMIN — ENOXAPARIN SODIUM 40 MG: 40 INJECTION SUBCUTANEOUS at 04:03

## 2024-03-08 NOTE — PROGRESS NOTES
"[facility] Hospital Medicine Progress Note    Primary Team: [facility] Hospitalist Team A  Attending Physician: Ayden Yi MD  Resident: Viet Rodas  Intern: Gillian Phillips    Subjective:      Pt resting in bed on NC currently. Denies chest pain, shortness of breath. Endorses improvement in LUE weakness. Denies altered sensation. Denies visual field deficits or other visual changes. Discussed plan for SHABNAM today to further evaluate embolic etiology of acute CVA.      Objective:     Last 24 Hour Vital Signs:  BP  Min: 109/66  Max: 128/72  Temp  Av.2 °F (36.8 °C)  Min: 98 °F (36.7 °C)  Max: 98.5 °F (36.9 °C)  Pulse  Av.9  Min: 52  Max: 74  Resp  Av.4  Min: 18  Max: 20  SpO2  Av.2 %  Min: 93 %  Max: 98 %  Height  Av' 1" (185.4 cm)  Min: 6' 1" (185.4 cm)  Max: 6' 1" (185.4 cm)  Weight  Av.9 kg (193 lb 14.3 oz)  Min: 86.9 kg (191 lb 9.3 oz)  Max: 88.5 kg (195 lb 1.7 oz)  I/O last 3 completed shifts:  In: -   Out: 2450 [Urine:2450]    Physical Examination:  General: alert, conversant. resting comfortably in bed  HEENT: PERRL, moist mucous membranes. No facial asymmetric. L makenzie neglect on EOM testing with pt unable to lateralize bilateral eyes to L field.   Cardiovascular: Regular rate and rhythm, normal S1 and S2, no murmurs, rubs or gallops  Pulm: Upper airway transmission throughout all lung fields. Otherwise, no adventitious lung sounds appreciated. Not in respiratory distress. On 2L NC.  Abdomen: Soft, non-tender, non-distended; no organomegaly  Skin: No rashes or erythema noted, no ecchymosis  Extremities: SCDs and compression socks in place. Warm and well perfused  Neurological: 4/5 strength bilateral upper extremities. No UE drift. 3/5 strength bilateral LE. L hemineglect on EOM testing as above. Denies visual field deficits.   Psychiatric: Normal mood and affect, thought content normal      Laboratory:  Laboratory Data Reviewed: yes  Pertinent Findings:  Recent Labs   Lab 24  0641 " 03/03/24  0313 03/06/24  1613 03/07/24  0617   WBC 2.27* 1.90* 6.12  --    HGB 7.6* 7.4* 9.4*  --    HCT 22.6* 22.0* 28.8*  --     179 317  --    MCV 95 94 98  --    RDW 16.6* 16.4* 17.2*  --     138 137 135*   K 3.7 4.1 4.8 4.3    104 102 104   CO2 24 24 21* 23   BUN 32* 30* 32* 24*   CREATININE 0.8 0.8 0.8 0.7    155* 105 122*   PROT 5.4* 5.4* 6.0 5.0*   ALBUMIN 2.4* 2.4* 2.8* 2.4*   BILITOT 0.3 0.3 0.3 0.3   AST 56* 39 40 23   ALKPHOS 89 90 100 93   ALT 25 24 19 15       Microbiology Data Reviewed: yes  Pertinent Findings:  none    Other Results:  EKG (my interpretation): no new tracings  - 3/6/24 on admission: normal sinus rhythm    Radiology Data Reviewed: yes  Pertinent Findings:  CTA 3/6/24: remote L MCA infarct with encephalomalacia and volume loss. Otherwise, no LVO and unremarkable. R LL pulmonary nodule measuring 4mm, follow up in 12 months.   MRI Brain 3/7/24: acute ischemic infarct in high right cerebral convexity, multifocal concerning for embolic phenomenon given distribution. Chronic L MCA infarct    Current Medications:     Infusions:       Scheduled:   allopurinoL  300 mg Oral Daily    atorvastatin  40 mg Oral Daily    calcium carbonate  1,500 mg Oral QHS    clopidogreL  75 mg Oral Daily    collagenase   Topical (Top) Daily    enoxparin  40 mg Subcutaneous Q24H (prophylaxis, 1700)    fenofibrate  145 mg Oral Daily    FLUoxetine  40 mg Oral Daily    mupirocin   Nasal BID    pantoprazole  40 mg Oral Daily    perflutren protein-a microsphr  0.5 mL Intravenous Once    [START ON 3/9/2024] predniSONE  30 mg Oral Daily    predniSONE  40 mg Oral Daily    valACYclovir  500 mg Oral Daily    vitamin D  1,000 Units Oral Daily        PRN:  sodium chloride 0.9%, acetaminophen, albuterol sulfate, benzonatate, dextrose 10%, glucagon (human recombinant), glucose, glucose, HYDROcodone-acetaminophen, insulin aspart U-100, melatonin, sodium chloride 0.9%      Assessment:     Harrison Haywood is  a 68 y.o. 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 CTA negative for acute stroke or LVO; s/p ASA + Plavix load in ED. MRI brain notable for acute multifocal R cerebral cortex infarct concerning for embolic phenomenon. Admitted to [facility] medicine for further stroke workup.     Plan:     Acute Infarct R Cerebral Convexity - likely embolic  Chronic Infarct L MCA Distribution - with residual RSW, 2/2 childhood brain abscess  presented with LUE weakness, unresponsiveness x 2-3 minutes followed by babling, L hemineglect, persistent LUE weakness. Rapidly improvement deficits on arrival with persistent L hemineglect on exam.   - No thrombolysis given as deficits rapidly improving on exam. NIHSS 5. ABCD2 score 4.   - s/p ASA + Plavix load with IV benadryl due to reported allergy to asa (hives)   - CTA without LVO and noted L MCA encephalomalacia (2/2 prior brain abscess as child with residual RSW however not appreciated on exam)   - MRI Brain 3/7: acute multifocal infarct of high right cerebral convexity, likely embolic  - TTE 3/7: LVEF normal, Grade II diastolic dysfunction, LA mildly dilated. No vegetations or clots noted.   - EEG 3/7: negative for epileptic focus  PLAN:   - NPO for SHABNAM today with cardiology for further evaluation of embolic etiology  - Plavix monotherapy + Lipitor 40 given NIHSS >3 and allergy to ASA  - will discuss H/O clinic recommendation of starting Eliquis for multiple myeloma in setting of ASA allergy     Multiple Myeloma with Lytic Spinal Lesions   Pancytopenia - improved  - following with oncology. endorses occasional back discomfort 2/2 spinal lytic lesions   - Started on RVD with H/O, s/p 2 cycles, not candidate for stem  cell transplant.   - previously with pancytopenia on last admit, WBC improved 6, Hgb 9.4, Plt 317 on this admit   PLAN:  - continue allopurinol 300 daily and valacyclovir 500 po daily for ppx  - continue calcium and vitamin D for bony involvement  - will discuss H/O clinic recommendation of starting Eliquis for multiple myeloma in setting of ASA allergy     Hyperlipidemia   - Lipid panel 3/6: Tchol 147, , LDL 66.2   PLAN:  - Continue Lipitor 40mg daily, Fenofibrate 145mg daily     Stage 4 sacral decubitus ulcer, POA  - Present prior to admission. Pt with discomfort with repositioning   PLAN:  - wound care consulted     Recent RSV Infection and CAP   - recent admit, 3/3/24 with SOB, cough. RSV positive   - discharged with course of Levofloxacin (to be completed today) and Prednisone taper   - s/p Methylpred 80mg IV x 1 in ED  - Satting well on room air currently with lungs CTAB  PLAN:   - resume Prednisone taper: Prednisone 40mg today, then prednisone 30mg daily x 3 days  - tessalon perles PRN  - Wean nasal cannula as able. Not on oxygen at home. Will assess for need.     Diet: NPO for SHABNAM today   DVT Ppx: lovenox   Code: Full     Dispo: pending SHABNAM today with cardiology to complete further stroke workup.     Gillian Phillips MD  [facility] Internal Medicine HO-I    [facility] Medicine Hospitalist Pager numbers:   [facility] Hospitalist Medicine Team A (Kassidy/Iris): [phone]  [facility] Hospitalist Medicine Team B (Mel/Eduardo):  [phone]

## 2024-03-08 NOTE — TRANSFER OF CARE
"Anesthesia Transfer of Care Note    Patient: Harrison Haywood    Procedure(s) Performed: Procedure(s) (LRB):  Transesophageal echo (SHABNAM) intra-procedure log documentation (N/A)    Patient location: Cath Lab    Anesthesia Type: general    Transport from OR: Transported from OR on 6-10 L/min O2 by face mask with adequate spontaneous ventilation    Post pain: adequate analgesia    Post assessment: no apparent anesthetic complications    Post vital signs: stable    Level of consciousness: awake and alert    Nausea/Vomiting: no nausea/vomiting    Complications: none    Transfer of care protocol was followed      Last vitals: Visit Vitals  /61   Pulse 62   Temp 36.7 °C (98.1 °F)   Resp 18   Ht 6' 1" (1.854 m)   Wt 86.9 kg (191 lb 9.3 oz)   SpO2 (!) 93%   BMI 25.28 kg/m²     "

## 2024-03-08 NOTE — CONSULTS
TELE-NEUROLOGY CONSULT       Patient Location: K456/K456 A        Name: Harrison Haywood  MRN: [id number]   [facility]: [id number]      Date: 03/08/2024    Chief Complaint: Stroke    History of Present Illness (HPI):  Patient is a 68-year-old male with history of multiple myeloma with lytic lesions in his spine, hypertension, hyperlipidemia, stage IV sacral decubitus ulcer, prior stroke with residual right-sided weakness and history of meningitis as a child with residual right-sided weakness who presented to the hospital 2 days ago with a complaint of left upper extremity weakness.  Patient developed sudden tightness and weakness in his left upper extremity at around 3:00 p.m., blank staring and unresponsiveness for about 2-3 minutes.  Per note, there was an event with confusion, babbling inattention to the left visual field.  Confusion lasted until presentation to the emergency room.  Since arrival, there has been improvement in the left upper extremity and confusion.  He is currently working with the physical therapist who states that he was mild left-sided weakness some spasticity but was able to walk with a walker.  He usually walks with a walker in the nursing home. He is scheduled for SHABNAM today. Vessel imaging shows chronic L MCA stenosis. R MCA territory stroke looks embolic with certain aspects that may be watershed. He will need a cardioembolic workup with SHABNAM/ILR.     ROS: as above    Past Medical History: The patient  has no past medical history on file.    Social History: The patient  reports that he has never smoked. He does not have any smokeless tobacco history on file.    Family History: Their family history is not on file.    Allergies: Aspirin and Penicillins     Meds: Scheduled Meds:   allopurinoL  300 mg Oral Daily    atorvastatin  40 mg Oral Daily    calcium carbonate  1,500 mg Oral QHS    clopidogreL  75 mg Oral Daily    collagenase   Topical (Top) Daily    enoxparin  40 mg Subcutaneous Q24H (prophylaxis,  "1700)    fenofibrate  145 mg Oral Daily    FLUoxetine  40 mg Oral Daily    mupirocin   Nasal BID    pantoprazole  40 mg Oral Daily    perflutren protein-a microsphr  0.5 mL Intravenous Once    [START ON 3/9/2024] predniSONE  30 mg Oral Daily    valACYclovir  500 mg Oral Daily    vitamin D  1,000 Units Oral Daily     Continuous Infusions:  PRN Meds:.sodium chloride 0.9%, acetaminophen, albuterol sulfate, benzonatate, dextrose 10%, glucagon (human recombinant), glucose, glucose, HYDROcodone-acetaminophen, insulin aspart U-100, melatonin, sodium chloride 0.9%    Exam:  /67 (BP Location: Left arm, Patient Position: Lying)   Pulse 68   Temp 97.7 °F (36.5 °C) (Oral)   Resp 16   Ht 6' 1" (1.854 m)   Wt 86.9 kg (191 lb 9.3 oz)   SpO2 97%   BMI 25.28 kg/m²       Constitutional  Frail, protein for wound a Proteus should live with today per the more appears stated age   Eyes  No scleral icterus       Cardiovascular  No lower extremity edema    Respiratory  No labored breathing    Skin  No rash    Hematologic  No bruising   Psychiatric  No depression   Other  GI/ deferred    Neurological     * Mental status  Alert and oriented to person, place, time, and situation; no dysarthria; no aphasia; normal recent and remote memory; follows commands   * Cranial nerves   EOMI, no facial weakness                                   * Motor  Lifts all 4 ext up equally, no drift       * Coordination  No dysmetria or tremor with outstretched hands    * Gait  Uses walker, limited         Laboratory/Radiological:   Admission on 03/06/2024   Component Date Value Ref Range Status    WBC 03/06/2024 6.12  3.90 - 12.70 K/uL Final    RBC 03/06/2024 2.95 (L)  4.60 - 6.20 M/uL Final    Hemoglobin 03/06/2024 9.4 (L)  14.0 - 18.0 g/dL Final    Hematocrit 03/06/2024 28.8 (L)  40.0 - 54.0 % Final    MCV 03/06/2024 98  82 - 98 fL Final    MCH 03/06/2024 31.9 (H)  27.0 - 31.0 pg Final    MCHC 03/06/2024 32.6  32.0 - 36.0 g/dL Final    RDW " 03/06/2024 17.2 (H)  11.5 - 14.5 % Final    Platelets 03/06/2024 317  150 - 450 K/uL Final    MPV 03/06/2024 11.2  9.2 - 12.9 fL Final    Immature Granulocytes 03/06/2024 4.4 (H)  0.0 - 0.5 % Final    Gran # (ANC) 03/06/2024 5.0  1.8 - 7.7 K/uL Final    Immature Grans (Abs) 03/06/2024 0.27 (H)  0.00 - 0.04 K/uL Final    Lymph # 03/06/2024 0.3 (L)  1.0 - 4.8 K/uL Final    Mono # 03/06/2024 0.6  0.3 - 1.0 K/uL Final    Eos # 03/06/2024 0.0  0.0 - 0.5 K/uL Final    Baso # 03/06/2024 0.03  0.00 - 0.20 K/uL Final    nRBC 03/06/2024 0  0 /100 WBC Final    Gran % 03/06/2024 81.2 (H)  38.0 - 73.0 % Final    Lymph % 03/06/2024 4.4 (L)  18.0 - 48.0 % Final    Mono % 03/06/2024 9.5  4.0 - 15.0 % Final    Eosinophil % 03/06/2024 0.0  0.0 - 8.0 % Final    Basophil % 03/06/2024 0.5  0.0 - 1.9 % Final    Differential Method 03/06/2024 Automated   Final    Sodium 03/06/2024 137  136 - 145 mmol/L Final    Potassium 03/06/2024 4.8  3.5 - 5.1 mmol/L Final    Chloride 03/06/2024 102  95 - 110 mmol/L Final    CO2 03/06/2024 21 (L)  23 - 29 mmol/L Final    Glucose 03/06/2024 105  70 - 110 mg/dL Final    BUN 03/06/2024 32 (H)  8 - 23 mg/dL Final    Creatinine 03/06/2024 0.8  0.5 - 1.4 mg/dL Final    Calcium 03/06/2024 8.6 (L)  8.7 - 10.5 mg/dL Final    Total Protein 03/06/2024 6.0  6.0 - 8.4 g/dL Final    Albumin 03/06/2024 2.8 (L)  3.5 - 5.2 g/dL Final    Total Bilirubin 03/06/2024 0.3  0.1 - 1.0 mg/dL Final    Alkaline Phosphatase 03/06/2024 100  55 - 135 U/L Final    AST 03/06/2024 40  10 - 40 U/L Final    ALT 03/06/2024 19  10 - 44 U/L Final    eGFR 03/06/2024 >60  >60 mL/min/1.73 m^2 Final    Anion Gap 03/06/2024 14  8 - 16 mmol/L Final    Prothrombin Time 03/06/2024 15.8 (H)  9.0 - 12.5 sec Final    INR 03/06/2024 1.5 (H)  0.8 - 1.2 Final    TSH 03/06/2024 0.203 (L)  0.400 - 4.000 uIU/mL Final    QRS Duration 03/06/2024 100  ms In process    [facility] QTC Calculation 03/06/2024 454  ms In process    Cholesterol 03/06/2024 147  120 - 199  mg/dL Final    Triglycerides 03/06/2024 164 (H)  30 - 150 mg/dL Final    HDL 03/06/2024 48  40 - 75 mg/dL Final    LDL Cholesterol 03/06/2024 66.2  63.0 - 159.0 mg/dL Final    HDL/Cholesterol Ratio 03/06/2024 32.7  20.0 - 50.0 % Final    Total Cholesterol/HDL Ratio 03/06/2024 3.1  2.0 - 5.0 Final    Non-HDL Cholesterol 03/06/2024 99  mg/dL Final    POCT Glucose 03/06/2024 106  70 - 110 mg/dL Final    POC PTINR 03/06/2024 1.1  0.9 - 1.2 Final    Sample 03/06/2024 VENOUS   Final    Site 03/06/2024 Other   Final    Allens Test 03/06/2024 N/A   Final    POC Creatinine 03/06/2024 0.9  0.5 - 1.4 mg/dL Final    Sample 03/06/2024 VENOUS   Final    Site 03/06/2024 Other   Final    Allens Test 03/06/2024 N/A   Final    Free T4 03/06/2024 1.15  0.71 - 1.51 ng/dL Final    BSA 03/07/2024 2.13  m2 Final    LA WIDTH 03/07/2024 4.4  cm Final    Rose's Biplane MOD Ejection Fra* 03/07/2024 61  % Final    LVOT stroke volume 03/07/2024 113.87  cm3 Final    LVIDd 03/07/2024 5.06  3.5 - 6.0 cm Final    LV Systolic Volume 03/07/2024 51.64  mL Final    LV Systolic Volume Index 03/07/2024 24.2  mL/m2 Final    LVIDs 03/07/2024 3.52  2.1 - 4.0 cm Final    LV Diastolic Volume 03/07/2024 121.43  mL Final    LV Diastolic Volume Index 03/07/2024 57.01  mL/m2 Final    IVS 03/07/2024 1.21 (A)  0.6 - 1.1 cm Final    LVOT diameter 03/07/2024 2.21  cm Final    LVOT area 03/07/2024 3.8  cm2 Final    FS 03/07/2024 30  28 - 44 % Final    Left Ventricle Relative Wall Thick* 03/07/2024 0.46  cm Final    Posterior Wall 03/07/2024 1.17 (A)  0.6 - 1.1 cm Final    LV mass 03/07/2024 235.45  g Final    LV Mass Index 03/07/2024 111  g/m2 Final    MV Peak E Kenji 03/07/2024 0.98  m/s Final    TDI LATERAL 03/07/2024 0.10  m/s Final    TDI SEPTAL 03/07/2024 0.08  m/s Final    E/E' ratio 03/07/2024 10.89  m/s Final    MV Peak A Kenji 03/07/2024 0.95  m/s Final    TR Max Kenji 03/07/2024 2.86  m/s Final    E/A ratio 03/07/2024 1.03   Final    E wave deceleration time  03/07/2024 222.48  msec Final    LV SEPTAL E/E' RATIO 03/07/2024 12.25  m/s Final    LA Volume Index 03/07/2024 38.5  mL/m2 Final    LV LATERAL E/E' RATIO 03/07/2024 9.80  m/s Final    LA volume 03/07/2024 81.96  cm3 Final    PV Peak S Kenji 03/07/2024 0.70  m/s Final    PV Peak D Kenji 03/07/2024 0.62  m/s Final    Pulm vein S/D ratio 03/07/2024 1.13   Final    LVOT peak kenji 03/07/2024 1.33  m/s Final    Left Ventricular Outflow Tract Rosie* 03/07/2024 0.99  cm/s Final    Left Ventricular Outflow Tract Rosie* 03/07/2024 4.18  mmHg Final    RVDD 03/07/2024 3.07  cm Final    RV S' 03/07/2024 17.08  cm/s Final    TAPSE 03/07/2024 2.01  cm Final    LA size 03/07/2024 3.10  cm Final    Left Atrium Minor Axis 03/07/2024 7.15  cm Final    Left Atrium Major Axis 03/07/2024 6.99  cm Final    LA volume (mod) 03/07/2024 84.43  cm3 Final    LA Volume Index (Mod) 03/07/2024 39.6  mL/m2 Final    RA Major Axis 03/07/2024 5.99  cm Final    RA Width 03/07/2024 3.94  cm Final    AR Max Kenji 03/07/2024 3.19  m/s Final    AV mean gradient 03/07/2024 7  mmHg Final    AV peak gradient 03/07/2024 11  mmHg Final    Ao peak kenji 03/07/2024 1.68  m/s Final    Ao VTI 03/07/2024 35.60  cm Final    LVOT peak VTI 03/07/2024 29.70  cm Final    AV valve area 03/07/2024 3.20  cm² Final    AV Velocity Ratio 03/07/2024 0.79   Final    AV index (prosthetic) 03/07/2024 0.83   Final    SHARON by Velocity Ratio 03/07/2024 3.04  cm² Final    Mr max kenji 03/07/2024 5.32  m/s Final    MV peak gradient 03/07/2024 4  mmHg Final    MV stenosis pressure 1/2 time 03/07/2024 64.52  ms Final    MV valve area p 1/2 method 03/07/2024 3.41  cm2 Final    MV valve area by continuity eq 03/07/2024 2.85  cm2 Final    MV VTI 03/07/2024 40.0  cm Final    Triscuspid Valve Regurgitation Pea* 03/07/2024 33  mmHg Final    PV PEAK VELOCITY 03/07/2024 1.04  m/s Final    PV peak gradient 03/07/2024 4  mmHg Final    Sinus 03/07/2024 3.35  cm Final    STJ 03/07/2024 2.82  cm Final    Ascending  aorta 03/07/2024 2.96  cm Final    IVC diameter 03/07/2024 1.49  cm Final    Mean e' 03/07/2024 0.09  m/s Final    ZLVIDS 03/07/2024 -1.29   Final    ZLVIDD 03/07/2024 -2.94   Final    RV-li mid d 03/07/2024 4.0  cm Final    MV mean gradient 03/07/2024 2  mmHg Final    TV resting pulmonary artery pressu* 03/07/2024 36  mmHg Final    RV TB RVSP 03/07/2024 6  mmHg Final    Est. RA pres 03/07/2024 3  mmHg Final    POCT Glucose 03/06/2024 170 (H)  70 - 110 mg/dL Final    Sodium 03/07/2024 135 (L)  136 - 145 mmol/L Final    Potassium 03/07/2024 4.3  3.5 - 5.1 mmol/L Final    Chloride 03/07/2024 104  95 - 110 mmol/L Final    CO2 03/07/2024 23  23 - 29 mmol/L Final    Glucose 03/07/2024 122 (H)  70 - 110 mg/dL Final    BUN 03/07/2024 24 (H)  8 - 23 mg/dL Final    Creatinine 03/07/2024 0.7  0.5 - 1.4 mg/dL Final    Calcium 03/07/2024 8.4 (L)  8.7 - 10.5 mg/dL Final    Total Protein 03/07/2024 5.0 (L)  6.0 - 8.4 g/dL Final    Albumin 03/07/2024 2.4 (L)  3.5 - 5.2 g/dL Final    Total Bilirubin 03/07/2024 0.3  0.1 - 1.0 mg/dL Final    Alkaline Phosphatase 03/07/2024 93  55 - 135 U/L Final    AST 03/07/2024 23  10 - 40 U/L Final    ALT 03/07/2024 15  10 - 44 U/L Final    eGFR 03/07/2024 >60  >60 mL/min/1.73 m^2 Final    Anion Gap 03/07/2024 8  8 - 16 mmol/L Final    Magnesium 03/07/2024 2.1  1.6 - 2.6 mg/dL Final    POCT Glucose 03/07/2024 126 (H)  70 - 110 mg/dL Final    POCT Glucose 03/07/2024 88  70 - 110 mg/dL Final    POCT Glucose 03/07/2024 142 (H)  70 - 110 mg/dL Final    POCT Glucose 03/07/2024 157 (H)  70 - 110 mg/dL Final    POCT Glucose 03/08/2024 70  70 - 110 mg/dL Final    Sodium 03/08/2024 136  136 - 145 mmol/L Final    Potassium 03/08/2024 3.9  3.5 - 5.1 mmol/L Final    Chloride 03/08/2024 104  95 - 110 mmol/L Final    CO2 03/08/2024 25  23 - 29 mmol/L Final    Glucose 03/08/2024 89  70 - 110 mg/dL Final    BUN 03/08/2024 24 (H)  8 - 23 mg/dL Final    Creatinine 03/08/2024 0.7  0.5 - 1.4 mg/dL Final    Calcium  2024 8.3 (L)  8.7 - 10.5 mg/dL Final    Total Protein 2024 4.9 (L)  6.0 - 8.4 g/dL Final    Albumin 2024 2.4 (L)  3.5 - 5.2 g/dL Final    Total Bilirubin 2024 0.3  0.1 - 1.0 mg/dL Final    Alkaline Phosphatase 2024 86  55 - 135 U/L Final    AST 2024 21  10 - 40 U/L Final    ALT 2024 12  10 - 44 U/L Final    eGFR 2024 >60  >60 mL/min/1.73 m^2 Final    Anion Gap 2024 7 (L)  8 - 16 mmol/L Final    POCT Glucose 2024 126 (H)  70 - 110 mg/dL Final   No results displayed because visit has over 200 results.          Diagnoses:   1) Acute R MCA stroke- etiology embolic vs watershed (less likely)    TTE- neg  A1c- 5.2  LDL- 66    Medical Decision Makin) Plavix 75 mg daily (allergic to aspirin since youth)  2) Lipitor 40 mg daily- titrate to LDL<70  3) TTE- neg for acute thrombus or valvular etiology  4), Loop recorder or long term rhythm monitoring to look for AF  5) Vascular risk factor optimization  6) Orthostatics   7) PT/OT/SLP  8) f/u in outpatient Neurology clinic    If patient found to be in AF, please let tele-neurology team know so that we can discuss timing of start of anticoagulation          Thank you for this consult. Please do not hesitate to contact us with questions. Please re-consult neurology if any issues.    This is a consult performed through audio-visual using [employer] sushil. Pt and provider are in different locations. History and physical exam are limited due to the nature of this encounter.       Dr. Gino Jimenez  Tele-Neurology- [facility]

## 2024-03-08 NOTE — TELEPHONE ENCOUNTER
----- Message from KAITLYN Mon, ANP sent at 3/8/2024  2:30 PM CST -----  Referral to EP for ILR recent stroke    BH

## 2024-03-08 NOTE — PT/OT/SLP PROGRESS
"Occupational Therapy   Treatment    Name: Harrison Haywood  MRN: [id number]  Admitting Diagnosis:  CVA (cerebral vascular accident)  Day of Surgery    Recommendations:     Discharge Recommendations: Moderate Intensity Therapy  Discharge Equipment Recommendations:  none  Barriers to discharge:  None    Assessment:     Harrison Haywood is a 68 y.o. male with a medical diagnosis of CVA (cerebral vascular accident).   Performance deficits affecting function are weakness, impaired endurance, impaired self care skills, impaired functional mobility, gait instability, impaired balance, impaired cognition, decreased lower extremity function, decreased safety awareness, decreased ROM, impaired coordination, impaired fine motor, decreased upper extremity function, impaired skin, impaired cardiopulmonary response to activity.    Pt found in bed, agreeable to therapy.  Pt with improved LUE strength.  He is progressing towards goals. Continue OT services to address functional goals, progressing as able.      Rehab Prognosis:  Good; patient would benefit from acute skilled OT services to address these deficits and reach maximum level of function.       Plan:     Patient to be seen 3 x/week to address the above listed problems via self-care/home management, therapeutic activities, therapeutic exercises  Plan of Care Expires: 04/07/24  Plan of Care Reviewed with: patient, sibling    Subjective     Chief Complaint: "I can't push buttons."  Patient/Family Comments/goals: to go home  Pain/Comfort:  Pain Rating 1: 0/10  Pain Rating Post-Intervention 1: 0/10    Objective:     Communicated with: RN prior to session.  Patient found HOB elevated with PureWick, oxygen, peripheral IV, telemetry upon OT entry to room.    General Precautions: Standard, fall, contact, droplet    Orthopedic Precautions:N/A  Braces: N/A    Occupational Performance:     Bed Mobility:    Patient completed Rolling/Turning to Left with  stand by assistance and with side " rail  Patient completed Scooting/Bridging with contact guard assistance  Patient completed Supine to Sit with contact guard assistance, HOB elevated, increased time and effort, vc's for effective technique  Patient completed Sit to Supine with contact guard assistance       Activities of Daily Living:  Feeding:  stand by assistance using LUE.  Pt requires vcs for proper  on spoon. Minimal spillage.  LUE fatigue noted at end of feeding. Pt perseverating on food being dry.         [facility] 6 Click ADL: 15    Treatment & Education:  Pt sat EOB with SBA while eating lunch.   Pt required Mod A to scoot laterally at EOB to HOB.   Pt provided with Min resistance yellow theraputty for L hand strengthening.  Pt instructed on gross grasp for general hand strengthening squeezing with all fingers and thumb.  Pt instructed to keep Tputty on container otherwise it will spread on furniture/clothing.   Pt positioned with HOB elevated 2/2 just finished lunch.     Patient left HOB elevated with all lines intact, call button in reach, bed alarm on, and sister present    GOALS:   Multidisciplinary Problems       Occupational Therapy Goals          Problem: Occupational Therapy    Goal Priority Disciplines Outcome Interventions   Occupational Therapy Goal     OT, PT/OT Ongoing, Progressing    Description: Goals to be met by: 4/7/24     Patient will increase functional independence with ADLs by performing:    LE Dressing with Minimal Assistance.  Grooming while standing with Contact Guard Assistance.  Toileting from toilet with Minimal Assistance for hygiene and clothing management.   Supine to sit with Contact Guard Assistance.  Step transfer with Contact Guard Assistance  Toilet transfer to toilet with Contact Guard Assistance.  Increased functional strength to WFL for self care skills and functional mobility.  Upper extremity exercise program x10 reps per handout, with assist as needed .                         Time Tracking:     OT  Date of Treatment: 03/08/24  OT Start Time: 1420  OT Stop Time: 1500  OT Total Time (min): 40 min    Billable Minutes:Self Care/Home Management 15  Therapeutic Activity 15  Therapeutic Exercise 10            3/8/2024

## 2024-03-08 NOTE — PLAN OF CARE
[facility]    FACILITY TRANSFER ORDERS      Patient Name: Harrison Haywood  YOB: 1955    PCP: Richard Mcnulty Jr., MD   PCP Address: [facility] [facility] / [place] [address]  PCP Phone Number: [phone]  PCP Fax: [phone]    Encounter Date: 03/08/2024    Admit to: [facility]     Vital Signs:  Routine    Diagnoses:   Active Hospital Problems    Diagnosis  POA    *CVA (cerebral vascular accident) [I63.9]  Yes      Resolved Hospital Problems   No resolved problems to display.       Allergies:  Review of patient's allergies indicates:   Allergen Reactions    Aspirin Hives    Penicillins Hives       Diet: diabetic diet: 2000 calorie    Activities: Activity as tolerated    Goals of Care Treatment Preferences:  Code Status: Full Code    Oxygen : 1-2Ls NC as needed. Wean as tolerated with sat goals > 92%    Nursing: Assistance with maneuvering      Labs: as needed by staff     CONSULTS:    Physical Therapy to evaluate and treat. , Occupational Therapy to evaluate and treat., and Speech Therapy to evaluate and treat for Language.    MISCELLANEOUS CARE:  Diabetes Care:   SN to perform and educate Diabetic management with blood glucose monitoring:    WOUND CARE ORDERS  Yes: Pressure Ulcer(s) Stage IV:  Location:     Consult ET nurse        Apply the following to wound: Santyl ointment daily to sacrum wound daily       Medications: Review discharge medications with patient and family and provide education.      Current Discharge Medication List        START taking these medications    Details   atorvastatin (LIPITOR) 40 MG tablet Take 1 tablet (40 mg total) by mouth once daily.  Qty: 90 tablet, Refills: 3      clopidogreL (PLAVIX) 75 mg tablet Take 1 tablet (75 mg total) by mouth once daily.  Qty: 30 tablet, Refills: 11           CONTINUE these medications which have NOT CHANGED    Details   acetaminophen (TYLENOL) 650 MG TbSR Take 650 mg by mouth every 8 (eight) hours as needed.       allopurinoL (ZYLOPRIM) 300 MG tablet Take 300 mg by mouth once daily.      amLODIPine (NORVASC) 5 MG tablet Take 5 mg by mouth once daily.      arginine-vitamin C-vitamin E (ARGINAID) 4.5 gram-156 mg/9.2 gram PwPk Take 1 packet by mouth 2 (two) times a day. 8 ounces water      ascorbic acid, vitamin C, (VITAMIN C) 500 MG tablet Take 500 mg by mouth 2 (two) times daily.      calcium carbonate (OS-SONIA) 600 mg calcium (1,500 mg) Tab Take 1,200 mg by mouth every evening.      celecoxib (CELEBREX) 200 MG capsule Take 1 capsule (200 mg total) by mouth once daily.  Qty: 30 capsule, Refills: 1      collagenase (SANTYL) ointment Apply topically once daily.  Qty: 2 g, Refills: 6      fenofibrate (TRICOR) 145 MG tablet Take 1 tablet (145 mg total) by mouth once daily.  Qty: 35 tablet, Refills: 0      FLUoxetine 40 MG capsule Take 40 mg by mouth once daily.      guaiFENesin 100 mg/5 ml (MCKENNA-TUSSIN) 100 mg/5 mL syrup Take 200 mg by mouth every 4 (four) hours as needed for Cough.      HYDROcodone-acetaminophen (NORCO) 5-325 mg per tablet Take 1 tablet by mouth every 6 (six) hours as needed for Pain.  Qty: 28 tablet, Refills: 0    Comments: Quantity prescribed more than 7 day supply? No      melatonin (MELATIN) 3 mg tablet Take 2 tablets (6 mg total) by mouth nightly as needed for Insomnia.  Qty: 30 tablet, Refills: 7      multivitamin (ONE DAILY MULTIVITAMIN) per tablet Take 1 tablet by mouth once daily.      ondansetron (ZOFRAN) 4 MG tablet Take 4 mg by mouth 4 (four) times daily as needed for Nausea (vomiting).      pantoprazole (PROTONIX) 40 MG tablet Take 1 tablet (40 mg total) by mouth once daily.  Qty: 30 tablet, Refills: 11      predniSONE (DELTASONE) 10 MG tablet Take 4 tablets (40 mg total) by mouth once daily for 3 days, THEN 3 tablets (30 mg total) once daily for 3 days, THEN 2 tablets (20 mg total) once daily for 3 days, THEN 1 tablet (10 mg total) once daily for 3 days.  Qty: 30 tablet, Refills: 0      protein  supplement (PROMOD PROTEIN) Liqd Take 30 mLs by mouth 2 (two) times a day.      tiZANidine (ZANAFLEX) 2 MG tablet Take 1 tablet (2 mg total) by mouth 2 (two) times a day. for 10 days  Qty: 20 tablet, Refills: 0      valACYclovir (VALTREX) 500 MG tablet Take 500 mg by mouth once daily.      VITAMIN D3 10 mcg (400 unit) tablet Take 800 Units by mouth every evening.      zinc sulfate (ZINCATE) 50 mg zinc (220 mg) capsule Take 220 mg by mouth once daily.      bisacodyL (DULCOLAX) 5 mg EC tablet Take 10 mg by mouth daily as needed for Constipation.           STOP taking these medications       dexAMETHasone (DECADRON) 4 MG Tab Comments:   Reason for Stopping:         levoFLOXacin (LEVAQUIN) 750 MG tablet Comments:   Reason for Stopping:                  Immunizations Administered as of 3/8/2024       No immunizations on file.            One Covid dose received     Some patients may experience side effects after vaccination.  These may include fever, headache, muscle or joint aches.  Most symptoms resolve with 24-48 hours and do not require urgent medical evaluation unless they persist for more than 72 hours or symptoms are concerning for an unrelated medical condition.          _________________________________  Viet Rodas MD  03/08/2024

## 2024-03-08 NOTE — BRIEF OP NOTE
Transesophageal echo (SHABNAM) intra-procedure log documentation Brief Op Note  Patient Name: Harrison Haywood  MRN: [id number]  : 1955  Date of Procedure: 2024  Location of Procedure: [facility] CATH LAB/EP    Procedure: Transesophageal echo (SHABNAM) intra-procedure log documentation    Pre-op Dx: R sided CVA    Post-op Dx: as above    Staff: Surgeon(s):  Rachid Rothman MD Xavier Diaz-Hernandez, MD    Anesthesia: Monitor Anesthesia Care    Indication for Procedure: multifocal R CVA concerning for cardio-embolic etiology        Findings:   -No [place] thrombus present.  -No obvious valvular vegetations   -Trace to Mild MR  -Multiple Negative bubble study, no obvious shunt present  -Consider  ILR given suspicion for cardio-embolic etiology      Brian Mcneal MD  [employer] Cardiology Chief Fellow, PGY-5  2024 12:14 PM

## 2024-03-08 NOTE — PHYSICIAN QUERY
PT Name: Harrison Haywood  MR #: [id number]     DOCUMENTATION CLARIFICATION     CDS: Grant Morales RN CCDS               Contact information: [email or web address]     This form is a permanent document in the medical record.    Query Date: 2024    By submitting this query, we are merely seeking further clarification of documentation.  Please utilize your independent clinical judgment when addressing the question(s) below.  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 He has had decreased ambulation recently but they have been doing physical therapy and he sits up in the wheel chiar every day.   Nursing assessment: Rosalio score 12-13  3/1 Wound care nursing consult: Sacrum- unstageable pressure injury- present on admit- 100% slough- 3.5x4x0.2cm- small amount of tan drainage- no odor- excoriated periwound, Pressure injury prevention interventions, Santyl ointment daily to sacrum wound         The clinical guidelines noted are only a system guideline. It does not replace the providers clinical judgment.    Per the National Pressure Injury Advisory Panel:   A pressure injury is localized damage to the skin and underlying soft tissue usually over a bony prominence or related to a medical or other device. The injury can present as intact skin or an open ulcer and may be painful. The injury occurs as a result of intense and/or prolonged pressure or pressure in combination with shear. The tolerance of soft tissue for pressure and shear may also be affected by microclimate, nutrition, perfusion, co-morbidities and condition of the soft tissue.       Stage 1 Pressure Injury:  Intact skin with a localized area of non-blanchable erythema, which may appear differently in darkly  pigmented skin. Color changes do not include purple or maroon discoloration; these may indicate deep tissue pressure injury.    Stage 2 Pressure Injury:  Partial-thickness loss of skin with exposed dermis. The wound bed is viable, pink or red, moist, and may also present as an intact or ruptured serum-filled blister.    Stage 3 Pressure Injury:  Full-thickness loss of skin, in which adipose (fat) is visible in the ulcer and granulation tissue and epibole (rolled wound edges) are often present. Slough and/or eschar may be visible. Undermining and tunneling may occur.    Stage 4 Pressure Injury:  Full-thickness skin and tissue loss with exposed or directly palpable fascia, muscle, tendon, ligament, cartilage or bone in the ulcer. Slough and/or eschar may be visible. Epibole (rolled edges), undermining and/or tunneling often occur.    Unstageable Pressure Injury:  Full-thickness skin and tissue loss in which the extent of tissue damage within the ulcer cannot be confirmed because it is obscured by slough or eschar. If slough or eschar is removed, a Stage 3 or Stage 4 pressure injury will be revealed.    Deep Tissue Pressure Injury:  Intact or non-intact skin with localized area of persistent non-blanchable deep red, maroon, purple discoloration or epidermal separation revealing a dark wound bed or blood filled blister. This injury results from intense and/or prolonged pressure and shear forces at the bone-muscle interface. The wound may evolve rapidly to reveal the actual extent of tissue injury, or may resolve without tissue loss. If necrotic tissue, subcutaneous tissue, granulation tissue, fascia, muscle or other underlying structures are visible, this indicates a full thickness pressure injury (Unstageable, Stage 3 or Stage 4). Do not use DTPI to describe vascular, traumatic, neuropathic, or dermatologic conditions.       Provider, please clarify the conflicting stage of the Sacral Decubitus Ulcer:      [ X  ]  Pressure Injury/Decubitus Ulcer, Unstageable   [   ] Pressure Injury/Decubitus Ulcer, Stage 4   [   ] Other stage (please specify):______________       Please document in your progress notes daily for the duration of treatment until resolved and include in your discharge summary.    Reference:    MAXIMO Obrien., SHAYNE Varela., Goldberg, M., MAXIMO Sutton, MAXIMO Boggs, & ALEX Carroll. (2016). Revised National Pressure Ulcer Advisory Panel Pressure Injury Staging System: Revised Pressure Injury Staging System. J Wound Ostomy Continence Nurs, 43(6), 585-597. doi:[email or web address]/[id number]    Form No.[id number]

## 2024-03-08 NOTE — DISCHARGE SUMMARY
"[facility] Hospital Medicine Discharge Summary    Primary Team: [facility] Hospitalist Team A  Attending Physician: Ayden Yi MD  Resident: Viet Rodas   Intern: Gillian Phillips    Date of Admit: 3/6/2024  Date of Discharge: 3/8/2024    Discharge to: [facility] with Skilled Nursing in house  Condition: Stable    Discharge Diagnoses     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 Otherwise, no LVO and unremarkable. R LL pulmonary nodule measuring 4mm, follow up in 12 months.   MRI Brain 3/7/24: acute ischemic infarct in high right cerebral convexity, multifocal concerning for embolic phenomenon given distribution. 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     Pt was in his usual state of health until the afternoon prior to arrival when he developed sudden onset confusion, L arm "heaviness" / weakness and inability to look to his left. At this time, he presented to [facility] [place] further evaluation. He was stroke activated with CTA negative for LVO or new infarct. He received plavix + Asa load with IV benadryl due to hx of asa allergy. Vascular neurology was consulted for " further evaluation and no thrombolytics were given due to pts deficits rapidly improving on exam. EEG was negative further ruling out seizure like episode. Pt was started on plavix monotherapy and Lipitor 40mg thereafter. MRI brain (3/7) revealed acute multifocal infarct of high R cerebral convexity concerning for cardio-embolic phenomenon. TTE was largely unremarkable with normal LVEF, Grade II DD and negative for vegetations or clots. SHABNAM was performed to further rule out cardioembolic source which was also unremarkable. Pt remained on telemetry while inpatient without any arrhythmias noted. PT/OT followed during stay, recommending SNF after discharge. SLP evaluated pt and cleared him for regular diet. Pt intermittently requiring 1-2L NC. On day of discharge, evaluated pt at bedside with SpO2 desaturations to 80s with improvement to 94% on 2LNC. Will discharge with 2L NC with plans to wean at nursing home as able. On 3/8, pt was deemed stable for discharge to [facility] with skilled nursing services with outpatient follow up for loop recorder placement. He was started on Plavix monotherapy in addition to continuing high intensity statin.     For the full HPI please refer to the History & Physical from this admission.    Hospital Course By Problem with Pertinent Findings     Acute Infarct R Cerebral Convexity - likely embolic  presented with LUE weakness, unresponsiveness x 2-3 minutes followed by babling, L hemineglect, persistent LUE weakness. Rapidly improvement deficits on arrival with persistent L hemineglect on exam.   - No thrombolysis given as deficits rapidly improving on exam. NIHSS 5. ABCD2 score 4.   - s/p ASA + Plavix load with IV benadryl due to reported allergy to asa (hives)   - CTA without LVO and noted L MCA encephalomalacia (2/2 prior brain abscess as child with residual RSW however not appreciated on exam)   - MRI Brain 3/7: acute multifocal infarct of high right cerebral convexity, likely  embolic  - TTE 3/7: LVEF normal, Grade II diastolic dysfunction, LA mildly dilated. No vegetations or clots noted.   - EEG 3/7: negative for epileptic focus  - SHABNAM 3/8: negative for [place] thrombus, valvular vegetations. Negative bubble study  - continue Plavix monotherapy + Lipitor 40mg daily allergy to ASA  - Outpatient follow up with Electrophysiology for Loop Recorder placement  - outpatient follow up with neurology for post-stroke care     Multiple Myeloma with Lytic Spinal Lesions   Pancytopenia - improved  - following with oncology. endorses occasional back discomfort 2/2 spinal lytic lesions   - Started on RVD with H/O, s/p 2 cycles, not candidate for stem cell transplant.   - previously with pancytopenia on last admit, WBC improved 6, Hgb 9.4, Plt 317 on this admit   - continue allopurinol 300 daily and valacyclovir 500 po daily for ppx  - continue calcium and vitamin D for bony involvement     Hyperlipidemia   - Lipid panel 3/6: Tchol 147, , LDL 66.2   - Continue Lipitor 40mg daily, Fenofibrate 145mg daily     Unstageable Sacral decubitus ulcer - present on admission  - Present prior to admission. Pt with discomfort with repositioning   - wound care consulted during admission     Recent RSV Infection and CAP   - recent admit, 3/3/24 with SOB, cough. RSV positive/ discharged with course of Levofloxacin  and Prednisone taper   - s/p Methylpred 80mg IV x 1 in ED  - Intermittently requiring 1-2L NC while admission. Exam with upper airway sounds transmitted throughout otherwise without appreciable wheezing or crackles.   - On day of discharge, evaluated pt at bedside with SpO2 down to 82% on room air with improvement to 94% on 2LNC.   - Will discharge with 2L NC- please wean as able at nursing home.   - Continue prednisone taper on discharge     Pulmonary Nodule   - noted on CTA head and neck, 4mm in RLL  - Outpatient follow up with PCP. Recommend re-imaging in 12 months    Discharge Medications         Medication List        START taking these medications      atorvastatin 40 MG tablet  Commonly known as: LIPITOR  Take 1 tablet (40 mg total) by mouth once daily.  Start taking on: March 9, 2024     clopidogreL 75 mg tablet  Commonly known as: PLAVIX  Take 1 tablet (75 mg total) by mouth once daily.  Start taking on: March 9, 2024            CONTINUE taking these medications      acetaminophen 650 MG Tbsr  Commonly known as: TYLENOL     allopurinoL 300 MG tablet  Commonly known as: ZYLOPRIM     amLODIPine 5 MG tablet  Commonly known as: NORVASC     ARGINAID 4.5 gram-156 mg/9.2 gram Pwpk  Generic drug: arginine-vitamin C-vitamin E     bisacodyL 5 mg EC tablet  Commonly known as: DULCOLAX     calcium carbonate 600 mg calcium (1,500 mg) Tab  Commonly known as: OS-SONIA     celecoxib 200 MG capsule  Commonly known as: CeleBREX  Take 1 capsule (200 mg total) by mouth once daily.     collagenase ointment  Commonly known as: SANTYL  Apply topically once daily.     fenofibrate 145 MG tablet  Commonly known as: TRICOR  Take 1 tablet (145 mg total) by mouth once daily.     FLUoxetine 40 MG capsule     MCKENNA-TUSSIN 100 mg/5 mL syrup  Generic drug: guaiFENesin 100 mg/5 ml     HYDROcodone-acetaminophen 5-325 mg per tablet  Commonly known as: NORCO  Take 1 tablet by mouth every 6 (six) hours as needed for Pain.     melatonin 3 mg tablet  Commonly known as: MELATIN  Take 2 tablets (6 mg total) by mouth nightly as needed for Insomnia.     ondansetron 4 MG tablet  Commonly known as: ZOFRAN     ONE DAILY MULTIVITAMIN per tablet  Generic drug: multivitamin     pantoprazole 40 MG tablet  Commonly known as: PROTONIX  Take 1 tablet (40 mg total) by mouth once daily.     predniSONE 10 MG tablet  Commonly known as: DELTASONE  Take 4 tablets (40 mg total) by mouth once daily for 3 days, THEN 3 tablets (30 mg total) once daily for 3 days, THEN 2 tablets (20 mg total) once daily for 3 days, THEN 1 tablet (10 mg total) once daily for 3  days.  Start taking on: March 3, 2024     PROMOD PROTEIN Liqd  Generic drug: protein supplement     tiZANidine 2 MG tablet  Commonly known as: ZANAFLEX  Take 1 tablet (2 mg total) by mouth 2 (two) times a day. for 10 days     valACYclovir 500 MG tablet  Commonly known as: VALTREX     VITAMIN C 500 MG tablet  Generic drug: ascorbic acid (vitamin C)     VITAMIN D3 10 mcg (400 unit) Tab  Generic drug: cholecalciferol (vitamin D3)     zinc sulfate 50 mg zinc (220 mg) capsule  Commonly known as: ZINCATE            STOP taking these medications      dexAMETHasone 4 MG Tab  Commonly known as: DECADRON     levoFLOXacin 750 MG tablet  Commonly known as: LEVAQUIN               Where to Get Your Medications        Information about where to get these medications is not yet available    Ask your nurse or doctor about these medications  atorvastatin 40 MG tablet  clopidogreL 75 mg tablet         Discharge Information:   Diet:  Regular    Physical Activity:  As tolerated             Instructions:  1. Take all medications as prescribed  2. Keep all follow-up appointments  3. Return to the hospital or call your primary care physicians if any worsening symptoms such as fever, chest pain, shortness of breath, return of symptoms, or any other concerns.    Follow-Up Appointments:  - ambulatory refferal to cardiology   - ambulatory referral to neurology   - follow up with PCP   - follow up Heme/Onc @ [facility] [place] [place]    Gillian Phillips MD  [facility] Internal Medicine, [place]

## 2024-03-08 NOTE — PLAN OF CARE
Patient remains cath lab bay #1 on stretcher with side rails up x2. Pt drowsy but / AAOx4 and able to follow commands. Pt is stable when connecting to cardiac monitors. VSS.    +2 asim radial pulses palpated. Skin normal in color and warm to touch, <3 sec cap refill.  Fall risk precautions given and patient acknowledges.  AIDET completed to pt.  Will continue to monitor patient.

## 2024-03-08 NOTE — PLAN OF CARE
TORY spoke with pt's sister Arminda [phone] to complete final assessment. Pt will return to [facility] later today. TORY set transportation for 4:30pm. Report can be called to [phone], pt will go to room 438A. Rounds completed on pt. All questions addressed. Bedside nurse to discuss d/c medications. Discussed importance to attend all f/u appts and take medications as prescribed. Verbalized understanding. Case Management to sign off.     TIM Amaral  [phone]    Future Appointments   Date Time Provider Department Center   5/9/2024 11:40 AM [facility]   5/9/2024 12:00 PM [facility]MONARY FUNCTION [facility]   5/9/2024 12:15 PM [facility] FUNCTION [facility]   5/9/2024 12:30 PM [facility] FUNCTION [facility]   5/9/2024  1:00 PM Marin Ortiz MD [facility]        03/08/24 1522   Final Note   Assessment Type Final Discharge Note   Anticipated Discharge Disposition   (return to NH)   Post-Acute Status   Post-Acute Authorization Placement   Discharge Delays None known at this time

## 2024-03-08 NOTE — ANESTHESIA PREPROCEDURE EVALUATION
03/08/2024  [facility]  Anesthesia Pre-Operative Evaluation         Patient Name: Harrison Haywood  YOB: 1955  MRN: [id number]    SUBJECTIVE:     Pre-operative evaluation for Procedure(s) (LRB):  Transesophageal echo (SHABNAM) intra-procedure log documentation (N/A)     03/08/2024    Harrison Haywood is a 68 y.o. male.  Patient now presents for the above procedure(s).    TTE:   Left Ventricle: The left ventricle is normal in size. There is concentric remodeling. There is normal systolic function. Biplane (2D) method of discs ejection fraction is 61%. Grade II diastolic dysfunction.    Right Ventricle: Normal right ventricular cavity size. Systolic function is normal. TAPSE is 2.01 cm.    Left Atrium: Left atrium is mildly dilated. The left atrium volume index is 38.5 mL/m2.    Right Atrium: Right atrium is dilated.    Mitral Valve: There is mild regurgitation.    Pulmonary Artery: The estimated pulmonary artery systolic pressure is 36 mmHg.    IVC/SVC: Normal venous pressure at 3 mmHg.    Study is negative for shunt.       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    Pancytopenia    Left-sided weakness    CVA (cerebral vascular accident)       Review of patient's allergies indicates:   Allergen Reactions    Aspirin Hives    Penicillins Hives       Current Inpatient Medications:   allopurinoL  300 mg Oral Daily    atorvastatin  40 mg Oral Daily    calcium carbonate  1,500 mg Oral QHS    clopidogreL  75 mg Oral Daily    collagenase   Topical (Top) Daily    enoxparin  40 mg Subcutaneous Q24H (prophylaxis, 1700)     fenofibrate  145 mg Oral Daily    FLUoxetine  40 mg Oral Daily    mupirocin   Nasal BID    pantoprazole  40 mg Oral Daily    perflutren protein-a microsphr  0.5 mL Intravenous Once    [START ON 3/9/2024] predniSONE  30 mg Oral Daily    valACYclovir  500 mg Oral Daily    vitamin D  1,000 Units Oral Daily       No current facility-administered medications on file prior to encounter.     Current Outpatient Medications on File Prior to Encounter   Medication Sig Dispense Refill    acetaminophen (TYLENOL) 650 MG TbSR Take 650 mg by mouth every 8 (eight) hours as needed.      allopurinoL (ZYLOPRIM) 300 MG tablet Take 300 mg by mouth once daily.      amLODIPine (NORVASC) 5 MG tablet Take 5 mg by mouth once daily.      arginine-vitamin C-vitamin E (ARGINAID) 4.5 gram-156 mg/9.2 gram PwPk Take 1 packet by mouth 2 (two) times a day. 8 ounces water      ascorbic acid, vitamin C, (VITAMIN C) 500 MG tablet Take 500 mg by mouth 2 (two) times daily.      calcium carbonate (OS-SONIA) 600 mg calcium (1,500 mg) Tab Take 1,200 mg by mouth every evening.      celecoxib (CELEBREX) 200 MG capsule Take 1 capsule (200 mg total) by mouth once daily. 30 capsule 1    collagenase (SANTYL) ointment Apply topically once daily. 2 g 6    dexAMETHasone (DECADRON) 4 MG Tab Take 40 mg by mouth once a week. 4 tablets once a week with breakfast      fenofibrate (TRICOR) 145 MG tablet Take 1 tablet (145 mg total) by mouth once daily. 35 tablet 0    FLUoxetine 40 MG capsule Take 40 mg by mouth once daily.      guaiFENesin 100 mg/5 ml (MCKENNA-TUSSIN) 100 mg/5 mL syrup Take 200 mg by mouth every 4 (four) hours as needed for Cough.      HYDROcodone-acetaminophen (NORCO) 5-325 mg per tablet Take 1 tablet by mouth every 6 (six) hours as needed for Pain. 28 tablet 0    melatonin (MELATIN) 3 mg tablet Take 2 tablets (6 mg total) by mouth nightly as needed for Insomnia. 30 tablet 7    multivitamin (ONE DAILY MULTIVITAMIN) per tablet Take 1 tablet by mouth once  daily.      ondansetron (ZOFRAN) 4 MG tablet Take 4 mg by mouth 4 (four) times daily as needed for Nausea (vomiting).      pantoprazole (PROTONIX) 40 MG tablet Take 1 tablet (40 mg total) by mouth once daily. 30 tablet 11    predniSONE (DELTASONE) 10 MG tablet Take 4 tablets (40 mg total) by mouth once daily for 3 days, THEN 3 tablets (30 mg total) once daily for 3 days, THEN 2 tablets (20 mg total) once daily for 3 days, THEN 1 tablet (10 mg total) once daily for 3 days. 30 tablet 0    protein supplement (PROMOD PROTEIN) Liqd Take 30 mLs by mouth 2 (two) times a day.      tiZANidine (ZANAFLEX) 2 MG tablet Take 1 tablet (2 mg total) by mouth 2 (two) times a day. for 10 days 20 tablet 0    valACYclovir (VALTREX) 500 MG tablet Take 500 mg by mouth once daily.      VITAMIN D3 10 mcg (400 unit) tablet Take 800 Units by mouth every evening.      zinc sulfate (ZINCATE) 50 mg zinc (220 mg) capsule Take 220 mg by mouth once daily.      bisacodyL (DULCOLAX) 5 mg EC tablet Take 10 mg by mouth daily as needed for Constipation.         No past surgical history on file.    OBJECTIVE:     Vital Signs Range (Last 24H):  Temp:  [36.5 °C (97.7 °F)-36.9 °C (98.5 °F)]   Pulse:  [52-72]   Resp:  [16-20]   BP: (109-128)/(58-72)   SpO2:  [93 %-98 %]       Significant Labs:  Lab Results   Component Value Date    WBC 6.12 03/06/2024    HGB 9.4 (L) 03/06/2024    HCT 28.8 (L) 03/06/2024     03/06/2024    CHOL 147 03/06/2024    TRIG 164 (H) 03/06/2024    HDL 48 03/06/2024    ALT 12 03/08/2024    AST 21 03/08/2024     03/08/2024    K 3.9 03/08/2024     03/08/2024    CREATININE 0.7 03/08/2024    BUN 24 (H) 03/08/2024    CO2 25 03/08/2024    TSH 0.203 (L) 03/06/2024    INR 1.1 03/06/2024    HGBA1C 5.2 02/29/2024       Diagnostic Studies: No relevant studies.    EKG:   Results for orders placed or performed during the hospital encounter of 03/06/24   ECG 12 lead    Collection Time: 03/06/24  4:52 PM   Result Value Ref Range     QRS Duration 100 ms    OHS QTC Calculation 454 ms    Narrative    Test Reason : R29.818,    Vent. Rate : 087 BPM     Atrial Rate : 087 BPM     P-R Int : 184 ms          QRS Dur : 100 ms      QT Int : 378 ms       P-R-T Axes : 049 032 056 degrees     QTc Int : 454 ms    Normal sinus rhythm  Normal ECG  When compared with ECG of 29-FEB-2024 13:02,  Premature ventricular complexes are no longer Present  Premature supraventricular complexes are no longer Present  Minimal criteria for Anterior infarct are no longer Present  Non-specific change in ST segment in Inferior leads  Non-specific change in ST segment in Lateral leads    Referred By: System System           Confirmed By:        ASSESSMENT/PLAN:           Pre-op Assessment    I have reviewed the Patient Summary Reports.     I have reviewed the Nursing Notes. I have reviewed the NPO Status.   I have reviewed the Medications.   Steroids Taken In Past Year: Prednisone    Review of Systems  Anesthesia Hx:             Denies Family Hx of Anesthesia complications.    Denies Personal Hx of Anesthesia complications.                    Hematology/Oncology:       -- Anemia:               Hematology Comments: multiple myeloma with lytic lesions in his spine    Current/Recent Cancer.                EENT/Dental:  EENT/Dental Normal           Cardiovascular:     Hypertension                                        Pulmonary:  Pneumonia                      Neurological:   CVA Neuromuscular Disease,       prior stroke with residual right-sided weakness and history of meningitis as a child with residual right-sided weakness    Vessel imaging shows chronic L MCA stenosis. R MCA territory stroke looks embolic with certain aspects that may be watershed     Dementia                         Dermatological:  stage IV sacral decubitus ulcer   Psych:  Psychiatric History                     Anesthesia Plan  Type of Anesthesia, risks & benefits discussed:    Anesthesia Type: Gen Natural  Airway  Intra-op Monitoring Plan: Standard ASA Monitors  Post Op Pain Control Plan: multimodal analgesia  Induction:  IV  Informed Consent: Informed consent signed with the Patient and all parties understand the risks and agree with anesthesia plan.  All questions answered.   ASA Score: 4  Day of Surgery Review of History & Physical: H&P Update referred to the surgeon/provider.    Ready For Surgery From Anesthesia Perspective.     .

## 2024-03-08 NOTE — PLAN OF CARE
SW sent facility transfer orders via careport to Halie at [facility] Phone: [phone]. Pt is anticipated to d/c tomorrow.     TIM Amaral  [phone]    Future Appointments   Date Time Provider Department Center   5/9/2024 11:40 AM SIX, MINUTE WALK [facility] PUL [facility]   5/9/2024 12:00 PM PULMONARY FUNCTION [facility]   5/9/2024 12:15 [facility]   5/9/2024 12:30 PM PULMONARY FUNCTION [facility]   5/9/2024  1:00 PM Marin Ortiz MD [facility]        03/08/24 1314   Post-Acute Status   Post-Acute Authorization Placement   Post-Acute Placement Status Set-up Complete/Auth obtained   Discharge Delays None known at this time   Discharge Plan   Discharge Plan A Skilled Nursing Facility

## 2024-03-08 NOTE — PLAN OF CARE
Report given to everton Joy nurse.  All questions answered.  Pt sitting up watching TV.  VSS. NAD noted.

## 2024-03-08 NOTE — PT/OT/SLP PROGRESS
Physical Therapy Treatment    Patient Name:  Harrison Haywood   MRN:  [id number]    Recommendations:     Discharge Recommendations: Moderate Intensity Therapy  Discharge Equipment Recommendations: none  Barriers to discharge: None    Assessment:     Harrison Haywood is a 68 y.o. male admitted with a medical diagnosis of Left-sided weakness.  He presents with the following impairments/functional limitations: weakness, impaired endurance, impaired functional mobility, gait instability, impaired balance, decreased upper extremity function, impaired cardiopulmonary response to activity. Pt continuing to progress towards goals. Pt frustrated today regarding being NPO for procedure so decreased motivation noted for PT session. Session today focused on BLE strengthening/balance exercises and increasing overall standing tolerance.     Rehab Prognosis: Good; patient would benefit from acute skilled PT services to address these deficits and reach maximum level of function.    Recent Surgery: Procedure(s) (LRB):  Transesophageal echo (SHABNAM) intra-procedure log documentation (N/A)      Plan:     During this hospitalization, patient to be seen 3 x/week to address the identified rehab impairments via gait training, therapeutic activities, therapeutic exercises, neuromuscular re-education and progress toward the following goals:    Plan of Care Expires:  03/21/24    Subjective     Chief Complaint: frustrated regarding being NPO  Patient/Family Comments/goals: to get stronger   Pain/Comfort:  Pain Rating 1: 0/10  Pain Rating Post-Intervention 1: 0/10      Objective:     Communicated with RN prior to session.  Patient found supine with PureWick, pressure relief boots, oxygen upon PT entry to room.     General Precautions: Standard, fall, droplet, contact  Orthopedic Precautions: N/A  Braces: N/A  Respiratory Status: Nasal cannula, flow 2 L/min     Functional Mobility:  Bed Mobility:     Supine to Sit: stand by assistance  Sit to Supine:  stand by assistance  Transfers:     Sit to Stand:  stand by assistance with rolling walker  Gait: not progressed this date.   Balance: Good static and dynamic sitting/standing balance with RW.       AM-PAC 6 CLICK MOBILITY  Turning over in bed (including adjusting bedclothes, sheets and blankets)?: 3  Sitting down on and standing up from a chair with arms (e.g., wheelchair, bedside commode, etc.): 3  Moving from lying on back to sitting on the side of the bed?: 3  Moving to and from a bed to a chair (including a wheelchair)?: 3  Need to walk in hospital room?: 3  Climbing 3-5 steps with a railing?: 3  Basic Mobility Total Score: 18       Treatment & Education:  Patient performed x 3 sets x 20 reps of standing marching with RW. CGA provided for safety. Pt required standing rest breaks lasting approximately 30 seconds in between sets d/t fatigue and reported muscle cramps. Patient tolerated approximately 10 min of standing activity with RW. Mild proximal LUE weakness noted upon formal MMT. Will continue to follow and progress as able.     Patient left supine with all lines intact, call button in reach, RN notified, and PCT present..    GOALS:   Multidisciplinary Problems       Physical Therapy Goals          Problem: Physical Therapy    Goal Priority Disciplines Outcome Goal Variances Interventions   Physical Therapy Goal     PT, PT/OT Ongoing, Progressing     Description: Goals to be met by: 3/21/24     Patient will increase functional independence with mobility by performin. Supine to sit with [place]  2. Sit to supine with [place]  3. Sit to stand transfer with Modified [place] w/RW  4. Bed to chair transfer with Modified [place] using Rolling Walker  5. Gait  x 150 feet with Modified [place] using Rolling Walker.                          Time Tracking:     PT Received On: 24  PT Start Time: 938     PT Stop Time: 1001  PT Total Time (min): 23 min     Billable Minutes:  Therapeutic Activity 23    Treatment Type: Treatment  PT/PTA: PTA     Number of PTA visits since last PT visit: 0     03/08/2024

## 2024-03-08 NOTE — NURSING
Shift assessment complete. Pt is alert and oriented x 4. Neuro check complete. Marko hose and boots on. 02 @ 2L NC. Pt denies pain. NPO after midnight and pt verbalizes understanding. Bed in lowest position, locked, and side rails up x 3. Bed alarm on. Call light in reach. Contact precautions in place.

## 2024-03-08 NOTE — PLAN OF CARE
TORY spoke with Halie at [facility] Phone: [phone]  to update her on this pt. Per Halie if she gets orders today they can still accept the pt tomorrow. Tory requested orders from the team. TORY will follow.     Roger Myles MSLACHO  [phone]    Future Appointments   Date Time Provider Department Center   5/9/2024 11:40 AM SIX, MINUTE WALK [facility] PUL WLK [facility]   5/9/2024 12:00 PM PULMONARY FUNCTION [facility] PULMLAB [facility]   5/9/2024 12:15 PM PULMONARY FUNCTION [facility] PULMLAB [facility]   5/9/2024 12:30 PM PULMONARY FUNCTION [facility] PUL[facility]AB [facility]   5/9/2024  1:00 PM Marin Ortiz MD [facility] PULMSVC [facility]        03/08/24 1014   Post-Acute Status   Post-Acute Authorization Placement   Discharge Plan   Discharge Plan A Skilled Nursing Facility;Return to nursing home

## 2024-03-08 NOTE — ANESTHESIA POSTPROCEDURE EVALUATION
Anesthesia Post Evaluation    Patient: Harrison Haywood    Procedure(s) Performed: Procedure(s) (LRB):  Transesophageal echo (SHABNAM) intra-procedure log documentation (N/A)    Final Anesthesia Type: general      Patient location during evaluation: Cath Lab  Patient participation: Yes- Able to Participate  Level of consciousness: awake and alert, oriented and awake  Post-procedure vital signs: reviewed and stable  Pain management: adequate  Airway patency: patent  ERASMO mitigation strategies: Multimodal analgesia  PONV status at discharge: No PONV  Anesthetic complications: no      Cardiovascular status: blood pressure returned to baseline and hemodynamically stable  Respiratory status: unassisted and spontaneous ventilation  Hydration status: euvolemic  Follow-up not needed.              Vitals Value Taken Time   /61 03/08/24 1305   Temp 36.7 °C (98.1 °F) 03/08/24 1305   Pulse 75 03/08/24 1305   Resp 20 03/08/24 1305   SpO2 92 % 03/08/24 1305         No case tracking events are documented in the log.      Pain/Ivett Score: Ivett Score: 9 (3/8/2024 12:30 PM)

## 2024-03-10 LAB
OHS QRS DURATION: 100 MS
OHS QTC CALCULATION: 454 MS

## 2024-03-11 PROBLEM — G45.9 TIA (TRANSIENT ISCHEMIC ATTACK): Status: ACTIVE | Noted: 2024-03-11

## 2024-06-03 PROBLEM — J18.9 PNEUMONIA OF LEFT LOWER LOBE DUE TO INFECTIOUS ORGANISM: Status: RESOLVED | Noted: 2024-02-29 | Resolved: 2024-06-03

## 2024-06-10 PROBLEM — G45.9 TIA (TRANSIENT ISCHEMIC ATTACK): Status: RESOLVED | Noted: 2024-03-11 | Resolved: 2024-06-10

## 2024-06-10 PROBLEM — I63.9 CVA (CEREBRAL VASCULAR ACCIDENT): Status: RESOLVED | Noted: 2024-03-08 | Resolved: 2024-06-10

## 2024-08-30 ENCOUNTER — HOSPITAL ENCOUNTER (INPATIENT)
Facility: HOSPITAL | Age: 69
LOS: 3 days | Discharge: HOME | DRG: 177 | End: 2024-09-03
Attending: EMERGENCY MEDICINE | Admitting: INTERNAL MEDICINE
Payer: MEDICARE

## 2024-08-30 DIAGNOSIS — Z85.79 HISTORY OF MULTIPLE MYELOMA: ICD-10-CM

## 2024-08-30 DIAGNOSIS — R06.02 SOB (SHORTNESS OF BREATH): ICD-10-CM

## 2024-08-30 DIAGNOSIS — R05.9 COUGH: ICD-10-CM

## 2024-08-30 DIAGNOSIS — U07.1 COVID-19 VIRUS INFECTION: Primary | ICD-10-CM

## 2024-08-30 DIAGNOSIS — R09.02 HYPOXIA: ICD-10-CM

## 2024-08-30 PROBLEM — Z86.73 HISTORY OF CVA (CEREBROVASCULAR ACCIDENT): Status: ACTIVE | Noted: 2024-04-28

## 2024-08-30 PROBLEM — R25.1 TREMOR: Status: ACTIVE | Noted: 2024-03-06

## 2024-08-30 PROBLEM — G81.10 SPASTIC HEMIPLEGIA: Status: ACTIVE | Noted: 2024-08-30

## 2024-08-30 LAB
ALBUMIN SERPL BCP-MCNC: 2.8 G/DL (ref 3.5–5.2)
ALP SERPL-CCNC: 100 U/L (ref 55–135)
ALT SERPL W/O P-5'-P-CCNC: 13 U/L (ref 10–44)
ANION GAP SERPL CALC-SCNC: 11 MMOL/L (ref 8–16)
AST SERPL-CCNC: 38 U/L (ref 10–40)
BACTERIA #/AREA URNS HPF: ABNORMAL /HPF
BASOPHILS # BLD AUTO: 0.01 K/UL (ref 0–0.2)
BASOPHILS NFR BLD: 0.2 % (ref 0–1.9)
BILIRUB SERPL-MCNC: 0.6 MG/DL (ref 0.1–1)
BILIRUB UR QL STRIP: NEGATIVE
BNP SERPL-MCNC: 64 PG/ML (ref 0–99)
BUN SERPL-MCNC: 27 MG/DL (ref 8–23)
CALCIUM SERPL-MCNC: 9 MG/DL (ref 8.7–10.5)
CHLORIDE SERPL-SCNC: 107 MMOL/L (ref 95–110)
CLARITY UR: ABNORMAL
CO2 SERPL-SCNC: 17 MMOL/L (ref 23–29)
COLOR UR: YELLOW
CREAT SERPL-MCNC: 0.8 MG/DL (ref 0.5–1.4)
CTP QC/QA: YES
CTP QC/QA: YES
DIFFERENTIAL METHOD BLD: ABNORMAL
EOSINOPHIL # BLD AUTO: 0 K/UL (ref 0–0.5)
EOSINOPHIL NFR BLD: 0.5 % (ref 0–8)
ERYTHROCYTE [DISTWIDTH] IN BLOOD BY AUTOMATED COUNT: 17.3 % (ref 11.5–14.5)
EST. GFR  (NO RACE VARIABLE): >60 ML/MIN/1.73 M^2
GLUCOSE SERPL-MCNC: 96 MG/DL (ref 70–110)
GLUCOSE UR QL STRIP: NEGATIVE
HCT VFR BLD AUTO: 31.1 % (ref 40–54)
HGB BLD-MCNC: 10.1 G/DL (ref 14–18)
HGB UR QL STRIP: ABNORMAL
HYALINE CASTS #/AREA URNS LPF: 1 /LPF
IMM GRANULOCYTES # BLD AUTO: 0.02 K/UL (ref 0–0.04)
IMM GRANULOCYTES NFR BLD AUTO: 0.5 % (ref 0–0.5)
KETONES UR QL STRIP: NEGATIVE
LACTATE SERPL-SCNC: 0.9 MMOL/L (ref 0.5–2.2)
LEUKOCYTE ESTERASE UR QL STRIP: ABNORMAL
LYMPHOCYTES # BLD AUTO: 0.2 K/UL (ref 1–4.8)
LYMPHOCYTES NFR BLD: 5.6 % (ref 18–48)
MCH RBC QN AUTO: 30.4 PG (ref 27–31)
MCHC RBC AUTO-ENTMCNC: 32.5 G/DL (ref 32–36)
MCV RBC AUTO: 94 FL (ref 82–98)
MICROSCOPIC COMMENT: ABNORMAL
MONOCYTES # BLD AUTO: 0.2 K/UL (ref 0.3–1)
MONOCYTES NFR BLD: 4.9 % (ref 4–15)
NEUTROPHILS # BLD AUTO: 3.8 K/UL (ref 1.8–7.7)
NEUTROPHILS NFR BLD: 88.3 % (ref 38–73)
NITRITE UR QL STRIP: NEGATIVE
NRBC BLD-RTO: 0 /100 WBC
PH UR STRIP: 7 [PH] (ref 5–8)
PLATELET # BLD AUTO: 174 K/UL (ref 150–450)
PMV BLD AUTO: 11.9 FL (ref 9.2–12.9)
POC MOLECULAR INFLUENZA A AGN: NEGATIVE
POC MOLECULAR INFLUENZA B AGN: NEGATIVE
POTASSIUM SERPL-SCNC: 4.4 MMOL/L (ref 3.5–5.1)
PROCALCITONIN SERPL IA-MCNC: 0.55 NG/ML
PROT SERPL-MCNC: 6.4 G/DL (ref 6–8.4)
PROT UR QL STRIP: ABNORMAL
RBC # BLD AUTO: 3.32 M/UL (ref 4.6–6.2)
RBC #/AREA URNS HPF: 28 /HPF (ref 0–4)
SARS-COV-2 RDRP RESP QL NAA+PROBE: POSITIVE
SODIUM SERPL-SCNC: 135 MMOL/L (ref 136–145)
SP GR UR STRIP: 1.03 (ref 1–1.03)
SQUAMOUS #/AREA URNS HPF: 0 /HPF
TROPONIN I SERPL DL<=0.01 NG/ML-MCNC: 0.01 NG/ML (ref 0–0.03)
URN SPEC COLLECT METH UR: ABNORMAL
UROBILINOGEN UR STRIP-ACNC: ABNORMAL EU/DL
WBC # BLD AUTO: 4.32 K/UL (ref 3.9–12.7)
WBC #/AREA URNS HPF: >100 /HPF (ref 0–5)

## 2024-08-30 PROCEDURE — 63600175 PHARM REV CODE 636 W HCPCS: Mod: JZ,TB

## 2024-08-30 PROCEDURE — 84484 ASSAY OF TROPONIN QUANT: CPT | Performed by: EMERGENCY MEDICINE

## 2024-08-30 PROCEDURE — G0378 HOSPITAL OBSERVATION PER HR: HCPCS

## 2024-08-30 PROCEDURE — 83880 ASSAY OF NATRIURETIC PEPTIDE: CPT | Performed by: EMERGENCY MEDICINE

## 2024-08-30 PROCEDURE — 87502 INFLUENZA DNA AMP PROBE: CPT

## 2024-08-30 PROCEDURE — 93005 ELECTROCARDIOGRAM TRACING: CPT

## 2024-08-30 PROCEDURE — 96372 THER/PROPH/DIAG INJ SC/IM: CPT

## 2024-08-30 PROCEDURE — 87186 SC STD MICRODIL/AGAR DIL: CPT | Performed by: EMERGENCY MEDICINE

## 2024-08-30 PROCEDURE — 87040 BLOOD CULTURE FOR BACTERIA: CPT | Performed by: EMERGENCY MEDICINE

## 2024-08-30 PROCEDURE — 93010 ELECTROCARDIOGRAM REPORT: CPT | Mod: ,,, | Performed by: INTERNAL MEDICINE

## 2024-08-30 PROCEDURE — 87088 URINE BACTERIA CULTURE: CPT | Performed by: EMERGENCY MEDICINE

## 2024-08-30 PROCEDURE — 96365 THER/PROPH/DIAG IV INF INIT: CPT

## 2024-08-30 PROCEDURE — 81000 URINALYSIS NONAUTO W/SCOPE: CPT | Performed by: EMERGENCY MEDICINE

## 2024-08-30 PROCEDURE — 84145 PROCALCITONIN (PCT): CPT | Performed by: EMERGENCY MEDICINE

## 2024-08-30 PROCEDURE — 99285 EMERGENCY DEPT VISIT HI MDM: CPT | Mod: 25

## 2024-08-30 PROCEDURE — 87635 SARS-COV-2 COVID-19 AMP PRB: CPT | Performed by: EMERGENCY MEDICINE

## 2024-08-30 PROCEDURE — 87086 URINE CULTURE/COLONY COUNT: CPT | Performed by: EMERGENCY MEDICINE

## 2024-08-30 PROCEDURE — 85025 COMPLETE CBC W/AUTO DIFF WBC: CPT | Performed by: EMERGENCY MEDICINE

## 2024-08-30 PROCEDURE — 83605 ASSAY OF LACTIC ACID: CPT

## 2024-08-30 PROCEDURE — 80053 COMPREHEN METABOLIC PANEL: CPT | Performed by: EMERGENCY MEDICINE

## 2024-08-30 PROCEDURE — 25000003 PHARM REV CODE 250

## 2024-08-30 RX ORDER — IPRATROPIUM BROMIDE AND ALBUTEROL SULFATE 2.5; .5 MG/3ML; MG/3ML
SOLUTION RESPIRATORY (INHALATION)
COMMUNITY
Start: 2024-08-28

## 2024-08-30 RX ORDER — DOXYCYCLINE 100 MG/1
100 CAPSULE ORAL 2 TIMES DAILY
COMMUNITY
Start: 2024-08-27

## 2024-08-30 RX ORDER — FLUTICASONE FUROATE AND VILANTEROL 200; 25 UG/1; UG/1
1 POWDER RESPIRATORY (INHALATION) DAILY
Status: DISCONTINUED | OUTPATIENT
Start: 2024-08-31 | End: 2024-09-03 | Stop reason: HOSPADM

## 2024-08-30 RX ORDER — GLUCAGON 1 MG
1 KIT INJECTION
Status: DISCONTINUED | OUTPATIENT
Start: 2024-08-30 | End: 2024-09-03 | Stop reason: HOSPADM

## 2024-08-30 RX ORDER — CLOPIDOGREL BISULFATE 75 MG/1
75 TABLET ORAL DAILY
Status: DISCONTINUED | OUTPATIENT
Start: 2024-08-31 | End: 2024-09-03 | Stop reason: HOSPADM

## 2024-08-30 RX ORDER — IBUPROFEN 200 MG
24 TABLET ORAL
Status: DISCONTINUED | OUTPATIENT
Start: 2024-08-30 | End: 2024-09-03 | Stop reason: HOSPADM

## 2024-08-30 RX ORDER — CHOLECALCIFEROL (VITAMIN D3) 25 MCG
1000 TABLET ORAL DAILY
COMMUNITY
Start: 2024-08-19

## 2024-08-30 RX ORDER — LENALIDOMIDE 25 MG/1
25 CAPSULE ORAL NIGHTLY
COMMUNITY
Start: 2024-08-29

## 2024-08-30 RX ORDER — IBUPROFEN 200 MG
16 TABLET ORAL
Status: DISCONTINUED | OUTPATIENT
Start: 2024-08-30 | End: 2024-09-03 | Stop reason: HOSPADM

## 2024-08-30 RX ORDER — AMLODIPINE BESYLATE 5 MG/1
5 TABLET ORAL DAILY
Status: DISCONTINUED | OUTPATIENT
Start: 2024-08-31 | End: 2024-08-30

## 2024-08-30 RX ORDER — PANTOPRAZOLE SODIUM 40 MG/1
40 TABLET, DELAYED RELEASE ORAL DAILY
Status: DISCONTINUED | OUTPATIENT
Start: 2024-08-31 | End: 2024-09-03 | Stop reason: HOSPADM

## 2024-08-30 RX ORDER — SODIUM CHLORIDE 0.9 % (FLUSH) 0.9 %
10 SYRINGE (ML) INJECTION EVERY 12 HOURS PRN
Status: DISCONTINUED | OUTPATIENT
Start: 2024-08-30 | End: 2024-09-03 | Stop reason: HOSPADM

## 2024-08-30 RX ORDER — NALOXONE HCL 0.4 MG/ML
0.02 VIAL (ML) INJECTION
Status: DISCONTINUED | OUTPATIENT
Start: 2024-08-30 | End: 2024-09-03 | Stop reason: HOSPADM

## 2024-08-30 RX ORDER — FLUOXETINE HYDROCHLORIDE 20 MG/1
20 CAPSULE ORAL DAILY
COMMUNITY
Start: 2024-07-26

## 2024-08-30 RX ORDER — HYDROCODONE BITARTRATE AND ACETAMINOPHEN 5; 325 MG/1; MG/1
2 TABLET ORAL 2 TIMES DAILY
COMMUNITY

## 2024-08-30 RX ORDER — DEXAMETHASONE 4 MG/1
20 TABLET ORAL
COMMUNITY

## 2024-08-30 RX ORDER — ENOXAPARIN SODIUM 100 MG/ML
40 INJECTION SUBCUTANEOUS EVERY 24 HOURS
Status: DISCONTINUED | OUTPATIENT
Start: 2024-08-30 | End: 2024-09-03 | Stop reason: HOSPADM

## 2024-08-30 RX ORDER — FENOFIBRATE 145 MG/1
145 TABLET, FILM COATED ORAL DAILY
Status: DISCONTINUED | OUTPATIENT
Start: 2024-08-31 | End: 2024-09-03 | Stop reason: HOSPADM

## 2024-08-30 RX ORDER — CELECOXIB 200 MG/1
200 CAPSULE ORAL 2 TIMES DAILY
COMMUNITY
Start: 2024-08-08

## 2024-08-30 RX ORDER — ATORVASTATIN CALCIUM 40 MG/1
40 TABLET, FILM COATED ORAL DAILY
Status: DISCONTINUED | OUTPATIENT
Start: 2024-08-31 | End: 2024-09-03 | Stop reason: HOSPADM

## 2024-08-30 RX ADMIN — ENOXAPARIN SODIUM 40 MG: 40 INJECTION SUBCUTANEOUS at 10:08

## 2024-08-30 RX ADMIN — REMDESIVIR 200 MG: 100 INJECTION, POWDER, LYOPHILIZED, FOR SOLUTION INTRAVENOUS at 10:08

## 2024-08-30 NOTE — ED NOTES
"Pt came in by EMS with a CC of shortness of breath  PT states he has been short or breath for a couple of days  Pt states that his chest feels "whoozy, and I can feel the air going in and out"   PT states if he moves a certain way at night he feels like he's not getting enough air and then you start coughing up phlegm   Pt states the phlegm is clear  "

## 2024-08-30 NOTE — ED NOTES
Per Dr. Delgado, pt is not being worked up for sepsis at this time.  Blood cultures being obtained at this time

## 2024-08-30 NOTE — ED NOTES
Pt is on chemo for multiple myeloma. He goes every other week, his last treatment was on the 26th

## 2024-08-30 NOTE — ED PROVIDER NOTES
Encounter Date: 8/30/2024       History     Chief Complaint   Patient presents with    Shortness of Breath     Nursing home report SOB with recent COVID, currently on abx for pneumonia. Presents awake, alert. Denies pain     Patient is a 69-year-old male with a history of multiple myeloma, last chemo treatment was 1 week ago who presents to the ED with complaint of cough and generalized fatigue.  Per sister at bedside, patient was recently diagnosed with presumptive bacterial pneumonia.  He was started on antibiotics proximally 4 days ago.  They both report compliance with these medications.  The patient reports worsening feelings of fatigue, worsening cough productive of brown sputum, intermittent shortness of breath.  Per sister and nursing home at which the patient is staying, the patient has been more lethargic but not confused.  The patient is answering my questions appropriately.  He was placed on 3 L of oxygen for comfort with an O2 sat of 99%.        Review of patient's allergies indicates:   Allergen Reactions    Aspirin Hives    Penicillins Hives     History reviewed. No pertinent past medical history.  Past Surgical History:   Procedure Laterality Date    ECHOCARDIOGRAM,TRANSESOPHAGEAL N/A 3/8/2024    Procedure: Transesophageal echo (SHABNAM) intra-procedure log documentation;  Surgeon: Rachid Rothman MD;  Location: [facility] CATH LAB/EP;  Service: Cardiology;  Laterality: N/A;     No family history on file.  Social History     Tobacco Use    Smoking status: Never     Review of Systems   Constitutional:  Positive for activity change and fatigue. Negative for chills, diaphoresis and fever.   Eyes:  Negative for photophobia and visual disturbance.   Respiratory:  Positive for cough, shortness of breath and wheezing. Negative for chest tightness.    Cardiovascular:  Negative for chest pain, palpitations and leg swelling.   Gastrointestinal:  Negative for abdominal pain, nausea and vomiting.   Genitourinary:   Negative for flank pain and hematuria.   Musculoskeletal:  Negative for back pain and myalgias.   Neurological:  Negative for dizziness and headaches.   Psychiatric/Behavioral:  Negative for agitation and confusion.        Physical Exam     Initial Vitals   BP Pulse Resp Temp SpO2   08/30/24 1218 08/30/24 1218 08/30/24 1221 08/30/24 1221 08/30/24 1218   113/62 93 (!) 22 99.3 °F (37.4 °C) 98 %      MAP       --                Physical Exam    Nursing note and vitals reviewed.  Constitutional: He appears well-developed and well-nourished. No distress.   Uncomfortable appearing but no acute distress; patient is not toxic in appearance he is nonseptic in appearance   HENT:   Head: Normocephalic and atraumatic.   Right Ear: External ear normal.   Left Ear: External ear normal.   Eyes: EOM are normal. Pupils are equal, round, and reactive to light.   Neck: Neck supple.   Normal range of motion.  Cardiovascular:  Normal rate, regular rhythm, normal heart sounds and intact distal pulses.           Pulmonary/Chest: No respiratory distress. He has wheezes. He has rhonchi. He has no rales.   Coarse breath sounds intermittent with expiratory wheezes   Abdominal: Abdomen is soft. Bowel sounds are normal.   Musculoskeletal:         General: No tenderness or edema. Normal range of motion.      Cervical back: Normal range of motion and neck supple.     Neurological: He is alert and oriented to person, place, and time. GCS score is 15. GCS eye subscore is 4. GCS verbal subscore is 5. GCS motor subscore is 6.   Skin: Skin is warm.   Psychiatric: He has a normal mood and affect.   Lucid pleasant         ED Course   Procedures  Labs Reviewed   CBC W/ AUTO DIFFERENTIAL - Abnormal       Result Value    WBC 4.32      RBC 3.32 (*)     Hemoglobin 10.1 (*)     Hematocrit 31.1 (*)     MCV 94      MCH 30.4      MCHC 32.5      RDW 17.3 (*)     Platelets 174      MPV 11.9      Immature Granulocytes 0.5      Gran # (ANC) 3.8      Immature Grans  (Abs) 0.02      Lymph # 0.2 (*)     Mono # 0.2 (*)     Eos # 0.0      Baso # 0.01      nRBC 0      Gran % 88.3 (*)     Lymph % 5.6 (*)     Mono % 4.9      Eosinophil % 0.5      Basophil % 0.2      Differential Method Automated     COMPREHENSIVE METABOLIC PANEL - Abnormal    Sodium 135 (*)     Potassium 4.4      Chloride 107      CO2 17 (*)     Glucose 96      BUN 27 (*)     Creatinine 0.8      Calcium 9.0      Total Protein 6.4      Albumin 2.8 (*)     Total Bilirubin 0.6      Alkaline Phosphatase 100      AST 38      ALT 13      eGFR >60      Anion Gap 11     PROCALCITONIN - Abnormal    Procalcitonin 0.55 (*)    URINALYSIS, REFLEX TO URINE CULTURE - Abnormal    Specimen UA Urine, Clean Catch      Color, UA Yellow      Appearance, UA Hazy (*)     pH, UA 7.0      Specific Gravity, UA 1.030      Protein, UA 1+ (*)     Glucose, UA Negative      Ketones, UA Negative      Bilirubin (UA) Negative      Occult Blood UA Trace (*)     Nitrite, UA Negative      Urobilinogen, UA 2.0-3.0 (*)     Leukocytes, UA 3+ (*)     Narrative:     Specimen Source->Urine   URINALYSIS MICROSCOPIC - Abnormal    RBC, UA 28 (*)     WBC, UA >100 (*)     Bacteria Few (*)     Squam Epithel, UA 0      Hyaline Casts, UA 1      Microscopic Comment SEE COMMENT      Narrative:     Specimen Source->Urine   SARS-COV-2 RDRP GENE - Abnormal    POC Rapid COVID Positive (*)      Acceptable Yes     CULTURE, BLOOD   CULTURE, BLOOD   CULTURE, URINE   TROPONIN I    Troponin I 0.012     B-TYPE NATRIURETIC PEPTIDE    BNP 64     POCT INFLUENZA A/B MOLECULAR    POC Molecular Influenza A Ag Negative      POC Molecular Influenza B Ag Negative       Acceptable Yes          ECG Results              EKG 12-lead (In process)        Collection Time Result Time QRS Duration [facility] QTC Calculation    08/30/24 12:28:39 08/30/24 13:16:05 104 440                     In process by Interface, Lab In [facility] (08/30/24 13:16:13)                    "Narrative:    Test Reason : R06.02,    Vent. Rate : 089 BPM     Atrial Rate : 089 BPM     P-R Int : 174 ms          QRS Dur : 104 ms      QT Int : 362 ms       P-R-T Axes : 048 029 053 degrees     QTc Int : 440 ms    Normal sinus rhythm  Possible Left atrial enlargement  Borderline Abnormal ECG  When compared with ECG of 06-MAR-2024 16:52,  No significant change was found    Referred By: [email or web address]   SELF           Confirmed By:                                   Imaging Results              X-Ray Chest AP Portable (Final result)  Result time 08/30/24 14:43:52      Final result by Javier Joe MD (08/30/24 14:43:52)                   Impression:      Cardiomegaly with probable mild interstitial edema.      Electronically signed by: Javier Joe MD  Date:    08/30/2024  Time:    14:43               Narrative:    EXAMINATION:  XR CHEST AP PORTABLE    CLINICAL HISTORY:  Provided history is "  Cough, unspecified".    TECHNIQUE:  One view of the chest.    COMPARISON:  02/29/2024.    FINDINGS:  Cardiac wires overlie the chest.  Cardiomediastinal silhouette is enlarged and similar to the prior study.  Atherosclerotic calcifications overlie the aortic arch.  Coarse interstitial lung markings.  No confluent area of consolidation.  No large pleural effusion.  No pneumothorax.                                       Medications - No data to display  Medical Decision Making  Amount and/or Complexity of Data Reviewed  Labs: ordered. Decision-making details documented in ED Course.  Radiology: ordered.               ED Course as of 08/30/24 1617   Fri Aug 30, 2024   1328 Hemoglobin(!): 10.1 [LC]   1328 Hematocrit(!): 31.1 [LC]   1347 Sodium(!): 135 [LC]   1347 WBC: 4.32 [LC]   1347 Hemoglobin(!): 10.1 [LC]   1347 Hematocrit(!): 31.1 [LC]   1347 Albumin(!): 2.8 [LC]   1502 Procalcitonin(!): 0.55 [LC]   1503 BNP: 64 [LC]   1503 Troponin I: 0.012 [LC]   1521 Influenza A & B by Molecular [LC]   1556 Case discussed with the [facility] " Hospital Medicine resident who states he will admit patient to his service for further evaluation and management [LC]      ED Course User Index  [LC] Addison Delgado MD               Medical Decision Making:   Clinical Tests:   Lab Tests: Ordered and Reviewed  Radiological Study: Ordered and Reviewed  ED Management:  - patient with O2 sat of 93-95% on 2 L of oxygen; he is COVID positive; his chest x-ray demonstrates questionable left lower lobe pneumonia; his labs are reassuring but his procalcitonin mildly elevated at 0.55; in light of the patient's failed outpatient treatment for presumptive pneumonia and questionable pneumonia on repeat chest x-ray in the context of COVID infection, I do think the patient would benefit from admission at this time for further evaluation and management with antivirals, potentially antibiotics, breathing treatments.  I discussed the case with the [facility] Hospital Medicine resident who will admit the patient to his service.             Clinical Impression:  Final diagnoses:  [R06.02] SOB (shortness of breath)  [R05.9] Cough  [U07.1] COVID-19 virus infection (Primary)  [R09.02] Hypoxia  [Z85.79] History of multiple myeloma          ED Disposition Condition    Observation Stable                Addison Delgado MD  08/30/24 1946

## 2024-08-30 NOTE — PHARMACY MED REC
"    [facility]           Pharmacy  Admission Medication History     The home medication history was taken by Radha Ojeda.      Medication history obtained from Medications listed below were obtained from: [employer] software- nodila    Based on information gathered for medication list, you may go to "Admission" then "Reconcile Home Medications" tabs to review and/or act upon those items.     The home medication list has been updated by the Pharmacy department.   Please read ALL comments highlighted in yellow.   Please address this information as you see fit.    Feel free to contact us if you have any questions or require assistance.        No current facility-administered medications on file prior to encounter.     Current Outpatient Medications on File Prior to Encounter   Medication Sig Dispense Refill    albuterol-ipratropium (DUO-NEB) 2.5 mg-0.5 mg/3 mL nebulizer solution Take by nebulization.      allopurinoL (ZYLOPRIM) 300 MG tablet Take 300 mg by mouth once daily.      amLODIPine (NORVASC) 5 MG tablet Take 5 mg by mouth once daily.      atorvastatin (LIPITOR) 40 MG tablet Take 1 tablet (40 mg total) by mouth once daily. 90 tablet 3    celecoxib (CELEBREX) 200 MG capsule Take 200 mg by mouth 2 (two) times daily.      clopidogreL (PLAVIX) 75 mg tablet Take 1 tablet (75 mg total) by mouth once daily. 30 tablet 11    dexAMETHasone (DECADRON) 4 MG Tab Take 20 mg by mouth. Five 4mg tablets with breakfast weekly      doxycycline (VIBRAMYCIN) 100 MG Cap Take 100 mg by mouth 2 (two) times daily.      FLUoxetine 20 MG capsule Take 20 mg by mouth once daily.      lenalidomide 25 mg Cap Take 25 mg by mouth nightly On days 1-21; every 28 day cycle.      pantoprazole (PROTONIX) 40 MG tablet Take 1 tablet (40 mg total) by mouth once daily. 30 tablet 11    valACYclovir (VALTREX) 500 MG tablet Take 500 mg by mouth once daily.      vitamin D (VITAMIN D3) 1000 units Tab Take 1,000 Units by mouth once " daily.      acetaminophen (TYLENOL) 650 MG TbSR Take 650 mg by mouth every 8 (eight) hours as needed.      arginine-vitamin C-vitamin E (ARGINAID) 4.5 gram-156 mg/9.2 gram PwPk Take 1 packet by mouth 2 (two) times a day. 8 ounces water      ascorbic acid, vitamin C, (VITAMIN C) 500 MG tablet Take 500 mg by mouth 2 (two) times daily.      bisacodyL (DULCOLAX) 5 mg EC tablet Take 10 mg by mouth daily as needed for Constipation.      calcium carbonate (OS-SONIA) 600 mg calcium (1,500 mg) Tab Take 1,200 mg by mouth every evening.      fenofibrate (TRICOR) 145 MG tablet Take 1 tablet (145 mg total) by mouth once daily. 35 tablet 0    guaiFENesin 100 mg/5 ml (MCKENNA-TUSSIN) 100 mg/5 mL syrup Take 200 mg by mouth every 4 (four) hours as needed for Cough.      HYDROcodone-acetaminophen (NORCO) 5-325 mg per tablet Take 2 tablets by mouth 2 (two) times daily.      multivitamin (ONE DAILY MULTIVITAMIN) per tablet Take 1 tablet by mouth once daily.      ondansetron (ZOFRAN) 4 MG tablet Take 4 mg by mouth 4 (four) times daily as needed for Nausea (vomiting).      protein supplement (PROMOD PROTEIN) Liqd Take 30 mLs by mouth 2 (two) times a day.      zinc sulfate (ZINCATE) 50 mg zinc (220 mg) capsule Take 220 mg by mouth once daily.         Please address this information as you see fit.  Feel free to contact us if you have any questions or require assistance.    Radha Ojeda  [phone]              .

## 2024-08-31 LAB
ALBUMIN SERPL BCP-MCNC: 2.5 G/DL (ref 3.5–5.2)
ALP SERPL-CCNC: 94 U/L (ref 55–135)
ALT SERPL W/O P-5'-P-CCNC: 14 U/L (ref 10–44)
ANION GAP SERPL CALC-SCNC: 9 MMOL/L (ref 8–16)
AST SERPL-CCNC: 35 U/L (ref 10–40)
BASOPHILS # BLD AUTO: 0 K/UL (ref 0–0.2)
BASOPHILS NFR BLD: 0 % (ref 0–1.9)
BILIRUB SERPL-MCNC: 0.3 MG/DL (ref 0.1–1)
BUN SERPL-MCNC: 21 MG/DL (ref 8–23)
CALCIUM SERPL-MCNC: 8.9 MG/DL (ref 8.7–10.5)
CHLORIDE SERPL-SCNC: 107 MMOL/L (ref 95–110)
CO2 SERPL-SCNC: 22 MMOL/L (ref 23–29)
CREAT SERPL-MCNC: 0.7 MG/DL (ref 0.5–1.4)
DIFFERENTIAL METHOD BLD: ABNORMAL
EOSINOPHIL # BLD AUTO: 0.1 K/UL (ref 0–0.5)
EOSINOPHIL NFR BLD: 2 % (ref 0–8)
ERYTHROCYTE [DISTWIDTH] IN BLOOD BY AUTOMATED COUNT: 17.2 % (ref 11.5–14.5)
EST. GFR  (NO RACE VARIABLE): >60 ML/MIN/1.73 M^2
GLUCOSE SERPL-MCNC: 114 MG/DL (ref 70–110)
HCT VFR BLD AUTO: 27.7 % (ref 40–54)
HGB BLD-MCNC: 9 G/DL (ref 14–18)
IMM GRANULOCYTES # BLD AUTO: 0.02 K/UL (ref 0–0.04)
IMM GRANULOCYTES NFR BLD AUTO: 0.8 % (ref 0–0.5)
LYMPHOCYTES # BLD AUTO: 0.2 K/UL (ref 1–4.8)
LYMPHOCYTES NFR BLD: 7.6 % (ref 18–48)
MCH RBC QN AUTO: 30.3 PG (ref 27–31)
MCHC RBC AUTO-ENTMCNC: 32.5 G/DL (ref 32–36)
MCV RBC AUTO: 93 FL (ref 82–98)
MONOCYTES # BLD AUTO: 0.2 K/UL (ref 0.3–1)
MONOCYTES NFR BLD: 6.4 % (ref 4–15)
NEUTROPHILS # BLD AUTO: 2.1 K/UL (ref 1.8–7.7)
NEUTROPHILS NFR BLD: 83.2 % (ref 38–73)
NRBC BLD-RTO: 0 /100 WBC
OHS QRS DURATION: 104 MS
OHS QTC CALCULATION: 440 MS
PLATELET # BLD AUTO: 155 K/UL (ref 150–450)
PMV BLD AUTO: 11 FL (ref 9.2–12.9)
POTASSIUM SERPL-SCNC: 3.1 MMOL/L (ref 3.5–5.1)
PROT SERPL-MCNC: 5.5 G/DL (ref 6–8.4)
RBC # BLD AUTO: 2.97 M/UL (ref 4.6–6.2)
SODIUM SERPL-SCNC: 138 MMOL/L (ref 136–145)
WBC # BLD AUTO: 2.5 K/UL (ref 3.9–12.7)

## 2024-08-31 PROCEDURE — 94761 N-INVAS EAR/PLS OXIMETRY MLT: CPT

## 2024-08-31 PROCEDURE — 85025 COMPLETE CBC W/AUTO DIFF WBC: CPT

## 2024-08-31 PROCEDURE — 25000003 PHARM REV CODE 250

## 2024-08-31 PROCEDURE — 96366 THER/PROPH/DIAG IV INF ADDON: CPT

## 2024-08-31 PROCEDURE — 63600175 PHARM REV CODE 636 W HCPCS

## 2024-08-31 PROCEDURE — 94640 AIRWAY INHALATION TREATMENT: CPT

## 2024-08-31 PROCEDURE — 25000242 PHARM REV CODE 250 ALT 637 W/ HCPCS

## 2024-08-31 PROCEDURE — 11000001 HC ACUTE MED/SURG PRIVATE ROOM

## 2024-08-31 PROCEDURE — 99900035 HC TECH TIME PER 15 MIN (STAT)

## 2024-08-31 PROCEDURE — 94760 N-INVAS EAR/PLS OXIMETRY 1: CPT

## 2024-08-31 PROCEDURE — XW033E5 INTRODUCTION OF REMDESIVIR ANTI-INFECTIVE INTO PERIPHERAL VEIN, PERCUTANEOUS APPROACH, NEW TECHNOLOGY GROUP 5: ICD-10-PCS | Performed by: INTERNAL MEDICINE

## 2024-08-31 PROCEDURE — 36415 COLL VENOUS BLD VENIPUNCTURE: CPT

## 2024-08-31 PROCEDURE — 96367 TX/PROPH/DG ADDL SEQ IV INF: CPT

## 2024-08-31 PROCEDURE — 94640 AIRWAY INHALATION TREATMENT: CPT | Mod: XB

## 2024-08-31 PROCEDURE — 27000207 HC ISOLATION

## 2024-08-31 PROCEDURE — 27000221 HC OXYGEN, UP TO 24 HOURS

## 2024-08-31 PROCEDURE — 80053 COMPREHEN METABOLIC PANEL: CPT

## 2024-08-31 RX ORDER — IPRATROPIUM BROMIDE AND ALBUTEROL SULFATE 2.5; .5 MG/3ML; MG/3ML
3 SOLUTION RESPIRATORY (INHALATION) EVERY 4 HOURS
Status: DISCONTINUED | OUTPATIENT
Start: 2024-08-31 | End: 2024-09-03 | Stop reason: HOSPADM

## 2024-08-31 RX ORDER — IPRATROPIUM BROMIDE AND ALBUTEROL SULFATE 2.5; .5 MG/3ML; MG/3ML
3 SOLUTION RESPIRATORY (INHALATION) EVERY 4 HOURS PRN
Status: DISCONTINUED | OUTPATIENT
Start: 2024-08-31 | End: 2024-08-31

## 2024-08-31 RX ORDER — ALBUTEROL SULFATE 90 UG/1
2 INHALANT RESPIRATORY (INHALATION) EVERY 6 HOURS PRN
Status: DISCONTINUED | OUTPATIENT
Start: 2024-08-31 | End: 2024-08-31

## 2024-08-31 RX ADMIN — CEFTRIAXONE SODIUM 1 G: 1 INJECTION, POWDER, FOR SOLUTION INTRAMUSCULAR; INTRAVENOUS at 11:08

## 2024-08-31 RX ADMIN — AZITHROMYCIN MONOHYDRATE 500 MG: 500 INJECTION, POWDER, LYOPHILIZED, FOR SOLUTION INTRAVENOUS at 01:08

## 2024-08-31 RX ADMIN — REMDESIVIR 100 MG: 100 INJECTION, POWDER, LYOPHILIZED, FOR SOLUTION INTRAVENOUS at 09:08

## 2024-08-31 RX ADMIN — ALBUTEROL SULFATE 2 PUFF: 90 AEROSOL, METERED RESPIRATORY (INHALATION) at 01:08

## 2024-08-31 RX ADMIN — PANTOPRAZOLE SODIUM 40 MG: 40 TABLET, DELAYED RELEASE ORAL at 09:08

## 2024-08-31 RX ADMIN — IPRATROPIUM BROMIDE AND ALBUTEROL SULFATE 3 ML: 2.5; .5 SOLUTION RESPIRATORY (INHALATION) at 07:08

## 2024-08-31 RX ADMIN — IPRATROPIUM BROMIDE AND ALBUTEROL SULFATE 3 ML: 2.5; .5 SOLUTION RESPIRATORY (INHALATION) at 04:08

## 2024-08-31 RX ADMIN — CEFTRIAXONE SODIUM 1 G: 1 INJECTION, POWDER, FOR SOLUTION INTRAMUSCULAR; INTRAVENOUS at 01:08

## 2024-08-31 RX ADMIN — POTASSIUM BICARBONATE 40 MEQ: 391 TABLET, EFFERVESCENT ORAL at 09:08

## 2024-08-31 RX ADMIN — IPRATROPIUM BROMIDE AND ALBUTEROL SULFATE 3 ML: 2.5; .5 SOLUTION RESPIRATORY (INHALATION) at 03:08

## 2024-08-31 RX ADMIN — CLOPIDOGREL BISULFATE 75 MG: 75 TABLET ORAL at 09:08

## 2024-08-31 RX ADMIN — POTASSIUM BICARBONATE 40 MEQ: 391 TABLET, EFFERVESCENT ORAL at 11:08

## 2024-08-31 RX ADMIN — IPRATROPIUM BROMIDE AND ALBUTEROL SULFATE 3 ML: 2.5; .5 SOLUTION RESPIRATORY (INHALATION) at 11:08

## 2024-08-31 RX ADMIN — ATORVASTATIN CALCIUM 40 MG: 40 TABLET, FILM COATED ORAL at 09:08

## 2024-08-31 RX ADMIN — ENOXAPARIN SODIUM 40 MG: 40 INJECTION SUBCUTANEOUS at 05:08

## 2024-08-31 RX ADMIN — FENOFIBRATE 145 MG: 145 TABLET ORAL at 09:08

## 2024-08-31 RX ADMIN — DEXAMETHASONE 6 MG: 4 TABLET ORAL at 09:08

## 2024-08-31 RX ADMIN — FLUTICASONE FUROATE AND VILANTEROL TRIFENATATE 1 PUFF: 200; 25 POWDER RESPIRATORY (INHALATION) at 08:08

## 2024-08-31 NOTE — H&P
[facility] Medicine H&P Note     Admitting Team: [facility] Hospitalist Team B  Attending Physician: Selwyn Leal MD  Resident: Martin Gonsalez MD  Intern: Mello Kerns MD    Date of Admit: 8/30/2024    Chief Complaint     SOB, cough    Subjective:      History of Present Illness:  Harrison Haywood is a 69 y.o. male with a PMH of multiple myeloma, HTN, HLD, prior CVA (3/6/2024), and anxiety/depression who presents for cough and shortness of breath.    Pt stays at [facility] and reports that he has had cough, congestion, shortness of breath, and fatigue for the past 2 days. Of note, he reports having Covid 3 weeks ago, treated, but has had a cough ever since then. However, his shortness of breath, congestion, and fatigue are new. He was seen by his nursing home provider 2 days prior, and reports that chest x-ray at that time was concerning for possible pneumonia so they started him on Doxycycline. Also reports clear sputum production. Denies any fevers/chills, nausea/vomiting, body aches, chest pain, headache, dizziness, blurriness. Pt is not on oxygen at home. Denies any smoking history, alcohol use, or illicit drug use.     In the ED, was found to be COVID positive. Initially requiring 2 L NC due to oxygen saturation of 88%. Chest x-ray showing coarse interstitial lung markings but no consolidation or edema. Subsequently weaned to room air. Admitted to [facility] medicine for further evaluation and treatment.      Past Medical History:  History reviewed. No pertinent past medical history.    Past Surgical History:  Past Surgical History:   Procedure Laterality Date    ECHOCARDIOGRAM,TRANSESOPHAGEAL N/A 3/8/2024    Procedure: Transesophageal echo (SHABNAM) intra-procedure log documentation;  Surgeon: Rachid Rothman MD;  Location: [facility] CATH LAB/EP;  Service: Cardiology;  Laterality: N/A;       Allergies:  Review of patient's allergies indicates:   Allergen Reactions    Aspirin Hives    Penicillins Hives       Home  Medications:  Prior to Admission medications    Medication Sig Start Date End Date Taking? Authorizing Provider   albuterol-ipratropium (DUO-NEB) 2.5 mg-0.5 mg/3 mL nebulizer solution Take by nebulization. 8/28/24  Yes Provider, Historical   allopurinoL (ZYLOPRIM) 300 MG tablet Take 300 mg by mouth once daily. 2/2/24  Yes Provider, Historical   amLODIPine (NORVASC) 5 MG tablet Take 5 mg by mouth once daily.   Yes Provider, Historical   atorvastatin (LIPITOR) 40 MG tablet Take 1 tablet (40 mg total) by mouth once daily. 3/9/24 3/9/25 Yes Viet Rodas MD   celecoxib (CELEBREX) 200 MG capsule Take 200 mg by mouth 2 (two) times daily. 8/8/24  Yes Provider, Historical   clopidogreL (PLAVIX) 75 mg tablet Take 1 tablet (75 mg total) by mouth once daily. 3/9/24 3/9/25 Yes Viet Rodas MD   dexAMETHasone (DECADRON) 4 MG Tab Take 20 mg by mouth. Five 4mg tablets with breakfast weekly   Yes Provider, Historical   doxycycline (VIBRAMYCIN) 100 MG Cap Take 100 mg by mouth 2 (two) times daily. 8/27/24  Yes Provider, Historical   FLUoxetine 20 MG capsule Take 20 mg by mouth once daily. 7/26/24  Yes Provider, Historical   lenalidomide 25 mg Cap Take 25 mg by mouth nightly On days 1-21; every 28 day cycle. 8/29/24  Yes Provider, Historical   pantoprazole (PROTONIX) 40 MG tablet Take 1 tablet (40 mg total) by mouth once daily. 3/4/24 3/4/25 Yes Viet Rodas MD   valACYclovir (VALTREX) 500 MG tablet Take 500 mg by mouth once daily.   Yes Provider, Historical   vitamin D (VITAMIN D3) 1000 units Tab Take 1,000 Units by mouth once daily. 8/19/24  Yes Provider, Historical   acetaminophen (TYLENOL) 650 MG TbSR Take 650 mg by mouth every 8 (eight) hours as needed.    Provider, Historical   arginine-vitamin C-vitamin E (ARGINAID) 4.5 gram-156 mg/9.2 gram PwPk Take 1 packet by mouth 2 (two) times a day. 8 ounces water    Provider, Historical   ascorbic acid, vitamin C, (VITAMIN C) 500 MG tablet Take 500 mg by mouth 2 (two) times daily.     Provider, Historical   bisacodyL (DULCOLAX) 5 mg EC tablet Take 10 mg by mouth daily as needed for Constipation.    Provider, Historical   calcium carbonate (OS-SONIA) 600 mg calcium (1,500 mg) Tab Take 1,200 mg by mouth every evening.    Provider, Historical   fenofibrate (TRICOR) 145 MG tablet Take 1 tablet (145 mg total) by mouth once daily. 3/3/24 4/7/24  Viet Rodas MD   guaiFENesin 100 mg/5 ml (MCKENNA-TUSSIN) 100 mg/5 mL syrup Take 200 mg by mouth every 4 (four) hours as needed for Cough.    Provider, Historical   HYDROcodone-acetaminophen (NORCO) 5-325 mg per tablet Take 2 tablets by mouth 2 (two) times daily.    Provider, Historical   multivitamin (ONE DAILY MULTIVITAMIN) per tablet Take 1 tablet by mouth once daily.    Provider, Historical   ondansetron (ZOFRAN) 4 MG tablet Take 4 mg by mouth 4 (four) times daily as needed for Nausea (vomiting).    Provider, Historical   protein supplement (PROMOD PROTEIN) Liqd Take 30 mLs by mouth 2 (two) times a day.    Provider, Historical   zinc sulfate (ZINCATE) 50 mg zinc (220 mg) capsule Take 220 mg by mouth once daily.    Provider, Historical   FLUoxetine 40 MG capsule Take 40 mg by mouth once daily. 24  Provider, Historical   VITAMIN D3 10 mcg (400 unit) tablet Take 800 Units by mouth every evening. 24  Provider, Historical       Family History:  No family history on file.    Social History:  Social History     Tobacco Use    Smoking status: Never       Review of Systems:  ROS  All other systems are reviewed and are negative.       Objective:   Last 24 Hour Vital Signs:  BP  Min: 100/55  Max: 136/63  Temp  Av.3 °F (37.4 °C)  Min: 99.3 °F (37.4 °C)  Max: 99.3 °F (37.4 °C)  Pulse  Av  Min: 77  Max: 107  Resp  Av.4  Min: 20  Max: 22  SpO2  Av.1 %  Min: 92 %  Max: 98 %  Weight  Av.8 kg (198 lb)  Min: 89.8 kg (198 lb)  Max: 89.8 kg (198 lb)  Body mass index is 26.12 kg/m².  No intake/output data recorded.    Physical  "Examination:    Constitutional: cooperative, calm, appears stated age.  HEENT: NCAT, PERRL, MMM  Neck: Supple, no masses, trachea not deviated.  Resp: Symmetrical, no increased work of breathing. Wheezes bilaterally  Cardio: RRR, S1 and S2 normal, no murmurs or added sounds.  GI: Soft, non-tender, bowel sounds active.  Extremities: No edema, peripheral pulses 2+ and symmetric, extremities warm.  Skin: No rashes, bruises, or lesions.    Neurologic: Alert and oriented x3, follows commands, moves extremities spontaneously.    Laboratory:  Most Recent Data:  CBC:   Lab Results   Component Value Date    WBC 4.32 08/30/2024    HGB 10.1 (L) 08/30/2024    HCT 31.1 (L) 08/30/2024     08/30/2024    MCV 94 08/30/2024    RDW 17.3 (H) 08/30/2024       BMP:   Lab Results   Component Value Date     (L) 08/30/2024    K 4.4 08/30/2024     08/30/2024    CO2 17 (L) 08/30/2024    BUN 27 (H) 08/30/2024    CREATININE 0.8 08/30/2024    GLU 96 08/30/2024    CALCIUM 9.0 08/30/2024    MG 2.1 03/07/2024     LFTs:   Lab Results   Component Value Date    PROT 6.4 08/30/2024    ALBUMIN 2.8 (L) 08/30/2024    BILITOT 0.6 08/30/2024    AST 38 08/30/2024    ALKPHOS 100 08/30/2024    ALT 13 08/30/2024     Coags:   Lab Results   Component Value Date    INR 1.1 04/26/2024     FLP:   Lab Results   Component Value Date    CHOL 147 03/06/2024    HDL 48 03/06/2024    LDLCALC 66.2 03/06/2024    TRIG 164 (H) 03/06/2024    CHOLHDL 32.7 03/06/2024     DM:   Lab Results   Component Value Date    HGBA1C 5.2 02/29/2024    LDLCALC 66.2 03/06/2024    CREATININE 0.8 08/30/2024     Thyroid:   Lab Results   Component Value Date    TSH 0.203 (L) 03/06/2024    FREET4 1.15 03/06/2024     Anemia: No results found for: "IRON", "TIBC", "FERRITIN", "[id number]", "FOLATE"  Cardiac:   Lab Results   Component Value Date    TROPONINI 0.012 08/30/2024    BNP 64 08/30/2024     Urinalysis:   Lab Results   Component Value Date    COLORU Yellow 08/30/2024    " "SPECGRAV 1.030 08/30/2024    NITRITE Negative 08/30/2024    KETONESU Negative 08/30/2024    UROBILINOGEN 2.0-3.0 (A) 08/30/2024    WBCUA >100 (H) 08/30/2024       Microbiology Data:  SARS-CoV-2: Positive    Other Results:  EKG (my interpretation): sinus rhythm    Radiology:  Imaging Results              X-Ray Chest AP Portable (Final result)  Result time 08/30/24 14:43:52      Final result by Javier Joe MD (08/30/24 14:43:52)                   Impression:      Cardiomegaly with probable mild interstitial edema.      Electronically signed by: Javier Joe MD  Date:    08/30/2024  Time:    14:43               Narrative:    EXAMINATION:  XR CHEST AP PORTABLE    CLINICAL HISTORY:  Provided history is "  Cough, unspecified".    TECHNIQUE:  One view of the chest.    COMPARISON:  02/29/2024.    FINDINGS:  Cardiac wires overlie the chest.  Cardiomediastinal silhouette is enlarged and similar to the prior study.  Atherosclerotic calcifications overlie the aortic arch.  Coarse interstitial lung markings.  No confluent area of consolidation.  No large pleural effusion.  No pneumothorax.                                         Assessment:     Harrison Haywood is a 69 y.o. male with a PMH of multiple myeloma, HTN, HLD, prior CVA (3/6/2024), and anxiety/depression who presents for cough and shortness of breath.    Initially on 2 L NC due to increased oxygen requirement in the ED. Subsequently weaned to room air. Found to be COVID positive. Chest x-ray showing no consolidation or effusion. Started on dexamethasone and remdesivir for treatment.    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 Initially on 2 L NC in ED, subsequently weaned to room air. Afebrile and with no leukocytosis. Lactic acid WNL. Symptoms likely due to COVID infection, however, cannot rule out superimposed bacterial pneumonia as pt also reports being COVID positive 3 weeks prior.  - remdesivir 200 mg daily  - dexamethasone 6 mg daily  - Duonebs q6h  - ceftriaxone 1g daily, day 1/5  - azithromycin 500 mg daily, day 1/3  - continue to monitor    HTN  Pressures normotensive on admission. Reports taking amlodipine 5 mg at home.  - continue to monitor, can start back home amlodipine if necessary    HLD  - Lipitor 40 mg daily    Prior CVA  Prior remote L MCA infarct and prior acute ischemic infarct in high right cerebral convexity on 3/7/24. Pt with no residual deficits. On home Plavix monotherapy and statin.  - continue home Plavix 75 mg daily  - continue home Lipitor    Multiple myeloma  Pt following with Dr. Howard with heme/onc. Bone marrow biopsy (12/18/23) showing 70% plasma cells. Per EMR, medications regimen is as follows: Revlimid 25 mg PO daily on days 1-21.   Daratumumab 1,800 mg - 30,000 units subcutaneous on days 1 & 15  Decadron 20 mg PO daily on days 1, 8, 15, 22. Also on Valacyclovir 500 mg daily and Allopurinol 300 mg daily for prophylaxis.  - consult heme/onc for chemotherapy recommendations      Diet: Cardiac  DVT: Lovenox  Dispo: Pending symptom resolution and COVID treatment    Code Status:     Full    Mello Kerns MD  [facility] Internal Medicine/Pediatrics PGY-I  [employer] Internal Medicine Team B    [facility] Medicine Hospitalist Pager numbers:   [facility] Hospitalist Medicine Team A  (Kassidy/Iris): [phone]  [facility] Hospitalist Medicine Team B (Mel/Eduardo):  [phone]

## 2024-08-31 NOTE — NURSING
Received pt from ED via stretcher, positioned in bed for comfort and safety. Vitals and full assessment completed. O2 sat 90% on room air, pt placed on 2L NC. Pt denies any shortness of breath. No other needs expressed at this time. Pt oriented to room and use of call light. Bed in lowest position, call bell within reach, bed alarm on.

## 2024-08-31 NOTE — PROGRESS NOTES
Inpatient Upgrade Note    Harrison Haywood has warranted treatment spanning two or more midnights of hospital level care for the management of  COVID, possible pneumonia . He continues to require IV antibiotics, daily labs, supplemental oxygen, further testing/imaging, and monitoring of vital signs. His condition is also complicated by the following comorbidities:  multiple myeloma, HTN, HLD .    Mello Kerns MD  [facility] Internal Medicine/Pediatrics PGY-I

## 2024-08-31 NOTE — NURSING
Upon talking with pt, expiratory wheezing noted. Pt denies any shortness of breath. O2 sat currently 95% on 2L NC. Dr. Francis Orlando notified and orders received for PRN inhaler puff treatments. Orders entered and carried out. Will continue to monitor.

## 2024-08-31 NOTE — PROGRESS NOTES
"[facility]           Pharmacy  Admission Medication Reconciliation     Based on information gathered for medication list, you may go to "Admission" then "Reconcile Home Medications" tabs to review and/or act upon those items.     The home medication list has been updated by the Pharmacy department.   Please read ALL comments highlighted in red.   Please address this information as you see fit.    Feel free to contact us if you have any questions or require assistance.    Home medication list has been compared to current inpatient medications. Please review the following discrepancies noted below:  Patient reports STILL TAKING the following medication(s) which was not ordered upon admit  Allopurinol 300mg daily    Feel free to contact us if you have any questions or require assistance.    Nestor Pantoja, PharmD  [phone]    "

## 2024-08-31 NOTE — PROGRESS NOTES
"[facility] Medicine Progress Note    Primary Team: [facility] Hospitalist Team B  Attending Physician: Selwyn Leal MD  Resident: Martin Gonsalez MD  Intern: Mello Kerns MD    Subjective:      Had shortness of breath last night and was subsequently transitioned to negative pressure room and given Duonebs. Pt reports feeling better this morning, although still with cough and wheezing. Currently on 3 L NC. Continuing steroids, remdesivir, and CAP coverage.     Objective:     Last 24 Hour Vital Signs:  BP  Min: 100/55  Max: 136/63  Temp  Av.9 °F (37.7 °C)  Min: 99.3 °F (37.4 °C)  Max: 100.3 °F (37.9 °C)  Pulse  Av.5  Min: 77  Max: 107  Resp  Av.2  Min: 16  Max: 22  SpO2  Av.9 %  Min: 90 %  Max: 98 %  Height  Av' 1" (185.4 cm)  Min: 6' 1" (185.4 cm)  Max: 6' 1" (185.4 cm)  Weight  Av.7 kg (195 lb 7.2 oz)  Min: 87.5 kg (192 lb 14.4 oz)  Max: 89.8 kg (198 lb)  No intake/output data recorded.    Physical Examination:  Constitutional: cooperative, calm, appears stated age.  HEENT: NCAT, PERRL, MMM  Neck: Supple, no masses, trachea not deviated.  Resp: Symmetrical, no increased work of breathing. Wheezes bilaterally  Cardio: RRR, S1 and S2 normal, no murmurs or added sounds.  GI: Soft, non-tender, bowel sounds active.  Extremities: No edema, peripheral pulses 2+ and symmetric, extremities warm.  Skin: No rashes, bruises, or lesions.    Neurologic: Alert and oriented x3, follows commands, moves extremities spontaneously.      Laboratory:  Recent Labs   Lab 24  1249 24  0340   WBC 4.32 2.50*   HGB 10.1* 9.0*   HCT 31.1* 27.7*    155   MCV 94 93   RDW 17.3* 17.2*   * 138   K 4.4 3.1*    107   CO2 17* 22*   BUN 27* 21   CREATININE 0.8 0.7   GLU 96 114*   PROT 6.4 5.5*   ALBUMIN 2.8* 2.5*   BILITOT 0.6 0.3   AST 38 35   ALKPHOS 100 94   ALT 13 14     Laboratory Data Reviewed:  Pertinent Findings:  Mild hypokalemia of 3.1. Mild anemia of 9.0. Decreased WBC of 2.5    Microbiology " Data Reviewed:  Pertinent Findings:  COVID positive  Blood cultures pending  Urine cultures pending    Other Results:  EKG (my interpretation): sinus rhythm    Radiology Data Reviewed:   Pertinent Findings:  Chest x-ray with coarse interstitial lung markings    Current Medications:     Infusions:       Scheduled:   atorvastatin  40 mg Oral Daily    azithromycin  500 mg Intravenous Q24H    cefTRIAXone (Rocephin) IV (PEDS and ADULTS)  1 g Intravenous Q24H    clopidogreL  75 mg Oral Daily    dexAMETHasone  6 mg Oral Daily    enoxparin  40 mg Subcutaneous Daily    fenofibrate  145 mg Oral Daily    fluticasone furoate-vilanteroL  1 puff Inhalation Daily    pantoprazole  40 mg Oral Daily    remdesivir infusion  100 mg Intravenous Daily        PRN:    Current Facility-Administered Medications:     albuterol-ipratropium, 3 mL, Nebulization, Q4H PRN    dextrose 10%, 12.5 g, Intravenous, PRN    dextrose 10%, 25 g, Intravenous, PRN    glucagon (human recombinant), 1 mg, Intramuscular, PRN    glucose, 16 g, Oral, PRN    glucose, 24 g, Oral, PRN    naloxone, 0.02 mg, Intravenous, PRN    sodium chloride 0.9%, 10 mL, Intravenous, Q12H PRN        Assessment:     Harrison Haywood is a 69 y.o. male with a PMH of multiple myeloma, HTN, HLD, prior CVA (3/6/2024), and anxiety/depression who presents for cough and shortness of breath.     Initially on 2 L NC due to increased oxygen requirement in the ED. Subsequently weaned to room air. Found to be COVID positive. Chest x-ray showing no consolidation or effusion. Started on dexamethasone and remdesivir for treatment. Also on CAP coverage for possible superimposed pneumonia.     Plan:     Acute hypoxic respiratory failure  COVID  Concern for pneumonia  Pt found to be COVID positive. Initially on 2 L NC in ED, subsequently weaned to room air. Afebrile and with no leukocytosis. Lactic acid WNL. Symptoms likely due to COVID infection, however, cannot rule out superimposed bacterial pneumonia as  pt also reports being COVID positive 3 weeks prior.  - remdesivir 200 mg daily  - dexamethasone 6 mg daily  - Duonebs q6h  - ceftriaxone 1g daily, day 2/5  - azithromycin 500 mg daily, day 2/3  - continue to monitor     HTN  Pressures normotensive on admission. Reports taking amlodipine 5 mg at home.  - continue to monitor, can start back home amlodipine if necessary     HLD  - Lipitor 40 mg daily     Prior CVA  Prior remote L MCA infarct and prior acute ischemic infarct in high right cerebral convexity on 3/7/24. Pt with no residual deficits. On home Plavix monotherapy and statin.  - continue home Plavix 75 mg daily  - continue home Lipitor     Multiple myeloma  Pt following with Dr. Howard with heme/onc. Bone marrow biopsy (12/18/23) showing 70% plasma cells. Per EMR, medications regimen is as follows: Revlimid 25 mg PO daily on days 1-21.   Daratumumab 1,800 mg - 30,000 units subcutaneous on days 1 & 15  Decadron 20 mg PO daily on days 1, 8, 15, 22. Also on Valacyclovir 500 mg daily and Allopurinol 300 mg daily for prophylaxis.  - consult heme/onc for chemotherapy recommendations    Chronic sacral wound  With clean pink tissue, no active bleeding or purulent drainage.  - wound care consulted, will follow up recs    Diet: Cardiac  DVT Prophylaxis: Lovenox  Code: Full    Dispo:    Mello Kerns MD  [facility] Internal Medicine/Pediatrics PGY-I  [facility] Internal Medicine Team B    [facility] Medicine Hospitalist Pager numbers:   [facility] Hospitalist Medicine Team A (Kassidy/Iris): [phone]  [facility] Hospitalist Medicine Team B (Mel/Eduardo):  [phone]

## 2024-09-01 LAB
ALBUMIN SERPL BCP-MCNC: 2.4 G/DL (ref 3.5–5.2)
ALP SERPL-CCNC: 95 U/L (ref 55–135)
ALT SERPL W/O P-5'-P-CCNC: 23 U/L (ref 10–44)
ANION GAP SERPL CALC-SCNC: 8 MMOL/L (ref 8–16)
ANISOCYTOSIS BLD QL SMEAR: SLIGHT
AST SERPL-CCNC: 43 U/L (ref 10–40)
BASOPHILS # BLD AUTO: ABNORMAL K/UL (ref 0–0.2)
BASOPHILS NFR BLD: 0 % (ref 0–1.9)
BILIRUB SERPL-MCNC: 0.3 MG/DL (ref 0.1–1)
BUN SERPL-MCNC: 21 MG/DL (ref 8–23)
BURR CELLS BLD QL SMEAR: ABNORMAL
CALCIUM SERPL-MCNC: 9 MG/DL (ref 8.7–10.5)
CHLORIDE SERPL-SCNC: 107 MMOL/L (ref 95–110)
CO2 SERPL-SCNC: 22 MMOL/L (ref 23–29)
CREAT SERPL-MCNC: 0.7 MG/DL (ref 0.5–1.4)
DIFFERENTIAL METHOD BLD: ABNORMAL
EOSINOPHIL # BLD AUTO: ABNORMAL K/UL (ref 0–0.5)
EOSINOPHIL NFR BLD: 0 % (ref 0–8)
ERYTHROCYTE [DISTWIDTH] IN BLOOD BY AUTOMATED COUNT: 17.4 % (ref 11.5–14.5)
EST. GFR  (NO RACE VARIABLE): >60 ML/MIN/1.73 M^2
GLUCOSE SERPL-MCNC: 175 MG/DL (ref 70–110)
HCT VFR BLD AUTO: 27.5 % (ref 40–54)
HGB BLD-MCNC: 9.1 G/DL (ref 14–18)
IMM GRANULOCYTES # BLD AUTO: ABNORMAL K/UL (ref 0–0.04)
IMM GRANULOCYTES NFR BLD AUTO: ABNORMAL % (ref 0–0.5)
LYMPHOCYTES # BLD AUTO: ABNORMAL K/UL (ref 1–4.8)
LYMPHOCYTES NFR BLD: 9 % (ref 18–48)
MCH RBC QN AUTO: 31 PG (ref 27–31)
MCHC RBC AUTO-ENTMCNC: 33.1 G/DL (ref 32–36)
MCV RBC AUTO: 94 FL (ref 82–98)
MONOCYTES # BLD AUTO: ABNORMAL K/UL (ref 0.3–1)
MONOCYTES NFR BLD: 7 % (ref 4–15)
NEUTROPHILS NFR BLD: 84 % (ref 38–73)
NRBC BLD-RTO: 0 /100 WBC
OVALOCYTES BLD QL SMEAR: ABNORMAL
PLATELET # BLD AUTO: 180 K/UL (ref 150–450)
PLATELET BLD QL SMEAR: ABNORMAL
PMV BLD AUTO: 11.3 FL (ref 9.2–12.9)
POIKILOCYTOSIS BLD QL SMEAR: SLIGHT
POTASSIUM SERPL-SCNC: 3.8 MMOL/L (ref 3.5–5.1)
PROT SERPL-MCNC: 5.4 G/DL (ref 6–8.4)
RBC # BLD AUTO: 2.94 M/UL (ref 4.6–6.2)
SODIUM SERPL-SCNC: 137 MMOL/L (ref 136–145)
WBC # BLD AUTO: 1.9 K/UL (ref 3.9–12.7)

## 2024-09-01 PROCEDURE — 94640 AIRWAY INHALATION TREATMENT: CPT

## 2024-09-01 PROCEDURE — 27000221 HC OXYGEN, UP TO 24 HOURS

## 2024-09-01 PROCEDURE — 25000003 PHARM REV CODE 250

## 2024-09-01 PROCEDURE — 63600175 PHARM REV CODE 636 W HCPCS

## 2024-09-01 PROCEDURE — 94761 N-INVAS EAR/PLS OXIMETRY MLT: CPT

## 2024-09-01 PROCEDURE — 85027 COMPLETE CBC AUTOMATED: CPT

## 2024-09-01 PROCEDURE — 80053 COMPREHEN METABOLIC PANEL: CPT

## 2024-09-01 PROCEDURE — 85007 BL SMEAR W/DIFF WBC COUNT: CPT

## 2024-09-01 PROCEDURE — 11000001 HC ACUTE MED/SURG PRIVATE ROOM

## 2024-09-01 PROCEDURE — 25000242 PHARM REV CODE 250 ALT 637 W/ HCPCS

## 2024-09-01 PROCEDURE — 27000207 HC ISOLATION

## 2024-09-01 PROCEDURE — 36415 COLL VENOUS BLD VENIPUNCTURE: CPT

## 2024-09-01 PROCEDURE — 94760 N-INVAS EAR/PLS OXIMETRY 1: CPT

## 2024-09-01 PROCEDURE — 99900035 HC TECH TIME PER 15 MIN (STAT)

## 2024-09-01 PROCEDURE — 92610 EVALUATE SWALLOWING FUNCTION: CPT

## 2024-09-01 RX ADMIN — ATORVASTATIN CALCIUM 40 MG: 40 TABLET, FILM COATED ORAL at 09:09

## 2024-09-01 RX ADMIN — CLOPIDOGREL BISULFATE 75 MG: 75 TABLET ORAL at 09:09

## 2024-09-01 RX ADMIN — DEXAMETHASONE 6 MG: 4 TABLET ORAL at 09:09

## 2024-09-01 RX ADMIN — IPRATROPIUM BROMIDE AND ALBUTEROL SULFATE 3 ML: 2.5; .5 SOLUTION RESPIRATORY (INHALATION) at 08:09

## 2024-09-01 RX ADMIN — CEFTRIAXONE SODIUM 1 G: 1 INJECTION, POWDER, FOR SOLUTION INTRAMUSCULAR; INTRAVENOUS at 11:09

## 2024-09-01 RX ADMIN — IPRATROPIUM BROMIDE AND ALBUTEROL SULFATE 3 ML: 2.5; .5 SOLUTION RESPIRATORY (INHALATION) at 04:09

## 2024-09-01 RX ADMIN — AZITHROMYCIN MONOHYDRATE 500 MG: 500 INJECTION, POWDER, LYOPHILIZED, FOR SOLUTION INTRAVENOUS at 11:09

## 2024-09-01 RX ADMIN — IPRATROPIUM BROMIDE AND ALBUTEROL SULFATE 3 ML: 2.5; .5 SOLUTION RESPIRATORY (INHALATION) at 11:09

## 2024-09-01 RX ADMIN — IPRATROPIUM BROMIDE AND ALBUTEROL SULFATE 3 ML: 2.5; .5 SOLUTION RESPIRATORY (INHALATION) at 03:09

## 2024-09-01 RX ADMIN — PANTOPRAZOLE SODIUM 40 MG: 40 TABLET, DELAYED RELEASE ORAL at 09:09

## 2024-09-01 RX ADMIN — AZITHROMYCIN MONOHYDRATE 500 MG: 500 INJECTION, POWDER, LYOPHILIZED, FOR SOLUTION INTRAVENOUS at 12:09

## 2024-09-01 RX ADMIN — FLUTICASONE FUROATE AND VILANTEROL TRIFENATATE 1 PUFF: 200; 25 POWDER RESPIRATORY (INHALATION) at 07:09

## 2024-09-01 RX ADMIN — IPRATROPIUM BROMIDE AND ALBUTEROL SULFATE 3 ML: 2.5; .5 SOLUTION RESPIRATORY (INHALATION) at 07:09

## 2024-09-01 RX ADMIN — FENOFIBRATE 145 MG: 145 TABLET ORAL at 09:09

## 2024-09-01 RX ADMIN — REMDESIVIR 100 MG: 100 INJECTION, POWDER, LYOPHILIZED, FOR SOLUTION INTRAVENOUS at 09:09

## 2024-09-01 RX ADMIN — IPRATROPIUM BROMIDE AND ALBUTEROL SULFATE 3 ML: 2.5; .5 SOLUTION RESPIRATORY (INHALATION) at 12:09

## 2024-09-01 RX ADMIN — ENOXAPARIN SODIUM 40 MG: 40 INJECTION SUBCUTANEOUS at 04:09

## 2024-09-01 NOTE — PLAN OF CARE
Patient on oxygen with documented flow.  Will attempt to wean per O2 order protocol. The proper method of use, as well as anticipated side effects, of this aerosol treatment are discussed and demonstrated to the patient.  The proper method of use, as well as anticipated side effects, of this metered-dose inhaler are discussed and demonstrated to the patient.  Will continue to monitor.

## 2024-09-01 NOTE — PLAN OF CARE
Problem: SLP  Goal: SLP Goal  Description: Short Term Goals:  1. Pt will participate in a clinical swallow eval to determine least restrictive diet. -MET    Outcome: Met   9/1/24:  Pt participated in clinical swallow eval this AM. Pt tolerated thin liquids, puree, and hard solid textures with no overt s/s of  dcr'd airway protection seen at bedside. Was noted with episodes of dcr'd respiratory support/coordination when consuming consecutive sips/bites; however, pt redirectable to consume smaller, single sips/bites and slower rate of intake, which improved breath support/coordination. Pt also endorsed improvement and appreciated education/strategies provided. SLP will sign off as no further skilled services warranted at this time. Please reconsult should pt experience any change in status.    SLP recs:  Continue Regular PO diet with the following swallow precautions:  - Slow rate of intake - provide incr'd time between trials  - Whole meds 1 by 1   - Single sips/bites  - Alternate sips/bites  - Upright, awake and alert for ALL PO intake and remain upright for 30 mins s/p meals   - Discontinue if overt s/s of aspiration are noted (coughing, choking, red in the face, watery eyes, dcr'd respiratory support, wet vocal quality, etc.)

## 2024-09-01 NOTE — PT/OT/SLP EVAL
Speech Language Pathology Evaluation  Bedside Swallow and Discharge Note    Patient Name:  Harrison Haywood   MRN:  [id number]  Admitting Diagnosis: COVID-19 virus infection    Recommendations:                 General Recommendations:  Follow-up not indicated  Diet recommendations:  Regular Diet - IDDSI Level 7,    Aspiration Precautions:    - Slow rate of intake - provide incr'd time between trials  - Whole meds 1 by 1   - Single sips/bites  - Alternate sips/bites  - Upright, awake and alert for ALL PO intake and remain upright for 30 mins s/p meals   - Discontinue if overt s/s of aspiration are noted (coughing, choking, red in the face, watery eyes, dcr'd respiratory support, wet vocal quality, etc.)  General Precautions: Standard, fall, airborne, contact  Communication strategies:  none    Assessment:     Harrison Haywood is a 69 y.o. male admitted with COVID.  He presents with oral and pharyngeal phases of the swallow judged to be WFL - no overt s/s of dcr'd airway protection seen across all PO trials. Episodes of reduced breath support/coordination, which in part appears related to COVID; however, improved by implementing safe swallow precautions as listed above.     History:   HPI: Harrison Haywood is a 69 y.o. male with a PMH of multiple myeloma, HTN, HLD, prior CVA (3/6/2024), and anxiety/depression who presents for cough and shortness of breath.     Initially on 2 L NC due to increased oxygen requirement in the ED. Subsequently weaned to room air. Found to be COVID positive. Chest x-ray showing no consolidation or effusion. Started on dexamethasone and remdesivir for treatment. Also on CAP coverage for possible superimposed pneumonia.      Past Surgical History:   Procedure Laterality Date    ECHOCARDIOGRAM,TRANSESOPHAGEAL N/A 3/8/2024    Procedure: Transesophageal echo (SHABNAM) intra-procedure log documentation;  Surgeon: Rachid Rothman MD;  Location: [facility] CATH LAB/EP;  Service: Cardiology;  Laterality: N/A;  "      Social History: Patient lives with his wife.    Prior Intubation HX:  N/A    Modified Barium Swallow: None on file per EMR    Chest X-Rays: Cardiomegaly with probable mild interstitial edema.     Prior diet: Regular PO diet .    Subjective     SLP consulted for clinical swallow eval. SLP confirmed with RN Nina prior to entry. Pt found in bed awake and alert. Agreeable to participate in skilled ST session.    Patient goals: "It just feels like I am having trouble breathing while I am eating. This all started a few days ago"      Pain/Comfort:  Pain Rating 1: 0/10    Respiratory Status: Nasal cannula, flow 3 L/min    Objective:     Cognition/Communication: Awake, alert, fair attention and demonstrated fairly timely processing and initiation throughout session. Oriented to name, , current place, current month/year and reasoning for current admission. Able to follow all commands and fluently express all wants/needs during session. Also demonstrated good turn-taking and topic maintenance throughout session.     Oral Musculature Evaluation  Oral Musculature: WFL  Dentition: present and adequate  Mucosal Quality: adequate  Mandibular Strength and Mobility: WFL  Oral Labial Strength and Mobility: WFL  Lingual Strength and Mobility: WFL  Buccal Strength and Mobility: WFL  Volitional Swallow:  (timely with adequate laryngeal elevation/excursion, per hand palpation)  Voice Prior to PO Intake:  (clear voice wiht appropriate volume)    Bedside Swallow Eval:  Pt participated in clinical swallow eval this AM. Pt tolerated thin liquids, puree, and hard solid textures with no overt s/s of  dcr'd airway protection seen at bedside. Was noted with episodes of dcr'd respiratory support/coordination when consuming consecutive sips/bites; however, pt redirectable to consume smaller, single sips/bites and slower rate of intake, which improved breath support/coordination. Pt also endorsed improvement and appreciated " education/strategies provided. SLP will sign off as no further skilled services warranted at this time. Please reconsult should pt experience any change in status.      Consistencies Assessed:  Thin liquids - water via cup sips (~6-8oz)  Puree - tsp bites of pudding x5  Solids - multiple bites of carmela cracker      Oral Phase:   WFL - good lip seal, timely bolus prep/formation, mastication and AP transit, with no oral residue across all given trials.  Did require cueing to slow rate of intake down and to consume smaller, single sips/bites to improve respiratory support/coordination    Pharyngeal Phase:   no overt clinical signs/symptoms of aspiration  no overt clinical signs/symptoms of pharyngeal dysphagia  As noted above, episodes of dcr'd respiratory support/coordination when consuming large and/or consecutive sips/bites;however, improvement when implementing safe swallow precautions and pt endorsed subjective improvement.     Compensatory Strategies  Slow rate of intake  Small, single sips/bites    Treatment: No further skilled ST services warranted at this time.      Goals:   Multidisciplinary Problems       SLP Goals       Not on file              Multidisciplinary Problems (Resolved)          Problem: SLP    Goal Priority Disciplines Outcome   SLP Goal   (Resolved)     SLP Met   Description: Short Term Goals:  1. Pt will participate in a clinical swallow eval to determine least restrictive diet. -MET                         Plan:       Plan of Care reviewed with:  patient, other (see comments) (LOLIS Wood and MD Banda)   SLP Follow-Up:  No       Discharge recommendations:  No Therapy Indicated   Barriers to Discharge:  None    Time Tracking:     SLP Treatment Date:   09/01/24  Speech Start Time:  0850  Speech Stop Time:  0901     Speech Total Time (min):  11 min    Billable Minutes: Eval Swallow and Oral Function 11    09/01/2024

## 2024-09-01 NOTE — PROGRESS NOTES
[facility] Medicine Progress Note    Primary Team: [facility] Hospitalist Team B  Attending Physician: Selwyn Leal MD  Resident: Martin Gonsalez MD  Intern: Mello Kerns MD    Subjective:      Currently on 2L NC. SOB improved on NC. Still having coughing this morning but only when he eats. He denies feeling like food is stuck in his throat. Continuing steroids, remdesivir, and CAP coverage.     Objective:     Last 24 Hour Vital Signs:  BP  Min: 112/68  Max: 135/67  Temp  Av.3 °F (36.8 °C)  Min: 97.9 °F (36.6 °C)  Max: 98.8 °F (37.1 °C)  Pulse  Av.8  Min: 63  Max: 99  Resp  Av.3  Min: 16  Max: 20  SpO2  Av.5 %  Min: 93 %  Max: 97 %  I/O last 3 completed shifts:  In: 593 [P.O.:593]  Out: 950 [Urine:950]    Physical Examination:  Constitutional: cooperative, calm, appears stated age.  HEENT: NCAT, PERRL, MMM  Neck: Supple, no masses, trachea not deviated.  Resp: Symmetrical, no increased work of breathing. No wheezing, comfortable on 2L NC  Cardio: RRR, S1 and S2 normal, no murmurs or added sounds.  GI: Soft, non-tender, bowel sounds active.  Extremities: No edema, peripheral pulses 2+ and symmetric, extremities warm.  Skin: No rashes, bruises, or lesions.    Neurologic: Alert and oriented x3, follows commands, moves extremities spontaneously    Laboratory:  Recent Labs   Lab 24  1249 24  0340 24  0240   WBC 4.32 2.50* 1.90*   HGB 10.1* 9.0* 9.1*   HCT 31.1* 27.7* 27.5*    155 180   MCV 94 93 94   RDW 17.3* 17.2* 17.4*   * 138 137   K 4.4 3.1* 3.8    107 107   CO2 17* 22* 22*   BUN 27* 21 21   CREATININE 0.8 0.7 0.7   GLU 96 114* 175*   PROT 6.4 5.5* 5.4*   ALBUMIN 2.8* 2.5* 2.4*   BILITOT 0.6 0.3 0.3   AST 38 35 43*   ALKPHOS 100 94 95   ALT 13 14 23     Laboratory Data Reviewed:  Pertinent Findings:  K of 3.8. Hgb stable at 9.1. Decreased WBC to 1.9.     Microbiology Data Reviewed:  Pertinent Findings:  COVID positive  Blood cultures NG 2days  Urine cultures + >100k  CFU of Gram Negative Rods    Other Results:  EKG (my interpretation): sinus rhythm    Radiology Data Reviewed:   Pertinent Findings:  Chest x-ray with coarse interstitial lung markings    Current Medications:     Infusions:       Scheduled:   albuterol-ipratropium  3 mL Nebulization Q4H    atorvastatin  40 mg Oral Daily    azithromycin  500 mg Intravenous Q24H    cefTRIAXone (Rocephin) IV (PEDS and ADULTS)  1 g Intravenous Q24H    clopidogreL  75 mg Oral Daily    dexAMETHasone  6 mg Oral Daily    enoxparin  40 mg Subcutaneous Daily    fenofibrate  145 mg Oral Daily    fluticasone furoate-vilanteroL  1 puff Inhalation Daily    pantoprazole  40 mg Oral Daily    remdesivir infusion  100 mg Intravenous Daily        PRN:    Current Facility-Administered Medications:     dextrose 10%, 12.5 g, Intravenous, PRN    dextrose 10%, 25 g, Intravenous, PRN    glucagon (human recombinant), 1 mg, Intramuscular, PRN    glucose, 16 g, Oral, PRN    glucose, 24 g, Oral, PRN    naloxone, 0.02 mg, Intravenous, PRN    sodium chloride 0.9%, 10 mL, Intravenous, Q12H PRN        Assessment:     Harrison Haywood is a 69 y.o. male with a PMH of multiple myeloma, HTN, HLD, prior CVA (3/6/2024), and anxiety/depression who presents for cough and shortness of breath.     Initially on 2 L NC due to increased oxygen requirement in the ED. Subsequently weaned to room air. Found to be COVID positive. Chest x-ray showing no consolidation or effusion. Started on dexamethasone and remdesivir for treatment. Also on CAP coverage for possible superimposed pneumonia. NC remains stable, attempt to wean today.      Plan:     Acute hypoxic respiratory failure  COVID  Concern for pneumonia  Pt found to be COVID positive. Initially on 2 L NC in ED, subsequently weaned to room air. Afebrile and with no leukocytosis. Lactic acid WNL. Symptoms likely due to COVID infection, however, cannot rule out superimposed bacterial pneumonia as pt also reports being COVID positive  3 weeks prior.  - remdesivir 200 mg daily  - dexamethasone 6 mg daily  - Duonebs q6h  - ceftriaxone 1g daily, day 3/5  - azithromycin 500 mg daily, day 3/3  - continue to monitor    UTI   - >100k CFU gram negative rods  - Currently receiving ceftriaxone for PNA, will likely cover   - pending speciation & sensitivities    Cough worse with meals   - Watched patient eat jello without overt signs of aspiration   - Consulted SLP, no s/s aspiration but having trouble coordinating breathing when eating & drinking  - Strategies provided by SLP, no further work up      HTN  Pressures normotensive on admission. Reports taking amlodipine 5 mg at home.  - continue to monitor, can start back home amlodipine if necessary     HLD  - Lipitor 40 mg daily     Prior CVA  Prior remote L MCA infarct and prior acute ischemic infarct in high right cerebral convexity on 3/7/24. Pt with no residual deficits. On home Plavix monotherapy and statin.  - continue home Plavix 75 mg daily  - continue home Lipitor     Multiple myeloma  Pt following with Dr. Howard with heme/onc. Bone marrow biopsy (12/18/23) showing 70% plasma cells. Per EMR, medications regimen is as follows: Revlimid 25 mg PO daily on days 1-21.   Daratumumab 1,800 mg - 30,000 units subcutaneous on days 1 & 15  Decadron 20 mg PO daily on days 1, 8, 15, 22. Also on Valacyclovir 500 mg daily and Allopurinol 300 mg daily for prophylaxis.  - consult heme/onc for chemotherapy recommendations    Chronic sacral wound  With clean pink tissue, no active bleeding or purulent drainage.  - wound care consulted, will follow up recs    Diet: Cardiac  DVT Prophylaxis: Lovenox  Code: Full    Dispo:    Keisha Banda, DO   U Internal Medicine/Pediatrics PGY-2  [facility] Internal Medicine Team B    [facility] Medicine Hospitalist Pager numbers:   [facility] Hospitalist Medicine Team A (Kassidy/Iris): [phone]  [facility] Hospitalist Medicine Team B (Mel/Eduardo):  [phone]

## 2024-09-02 LAB
ALBUMIN SERPL BCP-MCNC: 2.5 G/DL (ref 3.5–5.2)
ALP SERPL-CCNC: 87 U/L (ref 55–135)
ALT SERPL W/O P-5'-P-CCNC: 21 U/L (ref 10–44)
ANION GAP SERPL CALC-SCNC: 9 MMOL/L (ref 8–16)
AST SERPL-CCNC: 27 U/L (ref 10–40)
BACTERIA UR CULT: ABNORMAL
BASOPHILS # BLD AUTO: 0 K/UL (ref 0–0.2)
BASOPHILS NFR BLD: 0 % (ref 0–1.9)
BILIRUB SERPL-MCNC: 0.3 MG/DL (ref 0.1–1)
BUN SERPL-MCNC: 27 MG/DL (ref 8–23)
CALCIUM SERPL-MCNC: 8.8 MG/DL (ref 8.7–10.5)
CHLORIDE SERPL-SCNC: 109 MMOL/L (ref 95–110)
CO2 SERPL-SCNC: 23 MMOL/L (ref 23–29)
CREAT SERPL-MCNC: 0.7 MG/DL (ref 0.5–1.4)
DIFFERENTIAL METHOD BLD: ABNORMAL
EOSINOPHIL # BLD AUTO: 0 K/UL (ref 0–0.5)
EOSINOPHIL NFR BLD: 0 % (ref 0–8)
ERYTHROCYTE [DISTWIDTH] IN BLOOD BY AUTOMATED COUNT: 17.4 % (ref 11.5–14.5)
EST. GFR  (NO RACE VARIABLE): >60 ML/MIN/1.73 M^2
GLUCOSE SERPL-MCNC: 94 MG/DL (ref 70–110)
HCT VFR BLD AUTO: 26.6 % (ref 40–54)
HGB BLD-MCNC: 8.7 G/DL (ref 14–18)
IMM GRANULOCYTES # BLD AUTO: 0.01 K/UL (ref 0–0.04)
IMM GRANULOCYTES NFR BLD AUTO: 0.4 % (ref 0–0.5)
LYMPHOCYTES # BLD AUTO: 0.2 K/UL (ref 1–4.8)
LYMPHOCYTES NFR BLD: 7 % (ref 18–48)
MCH RBC QN AUTO: 30.6 PG (ref 27–31)
MCHC RBC AUTO-ENTMCNC: 32.7 G/DL (ref 32–36)
MCV RBC AUTO: 94 FL (ref 82–98)
MONOCYTES # BLD AUTO: 0.4 K/UL (ref 0.3–1)
MONOCYTES NFR BLD: 13.7 % (ref 4–15)
NEUTROPHILS # BLD AUTO: 2.2 K/UL (ref 1.8–7.7)
NEUTROPHILS NFR BLD: 78.9 % (ref 38–73)
NRBC BLD-RTO: 0 /100 WBC
PLATELET # BLD AUTO: 207 K/UL (ref 150–450)
PMV BLD AUTO: 11.2 FL (ref 9.2–12.9)
POTASSIUM SERPL-SCNC: 3.6 MMOL/L (ref 3.5–5.1)
PROT SERPL-MCNC: 5.3 G/DL (ref 6–8.4)
RBC # BLD AUTO: 2.84 M/UL (ref 4.6–6.2)
SODIUM SERPL-SCNC: 141 MMOL/L (ref 136–145)
WBC # BLD AUTO: 2.84 K/UL (ref 3.9–12.7)

## 2024-09-02 PROCEDURE — 63600175 PHARM REV CODE 636 W HCPCS

## 2024-09-02 PROCEDURE — 80053 COMPREHEN METABOLIC PANEL: CPT | Performed by: INTERNAL MEDICINE

## 2024-09-02 PROCEDURE — 97166 OT EVAL MOD COMPLEX 45 MIN: CPT

## 2024-09-02 PROCEDURE — 97116 GAIT TRAINING THERAPY: CPT

## 2024-09-02 PROCEDURE — 94760 N-INVAS EAR/PLS OXIMETRY 1: CPT

## 2024-09-02 PROCEDURE — 36415 COLL VENOUS BLD VENIPUNCTURE: CPT

## 2024-09-02 PROCEDURE — 27000207 HC ISOLATION

## 2024-09-02 PROCEDURE — 97110 THERAPEUTIC EXERCISES: CPT

## 2024-09-02 PROCEDURE — 36415 COLL VENOUS BLD VENIPUNCTURE: CPT | Performed by: INTERNAL MEDICINE

## 2024-09-02 PROCEDURE — 25000242 PHARM REV CODE 250 ALT 637 W/ HCPCS

## 2024-09-02 PROCEDURE — 25000003 PHARM REV CODE 250

## 2024-09-02 PROCEDURE — 94761 N-INVAS EAR/PLS OXIMETRY MLT: CPT

## 2024-09-02 PROCEDURE — 99900035 HC TECH TIME PER 15 MIN (STAT)

## 2024-09-02 PROCEDURE — 97161 PT EVAL LOW COMPLEX 20 MIN: CPT

## 2024-09-02 PROCEDURE — 85025 COMPLETE CBC W/AUTO DIFF WBC: CPT

## 2024-09-02 PROCEDURE — 94640 AIRWAY INHALATION TREATMENT: CPT

## 2024-09-02 PROCEDURE — 11000001 HC ACUTE MED/SURG PRIVATE ROOM

## 2024-09-02 RX ORDER — BENZONATATE 100 MG/1
100 CAPSULE ORAL 3 TIMES DAILY PRN
Status: DISCONTINUED | OUTPATIENT
Start: 2024-09-02 | End: 2024-09-03 | Stop reason: HOSPADM

## 2024-09-02 RX ADMIN — PANTOPRAZOLE SODIUM 40 MG: 40 TABLET, DELAYED RELEASE ORAL at 09:09

## 2024-09-02 RX ADMIN — IPRATROPIUM BROMIDE AND ALBUTEROL SULFATE 3 ML: 2.5; .5 SOLUTION RESPIRATORY (INHALATION) at 07:09

## 2024-09-02 RX ADMIN — BENZONATATE 100 MG: 100 CAPSULE ORAL at 09:09

## 2024-09-02 RX ADMIN — IPRATROPIUM BROMIDE AND ALBUTEROL SULFATE 3 ML: 2.5; .5 SOLUTION RESPIRATORY (INHALATION) at 11:09

## 2024-09-02 RX ADMIN — IPRATROPIUM BROMIDE AND ALBUTEROL SULFATE 3 ML: 2.5; .5 SOLUTION RESPIRATORY (INHALATION) at 03:09

## 2024-09-02 RX ADMIN — IPRATROPIUM BROMIDE AND ALBUTEROL SULFATE 3 ML: 2.5; .5 SOLUTION RESPIRATORY (INHALATION) at 12:09

## 2024-09-02 RX ADMIN — BENZONATATE 100 MG: 100 CAPSULE ORAL at 12:09

## 2024-09-02 RX ADMIN — IPRATROPIUM BROMIDE AND ALBUTEROL SULFATE 3 ML: 2.5; .5 SOLUTION RESPIRATORY (INHALATION) at 08:09

## 2024-09-02 RX ADMIN — IPRATROPIUM BROMIDE AND ALBUTEROL SULFATE 3 ML: 2.5; .5 SOLUTION RESPIRATORY (INHALATION) at 04:09

## 2024-09-02 RX ADMIN — ENOXAPARIN SODIUM 40 MG: 40 INJECTION SUBCUTANEOUS at 04:09

## 2024-09-02 RX ADMIN — FENOFIBRATE 145 MG: 145 TABLET ORAL at 09:09

## 2024-09-02 RX ADMIN — DEXAMETHASONE 6 MG: 4 TABLET ORAL at 09:09

## 2024-09-02 RX ADMIN — FLUTICASONE FUROATE AND VILANTEROL TRIFENATATE 1 PUFF: 200; 25 POWDER RESPIRATORY (INHALATION) at 07:09

## 2024-09-02 RX ADMIN — CEFTRIAXONE SODIUM 1 G: 1 INJECTION, POWDER, FOR SOLUTION INTRAMUSCULAR; INTRAVENOUS at 11:09

## 2024-09-02 RX ADMIN — ATORVASTATIN CALCIUM 40 MG: 40 TABLET, FILM COATED ORAL at 09:09

## 2024-09-02 RX ADMIN — REMDESIVIR 100 MG: 100 INJECTION, POWDER, LYOPHILIZED, FOR SOLUTION INTRAVENOUS at 09:09

## 2024-09-02 RX ADMIN — CLOPIDOGREL BISULFATE 75 MG: 75 TABLET ORAL at 09:09

## 2024-09-02 NOTE — PLAN OF CARE
Pt on RA with documented sats. The proper method of use, as well as anticipated side effects, of this aerosol treatment are discussed and demonstrated to the patient. The proper method of use, as well as anticipated side effects, of this metered-dose inhaler are discussed and demonstrated to the patient. Will continue to monitor.

## 2024-09-02 NOTE — DISCHARGE SUMMARY
hospitals Internal Medicine Discharge Summary    Primary Team: [facility] Medicine B  Attending Physician: Selwyn Leal MD  Resident: Wallace  Intern:     Date of Admit: 8/30/2024  Date of Discharge: 09/02/2024    Discharge to: NH  Condition: Stable    Discharge Diagnoses       COVID-19 virus infection    Acute hypoxic respiratory failure      Consultants and Procedures     Consultants: Wound Care    Procedures: N/A    Brief History of Present Illness     From the H&P:  Harrison Haywood is a 69 y.o. male with a PMH of multiple myeloma, HTN, HLD, prior CVA (3/6/2024), and anxiety/depression who presents for cough and shortness of breath.     Pt stays at [facility] and reports that he has had cough, congestion, shortness of breath, and fatigue for the past 2 days. Of note, he reports having Covid 3 weeks ago, treated, but has had a cough ever since then. However, his shortness of breath, congestion, and fatigue are new. He was seen by his nursing home provider 2 days prior, and reports that chest x-ray at that time was concerning for possible pneumonia so they started him on Doxycycline. Also reports clear sputum production. Denies any fevers/chills, nausea/vomiting, body aches, chest pain, headache, dizziness, blurriness. Pt is not on oxygen at home. Denies any smoking history, alcohol use, or illicit drug use.      In the ED, was found to be COVID positive. Initially requiring 2 L NC due to oxygen saturation of 88%. Chest x-ray showing coarse interstitial lung markings but no consolidation or edema. Subsequently weaned to room air. Admitted to hospitals medicine for further evaluation and treatment.    For the full HPI please refer to the History & Physical from this admission.  -------------------------------------------------------------------------    He received treatment for COVID with remdesivir and dexamethasone while inpatient.  He also received treatment with CTX and azithromycin for CAP.  Patient had positive  urine cultures however denied any symptoms.  Regardless received treatment for CAP which may have covered UTI.  He was weaned from 3 L of NC oxygen to RA.  He also received duonebs in an isolation room.  Sacral wound appeared well healed, images in chart.  Wound care ordered and recs started while inpatient. Patient well appearing with no wheezing on physical exam. Will discharge with pulmonary follow up, PRN albuterol and budesonide inhaler, and 3 days of Levaquin and Dexamethasone to ensure improvement after DC.    Hospital Course By Problem with Pertinent Findings     Acute hypoxic respiratory failure  COVID  Concern for pneumonia  Pt found to be COVID positive. Initially on 2 L NC in ED, subsequently weaned to room air. Afebrile and with no leukocytosis. Lactic acid WNL. Symptoms likely due to COVID infection, however, cannot rule out superimposed bacterial pneumonia as pt also reports being COVID positive 3 weeks prior.  - remdesivir 100 mg daily can stop on discharge  - dexamethasone 6 mg daily; continue on discharge x 3 days  - START albuterol and budesonide inhalers PRN post-COVID cough  - Duonebs q4h  - ceftriaxone 1g daily, day 5/5, discharge with Levaquin 750 mg x 3 days   - azithromycin 500 mg daily, day 3/3 -> complete  - Pulmonary follow up upon discharge     UTI   - >100k CFU gram negative rods  - Currently receiving ceftriaxone for PNA, will likely cover   - pending speciation & sensitivities  - Not having symptoms     Cough worse with meals   - Watched patient eat jello without overt signs of aspiration   - Consulted SLP, no s/s aspiration but having trouble coordinating breathing when eating & drinking  - Strategies provided by SLP, no further work up   - Tessalon; continue on DC     HTN  Pressures normotensive on admission. Reports taking amlodipine 5 mg at home.  - continue to monitor, can start back home amlodipine if necessary  - continue home meds on discharge     HLD  - Lipitor 40 mg daily;  continue on DC     Prior CVA  Prior remote L MCA infarct and prior acute ischemic infarct in high right cerebral convexity on 3/7/24. Pt with no residual deficits. On home Plavix monotherapy and statin.  - continue home Plavix 75 mg daily; cont on DC  - continue home Lipitor 40 mg daily; cont on DC     Multiple myeloma  Pt following with Dr. Howard with heme/onc. Bone marrow biopsy (12/18/23) showing 70% plasma cells. Per EMR, medications regimen is as follows: Revlimid 25 mg PO daily on days 1-21.   Daratumumab 1,800 mg - 30,000 units subcutaneous on days 1 & 15  Decadron 20 mg PO daily on days 1, 8, 15, 22. Also on Valacyclovir 500 mg daily and Allopurinol 300 mg daily for prophylaxis.     Chronic sacral wound  With clean pink tissue, no active bleeding or purulent drainage.  - wound care consulted, triad ointment BID to sacrum; continue on DC       Discharge Medications        Medication List        START taking these medications      albuterol 90 mcg/actuation inhaler  Commonly known as: PROVENTIL/VENTOLIN HFA  Inhale 2 puffs into the lungs every 4 (four) hours as needed for Wheezing. Rescue     benzonatate 100 MG capsule  Commonly known as: TESSALON  Take 1 capsule (100 mg total) by mouth 3 (three) times daily as needed for Cough.     budesonide 90 mcg/actuation Aepb  Commonly known as: PULMICORT FLEXHALER  Inhale 2 puffs (180 mcg total) into the lungs every 12 (twelve) hours as needed. Controller     levoFLOXacin 750 MG tablet  Commonly known as: LEVAQUIN  Take 1 tablet (750 mg total) by mouth once daily. for 2 days            CHANGE how you take these medications      * albuterol-ipratropium 2.5 mg-0.5 mg/3 mL nebulizer solution  Commonly known as: DUO-NEB  What changed: Another medication with the same name was added. Make sure you understand how and when to take each.     * ipratropium-albuteroL  mcg/actuation inhaler  Commonly known as: CombiVENT  Inhale 1 puff into the lungs every 6 (six) hours as  needed for Wheezing. Rescue  What changed: You were already taking a medication with the same name, and this prescription was added. Make sure you understand how and when to take each.     * dexAMETHasone 4 MG Tab  Commonly known as: DECADRON  What changed: Another medication with the same name was added. Make sure you understand how and when to take each.     * dexAMETHasone 6 MG tablet  Commonly known as: DECADRON  Take 1 tablet (6 mg total) by mouth once daily. for 3 days  What changed: You were already taking a medication with the same name, and this prescription was added. Make sure you understand how and when to take each.           * This list has 4 medication(s) that are the same as other medications prescribed for you. Read the directions carefully, and ask your doctor or other care provider to review them with you.                CONTINUE taking these medications      acetaminophen 650 MG Tbsr  Commonly known as: TYLENOL     allopurinoL 300 MG tablet  Commonly known as: ZYLOPRIM     amLODIPine 5 MG tablet  Commonly known as: NORVASC     ARGINAID 4.5 gram-156 mg/9.2 gram Pwpk  Generic drug: arginine-vitamin C-vitamin E     atorvastatin 40 MG tablet  Commonly known as: LIPITOR  Take 1 tablet (40 mg total) by mouth once daily.     bisacodyL 5 mg EC tablet  Commonly known as: DULCOLAX     calcium carbonate 600 mg calcium (1,500 mg) Tab  Commonly known as: OS-SONIA     celecoxib 200 MG capsule  Commonly known as: CeleBREX     clopidogreL 75 mg tablet  Commonly known as: PLAVIX  Take 1 tablet (75 mg total) by mouth once daily.     doxycycline 100 MG Cap  Commonly known as: VIBRAMYCIN     fenofibrate 145 MG tablet  Commonly known as: TRICOR  Take 1 tablet (145 mg total) by mouth once daily.     FLUoxetine 20 MG capsule     MCKENNA-TUSSIN 100 mg/5 mL syrup  Generic drug: guaiFENesin 100 mg/5 ml     HYDROcodone-acetaminophen 5-325 mg per tablet  Commonly known as: NORCO     lenalidomide 25 mg Cap     ondansetron 4 MG  tablet  Commonly known as: ZOFRAN     ONE DAILY MULTIVITAMIN per tablet  Generic drug: multivitamin     pantoprazole 40 MG tablet  Commonly known as: PROTONIX  Take 1 tablet (40 mg total) by mouth once daily.     PROMOD PROTEIN Liqd  Generic drug: protein supplement     valACYclovir 500 MG tablet  Commonly known as: VALTREX     VITAMIN C 500 MG tablet  Generic drug: ascorbic acid (vitamin C)     vitamin D 1000 units Tab  Commonly known as: VITAMIN D3     zinc sulfate 50 mg zinc (220 mg) capsule  Commonly known as: ZINCATE               Where to Get Your Medications        These medications were sent to [id number] - [place] - [address] [facility] [address]  [address], [place] [address]      Phone: [phone]   ipratropium-albuteroL  mcg/actuation inhaler       Information about where to get these medications is not yet available    Ask your nurse or doctor about these medications  albuterol 90 mcg/actuation inhaler  benzonatate 100 MG capsule  budesonide 90 mcg/actuation Aepb  dexAMETHasone 6 MG tablet  levoFLOXacin 750 MG tablet         Discharge Information:   Diet: Adult Regular    Physical Activity: As tolerated.    Instructions:  1. Take all medications as prescribed  2. Keep all follow-up appointments  3. Return to the hospital or call your primary care physicians if any worsening symptoms occur.    Follow-Up Appointments:  Pulmonology Follow Up    Peyton Jiménez MD  [facility], PGY-1  [employer] IM Team B

## 2024-09-02 NOTE — PLAN OF CARE
CESAR sent [facility] Phone: [phone] a return referral via [employer]. Cesar attempted to call [facility] there was no answer and cesar was unable to leave a VM. CESAR will follow.    TIM Amaral  [phone]     09/02/24 0931   Post-Acute Status   Post-Acute Authorization Placement   Post-Acute Placement Status Referrals Sent   Discharge Plan   Discharge Plan A Return to nursing home

## 2024-09-02 NOTE — CONSULTS
[facility] - Telemetry  Wound Care    Patient Name:  Harrison Haywood   MRN:  [id number]  Date: 9/2/2024  Diagnosis: COVID-19 virus infection    History:     History reviewed. No pertinent past medical history.    Social History     Socioeconomic History    Marital status: Single   Tobacco Use    Smoking status: Never     Social Determinants of Health     Financial Resource Strain: Low Risk  (3/1/2024)    Overall Financial Resource Strain (CARDIA)     Difficulty of Paying Living Expenses: Not hard at all   Food Insecurity: No Food Insecurity (4/27/2024)    Received from [facility]    Hunger Vital Sign     Worried About Running Out of Food in the Last Year: Never true     Ran Out of Food in the Last Year: Never true   Transportation Needs: No Transportation Needs (4/27/2024)    Received from [facility]    PRAPARE - Transportation     Lack of Transportation (Medical): No     Lack of Transportation (Non-Medical): No   Physical Activity: Insufficiently Active (3/1/2024)    Exercise Vital Sign     Days of Exercise per Week: 2 days     Minutes of Exercise per Session: 30 min   Stress: No Stress Concern Present (3/1/2024)    [employer] - Occupational Stress Questionnaire     Feeling of Stress : Only a little   Housing Stability: Low Risk  (3/1/2024)    Housing Stability Vital Sign     Unable to Pay for Housing in the Last Year: No     Number of Places Lived in the Last Year: 2     Unstable Housing in the Last Year: No       Precautions:     Allergies as of 08/30/2024 - Reviewed 08/30/2024   Allergen Reaction Noted    Aspirin Hives 03/06/2024    Penicillins Hives 03/06/2024       WOC Assessment Details/Treatment     Sacrum/buttocks- present on admit- scattered partial thickness skin breakdown related to pressure and moisture- 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 Jiménez:  - Pressure injury  prevention interventions - waffle overlay  - Triad ointment BID to sacrum    09/02/2024

## 2024-09-02 NOTE — DISCHARGE SUMMARY
[facility] Internal Medicine Discharge Summary    Primary Team: [facility] Medicine B  Attending Physician: Selwyn Leal MD  Resident: Wallace  Intern:     Date of Admit: 8/30/2024  Date of Discharge: 09/02/2024    Discharge to: NH  Condition: Stable    Discharge Diagnoses       COVID-19 virus infection    Acute hypoxic respiratory failure      Consultants and Procedures     Consultants: ***    Procedures: ***    Brief History of Present Illness     From the H&P:  Harrison Haywood is a 69 y.o. male with a PMH of multiple myeloma, HTN, HLD, prior CVA (3/6/2024), and anxiety/depression who presents for cough and shortness of breath.     Pt stays at [facility] and reports that he has had cough, congestion, shortness of breath, and fatigue for the past 2 days. Of note, he reports having Covid 3 weeks ago, treated, but has had a cough ever since then. However, his shortness of breath, congestion, and fatigue are new. He was seen by his nursing home provider 2 days prior, and reports that chest x-ray at that time was concerning for possible pneumonia so they started him on Doxycycline. Also reports clear sputum production. Denies any fevers/chills, nausea/vomiting, body aches, chest pain, headache, dizziness, blurriness. Pt is not on oxygen at home. Denies any smoking history, alcohol use, or illicit drug use.      In the ED, was found to be COVID positive. Initially requiring 2 L NC due to oxygen saturation of 88%. Chest x-ray showing coarse interstitial lung markings but no consolidation or edema. Subsequently weaned to room air. Admitted to [facility] medicine for further evaluation and treatment.    For the full HPI please refer to the History & Physical from this admission.  -------------------------------------------------------------------------    He received treatment for COVID with remdesivir and dexamethasone while inpatient.  He also received treatment with CTX and azithromycin for CAP.  Patient had positive urine  cultures however denied any symptoms.  Regardless received treatment for CAP which may have covered UTI.  He was weaned from 3 L of NC oxygen to RA.  He also received duonebs in an isolation room.  Sacral wound appeared well healed, images in chart.  Wound care ordered.    Hospital Course By Problem with Pertinent Findings     Acute hypoxic respiratory failure  COVID  Concern for pneumonia  Pt found to be COVID positive. Initially on 2 L NC in ED, subsequently weaned to room air. Afebrile and with no leukocytosis. Lactic acid WNL. Symptoms likely due to COVID infection, however, cannot rule out superimposed bacterial pneumonia as pt also reports being COVID positive 3 weeks prior.  - remdesivir 100 mg daily can stop on discharge  - dexamethasone 6 mg daily stop on discharge  - Duonebs q4h  - ceftriaxone 1g daily, day 4/5, can discharge with augmentin  - azithromycin 500 mg daily, day 3/3 -> complete  - continue to monitor     UTI   - >100k CFU gram negative rods  - Currently receiving ceftriaxone for PNA, will likely cover   - pending speciation & sensitivities  - Not having symptoms     Cough worse with meals   - Watched patient eat jello without overt signs of aspiration   - Consulted SLP, no s/s aspiration but having trouble coordinating breathing when eating & drinking  - Strategies provided by SLP, no further work up   - Tessalon     HTN  Pressures normotensive on admission. Reports taking amlodipine 5 mg at home.  - continue to monitor, can start back home amlodipine if necessary  - continue home meds on discharge     HLD  - Lipitor 40 mg daily     Prior CVA  Prior remote L MCA infarct and prior acute ischemic infarct in high right cerebral convexity on 3/7/24. Pt with no residual deficits. On home Plavix monotherapy and statin.  - continue home Plavix 75 mg daily  - continue home Lipitor 40 mg daily     Multiple myeloma  Pt following with Dr. Howard with heme/onc. Bone marrow biopsy (12/18/23) showing 70%  plasma cells. Per EMR, medications regimen is as follows: Revlimid 25 mg PO daily on days 1-21.   Daratumumab 1,800 mg - 30,000 units subcutaneous on days 1 & 15  Decadron 20 mg PO daily on days 1, 8, 15, 22. Also on Valacyclovir 500 mg daily and Allopurinol 300 mg daily for prophylaxis.     Chronic sacral wound  With clean pink tissue, no active bleeding or purulent drainage.  - wound care consulted, will follow up recs       Discharge Medications        Medication List        ASK your doctor about these medications      acetaminophen 650 MG Tbsr  Commonly known as: TYLENOL     albuterol-ipratropium 2.5 mg-0.5 mg/3 mL nebulizer solution  Commonly known as: DUO-NEB     allopurinoL 300 MG tablet  Commonly known as: ZYLOPRIM     amLODIPine 5 MG tablet  Commonly known as: NORVASC     ARGINAID 4.5 gram-156 mg/9.2 gram Pwpk  Generic drug: arginine-vitamin C-vitamin E     atorvastatin 40 MG tablet  Commonly known as: LIPITOR  Take 1 tablet (40 mg total) by mouth once daily.     bisacodyL 5 mg EC tablet  Commonly known as: DULCOLAX     calcium carbonate 600 mg calcium (1,500 mg) Tab  Commonly known as: OS-SONIA     celecoxib 200 MG capsule  Commonly known as: CeleBREX     clopidogreL 75 mg tablet  Commonly known as: PLAVIX  Take 1 tablet (75 mg total) by mouth once daily.     dexAMETHasone 4 MG Tab  Commonly known as: DECADRON     doxycycline 100 MG Cap  Commonly known as: VIBRAMYCIN     fenofibrate 145 MG tablet  Commonly known as: TRICOR  Take 1 tablet (145 mg total) by mouth once daily.     FLUoxetine 20 MG capsule  Ask about: Which instructions should I use?     MCKENNA-TUSSIN 100 mg/5 mL syrup  Generic drug: guaiFENesin 100 mg/5 ml     HYDROcodone-acetaminophen 5-325 mg per tablet  Commonly known as: NORCO     lenalidomide 25 mg Cap     ondansetron 4 MG tablet  Commonly known as: ZOFRAN     ONE DAILY MULTIVITAMIN per tablet  Generic drug: multivitamin     pantoprazole 40 MG tablet  Commonly known as: PROTONIX  Take 1  tablet (40 mg total) by mouth once daily.     PROMOD PROTEIN Liqd  Generic drug: protein supplement     valACYclovir 500 MG tablet  Commonly known as: VALTREX     VITAMIN C 500 MG tablet  Generic drug: ascorbic acid (vitamin C)     vitamin D 1000 units Tab  Commonly known as: VITAMIN D3  Ask about: Which instructions should I use?     zinc sulfate 50 mg zinc (220 mg) capsule  Commonly known as: ZINCATE              Discharge Information:   Diet: ***    Physical Activity: As tolerated.    Instructions:  1. Take all medications as prescribed  2. Keep all follow-up appointments  3. Return to the hospital or call your primary care physicians if any worsening symptoms occur.    Follow-Up Appointments:  No future appointments.        Martin Gonsalez MD  [facility]  [facility]

## 2024-09-02 NOTE — PROGRESS NOTES
[facility] Medicine Progress Note    Primary Team: [facility] Hospitalist Team B  Attending Physician: Selwyn Leal MD  Resident: Martin Gonsalez MD  Intern: Peyton Jiménez MD    Subjective:      Pt breathing comfortably on room air with no complaints. Denies SOB this AM. Continues to have cough but overall reports improvement. Denies any abdominal pain or dysuria. Continuing steroids, remdesivir, and CAP coverage.     Objective:     Last 24 Hour Vital Signs:  BP  Min: 115/69  Max: 145/90  Temp  Av.2 °F (36.8 °C)  Min: 97.7 °F (36.5 °C)  Max: 98.7 °F (37.1 °C)  Pulse  Av.5  Min: 54  Max: 93  Resp  Av.4  Min: 16  Max: 20  SpO2  Av.2 %  Min: 92 %  Max: 100 %  Weight  Av.1 kg (203 lb 0.7 oz)  Min: 92.1 kg (203 lb 0.7 oz)  Max: 92.1 kg (203 lb 0.7 oz)  I/O last 3 completed shifts:  In: 1058 [P.O.:708; IV Piggyback:350]  Out: 900 [Urine:900]    Physical Examination:  Constitutional: cooperative, calm, appears stated age.  HEENT: NCAT, PERRL, MMM  Neck: Supple, no masses, trachea not deviated.  Resp: minimal wheezing bilaterally, no labored breathing   Cardio: RRR, S1 and S2 normal, no murmurs or added sounds.  GI: Soft, non-tender, bowel sounds active.  Extremities: No edema, peripheral pulses 2+ and symmetric, extremities warm.  Skin: No rashes, bruises, or lesions.    Neurologic: Alert and oriented x3, follows commands, moves extremities spontaneously    Laboratory:  Recent Labs   Lab 24  0340 24  0240 24  0158 24  0516   WBC 2.50* 1.90* 2.84*  --    HGB 9.0* 9.1* 8.7*  --    HCT 27.7* 27.5* 26.6*  --     180 207  --    MCV 93 94 94  --    RDW 17.2* 17.4* 17.4*  --     137  --  141   K 3.1* 3.8  --  3.6    107  --  109   CO2 22* 22*  --  23   BUN 21 21  --  27*   CREATININE 0.7 0.7  --  0.7   * 175*  --  94   PROT 5.5* 5.4*  --  5.3*   ALBUMIN 2.5* 2.4*  --  2.5*   BILITOT 0.3 0.3  --  0.3   AST 35 43*  --  27   ALKPHOS 94 95  --  87   ALT 14 23  --   21     Laboratory Data Reviewed:  Pertinent Findings:  K of 3.6. Hgb stable at 8.7. WBC stable at 2.84.     Microbiology Data Reviewed:  Pertinent Findings:  COVID positive  Blood cultures NG 3 days  Urine cultures + >100k CFU of Gram Negative Rods    Other Results:  EKG (my interpretation): sinus rhythm    Radiology Data Reviewed:   Pertinent Findings:  Chest x-ray with coarse interstitial lung markings    Current Medications:     Infusions:       Scheduled:   albuterol-ipratropium  3 mL Nebulization Q4H    atorvastatin  40 mg Oral Daily    cefTRIAXone (Rocephin) IV (PEDS and ADULTS)  1 g Intravenous Q24H    clopidogreL  75 mg Oral Daily    dexAMETHasone  6 mg Oral Daily    enoxparin  40 mg Subcutaneous Daily    fenofibrate  145 mg Oral Daily    fluticasone furoate-vilanteroL  1 puff Inhalation Daily    pantoprazole  40 mg Oral Daily    remdesivir infusion  100 mg Intravenous Daily        PRN:    Current Facility-Administered Medications:     dextrose 10%, 12.5 g, Intravenous, PRN    dextrose 10%, 25 g, Intravenous, PRN    glucagon (human recombinant), 1 mg, Intramuscular, PRN    glucose, 16 g, Oral, PRN    glucose, 24 g, Oral, PRN    naloxone, 0.02 mg, Intravenous, PRN    sodium chloride 0.9%, 10 mL, Intravenous, Q12H PRN        Assessment:     Harrison Haywood is a 69 y.o. male with a PMH of multiple myeloma, HTN, HLD, prior CVA (3/6/2024), and anxiety/depression who presents for cough and shortness of breath. Initially on 2 L NC due to increased oxygen requirement in the ED. Subsequently weaned to room air. Found to be COVID positive. Chest x-ray showing no consolidation or effusion. Started on dexamethasone and remdesivir for treatment. Also on CAP coverage for possible superimposed pneumonia.     Patient weaned to room air last night with oxygen saturation between %. Bilateral wheezing present on physical exam but improved from initial presentation. Patient improved today and comfortable with discharge to  nursing home.      Plan:     Acute hypoxic respiratory failure  COVID  Concern for pneumonia  Pt found to be COVID positive. Initially on 2 L NC in ED, subsequently weaned to room air. Afebrile and with no leukocytosis. Lactic acid WNL. Symptoms likely due to COVID infection, however, cannot rule out superimposed bacterial pneumonia as pt also reports being COVID positive 3 weeks prior.  - remdesivir 100 mg daily  - dexamethasone 6 mg daily  - Duonebs q4h  - ceftriaxone 1g daily, day 4/5  - azithromycin 500 mg daily, day 3/3 -> complete  - continue to monitor    UTI   - >100k CFU gram negative rods  - Currently receiving ceftriaxone for PNA, will likely cover   - pending speciation & sensitivities    Cough worse with meals   - Watched patient eat jello without overt signs of aspiration   - Consulted SLP, no s/s aspiration but having trouble coordinating breathing when eating & drinking  - Strategies provided by SLP, no further work up      HTN  Pressures normotensive on admission. Reports taking amlodipine 5 mg at home.  - continue to monitor, can start back home amlodipine if necessary     HLD  - Lipitor 40 mg daily     Prior CVA  Prior remote L MCA infarct and prior acute ischemic infarct in high right cerebral convexity on 3/7/24. Pt with no residual deficits. On home Plavix monotherapy and statin.  - continue home Plavix 75 mg daily  - continue home Lipitor 40 mg daily     Multiple myeloma  Pt following with Dr. Howard with heme/onc. Bone marrow biopsy (12/18/23) showing 70% plasma cells. Per EMR, medications regimen is as follows: Revlimid 25 mg PO daily on days 1-21.   Daratumumab 1,800 mg - 30,000 units subcutaneous on days 1 & 15  Decadron 20 mg PO daily on days 1, 8, 15, 22. Also on Valacyclovir 500 mg daily and Allopurinol 300 mg daily for prophylaxis.  - consult heme/onc for chemotherapy recommendations    Chronic sacral wound  With clean pink tissue, no active bleeding or purulent drainage.  - wound  care consulted, will follow up recs    Diet: Cardiac  DVT Prophylaxis: Lovenox  Code: Full    Dispo: pending safe discharge to nursing home    Peyton Jiménez MD  [facility], PGY-2  [facility] Internal Medicine Team B    [facility] Medicine Hospitalist Pager numbers:   [facility] Hospitalist Medicine Team A (Kassidy/Iris): [phone]  [facility] Hospitalist Medicine Team B (Mel/Eduardo):  [phone]

## 2024-09-02 NOTE — PT/OT/SLP EVAL
Occupational Therapy   Evaluation    Name: Harrison Haywood  MRN: [id number]  Admitting Diagnosis: COVID-19 virus infection  Recent Surgery: * No surgery found *      Recommendations:     Discharge Recommendations: Low Intensity Therapy (return to [facility] with part B therapies.)  Discharge Equipment Recommendations:  none  Barriers to discharge:  None    Assessment:     Harrison Haywood is a 69 y.o. male with a medical diagnosis of COVID-19 virus infection.  He presents with deconditioning, some increased coughing while seated EOB. Performance deficits affecting function: weakness, impaired endurance, impaired self care skills, impaired functional mobility, gait instability, impaired balance, decreased coordination, abnormal tone, decreased lower extremity function, decreased upper extremity function, decreased ROM, impaired coordination, impaired fine motor, impaired skin, impaired cardiopulmonary response to activity.      Rehab Prognosis: Good; patient would benefit from acute skilled OT services to address these deficits and reach maximum level of function.       Plan:     Patient to be seen 3 x/week to address the above listed problems via self-care/home management, therapeutic activities, therapeutic exercises  Plan of Care Expires: 10/02/24  Plan of Care Reviewed with: patient    Subjective     Chief Complaint: Pt reports that he is weaker than normal.   Patient/Family Comments/goals: To return to [facility]    Occupational Profile:  Living Environment: Pt is [facility] resident  Previous level of function: Mod I from w/c level, ambulates with RW short distances  Roles and Routines: Caretaker to self, enjoys BINGO at the NH  Equipment Used at Home: walker, rolling, wheelchair, hospital bed  Assistance upon Discharge: Staff    Pain/Comfort:  Pain Rating 1: 0/10    Patients cultural, spiritual, [employer] conflicts given the current situation:      Objective:     Communicated with: marshall prior to session.  Patient found  HOB elevated with bed alarm, telemetry upon OT entry to room.    General Precautions: Standard, fall, contact, airborne, droplet  Orthopedic Precautions: N/A  Braces: N/A  Respiratory Status: Room air    Occupational Performance:    Bed Mobility:    Patient completed Rolling/Turning to Right with minimum assistance  Patient completed Scooting/Bridging with minimum assistance  Patient completed Supine to Sit with minimum assistance and moderate assistance  Patient completed Sit to Supine with moderate assistance    Functional Mobility/Transfers:  Patient completed Sit <> Stand Transfer with minimum assistance  with  rolling walker   Functional Mobility: Pt with fair to fair- dynamic seated and standing balance.     Activities of Daily Living:  Upper Body Dressing: moderate assistance to don/doff gown as robe seated EOB  Lower Body Dressing: maximal assistance to adjust B socks supine in bed    Cognitive/Visual Perceptual:  Cognitive/Psychosocial Skills:     -       Oriented to: Person, Place, Time and Situation   -       Follows Commands/attention:Follows one and two step  commands  -       Communication: clear/fluent  -       Memory: No Deficits noted  -       Safety awareness/insight to disability: intact   -       Mood/Affect/Coping skills/emotional control: Appropriate to situation    Physical Exam:  Postural examination/scapula alignment:    -       Rounded shoulders, forward head  Skin integrity: Visible skin intact  Motor Planning:    -       WFL though some decreased RUE motor coordination and control but able to compensate well with minimally increased time and attention to task.   Dominant hand:    -       L handed  Upper Extremity Range of Motion: BUE WFL except R shoulder flexion ~0-90* AROM    Upper Extremity Strength:  RUE 3+/5, LUE 3+ to 4-/5   Strength:  B hands WFL  Fine Motor Coordination:    -       Intact L hands, Fair R hand  Gross motor coordination:   R sided weakness/motor control deficits  at baseline 2/2 history of CVA    AMPA 6 Click ADL:  AMPAC Total Score: 15    Treatment & Education:  Pt educated on role of OT and POC.   Pt performing skills as listed above.     Patient left HOB elevated with all lines intact, call button in reach, bed alarm on, and nsg notified    GOALS:   Multidisciplinary Problems       Occupational Therapy Goals          Problem: Occupational Therapy    Goal Priority Disciplines Outcome Interventions   Occupational Therapy Goal     OT, PT/OT Progressing    Description: Goals to be met by: 10/02/2024      Patient will increase functional independence with ADLs by performing:    UE Dressing with Stand-by Assistance.  LE Dressing with Moderate Assistance.  Grooming while seated with Modified [place].  Toileting from bedside commode with Minimal Assistance for hygiene and clothing management.   Toilet transfer to bedside commode with Minimal Assistance.  Increased functional strength to WFL for self care.  Upper extremity exercise program x10 reps per handout, with supervision.                          History:     History reviewed. No pertinent past medical history.      Past Surgical History:   Procedure Laterality Date    ECHOCARDIOGRAM,TRANSESOPHAGEAL N/A 3/8/2024    Procedure: Transesophageal echo (SHABNAM) intra-procedure log documentation;  Surgeon: Rachid Rothman MD;  Location: [facility] CATH LAB/EP;  Service: Cardiology;  Laterality: N/A;       Time Tracking:     OT Date of Treatment: 09/02/24  OT Start Time: 1342  OT Stop Time: 1400  OT Total Time (min): 18 min    Billable Minutes:Evaluation 18    9/2/2024

## 2024-09-02 NOTE — PLAN OF CARE
Problem: Physical Therapy  Goal: Physical Therapy Goal  Description: Goals to be met by: 10/2/2024    Patient will increase functional independence with mobility by performin. Sit<>stand with SBA with RW.  2. Gait x 75 feet with RW with SBA.  3. Supine to sit with SBA.      Outcome: Progressing   Orders received and Physical Therapy evaluation completed.    Pt required Min A to transfer supine to sit and CGA to transfer sit to stand with a RW.  He was able to ambulate 30 ft with a RW and CGA.    Rec: return to [facility] facility with low intensity therapy  DME: none

## 2024-09-02 NOTE — PLAN OF CARE
Problem: Occupational Therapy  Goal: Occupational Therapy Goal  Description: Goals to be met by: 10/02/2024      Patient will increase functional independence with ADLs by performing:    UE Dressing with Stand-by Assistance.  LE Dressing with Moderate Assistance.  Grooming while seated with Modified [place].  Toileting from bedside commode with Minimal Assistance for hygiene and clothing management.   Toilet transfer to bedside commode with Minimal Assistance.  Increased functional strength to WFL for self care.  Upper extremity exercise program x10 reps per handout, with supervision.     Outcome: Progressing   Pt would benefit from continued OT to address deficits in self care and functional mobility. Recommending return to [facility] with low intensity therapies; DME needs TBD pending progress with therapies

## 2024-09-02 NOTE — PLAN OF CARE
Problem: Adult Inpatient Plan of Care  Goal: Plan of Care Review  9/2/2024 0425 by Devan Marrero RN  Outcome: Progressing  Flowsheets (Taken 9/2/2024 0425)  Plan of Care Reviewed With: patient  9/2/2024 0424 by Devan Marrero RN  Outcome: Progressing  Flowsheets (Taken 9/2/2024 0424)  Plan of Care Reviewed With: patient     Problem: Wound  Goal: Optimal Coping  Outcome: Progressing  Intervention: Support Patient and Family Response  Flowsheets (Taken 9/2/2024 0425)  Supportive Measures:   active listening utilized   positive reinforcement provided   self-responsibility promoted   verbalization of feelings encouraged   relaxation techniques promoted   self-care encouraged   self-reflection promoted     Problem: Pulmonary Impairment  Goal: Improved Activity Tolerance  Outcome: Progressing  Intervention: Facilitate Activity Tolerance  Flowsheets (Taken 9/2/2024 0425)  Energy Conservation Techniques:   relaxation techniques promoted   regular rest breaks encouraged   breathing techniques encouraged     Problem: Fall Injury Risk  Goal: Absence of Fall and Fall-Related Injury  Outcome: Progressing  Intervention: Identify and Manage Contributors  Flowsheets (Taken 9/2/2024 0425)  Self-Care Promotion:   independence encouraged   BADL personal objects within reach   BADL personal routines maintained  Medication Review/Management:   medications reviewed   high-risk medications identified     Problem: Comorbidity Management  Goal: Blood Pressure in Desired Range  Outcome: Progressing  Intervention: Maintain Blood Pressure Management  Flowsheets (Taken 9/2/2024 0425)  Medication Review/Management:   medications reviewed   high-risk medications identified     Problem: Skin Injury Risk Increased  Goal: Skin Health and Integrity  9/2/2024 0425 by Devan Marrero RN  Outcome: Progressing  9/2/2024 0424 by Devan Marrero RN  Outcome: Progressing  Intervention: Optimize Skin Protection  9/2/2024 0425 by Elida  Devan MARSH RN  Flowsheets (Taken 9/2/2024 0425)  Pressure Reduction Techniques: weight shift assistance provided  Skin Protection:   incontinence pads utilized   transparent dressing maintained  Activity Management: Rolling - L1  Head of Bed (HOB) Positioning: HOB elevated  9/2/2024 0424 by Devan Marrero, RN  Flowsheets (Taken 9/2/2024 0424)  Pressure Reduction Techniques: weight shift assistance provided  Skin Protection:   transparent dressing maintained   incontinence pads utilized  Activity Management: Rolling - L1  Head of Bed (HOB) Positioning: HOB elevated     Problem: Pneumonia  Goal: Fluid Balance  9/2/2024 0425 by Devan Marrero RN  Outcome: Progressing  9/2/2024 0424 by Devan Marrero RN  Outcome: Progressing   Plan of care progressing.

## 2024-09-02 NOTE — PT/OT/SLP EVAL
Physical Therapy Evaluation    Patient Name:  Harrison Haywood   MRN:  [id number]    Recommendations:     Discharge Recommendations: Low Intensity Therapy (Return to skilled nursing living facility)   Discharge Equipment Recommendations: none   Barriers to discharge: None    Assessment:     Harrison Haywood is a 69 y.o. male admitted with a medical diagnosis of COVID-19 virus infection.  He presents with the following impairments/functional limitations: weakness, impaired endurance, impaired sensation, impaired self care skills, impaired functional mobility, gait instability, impaired balance, decreased coordination, decreased upper extremity function, decreased lower extremity function, decreased safety awareness, decreased ROM, impaired coordination, impaired fine motor, impaired cardiopulmonary response to activity, impaired muscle length. Pt required Min A to transfer supine to sit and CGA to transfer sit to stand with a RW.  He was able to ambulate 30 ft with a RW and CGA.    Rec: return to skilled nursing living facility with low intensity therapy  DME: none.    Rehab Prognosis: Good; patient would benefit from acute skilled PT services to address these deficits and reach maximum level of function.    Recent Surgery: * No surgery found *      Plan:     During this hospitalization, patient to be seen 5 x/week to address the identified rehab impairments via gait training, therapeutic activities, therapeutic exercises, neuromuscular re-education and progress toward the following goals:    Plan of Care Expires:       Subjective     Chief Complaint: His cough  Patient/Family Comments/goals: Pt agreeable to evaluation  Pain/Comfort:  Pain Rating 1: 0/10  Pain Rating Post-Intervention 1: 0/10    Patients cultural, spiritual, Catholic conflicts given the current situation: no    Living Environment:  Pt has lived in a skilled nursing living facility where he gets Physical Therapy  Prior to admission, patients level of function was dependent  for dressing and bathing..  Equipment used at home: walker, rolling, wheelchair.  DME owned (not currently used): none.  Upon discharge, patient will have assistance from [facility] staff.    Objective:     Communicated with Nurse prior to session.  Patient found HOB elevated with bed alarm, PureWick, peripheral IV, telemetry  upon PT entry to room.    General Precautions: Standard, airborne, contact, droplet, fall  Orthopedic Precautions:N/A   Braces: N/A  Respiratory Status: Room air    Exams:  Cognitive Exam:  Patient is oriented to Person, Place, Time, and Situation  RLE ROM: WFL  RLE Strength: WFL  LLE ROM: WFL  LLE Strength: WFL    Functional Mobility:  Bed Mobility:     Supine to Sit: minimum assistance  Sit to Supine: stand by assistance  Transfers:     Sit to Stand:  contact guard assistance with rolling walker  Gait: Pt was able to ambulate 30 ft with a RW and CGA      AM-PAC 6 CLICK MOBILITY  Total Score:18       Treatment & Education:  Role of Physical Therapy  Physical Therapy evaluation  Transfer and gait training with a RW  Seated U knee extension, seated hip abduction/adduction and seated marching    Patient left HOB elevated with all lines intact, call button in reach, bed alarm on, and Nurse notified.    GOALS:   Multidisciplinary Problems       Physical Therapy Goals          Problem: Physical Therapy    Goal Priority Disciplines Outcome Goal Variances Interventions   Physical Therapy Goal     PT, PT/OT Progressing     Description: Goals to be met by: 10/2/2024    Patient will increase functional independence with mobility by performin. Sit<>stand with SBA with RW.  2. Gait x 75 feet with RW with SBA.  3. Supine to sit with SBA.                           History:     History reviewed. No pertinent past medical history.    Past Surgical History:   Procedure Laterality Date    ECHOCARDIOGRAM,TRANSESOPHAGEAL N/A 3/8/2024    Procedure: Transesophageal echo (SHABNAM) intra-procedure log  documentation;  Surgeon: Rachid Rothman MD;  Location: [facility] CATH LAB/EP;  Service: Cardiology;  Laterality: N/A;       Time Tracking:     PT Received On: 09/02/24  PT Start Time: 1519     PT Stop Time: 1550  PT Total Time (min): 31 min     Billable Minutes: Evaluation 10, Gait Training 11, and Therapeutic Exercise 10      09/02/2024

## 2024-09-03 VITALS
RESPIRATION RATE: 18 BRPM | BODY MASS INDEX: 26.91 KG/M2 | DIASTOLIC BLOOD PRESSURE: 68 MMHG | HEART RATE: 89 BPM | OXYGEN SATURATION: 94 % | TEMPERATURE: 98 F | SYSTOLIC BLOOD PRESSURE: 123 MMHG | HEIGHT: 73 IN | WEIGHT: 203.06 LBS

## 2024-09-03 DIAGNOSIS — U07.1 COVID-19 VIRUS DETECTED: ICD-10-CM

## 2024-09-03 LAB
ALBUMIN SERPL BCP-MCNC: 2.5 G/DL (ref 3.5–5.2)
ALP SERPL-CCNC: 85 U/L (ref 55–135)
ALT SERPL W/O P-5'-P-CCNC: 19 U/L (ref 10–44)
ANION GAP SERPL CALC-SCNC: 9 MMOL/L (ref 8–16)
AST SERPL-CCNC: 19 U/L (ref 10–40)
BASOPHILS # BLD AUTO: 0 K/UL (ref 0–0.2)
BASOPHILS NFR BLD: 0 % (ref 0–1.9)
BILIRUB SERPL-MCNC: 0.2 MG/DL (ref 0.1–1)
BUN SERPL-MCNC: 25 MG/DL (ref 8–23)
CALCIUM SERPL-MCNC: 8.8 MG/DL (ref 8.7–10.5)
CHLORIDE SERPL-SCNC: 110 MMOL/L (ref 95–110)
CO2 SERPL-SCNC: 21 MMOL/L (ref 23–29)
CREAT SERPL-MCNC: 0.7 MG/DL (ref 0.5–1.4)
DIFFERENTIAL METHOD BLD: ABNORMAL
EOSINOPHIL # BLD AUTO: 0 K/UL (ref 0–0.5)
EOSINOPHIL NFR BLD: 0 % (ref 0–8)
ERYTHROCYTE [DISTWIDTH] IN BLOOD BY AUTOMATED COUNT: 17.5 % (ref 11.5–14.5)
EST. GFR  (NO RACE VARIABLE): >60 ML/MIN/1.73 M^2
GLUCOSE SERPL-MCNC: 148 MG/DL (ref 70–110)
HCT VFR BLD AUTO: 27.4 % (ref 40–54)
HGB BLD-MCNC: 8.9 G/DL (ref 14–18)
IMM GRANULOCYTES # BLD AUTO: 0.03 K/UL (ref 0–0.04)
IMM GRANULOCYTES NFR BLD AUTO: 1.2 % (ref 0–0.5)
LYMPHOCYTES # BLD AUTO: 0.2 K/UL (ref 1–4.8)
LYMPHOCYTES NFR BLD: 6.8 % (ref 18–48)
MCH RBC QN AUTO: 30.4 PG (ref 27–31)
MCHC RBC AUTO-ENTMCNC: 32.5 G/DL (ref 32–36)
MCV RBC AUTO: 94 FL (ref 82–98)
MONOCYTES # BLD AUTO: 0.3 K/UL (ref 0.3–1)
MONOCYTES NFR BLD: 12 % (ref 4–15)
NEUTROPHILS # BLD AUTO: 2 K/UL (ref 1.8–7.7)
NEUTROPHILS NFR BLD: 80 % (ref 38–73)
NRBC BLD-RTO: 0 /100 WBC
PLATELET # BLD AUTO: 242 K/UL (ref 150–450)
PMV BLD AUTO: 11.7 FL (ref 9.2–12.9)
POTASSIUM SERPL-SCNC: 4 MMOL/L (ref 3.5–5.1)
PROT SERPL-MCNC: 5.2 G/DL (ref 6–8.4)
RBC # BLD AUTO: 2.93 M/UL (ref 4.6–6.2)
SODIUM SERPL-SCNC: 140 MMOL/L (ref 136–145)
WBC # BLD AUTO: 2.49 K/UL (ref 3.9–12.7)

## 2024-09-03 PROCEDURE — 80053 COMPREHEN METABOLIC PANEL: CPT

## 2024-09-03 PROCEDURE — 94640 AIRWAY INHALATION TREATMENT: CPT

## 2024-09-03 PROCEDURE — 85025 COMPLETE CBC W/AUTO DIFF WBC: CPT

## 2024-09-03 PROCEDURE — 36415 COLL VENOUS BLD VENIPUNCTURE: CPT

## 2024-09-03 PROCEDURE — 94761 N-INVAS EAR/PLS OXIMETRY MLT: CPT

## 2024-09-03 PROCEDURE — 25000242 PHARM REV CODE 250 ALT 637 W/ HCPCS

## 2024-09-03 PROCEDURE — 25000003 PHARM REV CODE 250

## 2024-09-03 PROCEDURE — 63600175 PHARM REV CODE 636 W HCPCS: Mod: JZ,TB

## 2024-09-03 RX ORDER — DEXAMETHASONE 6 MG/1
6 TABLET ORAL DAILY
Start: 2024-09-03 | End: 2024-09-03

## 2024-09-03 RX ORDER — BENZONATATE 100 MG/1
100 CAPSULE ORAL 3 TIMES DAILY PRN
Start: 2024-09-03 | End: 2024-09-08

## 2024-09-03 RX ORDER — LEVOFLOXACIN 750 MG/1
750 TABLET ORAL DAILY
Start: 2024-09-03 | End: 2024-09-05

## 2024-09-03 RX ORDER — ALBUTEROL SULFATE 90 UG/1
2 INHALANT RESPIRATORY (INHALATION) EVERY 4 HOURS PRN
Start: 2024-09-03

## 2024-09-03 RX ORDER — DEXAMETHASONE 6 MG/1
6 TABLET ORAL DAILY
Start: 2024-09-03 | End: 2024-09-06

## 2024-09-03 RX ADMIN — FENOFIBRATE 145 MG: 145 TABLET ORAL at 10:09

## 2024-09-03 RX ADMIN — DEXAMETHASONE 6 MG: 4 TABLET ORAL at 10:09

## 2024-09-03 RX ADMIN — FLUTICASONE FUROATE AND VILANTEROL TRIFENATATE 1 PUFF: 200; 25 POWDER RESPIRATORY (INHALATION) at 07:09

## 2024-09-03 RX ADMIN — PANTOPRAZOLE SODIUM 40 MG: 40 TABLET, DELAYED RELEASE ORAL at 10:09

## 2024-09-03 RX ADMIN — CLOPIDOGREL BISULFATE 75 MG: 75 TABLET ORAL at 10:09

## 2024-09-03 RX ADMIN — IPRATROPIUM BROMIDE AND ALBUTEROL SULFATE 3 ML: 2.5; .5 SOLUTION RESPIRATORY (INHALATION) at 12:09

## 2024-09-03 RX ADMIN — IPRATROPIUM BROMIDE AND ALBUTEROL SULFATE 3 ML: 2.5; .5 SOLUTION RESPIRATORY (INHALATION) at 07:09

## 2024-09-03 RX ADMIN — IPRATROPIUM BROMIDE AND ALBUTEROL SULFATE 3 ML: 2.5; .5 SOLUTION RESPIRATORY (INHALATION) at 04:09

## 2024-09-03 RX ADMIN — ATORVASTATIN CALCIUM 40 MG: 40 TABLET, FILM COATED ORAL at 10:09

## 2024-09-03 RX ADMIN — REMDESIVIR 100 MG: 100 INJECTION, POWDER, LYOPHILIZED, FOR SOLUTION INTRAVENOUS at 10:09

## 2024-09-03 NOTE — PROGRESS NOTES
[facility] Medicine Progress Note    Primary Team: [facility] Hospitalist Team B  Attending Physician: Selwyn Leal MD  Resident: Martin Gonsalez MD  Intern: Peyton Jiménez MD    Subjective:      Pt breathing comfortably on room air with no complaints other than continued cough. Denies SOB this AM. Endorses overall improvement. Denies any abdominal pain or dysuria. Continuing steroids and CAP coverage with Levaquin on discharge.     Objective:     Last 24 Hour Vital Signs:  BP  Min: 118/63  Max: 134/70  Temp  Av.1 °F (36.7 °C)  Min: 97.7 °F (36.5 °C)  Max: 98.3 °F (36.8 °C)  Pulse  Av.5  Min: 59  Max: 106  Resp  Av.8  Min: 16  Max: 20  SpO2  Av.9 %  Min: 90 %  Max: 98 %  I/O last 3 completed shifts:  In: 118 [P.O.:118]  Out: 1000 [Urine:1000]    Physical Examination:  Constitutional: cooperative, calm, appears stated age.  HEENT: NCAT, PERRL, MMM  Neck: Supple, no masses, trachea not deviated.  Resp: CTAB, no wheezing, no labored breathing   Cardio: RRR, S1 and S2 normal, no murmurs or added sounds.  GI: Soft, non-tender, bowel sounds active.  Extremities: No edema, peripheral pulses 2+ and symmetric, extremities warm.  Skin: No rashes, bruises, or lesions.    Neurologic: Alert and oriented x3, follows commands, moves extremities spontaneously    Laboratory:  Recent Labs   Lab 24  0240 24  0158 24  0516 24  0202   WBC 1.90* 2.84*  --  2.49*   HGB 9.1* 8.7*  --  8.9*   HCT 27.5* 26.6*  --  27.4*    207  --  242   MCV 94 94  --  94   RDW 17.4* 17.4*  --  17.5*     --  141 140   K 3.8  --  3.6 4.0     --  109 110   CO2 22*  --  23 21*   BUN 21  --  27* 25*   CREATININE 0.7  --  0.7 0.7   *  --  94 148*   PROT 5.4*  --  5.3* 5.2*   ALBUMIN 2.4*  --  2.5* 2.5*   BILITOT 0.3  --  0.3 0.2   AST 43*  --  27 19   ALKPHOS 95  --  87 85   ALT 23  --  21 19     Laboratory Data Reviewed:  Pertinent Findings:  K of 4. Hgb stable at 8.9. WBC stable at 2.49.      Microbiology Data Reviewed:  Pertinent Findings:  COVID positive  Blood cultures NG 4 days  Urine cultures + >100k CFU of Gram Negative Rods, no symptoms    Other Results:  EKG (my interpretation): sinus rhythm    Radiology Data Reviewed:   Pertinent Findings:  Chest x-ray with coarse interstitial lung markings    Current Medications:     Infusions:       Scheduled:   albuterol-ipratropium  3 mL Nebulization Q4H    atorvastatin  40 mg Oral Daily    cefTRIAXone (Rocephin) IV (PEDS and ADULTS)  1 g Intravenous Q24H    clopidogreL  75 mg Oral Daily    dexAMETHasone  6 mg Oral Daily    enoxparin  40 mg Subcutaneous Daily    fenofibrate  145 mg Oral Daily    fluticasone furoate-vilanteroL  1 puff Inhalation Daily    pantoprazole  40 mg Oral Daily    remdesivir infusion  100 mg Intravenous Daily        PRN:    Current Facility-Administered Medications:     benzonatate, 100 mg, Oral, TID PRN    dextrose 10%, 12.5 g, Intravenous, PRN    dextrose 10%, 25 g, Intravenous, PRN    glucagon (human recombinant), 1 mg, Intramuscular, PRN    glucose, 16 g, Oral, PRN    glucose, 24 g, Oral, PRN    naloxone, 0.02 mg, Intravenous, PRN    sodium chloride 0.9%, 10 mL, Intravenous, Q12H PRN        Assessment:     Harrison Haywood is a 69 y.o. male with a PMH of multiple myeloma, HTN, HLD, prior CVA (3/6/2024), and anxiety/depression who presents for cough and shortness of breath. Initially on 2 L NC due to increased oxygen requirement in the ED. Subsequently weaned to room air. Found to be COVID positive. Chest x-ray showing no consolidation or effusion. Started on dexamethasone and remdesivir for treatment. Also on CAP coverage for possible superimposed pneumonia.     Patient continues to tolerate breathing on RA with no issues. Satting 90-98%. No wheezing present on physical exam today. Patient improved today and comfortable with discharge to nursing home with steroids and CAP coverage.     Plan:     Acute hypoxic respiratory  failure  COVID  Concern for pneumonia  Pt found to be COVID positive. Initially on 2 L NC in ED, subsequently weaned to room air. Afebrile and with no leukocytosis. Lactic acid WNL. Symptoms likely due to COVID infection, however, cannot rule out superimposed bacterial pneumonia as pt also reports being COVID positive 3 weeks prior.  - remdesivir 100 mg daily, discontinue on dc  - dexamethasone 6 mg daily  - Duonebs q4h, discontinue on dc  - ceftriaxone 1g daily, day 5/5 -> complete  - azithromycin 500 mg daily, day 3/3 -> complete  - continue to monitor  - Levaquin x 2 days on discharge    UTI   - >100k CFU gram negative rods  - Currently receiving ceftriaxone for PNA, will likely cover   - pending speciation & sensitivities  - no reported symptoms    Cough worse with meals   - Watched patient eat jello without overt signs of aspiration   - Consulted SLP, no s/s aspiration but having trouble coordinating breathing when eating & drinking  - Strategies provided by SLP, no further work up      HTN  Pressures normotensive on admission. Reports taking amlodipine 5 mg at home.  - continue to monitor, can start back home amlodipine if necessary     HLD  - Lipitor 40 mg daily     Prior CVA  Prior remote L MCA infarct and prior acute ischemic infarct in high right cerebral convexity on 3/7/24. Pt with no residual deficits. On home Plavix monotherapy and statin.  - continue home Plavix 75 mg daily, continue on DC  - continue home Lipitor 40 mg daily, continue on DC     Multiple myeloma  Pt following with Dr. Howard with heme/onc. Bone marrow biopsy (12/18/23) showing 70% plasma cells. Per EMR, medications regimen is as follows: Revlimid 25 mg PO daily on days 1-21.   Daratumumab 1,800 mg - 30,000 units subcutaneous on days 1 & 15  Decadron 20 mg PO daily on days 1, 8, 15, 22. Also on Valacyclovir 500 mg daily and Allopurinol 300 mg daily for prophylaxis.  - consult heme/onc for chemotherapy recommendations    Chronic  sacral wound  With clean pink tissue, no active bleeding or purulent drainage.  - wound care consulted and recommend triad cream BID, continue on DC    Diet: Cardiac  DVT Prophylaxis: Lovenox  Code: Full    Dispo: pending safe discharge to nursing home likely today    Peyton Jiménez MD  [facility], PGY-2  [facility] Internal Medicine Team B    [facility] Medicine Hospitalist Pager numbers:   [facility] Hospitalist Medicine Team A (Kassidy/Iris): [phone]  [facility] Hospitalist Medicine Team B (Mel/Eduardo):  [phone]

## 2024-09-03 NOTE — PLAN OF CARE
D/c orders noted.     Set up completed for pt to return to [facility]. Rn can call report to [phone] room 438A.     TORY spoke with pt's sister Arminda. Arminda is agreeable for pt to return to [facility].     TORY arranged ambulance transportation due to poor trunk control.     Pt is cleared to go from CM standpoint.        09/03/24 1125   Final Note   Assessment Type Final Discharge Note   Anticipated Discharge Disposition [facility]  ([facility])   What phone number can be called within the next 1-3 days to see how you are doing after discharge? [phone]   Post-Acute Status   Post-Acute Authorization Placement   Post-Acute Placement Status Set-up Complete/Auth obtained   Discharge Delays None known at this time

## 2024-09-03 NOTE — PROGRESS NOTES
[facility] [place]  [address] [place]  [address]  [phone]           FACILITY TRANSFER ORDERS           Admit to:  [facility] skilled bed [facility]  Diagnoses:   Active Hospital Problems    Diagnosis  POA    *COVID-19 virus infection [U07.1]  Unknown    Acute hypoxic respiratory failure [J96.01]  Yes      Resolved Hospital Problems   No resolved problems to display.        Goals of Care Treatment Preferences:  Code Status: Full Code         Allergies:  Review of patient's allergies indicates:   Allergen Reactions    Aspirin Hives    Penicillins Hives       Vitals: routine     Diet:                Supplement:  1 can every three times a day with meals                         Type:  house or ensure           Activities:    - Up in a chair each morning as tolerated   - RW ambulate with assistance          LABS:  Per facility protocol     Nursing Precautions:    - Aspiration precautions:               -  Upright 90 degrees before during and after meals      - Fall precautions per nursing home protocol    - Decubitus precautions:              -  for positioning              - Pressure reducing foam mattress              - Turn patient every two hours. Use wedge pillows to anchor patient     CONSULTS:Physical Therapy to evaluate and treat                 Occupational Therapy to evaluate and treat                            MISCELLANEOUS CARE                         Routine Skin for Bedridden Patients:  Apply moisture barrier cream to all                          skin folds and wet areas in perineal area daily and after baths and                           all bowel movements.    Wound care    - Pressure injury prevention interventions - waffle overlay  - Triad ointment BID to sacrum     Medications: Discontinue all previous medication orders, if any. See new list below.     Medication List        START taking these medications      albuterol 90 mcg/actuation inhaler  Commonly known as:  PROVENTIL/VENTOLIN HFA  Inhale 2 puffs into the lungs every 4 (four) hours as needed for Wheezing. Rescue     benzonatate 100 MG capsule  Commonly known as: TESSALON  Take 1 capsule (100 mg total) by mouth 3 (three) times daily as needed for Cough.     budesonide 90 mcg/actuation Aepb  Commonly known as: PULMICORT FLEXHALER  Inhale 2 puffs (180 mcg total) into the lungs every 12 (twelve) hours as needed. Controller     levoFLOXacin 750 MG tablet  Commonly known as: LEVAQUIN  Take 1 tablet (750 mg total) by mouth once daily. for 2 days            CHANGE how you take these medications      * albuterol-ipratropium 2.5 mg-0.5 mg/3 mL nebulizer solution  Commonly known as: DUO-NEB  Take by nebulization.  What changed: Another medication with the same name was added. Make sure you understand how and when to take each.     * ipratropium-albuteroL  mcg/actuation inhaler  Commonly known as: CombiVENT  Inhale 1 puff into the lungs every 6 (six) hours as needed for Wheezing. Rescue  What changed: You were already taking a medication with the same name, and this prescription was added. Make sure you understand how and when to take each.     * dexAMETHasone 4 MG Tab  Commonly known as: DECADRON  Take 20 mg by mouth. Five 4mg tablets with breakfast weekly  What changed: Another medication with the same name was added. Make sure you understand how and when to take each.     * dexAMETHasone 6 MG tablet  Commonly known as: DECADRON  Take 1 tablet (6 mg total) by mouth once daily. for 3 days  What changed: You were already taking a medication with the same name, and this prescription was added. Make sure you understand how and when to take each.           * This list has 4 medication(s) that are the same as other medications prescribed for you. Read the directions carefully, and ask your doctor or other care provider to review them with you.                CONTINUE taking these medications      acetaminophen 650 MG  Tbsr  Commonly known as: TYLENOL  Take 650 mg by mouth every 8 (eight) hours as needed.     allopurinoL 300 MG tablet  Commonly known as: ZYLOPRIM  Take 300 mg by mouth once daily.     amLODIPine 5 MG tablet  Commonly known as: NORVASC  Take 5 mg by mouth once daily.     ARGINAID 4.5 gram-156 mg/9.2 gram Pwpk  Generic drug: arginine-vitamin C-vitamin E  Take 1 packet by mouth 2 (two) times a day. 8 ounces water     atorvastatin 40 MG tablet  Commonly known as: LIPITOR  Take 1 tablet (40 mg total) by mouth once daily.     bisacodyL 5 mg EC tablet  Commonly known as: DULCOLAX  Take 10 mg by mouth daily as needed for Constipation.     calcium carbonate 600 mg calcium (1,500 mg) Tab  Commonly known as: OS-SONIA  Take 1,200 mg by mouth every evening.     celecoxib 200 MG capsule  Commonly known as: CeleBREX  Take 200 mg by mouth 2 (two) times daily.     clopidogreL 75 mg tablet  Commonly known as: PLAVIX  Take 1 tablet (75 mg total) by mouth once daily.     doxycycline 100 MG Cap  Commonly known as: VIBRAMYCIN  Take 100 mg by mouth 2 (two) times daily.     fenofibrate 145 MG tablet  Commonly known as: TRICOR  Take 1 tablet (145 mg total) by mouth once daily.     FLUoxetine 20 MG capsule  Take 20 mg by mouth once daily.     MCKENNA-TUSSIN 100 mg/5 mL syrup  Generic drug: guaiFENesin 100 mg/5 ml  Take 200 mg by mouth every 4 (four) hours as needed for Cough.     HYDROcodone-acetaminophen 5-325 mg per tablet  Commonly known as: NORCO  Take 2 tablets by mouth 2 (two) times daily.     lenalidomide 25 mg Cap  Take 25 mg by mouth nightly On days 1-21; every 28 day cycle.     ondansetron 4 MG tablet  Commonly known as: ZOFRAN  Take 4 mg by mouth 4 (four) times daily as needed for Nausea (vomiting).     ONE DAILY MULTIVITAMIN per tablet  Generic drug: multivitamin  Take 1 tablet by mouth once daily.     pantoprazole 40 MG tablet  Commonly known as: PROTONIX  Take 1 tablet (40 mg total) by mouth once daily.     PROMOD PROTEIN  Liqd  Generic drug: protein supplement  Take 30 mLs by mouth 2 (two) times a day.     valACYclovir 500 MG tablet  Commonly known as: VALTREX  Take 500 mg by mouth once daily.     VITAMIN C 500 MG tablet  Generic drug: ascorbic acid (vitamin C)  Take 500 mg by mouth 2 (two) times daily.     vitamin D 1000 units Tab  Commonly known as: VITAMIN D3  Take 1,000 Units by mouth once daily.     zinc sulfate 50 mg zinc (220 mg) capsule  Commonly known as: ZINCATE  Take 220 mg by mouth once daily.                Martin Gonsalez MD  [employer]  [facility]

## 2024-09-03 NOTE — PLAN OF CARE
Pt resides at [facility] and will return to [facility] upon discharge.        09/03/24 1117   Discharge Planning   Assessment Type Discharge Planning Brief Assessment   Resource/Environmental Concerns none   Support Systems Family members  (pt's sister Arminda [phone])   Current Living Arrangements residential facility   Care Facility Name [facility]   Patient/Family Anticipates Transition to long-term care facility   Patient/Family Anticipated Services at Transition none   DME Needed Upon Discharge  none   Discharge Plan A Return to nursing home

## 2024-09-03 NOTE — PLAN OF CARE
Problem: Adult Inpatient Plan of Care  Goal: Plan of Care Review  Outcome: Progressing  Flowsheets (Taken 9/3/2024 0618)  Plan of Care Reviewed With: patient     Problem: Pulmonary Impairment  Goal: Improved Activity Tolerance  Outcome: Progressing  Intervention: Facilitate Activity Tolerance  Flowsheets (Taken 9/3/2024 0618)  Energy Conservation Techniques:   activity adapted to sitting   regular rest breaks encouraged   relaxation techniques promoted   breathing techniques encouraged     Problem: Oncology Care  Goal: Effective Coping  Outcome: Progressing  Intervention: Support and Enhance Coping Strategies  Flowsheets (Taken 9/3/2024 0618)  Supportive Measures:   active listening utilized   relaxation techniques promoted   verbalization of feelings encouraged   self-care encouraged   self-reflection promoted   positive reinforcement provided   self-responsibility promoted  Environmental Support:   calm environment promoted   personal routine supported   rest periods encouraged     Problem: Fall Injury Risk  Goal: Absence of Fall and Fall-Related Injury  Outcome: Progressing  Intervention: Identify and Manage Contributors  Flowsheets (Taken 9/3/2024 0618)  Self-Care Promotion:   independence encouraged   BADL personal objects within reach   BADL personal routines maintained  Medication Review/Management:   medications reviewed   high-risk medications identified     Problem: Comorbidity Management  Goal: Blood Pressure in Desired Range  Outcome: Progressing  Intervention: Maintain Blood Pressure Management  Flowsheets (Taken 9/3/2024 0618)  Medication Review/Management:   medications reviewed   high-risk medications identified     Problem: Skin Injury Risk Increased  Goal: Skin Health and Integrity  Outcome: Progressing  Intervention: Optimize Skin Protection  Flowsheets (Taken 9/3/2024 0618)  Pressure Reduction Techniques: weight shift assistance provided  Pressure Reduction Devices: positioning supports  utilized  Skin Protection:   incontinence pads utilized   transparent dressing maintained  Activity Management: Rolling - L1  Head of Bed (HOB) Positioning: HOB elevated     Problem: Pneumonia  Goal: Fluid Balance  Outcome: Progressing     Problem: UTI (Urinary Tract Infection)  Goal: Improved Infection Symptoms  Outcome: Progressing  Intervention: Prevent Infection Progression  Flowsheets (Taken 9/3/2024 0618)  Fever Reduction/Comfort Measures:   lightweight bedding   lightweight clothing  Infection Management: aseptic technique maintained   Plan of care progressing.

## 2024-09-04 LAB
BACTERIA BLD CULT: NORMAL
BACTERIA BLD CULT: NORMAL

## 2024-09-05 DIAGNOSIS — U07.1 COVID-19 VIRUS INFECTION: Primary | ICD-10-CM

## 2024-09-09 ENCOUNTER — PATIENT OUTREACH (OUTPATIENT)
Dept: ADMINISTRATIVE | Facility: CLINIC | Age: 69
End: 2024-09-09
Payer: MEDICARE

## 2024-10-15 PROBLEM — R06.02 SOB (SHORTNESS OF BREATH): Status: ACTIVE | Noted: 2024-10-15

## 2024-10-15 PROBLEM — J96.01 ACUTE HYPOXEMIC RESPIRATORY FAILURE: Status: ACTIVE | Noted: 2024-10-15

## 2024-10-15 PROBLEM — J18.9 PNEUMONIA DUE TO INFECTIOUS ORGANISM: Status: ACTIVE | Noted: 2024-10-15

## 2024-10-15 PROBLEM — Z85.79 HISTORY OF MULTIPLE MYELOMA: Status: ACTIVE | Noted: 2024-10-15

## 2025-03-05 ENCOUNTER — HOSPITAL ENCOUNTER (INPATIENT)
Facility: HOSPITAL | Age: 70
LOS: 4 days | Discharge: HOME | DRG: 193 | End: 2025-03-10
Attending: EMERGENCY MEDICINE | Admitting: INTERNAL MEDICINE
Payer: MEDICARE

## 2025-03-05 DIAGNOSIS — J18.9 PNEUMONIA DUE TO INFECTIOUS ORGANISM, UNSPECIFIED LATERALITY, UNSPECIFIED PART OF LUNG: ICD-10-CM

## 2025-03-05 DIAGNOSIS — E87.6 HYPOKALEMIA: ICD-10-CM

## 2025-03-05 DIAGNOSIS — J96.01 ACUTE HYPOXIC RESPIRATORY FAILURE: ICD-10-CM

## 2025-03-05 DIAGNOSIS — R65.10 SIRS (SYSTEMIC INFLAMMATORY RESPONSE SYNDROME): ICD-10-CM

## 2025-03-05 DIAGNOSIS — J45.41 MODERATE PERSISTENT REACTIVE AIRWAY DISEASE WITH ACUTE EXACERBATION: Primary | ICD-10-CM

## 2025-03-05 DIAGNOSIS — R06.02 SHORTNESS OF BREATH: ICD-10-CM

## 2025-03-05 DIAGNOSIS — C90.00 MULTIPLE MYELOMA, REMISSION STATUS UNSPECIFIED: ICD-10-CM

## 2025-03-05 DIAGNOSIS — R05.9 COUGH, UNSPECIFIED TYPE: ICD-10-CM

## 2025-03-05 LAB
ALBUMIN SERPL BCP-MCNC: 3.4 G/DL (ref 3.5–5.2)
ALP SERPL-CCNC: 68 U/L (ref 40–150)
ALT SERPL W/O P-5'-P-CCNC: 5 U/L (ref 10–44)
ANION GAP SERPL CALC-SCNC: 13 MMOL/L (ref 8–16)
AST SERPL-CCNC: 13 U/L (ref 10–40)
BASOPHILS # BLD AUTO: 0.02 K/UL (ref 0–0.2)
BASOPHILS NFR BLD: 0.3 % (ref 0–1.9)
BILIRUB SERPL-MCNC: 0.4 MG/DL (ref 0.1–1)
BNP SERPL-MCNC: 99 PG/ML (ref 0–99)
BUN SERPL-MCNC: 23 MG/DL (ref 8–23)
CALCIUM SERPL-MCNC: 8.2 MG/DL (ref 8.7–10.5)
CHLORIDE SERPL-SCNC: 112 MMOL/L (ref 95–110)
CO2 SERPL-SCNC: 19 MMOL/L (ref 23–29)
CREAT SERPL-MCNC: 0.7 MG/DL (ref 0.5–1.4)
DIFFERENTIAL METHOD BLD: ABNORMAL
EOSINOPHIL # BLD AUTO: 0.3 K/UL (ref 0–0.5)
EOSINOPHIL NFR BLD: 4.1 % (ref 0–8)
ERYTHROCYTE [DISTWIDTH] IN BLOOD BY AUTOMATED COUNT: 16.8 % (ref 11.5–14.5)
EST. GFR  (NO RACE VARIABLE): >60 ML/MIN/1.73 M^2
FIO2: 21 %
GLUCOSE SERPL-MCNC: 114 MG/DL (ref 70–110)
HCT VFR BLD AUTO: 33.5 % (ref 40–54)
HGB BLD-MCNC: 10.7 G/DL (ref 14–18)
IMM GRANULOCYTES # BLD AUTO: 0.06 K/UL (ref 0–0.04)
IMM GRANULOCYTES NFR BLD AUTO: 0.8 % (ref 0–0.5)
INFLUENZA A, MOLECULAR: NEGATIVE
INFLUENZA B, MOLECULAR: NEGATIVE
LDH SERPL L TO P-CCNC: 2.2 MMOL/L (ref 0.5–2.2)
LYMPHOCYTES # BLD AUTO: 0.5 K/UL (ref 1–4.8)
LYMPHOCYTES NFR BLD: 6.6 % (ref 18–48)
MAGNESIUM SERPL-MCNC: 1.4 MG/DL (ref 1.6–2.6)
MCH RBC QN AUTO: 31.8 PG (ref 27–31)
MCHC RBC AUTO-ENTMCNC: 31.9 G/DL (ref 32–36)
MCV RBC AUTO: 99 FL (ref 82–98)
MONOCYTES # BLD AUTO: 0.8 K/UL (ref 0.3–1)
MONOCYTES NFR BLD: 11.7 % (ref 4–15)
NEUTROPHILS # BLD AUTO: 5.4 K/UL (ref 1.8–7.7)
NEUTROPHILS NFR BLD: 76.5 % (ref 38–73)
NRBC BLD-RTO: 0 /100 WBC
PLATELET # BLD AUTO: 163 K/UL (ref 150–450)
PMV BLD AUTO: 12.4 FL (ref 9.2–12.9)
POC PERFORMED BY: NORMAL
POTASSIUM SERPL-SCNC: 3.3 MMOL/L (ref 3.5–5.1)
PROCALCITONIN SERPL IA-MCNC: 0.32 NG/ML
PROT SERPL-MCNC: 6 G/DL (ref 6–8.4)
RBC # BLD AUTO: 3.37 M/UL (ref 4.6–6.2)
SARS-COV-2 RDRP RESP QL NAA+PROBE: NEGATIVE
SODIUM SERPL-SCNC: 144 MMOL/L (ref 136–145)
SPECIMEN SOURCE: NORMAL
SPECIMEN SOURCE: NORMAL
TROPONIN I SERPL DL<=0.01 NG/ML-MCNC: 0.03 NG/ML (ref 0–0.03)
WBC # BLD AUTO: 7.07 K/UL (ref 3.9–12.7)

## 2025-03-05 PROCEDURE — 84145 PROCALCITONIN (PCT): CPT | Performed by: EMERGENCY MEDICINE

## 2025-03-05 PROCEDURE — 63600175 PHARM REV CODE 636 W HCPCS: Performed by: EMERGENCY MEDICINE

## 2025-03-05 PROCEDURE — 25000003 PHARM REV CODE 250: Performed by: EMERGENCY MEDICINE

## 2025-03-05 PROCEDURE — 93005 ELECTROCARDIOGRAM TRACING: CPT

## 2025-03-05 PROCEDURE — 96375 TX/PRO/DX INJ NEW DRUG ADDON: CPT

## 2025-03-05 PROCEDURE — 85025 COMPLETE CBC W/AUTO DIFF WBC: CPT | Performed by: EMERGENCY MEDICINE

## 2025-03-05 PROCEDURE — 80053 COMPREHEN METABOLIC PANEL: CPT | Performed by: EMERGENCY MEDICINE

## 2025-03-05 PROCEDURE — 87040 BLOOD CULTURE FOR BACTERIA: CPT | Mod: 59 | Performed by: EMERGENCY MEDICINE

## 2025-03-05 PROCEDURE — 93010 ELECTROCARDIOGRAM REPORT: CPT | Mod: ,,, | Performed by: STUDENT IN AN ORGANIZED HEALTH CARE EDUCATION/TRAINING PROGRAM

## 2025-03-05 PROCEDURE — 25000242 PHARM REV CODE 250 ALT 637 W/ HCPCS: Performed by: EMERGENCY MEDICINE

## 2025-03-05 PROCEDURE — 99900035 HC TECH TIME PER 15 MIN (STAT)

## 2025-03-05 PROCEDURE — 87502 INFLUENZA DNA AMP PROBE: CPT | Performed by: EMERGENCY MEDICINE

## 2025-03-05 PROCEDURE — 83605 ASSAY OF LACTIC ACID: CPT

## 2025-03-05 PROCEDURE — 83735 ASSAY OF MAGNESIUM: CPT | Performed by: EMERGENCY MEDICINE

## 2025-03-05 PROCEDURE — 96361 HYDRATE IV INFUSION ADD-ON: CPT

## 2025-03-05 PROCEDURE — 83880 ASSAY OF NATRIURETIC PEPTIDE: CPT | Performed by: EMERGENCY MEDICINE

## 2025-03-05 PROCEDURE — 84484 ASSAY OF TROPONIN QUANT: CPT | Performed by: EMERGENCY MEDICINE

## 2025-03-05 PROCEDURE — 96365 THER/PROPH/DIAG IV INF INIT: CPT

## 2025-03-05 PROCEDURE — 99291 CRITICAL CARE FIRST HOUR: CPT

## 2025-03-05 PROCEDURE — 87635 SARS-COV-2 COVID-19 AMP PRB: CPT | Performed by: EMERGENCY MEDICINE

## 2025-03-05 RX ORDER — CEFEPIME HYDROCHLORIDE 2 G/1
2 INJECTION, POWDER, FOR SOLUTION INTRAVENOUS
Status: COMPLETED | OUTPATIENT
Start: 2025-03-05 | End: 2025-03-05

## 2025-03-05 RX ORDER — DEXAMETHASONE SODIUM PHOSPHATE 4 MG/ML
8 INJECTION, SOLUTION INTRA-ARTICULAR; INTRALESIONAL; INTRAMUSCULAR; INTRAVENOUS; SOFT TISSUE
Status: COMPLETED | OUTPATIENT
Start: 2025-03-05 | End: 2025-03-05

## 2025-03-05 RX ORDER — MAGNESIUM SULFATE HEPTAHYDRATE 40 MG/ML
2 INJECTION, SOLUTION INTRAVENOUS ONCE
Status: COMPLETED | OUTPATIENT
Start: 2025-03-05 | End: 2025-03-06

## 2025-03-05 RX ORDER — IPRATROPIUM BROMIDE AND ALBUTEROL SULFATE 2.5; .5 MG/3ML; MG/3ML
3 SOLUTION RESPIRATORY (INHALATION)
Status: COMPLETED | OUTPATIENT
Start: 2025-03-05 | End: 2025-03-05

## 2025-03-05 RX ADMIN — IPRATROPIUM BROMIDE AND ALBUTEROL SULFATE 3 ML: .5; 3 SOLUTION RESPIRATORY (INHALATION) at 10:03

## 2025-03-05 RX ADMIN — DEXAMETHASONE SODIUM PHOSPHATE 8 MG: 4 INJECTION, SOLUTION INTRA-ARTICULAR; INTRALESIONAL; INTRAMUSCULAR; INTRAVENOUS; SOFT TISSUE at 10:03

## 2025-03-05 RX ADMIN — SODIUM CHLORIDE 1000 ML: 9 INJECTION, SOLUTION INTRAVENOUS at 09:03

## 2025-03-05 RX ADMIN — MAGNESIUM SULFATE HEPTAHYDRATE 2 G: 40 INJECTION, SOLUTION INTRAVENOUS at 10:03

## 2025-03-05 RX ADMIN — CEFEPIME 2 G: 2 INJECTION, POWDER, FOR SOLUTION INTRAVENOUS at 09:03

## 2025-03-05 NOTE — Clinical Note
Diagnosis: Shortness of breath [786.05.ICD-9-CM]   Future Attending Provider: DECLAN CUEVAS [[id number]]   Is the patient being sent to ED Observation?: No   Special Needs:: Fall Risk [15]

## 2025-03-06 PROBLEM — C90.00 MULTIPLE MYELOMA: Status: ACTIVE | Noted: 2025-03-06

## 2025-03-06 PROBLEM — R06.02 SHORTNESS OF BREATH: Status: ACTIVE | Noted: 2025-03-06

## 2025-03-06 PROBLEM — R05.9 COUGH: Status: ACTIVE | Noted: 2025-03-06

## 2025-03-06 PROBLEM — E87.6 HYPOKALEMIA: Status: ACTIVE | Noted: 2025-03-06

## 2025-03-06 LAB
ALBUMIN SERPL BCP-MCNC: 3.1 G/DL (ref 3.5–5.2)
ALLENS TEST: NO
ALP SERPL-CCNC: 59 U/L (ref 40–150)
ALT SERPL W/O P-5'-P-CCNC: <5 U/L (ref 10–44)
ANION GAP SERPL CALC-SCNC: 11 MMOL/L (ref 8–16)
AST SERPL-CCNC: 13 U/L (ref 10–40)
BASOPHILS # BLD AUTO: 0.01 K/UL (ref 0–0.2)
BASOPHILS NFR BLD: 0.2 % (ref 0–1.9)
BILIRUB SERPL-MCNC: 0.3 MG/DL (ref 0.1–1)
BILIRUB UR QL STRIP: NEGATIVE
BUN SERPL-MCNC: 20 MG/DL (ref 8–23)
CALCIUM SERPL-MCNC: 7.7 MG/DL (ref 8.7–10.5)
CHLORIDE SERPL-SCNC: 111 MMOL/L (ref 95–110)
CLARITY UR: CLEAR
CO2 SERPL-SCNC: 18 MMOL/L (ref 23–29)
COLOR UR: YELLOW
CREAT SERPL-MCNC: 0.7 MG/DL (ref 0.5–1.4)
DIFFERENTIAL METHOD BLD: ABNORMAL
EOSINOPHIL # BLD AUTO: 0 K/UL (ref 0–0.5)
EOSINOPHIL NFR BLD: 0.5 % (ref 0–8)
ERYTHROCYTE [DISTWIDTH] IN BLOOD BY AUTOMATED COUNT: 16.7 % (ref 11.5–14.5)
EST. GFR  (NO RACE VARIABLE): >60 ML/MIN/1.73 M^2
FIO2: 24 %
GLUCOSE SERPL-MCNC: 148 MG/DL (ref 70–110)
GLUCOSE UR QL STRIP: NEGATIVE
HCT VFR BLD AUTO: 29.7 % (ref 40–54)
HGB BLD-MCNC: 9.5 G/DL (ref 14–18)
HGB UR QL STRIP: NEGATIVE
IMM GRANULOCYTES # BLD AUTO: 0.06 K/UL (ref 0–0.04)
IMM GRANULOCYTES NFR BLD AUTO: 0.9 % (ref 0–0.5)
KETONES UR QL STRIP: NEGATIVE
LDH SERPL L TO P-CCNC: 141 U/L (ref 110–260)
LEUKOCYTE ESTERASE UR QL STRIP: NEGATIVE
LPM: 1
LYMPHOCYTES # BLD AUTO: 0.2 K/UL (ref 1–4.8)
LYMPHOCYTES NFR BLD: 3.1 % (ref 18–48)
MAGNESIUM SERPL-MCNC: 1.7 MG/DL (ref 1.6–2.6)
MCH RBC QN AUTO: 31.4 PG (ref 27–31)
MCHC RBC AUTO-ENTMCNC: 32 G/DL (ref 32–36)
MCV RBC AUTO: 98 FL (ref 82–98)
MONOCYTES # BLD AUTO: 0.3 K/UL (ref 0.3–1)
MONOCYTES NFR BLD: 4.5 % (ref 4–15)
NEUTROPHILS # BLD AUTO: 5.8 K/UL (ref 1.8–7.7)
NEUTROPHILS NFR BLD: 90.8 % (ref 38–73)
NITRITE UR QL STRIP: NEGATIVE
NRBC BLD-RTO: 0 /100 WBC
PCO2 BLDA: 34.1 MMHG (ref 35–45)
PH SMN: 7.41 [PH] (ref 7.35–7.45)
PH UR STRIP: 6 [PH] (ref 5–8)
PLATELET # BLD AUTO: 129 K/UL (ref 150–450)
PMV BLD AUTO: 11.2 FL (ref 9.2–12.9)
PO2 BLDA: 66 MMHG (ref 80–100)
POC BASE DEFICIT: -2.4 MMOL/L (ref -2–2)
POC HCO3: 21.7 MMOL/L (ref 24–28)
POC PERFORMED BY: ABNORMAL
POC SATURATED O2: 93.8 % (ref 95–100)
POTASSIUM SERPL-SCNC: 3.6 MMOL/L (ref 3.5–5.1)
PROT SERPL-MCNC: 5.4 G/DL (ref 6–8.4)
PROT UR QL STRIP: NEGATIVE
RBC # BLD AUTO: 3.03 M/UL (ref 4.6–6.2)
RSV AG SPEC QL IA: NEGATIVE
SODIUM SERPL-SCNC: 140 MMOL/L (ref 136–145)
SP GR UR STRIP: 1.02 (ref 1–1.03)
SPECIMEN SOURCE: ABNORMAL
SPECIMEN SOURCE: NORMAL
URN SPEC COLLECT METH UR: NORMAL
UROBILINOGEN UR STRIP-ACNC: NEGATIVE EU/DL
WBC # BLD AUTO: 6.39 K/UL (ref 3.9–12.7)

## 2025-03-06 PROCEDURE — 25500020 PHARM REV CODE 255: Performed by: INTERNAL MEDICINE

## 2025-03-06 PROCEDURE — 94640 AIRWAY INHALATION TREATMENT: CPT

## 2025-03-06 PROCEDURE — 97530 THERAPEUTIC ACTIVITIES: CPT

## 2025-03-06 PROCEDURE — 63600175 PHARM REV CODE 636 W HCPCS

## 2025-03-06 PROCEDURE — 11000001 HC ACUTE MED/SURG PRIVATE ROOM

## 2025-03-06 PROCEDURE — 81003 URINALYSIS AUTO W/O SCOPE: CPT | Performed by: EMERGENCY MEDICINE

## 2025-03-06 PROCEDURE — 94799 UNLISTED PULMONARY SVC/PX: CPT

## 2025-03-06 PROCEDURE — 82803 BLOOD GASES ANY COMBINATION: CPT

## 2025-03-06 PROCEDURE — 25000242 PHARM REV CODE 250 ALT 637 W/ HCPCS

## 2025-03-06 PROCEDURE — 36600 WITHDRAWAL OF ARTERIAL BLOOD: CPT

## 2025-03-06 PROCEDURE — 94761 N-INVAS EAR/PLS OXIMETRY MLT: CPT | Mod: XB

## 2025-03-06 PROCEDURE — 83735 ASSAY OF MAGNESIUM: CPT

## 2025-03-06 PROCEDURE — 97165 OT EVAL LOW COMPLEX 30 MIN: CPT

## 2025-03-06 PROCEDURE — 80053 COMPREHEN METABOLIC PANEL: CPT

## 2025-03-06 PROCEDURE — 27000221 HC OXYGEN, UP TO 24 HOURS

## 2025-03-06 PROCEDURE — 97161 PT EVAL LOW COMPLEX 20 MIN: CPT

## 2025-03-06 PROCEDURE — 99900035 HC TECH TIME PER 15 MIN (STAT)

## 2025-03-06 PROCEDURE — 87634 RSV DNA/RNA AMP PROBE: CPT

## 2025-03-06 PROCEDURE — 96366 THER/PROPH/DIAG IV INF ADDON: CPT

## 2025-03-06 PROCEDURE — 85025 COMPLETE CBC W/AUTO DIFF WBC: CPT

## 2025-03-06 PROCEDURE — 25000003 PHARM REV CODE 250

## 2025-03-06 PROCEDURE — 83615 LACTATE (LD) (LDH) ENZYME: CPT

## 2025-03-06 PROCEDURE — 87449 NOS EACH ORGANISM AG IA: CPT

## 2025-03-06 RX ORDER — ENOXAPARIN SODIUM 100 MG/ML
40 INJECTION SUBCUTANEOUS EVERY 24 HOURS
Status: DISCONTINUED | OUTPATIENT
Start: 2025-03-06 | End: 2025-03-10 | Stop reason: HOSPADM

## 2025-03-06 RX ORDER — CALCIUM CARBONATE 200(500)MG
1000 TABLET,CHEWABLE ORAL NIGHTLY
Status: DISCONTINUED | OUTPATIENT
Start: 2025-03-06 | End: 2025-03-10 | Stop reason: HOSPADM

## 2025-03-06 RX ORDER — VALACYCLOVIR HYDROCHLORIDE 500 MG/1
500 TABLET, FILM COATED ORAL DAILY
Status: DISCONTINUED | OUTPATIENT
Start: 2025-03-06 | End: 2025-03-10 | Stop reason: HOSPADM

## 2025-03-06 RX ORDER — SENNOSIDES 8.6 MG/1
8.6 TABLET ORAL NIGHTLY
Status: DISCONTINUED | OUTPATIENT
Start: 2025-03-06 | End: 2025-03-10 | Stop reason: HOSPADM

## 2025-03-06 RX ORDER — ATORVASTATIN CALCIUM 40 MG/1
40 TABLET, FILM COATED ORAL DAILY
Status: DISCONTINUED | OUTPATIENT
Start: 2025-03-06 | End: 2025-03-10 | Stop reason: HOSPADM

## 2025-03-06 RX ORDER — VITAMIN A 3000 MCG
1 CAPSULE ORAL 2 TIMES DAILY PRN
COMMUNITY

## 2025-03-06 RX ORDER — CHOLECALCIFEROL (VITAMIN D3) 25 MCG
1000 TABLET ORAL DAILY
Status: DISCONTINUED | OUTPATIENT
Start: 2025-03-06 | End: 2025-03-10 | Stop reason: HOSPADM

## 2025-03-06 RX ORDER — FENOFIBRATE 145 MG/1
145 TABLET, FILM COATED ORAL DAILY
Status: DISCONTINUED | OUTPATIENT
Start: 2025-03-06 | End: 2025-03-10 | Stop reason: HOSPADM

## 2025-03-06 RX ORDER — ACETAMINOPHEN 325 MG/1
650 TABLET ORAL EVERY 8 HOURS PRN
COMMUNITY

## 2025-03-06 RX ORDER — IPRATROPIUM BROMIDE AND ALBUTEROL SULFATE 2.5; .5 MG/3ML; MG/3ML
3 SOLUTION RESPIRATORY (INHALATION)
Status: DISCONTINUED | OUTPATIENT
Start: 2025-03-06 | End: 2025-03-10 | Stop reason: HOSPADM

## 2025-03-06 RX ORDER — PANTOPRAZOLE SODIUM 40 MG/1
40 TABLET, DELAYED RELEASE ORAL DAILY
Status: DISCONTINUED | OUTPATIENT
Start: 2025-03-06 | End: 2025-03-10 | Stop reason: HOSPADM

## 2025-03-06 RX ORDER — ALLOPURINOL 100 MG/1
300 TABLET ORAL DAILY
Status: DISCONTINUED | OUTPATIENT
Start: 2025-03-06 | End: 2025-03-10 | Stop reason: HOSPADM

## 2025-03-06 RX ORDER — GLUCAGON 1 MG
1 KIT INJECTION
Status: DISCONTINUED | OUTPATIENT
Start: 2025-03-06 | End: 2025-03-10 | Stop reason: HOSPADM

## 2025-03-06 RX ORDER — AMLODIPINE BESYLATE 5 MG/1
5 TABLET ORAL DAILY
Status: DISCONTINUED | OUTPATIENT
Start: 2025-03-06 | End: 2025-03-10 | Stop reason: HOSPADM

## 2025-03-06 RX ORDER — CEFTRIAXONE 1 G/1
1 INJECTION, POWDER, FOR SOLUTION INTRAMUSCULAR; INTRAVENOUS
Status: DISCONTINUED | OUTPATIENT
Start: 2025-03-06 | End: 2025-03-06

## 2025-03-06 RX ORDER — CARBOXYMETHYLCELLULOSE SODIUM 5 MG/ML
2 SOLUTION/ DROPS OPHTHALMIC 3 TIMES DAILY
COMMUNITY
Start: 2025-02-06

## 2025-03-06 RX ORDER — FENOFIBRATE 145 MG/1
145 TABLET, FILM COATED ORAL DAILY
COMMUNITY

## 2025-03-06 RX ORDER — ACETAMINOPHEN 325 MG/1
650 TABLET ORAL EVERY 6 HOURS PRN
Status: DISCONTINUED | OUTPATIENT
Start: 2025-03-06 | End: 2025-03-10 | Stop reason: HOSPADM

## 2025-03-06 RX ORDER — SODIUM CHLORIDE 0.9 % (FLUSH) 0.9 %
10 SYRINGE (ML) INJECTION EVERY 12 HOURS PRN
Status: DISCONTINUED | OUTPATIENT
Start: 2025-03-06 | End: 2025-03-10 | Stop reason: HOSPADM

## 2025-03-06 RX ORDER — CEFTRIAXONE 1 G/1
1 INJECTION, POWDER, FOR SOLUTION INTRAMUSCULAR; INTRAVENOUS
Status: COMPLETED | OUTPATIENT
Start: 2025-03-06 | End: 2025-03-10

## 2025-03-06 RX ORDER — HYDROCODONE BITARTRATE AND ACETAMINOPHEN 5; 325 MG/1; MG/1
1 TABLET ORAL EVERY 6 HOURS PRN
Refills: 0 | Status: DISCONTINUED | OUTPATIENT
Start: 2025-03-06 | End: 2025-03-06

## 2025-03-06 RX ORDER — IBUPROFEN 200 MG
24 TABLET ORAL
Status: DISCONTINUED | OUTPATIENT
Start: 2025-03-06 | End: 2025-03-10 | Stop reason: HOSPADM

## 2025-03-06 RX ORDER — IBUPROFEN 200 MG
16 TABLET ORAL
Status: DISCONTINUED | OUTPATIENT
Start: 2025-03-06 | End: 2025-03-10 | Stop reason: HOSPADM

## 2025-03-06 RX ORDER — BENZONATATE 100 MG/1
100 CAPSULE ORAL EVERY 8 HOURS PRN
COMMUNITY
Start: 2025-01-08

## 2025-03-06 RX ORDER — CLOPIDOGREL BISULFATE 75 MG/1
75 TABLET ORAL DAILY
Status: DISCONTINUED | OUTPATIENT
Start: 2025-03-06 | End: 2025-03-10 | Stop reason: HOSPADM

## 2025-03-06 RX ORDER — MAGNESIUM SULFATE HEPTAHYDRATE 40 MG/ML
2 INJECTION, SOLUTION INTRAVENOUS ONCE
Status: COMPLETED | OUTPATIENT
Start: 2025-03-06 | End: 2025-03-06

## 2025-03-06 RX ORDER — POTASSIUM CHLORIDE 20 MEQ/1
40 TABLET, EXTENDED RELEASE ORAL ONCE
Status: COMPLETED | OUTPATIENT
Start: 2025-03-06 | End: 2025-03-06

## 2025-03-06 RX ORDER — BENZONATATE 100 MG/1
100 CAPSULE ORAL 3 TIMES DAILY PRN
Status: DISCONTINUED | OUTPATIENT
Start: 2025-03-06 | End: 2025-03-10 | Stop reason: HOSPADM

## 2025-03-06 RX ORDER — PANTOPRAZOLE SODIUM 40 MG/1
40 TABLET, DELAYED RELEASE ORAL DAILY
COMMUNITY

## 2025-03-06 RX ORDER — POLYETHYLENE GLYCOL 3350 17 G/17G
17 POWDER, FOR SOLUTION ORAL DAILY
Status: DISCONTINUED | OUTPATIENT
Start: 2025-03-06 | End: 2025-03-10 | Stop reason: HOSPADM

## 2025-03-06 RX ORDER — ZINC SULFATE 50(220)MG
220 CAPSULE ORAL DAILY
Status: DISCONTINUED | OUTPATIENT
Start: 2025-03-06 | End: 2025-03-10 | Stop reason: HOSPADM

## 2025-03-06 RX ORDER — FLUOXETINE HYDROCHLORIDE 20 MG/1
20 CAPSULE ORAL DAILY
Status: DISCONTINUED | OUTPATIENT
Start: 2025-03-06 | End: 2025-03-10 | Stop reason: HOSPADM

## 2025-03-06 RX ADMIN — AMLODIPINE BESYLATE 5 MG: 5 TABLET ORAL at 09:03

## 2025-03-06 RX ADMIN — CEFTRIAXONE SODIUM 1 G: 1 INJECTION, POWDER, FOR SOLUTION INTRAMUSCULAR; INTRAVENOUS at 04:03

## 2025-03-06 RX ADMIN — POTASSIUM CHLORIDE 40 MEQ: 1500 TABLET, EXTENDED RELEASE ORAL at 02:03

## 2025-03-06 RX ADMIN — CALCIUM CARBONATE (ANTACID) CHEW TAB 500 MG 1000 MG: 500 CHEW TAB at 02:03

## 2025-03-06 RX ADMIN — SENNOSIDES 8.6 MG: 8.6 TABLET, FILM COATED ORAL at 09:03

## 2025-03-06 RX ADMIN — ATORVASTATIN CALCIUM 40 MG: 40 TABLET, FILM COATED ORAL at 09:03

## 2025-03-06 RX ADMIN — FLUOXETINE HYDROCHLORIDE 20 MG: 20 CAPSULE ORAL at 09:03

## 2025-03-06 RX ADMIN — CALCIUM CARBONATE (ANTACID) CHEW TAB 500 MG 1000 MG: 500 CHEW TAB at 09:03

## 2025-03-06 RX ADMIN — IPRATROPIUM BROMIDE AND ALBUTEROL SULFATE 3 ML: 2.5; .5 SOLUTION RESPIRATORY (INHALATION) at 12:03

## 2025-03-06 RX ADMIN — VALACYCLOVIR HYDROCHLORIDE 500 MG: 500 TABLET, FILM COATED ORAL at 09:03

## 2025-03-06 RX ADMIN — SENNOSIDES 8.6 MG: 8.6 TABLET, FILM COATED ORAL at 04:03

## 2025-03-06 RX ADMIN — IPRATROPIUM BROMIDE AND ALBUTEROL SULFATE 3 ML: 2.5; .5 SOLUTION RESPIRATORY (INHALATION) at 03:03

## 2025-03-06 RX ADMIN — IPRATROPIUM BROMIDE AND ALBUTEROL SULFATE 3 ML: 2.5; .5 SOLUTION RESPIRATORY (INHALATION) at 07:03

## 2025-03-06 RX ADMIN — AZITHROMYCIN MONOHYDRATE 500 MG: 500 INJECTION, POWDER, LYOPHILIZED, FOR SOLUTION INTRAVENOUS at 02:03

## 2025-03-06 RX ADMIN — FENOFIBRATE 145 MG: 145 TABLET ORAL at 09:03

## 2025-03-06 RX ADMIN — MAGNESIUM SULFATE HEPTAHYDRATE 2 G: 40 INJECTION, SOLUTION INTRAVENOUS at 09:03

## 2025-03-06 RX ADMIN — ALLOPURINOL 300 MG: 100 TABLET ORAL at 09:03

## 2025-03-06 RX ADMIN — ZINC SULFATE 220 MG (50 MG) CAPSULE 220 MG: CAPSULE at 09:03

## 2025-03-06 RX ADMIN — IOHEXOL 100 ML: 350 INJECTION, SOLUTION INTRAVENOUS at 02:03

## 2025-03-06 RX ADMIN — CLOPIDOGREL BISULFATE 75 MG: 75 TABLET ORAL at 09:03

## 2025-03-06 RX ADMIN — Medication 1000 UNITS: at 09:03

## 2025-03-06 RX ADMIN — PANTOPRAZOLE SODIUM 40 MG: 40 TABLET, DELAYED RELEASE ORAL at 09:03

## 2025-03-06 NOTE — PHARMACY MED REC
"[facility]           Pharmacy  Admission Medication History     The home medication history was taken by Radha Ojeda.      Medication history obtained from Medications listed below were obtained from: Nursing home    Based on information gathered for medication list, you may go to "Admission" then "Reconcile Home Medications" tabs to review and/or act upon those items.     The home medication list has been updated by the Pharmacy department.   Please read ALL comments highlighted in yellow.   Please address this information as you see fit.    Feel free to contact us if you have any questions or require assistance.      No current facility-administered medications on file prior to encounter.     Current Outpatient Medications on File Prior to Encounter   Medication Sig Dispense Refill    acetaminophen (TYLENOL) 325 MG tablet Take 650 mg by mouth every 8 (eight) hours as needed for Pain.      albuterol-budesonide (AIRSUPRA) 90-80 mcg/actuation Inhale 2 puffs into the lungs every 4 (four) hours as needed (wheezing).      albuterol-ipratropium (DUO-NEB) 2.5 mg-0.5 mg/3 mL nebulizer solution Take 3 mLs by nebulization every 6 (six) hours as needed (cough and chest congestion).      allopurinoL (ZYLOPRIM) 300 MG tablet Take 300 mg by mouth once daily.      amLODIPine (NORVASC) 5 MG tablet Take 5 mg by mouth once daily.      ascorbic acid, vitamin C, (VITAMIN C) 500 MG tablet Take 500 mg by mouth 2 (two) times daily.      atorvastatin (LIPITOR) 40 MG tablet Take 1 tablet (40 mg total) by mouth once daily. 90 tablet 3    benzonatate (TESSALON) 100 MG capsule Take 100 mg by mouth every 8 (eight) hours as needed for Cough.      bisacodyL (DULCOLAX) 5 mg EC tablet Take 10 mg by mouth daily as needed for Constipation.      budesonide (PULMICORT FLEXHALER) 90 mcg/actuation AePB Inhale 2 puffs (180 mcg total) into the lungs every 12 (twelve) hours as needed. Controller      calcium carbonate (OS-SONIA) 600 " mg calcium (1,500 mg) Tab Take 1,200 mg by mouth every evening.      celecoxib (CELEBREX) 200 MG capsule Take 200 mg by mouth 2 (two) times daily.      clopidogreL (PLAVIX) 75 mg tablet Take 1 tablet (75 mg total) by mouth once daily. 30 tablet 11    dexAMETHasone (DECADRON) 4 MG Tab Take 20 mg by mouth every Monday. Five 4mg tablets with breakfast weekly      fenofibrate (TRICOR) 145 MG tablet Take 145 mg by mouth once daily.      FLUoxetine 20 MG capsule Take 20 mg by mouth once daily.      guaiFENesin 100 mg/5 ml (MCKENNA-TUSSIN) 100 mg/5 mL syrup Take 200 mg by mouth every 4 (four) hours as needed for Cough.      HYDROcodone-acetaminophen (NORCO) 5-325 mg per tablet Take 1 tablet by mouth every 6 (six) hours as needed for Pain.      lenalidomide 25 mg Cap Take 25 mg by mouth nightly On days 1-21; every 28 day cycle.      multivitamin (ONE DAILY MULTIVITAMIN) per tablet Take 1 tablet by mouth once daily.      ondansetron (ZOFRAN) 4 MG tablet Take 4 mg by mouth every 6 (six) hours as needed (vomiting).      pantoprazole (PROTONIX) 40 MG tablet Take 40 mg by mouth once daily.      REFRESH TEARS 0.5 % Drop Place 2 drops into both eyes 3 (three) times daily.      sodium chloride (SALINE NASAL) 0.65 % nasal spray 1 spray by Nasal route 2 (two) times daily as needed.      valACYclovir (VALTREX) 500 MG tablet Take 500 mg by mouth 2 (two) times daily.      vitamin D (VITAMIN D3) 1000 units Tab Take 1,000 Units by mouth once daily.      arginine-vitamin C-vitamin E (ARGINAID) 4.5 gram-156 mg/9.2 gram PwPk Take 1 packet by mouth 2 (two) times a day. 8 ounces water      protein supplement (PROMOD PROTEIN) Liqd Take 30 mLs by mouth 2 (two) times a day.      pulse oximeter (PULSE OXIMETER) device by Apply Externally route 2 (two) times a day. Use twice daily at 8 AM and 3 PM and record the value in my[employer] as directed. 1 each 0       Please address this information as you see fit.  Feel free to contact us if you have any  questions or require assistance.    Radha Ojeda  [phone]                  .

## 2025-03-06 NOTE — ED PROVIDER NOTES
Encounter Date: 3/5/2025       History     Chief Complaint   Patient presents with    Shortness of Breath     Pt presents to the ED bib EMS for SOB. Coming from [facility] states his O2 was 93 on RA.      69-year-old male brought to the emergency department for evaluation of shortness of breath.  Unclear onset as dementia limits HPI an ROS.  Patient states he does feel short of breath right now.  Notes some cough as well.  Denies any pain at this time.  Denies any fever, nausea, vomiting.      Review of patient's allergies indicates:   Allergen Reactions    Aspirin Hives    Penicillins Hives     History reviewed. No pertinent past medical history.  Past Surgical History:   Procedure Laterality Date    ECHOCARDIOGRAM,TRANSESOPHAGEAL N/A 3/8/2024    Procedure: Transesophageal echo (SHABNAM) intra-procedure log documentation;  Surgeon: Rachid Rothman MD;  Location: [facility] CATH LAB/EP;  Service: Cardiology;  Laterality: N/A;     No family history on file.  Social History[1]  Review of Systems   Unable to perform ROS: Dementia       Physical Exam     Initial Vitals [03/05/25 2100]   BP Pulse Resp Temp SpO2   -- (!) 111 (!) 21 98.4 °F (36.9 °C) 98 %      MAP       --         Physical Exam    Nursing note and vitals reviewed.  Constitutional: He appears well-developed and well-nourished.   HENT:   Head: Normocephalic and atraumatic.   Eyes: Conjunctivae and EOM are normal. Pupils are equal, round, and reactive to light.   Neck: Neck supple. No tracheal deviation present.   Normal range of motion.  Cardiovascular:  Regular rhythm and intact distal pulses.           Pulmonary/Chest: He is in respiratory distress (Mild increased work of breathing).   Abdominal: Abdomen is soft. He exhibits no distension. There is no abdominal tenderness.   Musculoskeletal:         General: Edema (1+ pitting edema to bilateral lower extremities) present. No tenderness. Normal range of motion.      Cervical back: Normal range of motion and  neck supple.     Neurological: He is alert. He has normal strength. No cranial nerve deficit. GCS score is 15. GCS eye subscore is 4. GCS verbal subscore is 5. GCS motor subscore is 6.   Skin: Skin is warm and dry.         ED Course   Critical Care    Date/Time: 3/6/2025 12:21 AM    Performed by: Rey Rhodes MD  Authorized by: Rey Rhodes MD  Direct patient critical care time: 15 minutes  Additional history critical care time: 15 minutes  Ordering / reviewing critical care time: 15 minutes  Documentation critical care time: 15 minutes  Consulting other physicians critical care time: 15 minutes  Consult with family critical care time: 10 minutes  Total critical care time (exclusive of procedural time) : 85 minutes  Critical care time was exclusive of separately billable procedures and treating other patients.  Critical care was necessary to treat or prevent imminent or life-threatening deterioration of the following conditions: respiratory failure.  Critical care was time spent personally by me on the following activities: development of treatment plan with patient or surrogate, interpretation of cardiac output measurements, obtaining history from patient or surrogate, ordering and performing treatments and interventions, ordering and review of laboratory studies, ordering and review of radiographic studies, examination of patient, evaluation of patient's response to treatment, pulse oximetry, re-evaluation of patient's condition and review of old charts.        Labs Reviewed   CBC W/ AUTO DIFFERENTIAL - Abnormal       Result Value    WBC 7.07      RBC 3.37 (*)     Hemoglobin 10.7 (*)     Hematocrit 33.5 (*)     MCV 99 (*)     MCH 31.8 (*)     MCHC 31.9 (*)     RDW 16.8 (*)     Platelets 163      MPV 12.4      Immature Granulocytes 0.8 (*)     Gran # (ANC) 5.4      Immature Grans (Abs) 0.06 (*)     Lymph # 0.5 (*)     Mono # 0.8      Eos # 0.3      Baso # 0.02      nRBC 0      Gran % 76.5 (*)      Lymph % 6.6 (*)     Mono % 11.7      Eosinophil % 4.1      Basophil % 0.3      Differential Method Automated     COMPREHENSIVE METABOLIC PANEL - Abnormal    Sodium 144      Potassium 3.3 (*)     Chloride 112 (*)     CO2 19 (*)     Glucose 114 (*)     BUN 23      Creatinine 0.7      Calcium 8.2 (*)     Total Protein 6.0      Albumin 3.4 (*)     Total Bilirubin 0.4      Alkaline Phosphatase 68      AST 13      ALT 5 (*)     eGFR >60      Anion Gap 13     MAGNESIUM - Abnormal    Magnesium 1.4 (*)    PROCALCITONIN - Abnormal    Procalcitonin 0.32 (*)    INFLUENZA A & B BY MOLECULAR    Influenza A, Molecular Negative      Influenza B, Molecular Negative      Flu A & B Source Nasal swab     CULTURE, BLOOD   CULTURE, BLOOD   TROPONIN I    Troponin I 0.025     B-TYPE NATRIURETIC PEPTIDE    BNP 99     SARS-COV-2 RNA AMPLIFICATION, QUAL    SARS-CoV-2 RNA, Amplification, Qual Negative     URINALYSIS     EKG Readings: (Independently Interpreted)   Initial Reading: No STEMI. Previous EKG: Compared with most recent EKG Previous EKG Date: 8/30/2024 (Nonspecific change). Rhythm: Sinus Tachycardia. Heart Rate: 106. Ectopy: No Ectopy. ST Segments: Normal ST Segments. Axis: Normal.   EKG independently interpreted by me pending Cardiology review           X-Rays:   Independently Interpreted Readings:   Other Readings:  Chest x-ray independently interpreted by me pending radiology review: No acute infiltrate, no pneumothorax, no effusion    Imaging Results              X-Ray Chest AP Portable (Final result)  Result time 03/05/25 22:08:21      Final result by Ean Loya DO (03/05/25 22:08:21)                   Impression:      No acute abnormality.      Electronically signed by: Ean Loya  Date:    03/05/2025  Time:    22:08               Narrative:    EXAMINATION:  XR CHEST AP PORTABLE    CLINICAL HISTORY:  Shortness of breath;    TECHNIQUE:  Single frontal view of the chest was  performed.    COMPARISON:  08/30/2024.    FINDINGS:  The lungs are well expanded and clear. No focal opacities are seen. The pleural spaces are clear. The cardiac silhouette is enlarged.  There are calcifications of the aortic arch.  There are degenerative changes.                                      Medications   magnesium sulfate 2g in water 50mL IVPB (premix) (2 g Intravenous New Bag 3/5/25 2242)   ceFEPIme injection 2 g (2 g Intravenous Given 3/5/25 2127)   sodium chloride 0.9% bolus 1,000 mL 1,000 mL (1,000 mLs Intravenous New Bag 3/5/25 2126)   dexAMETHasone injection 8 mg (8 mg Intravenous Given 3/5/25 2241)   albuterol-ipratropium 2.5 mg-0.5 mg/3 mL nebulizer solution 3 mL (3 mLs Nebulization Given 3/5/25 2218)     Medical Decision Making  69-year-old male presents emergency department complaining of shortness of breath      Differential:  ACS, dissection, pneumonia, pneumothorax, CHF, COPD, anemia, COVID, influenza, viral syndrome, SIRS, sepsis      Patient met SIRS criteria on arrival.  He was given a L of IV fluid, lactic acid was less than 2.5.  He does not have any focus of infection on his chest x-ray or labs or exam, therefore I do not think he is septic.  Does not also not have a white blood cell count that is elevated or a fever.  Given Zosyn as his broad-spectrum antibiotic.  Unfortunately despite improvement with Decadron and neb treatments, he is still somewhat hypoxic, deciding as low as 92% on room air at rest.  Still with some mild increased work of breathing.  Discussed with [facility] internal Medicine regarding the patient's presentation in the are amenable to admission for observation, patient comfortable with plan at this time.    Problems Addressed:  Moderate persistent reactive airway disease with acute exacerbation: acute illness or injury with systemic symptoms  Shortness of breath: acute illness or injury  SIRS (systemic inflammatory response syndrome): acute illness or injury    Amount  and/or Complexity of Data Reviewed  Independent Historian:      Details: Discussed with patient's family member at bedside who add some history given his dementia  External Data Reviewed: ECG and notes.     Details: Reviewed most recent EKG for comparison     Reviewed most recent Heme-Onc note documenting baseline medications and past medical history  Labs: ordered.     Details: CBC without leukocytosis, mild anemia; CMP with normal renal and liver function tests, mild hypokalemia; BNP within normal limits; troponin within normal limits  Radiology: ordered and independent interpretation performed. Decision-making details documented in ED Course.  ECG/medicine tests: ordered and independent interpretation performed. Decision-making details documented in ED Course.  Discussion of management or test interpretation with external provider(s): Discussed with [facility] internal Medicine regarding patient's past medical history, presentation, labs, imaging, interventions, plan to admit for observation    Risk  OTC drugs.  Prescription drug management.  Decision regarding hospitalization.    Critical Care  Total time providing critical care: 85 minutes    This patient does not have evidence of infective focus  My overall impression is  SIRS without sepsis .  Source:  none  Antibiotics given-   Antibiotics (72h ago, onward)      None          Latest lactate reviewed-  Recent Labs   Lab 03/05/25  2118   POCLAC 2.2     Organ dysfunction indicated by Acute respiratory failure    Fluid challenge Other- Patient to receive 1L volume other than 30cc/kg due to lactic acid < 2.5      Post- resuscitation assessment No - Post resuscitation assessment not needed       Will Not start Pressors- Levophed for MAP of 65  Source control achieved by: zosyn                                    Clinical Impression:  Final diagnoses:  [R06.02] Shortness of breath  [J45.41] Moderate persistent reactive airway disease with acute exacerbation  (Primary)  [R65.10] SIRS (systemic inflammatory response syndrome)          ED Disposition Condition    Observation Stable                    [1]   Social History  Tobacco Use    Smoking status: Never        Rey Rhodes MD  03/06/25 0024

## 2025-03-06 NOTE — PT/OT/SLP EVAL
Occupational Therapy   Evaluation    Name: Harrison Haywood  MRN: [id number]  Admitting Diagnosis: Acute hypoxic respiratory failure  Recent Surgery: * No surgery found *      Recommendations:     Discharge Recommendations:  (return to NH with therapy services)  Discharge Equipment Recommendations:  to be determined by next level of care  Barriers to discharge:  Other (Comment) (Pt requires increased level of assist)    Assessment:     Harrison Haywood is a 69 y.o. male with a medical diagnosis of Acute hypoxic respiratory failure.  He presents with The primary encounter diagnosis was Moderate persistent reactive airway disease with acute exacerbation. Diagnoses of Shortness of breath, SIRS (systemic inflammatory response syndrome), and Acute hypoxic respiratory failure were also pertinent to this visit. Performance deficits affecting function: weakness, impaired functional mobility, gait instability, impaired endurance, decreased upper extremity function, decreased lower extremity function, decreased ROM, impaired self care skills, impaired balance, decreased coordination, impaired cardiopulmonary response to activity, impaired joint extensibility.      Rehab Prognosis: Fair; patient would benefit from acute skilled OT services to address these deficits and reach maximum level of function.       Plan:     Patient to be seen 3 x/week to address the above listed problems via self-care/home management, therapeutic activities, therapeutic exercises  Plan of Care Expires: 04/06/25  Plan of Care Reviewed with: patient    Subjective     Chief Complaint: mild SOB w/ax  Patient/Family Comments/goals: agreeable to therapy    Occupational Profile:  Living Environment: detention resident at [facility]  Previous level of function: Mostly WC level for mobility, ambulates short distance with RW during therapy, able to t/f to WC & propel self [place], assist bathing, LBD, wears diapers  Equipment Used at Home: walker, rolling, wheelchair,  hospital bed  Assistance upon Discharge: Staff at NH    Pain/Comfort:  Pain Rating 1: 0/10    Patients cultural, spiritual, Buddhism conflicts given the current situation: no    Objective:     Communicated with: nsg prior to session.  Patient found HOB elevated with blood pressure cuff, pulse ox (continuous), telemetry upon OT entry to room.    General Precautions: Standard, fall  Orthopedic Precautions: N/A  Braces: N/A  Respiratory Status: Room air    Occupational Performance:    Bed Mobility:    Patient completed Supine to Sit with minimum assistance  Patient completed Sit to Supine with moderate assistance    Functional Mobility/Transfers:  Patient completed Sit <> Stand Transfer with contact guard assistance  with  rolling walker   Functional Mobility: Pt taking side steps to HOB with CGA & use of RW.    Cognitive/Visual Perceptual:  Cognitive/Psychosocial Skills:     -       Oriented to: Person, Place, Time, and Situation   -       Memory: No Deficits noted  -       Mood/Affect/Coping skills/emotional control: Cooperative    Physical Exam:  Upper Extremity Range of Motion:     -       Left Upper Extremity: Deficits: shoulder flex ~75 degrees  -       Right Upper Extremity: Deficits: shoulder flex ~10 degrees  Upper Extremity Strength:    -       Right Upper Extremity:  WFL dist  -       Left Upper Extremity:  WFL dist   Strength:    -       Right Upper Extremity: WFL  -       Left Upper Extremity: WFL    AMPAC 6 Click ADL:  AMPAC Total Score: 16    Treatment & Education:  Pt would benefit from cont OT services in order to maximize functional independence.   Pt is a [facility] resident at [facility], states he works with therapy ~2x/week.   Pt endorses mild improvement in shortness of breath this date.   EOB O2 90-92% on RA.  Pt drops to upper 80s after stand & side steps, recovers quickly to 93% on RA.   Pt taking side steps to HOB CGA w/RW.  Will progress as able.    Patient left HOB elevated with all  lines intact, call button in reach, and nsg notified    GOALS:   Multidisciplinary Problems       Occupational Therapy Goals          Problem: Occupational Therapy    Goal Priority Disciplines Outcome Interventions   Occupational Therapy Goal     OT, PT/OT Progressing    Description: Goals to be met by: 04/06/2025     Patient will increase functional independence with ADLs by performing:    Grooming while seated with Set-up Assistance.  Supine to sit with Stand-by Assistance.  Step transfer with Stand-by Assistance  Toilet transfer to toilet with Stand-by Assistance.                           History:     History reviewed. No pertinent past medical history.      Past Surgical History:   Procedure Laterality Date    ECHOCARDIOGRAM,TRANSESOPHAGEAL N/A 3/8/2024    Procedure: Transesophageal echo (SHABNAM) intra-procedure log documentation;  Surgeon: Rachid Rothman MD;  Location: [facility] CATH LAB/EP;  Service: Cardiology;  Laterality: N/A;       Time Tracking:     OT Date of Treatment: 03/06/25  OT Start Time: 1058  OT Stop Time: 1116  OT Total Time (min): 18 min    Billable Minutes:Evaluation 9  Therapeutic Activity 9    3/6/2025

## 2025-03-06 NOTE — H&P
[facility] Medicine H&P Note     Admitting Team: [facility] Hospitalist Team A  Attending Physician: Selwyn Leal MD  Resident: Dr. Osman  Intern: Dr. Cooper    Date of Admit: 3/5/2025    Chief Complaint     SOB with Cough    Subjective:      History of Present Illness:  Harrison Haywood is a 69 y.o. M with past medical history of as below who presented on 3/5/2025 for Shortness of Breath (Pt presents to the ED bib EMS for SOB. Coming from [facility] states his O2 was 93 on RA. )      The patient was in their usual state of health until day prior to admission when he started to notice worsening shortness of breath and some subjective chills. Patient claims that he also has an ongoing cough for several months that was worse, non-productive over this time. Patient denies any fever, dysuria, H/A, facial tenderness, allergic rhinitis sx. Patient was found to have sats in the high 80s in the ED, received duonebs, steroids and stated he felt somewhat improved. Of note, has been admitted to [facility] several times in past with complications of AHRF in setting of viral pneumonia, typically treated with short course of steroids and abx. At admission in 3/2024 had sats in the 80s ORA and was sent to NH on 2L NC but was weaned since that time and no longer wears. Patient claims he does feel quite fatigued with activity and does not respond well to illness since he started chemo for his MM. No significant JAIMES, orthopnea, does have some pitting edema in bilateral ankles but claims this has been present for some time. Denies CP or anginal equivalents. Patient states that his cough has been ongoing for months, was admitted in 3/2024 and 9/2024 and during those admissions noted to have cough at baseline. Last chemo session was 2/24 and states that he is tolerating the therapy well, does have ongoing back pain as a result of his MM that is stable.    Past Medical History:  MM on with Lytic Spinal Lesions on Chemotherapy  HTN  HLD  CVA  "3/2024  Anxiety and Depression  Stage IV Decubitus Ulcers    Past Surgical History:    Past Surgical History:   Procedure Laterality Date    ECHOCARDIOGRAM,TRANSESOPHAGEAL N/A 3/8/2024    Procedure: Transesophageal echo (SHABNAM) intra-procedure log documentation;  Surgeon: Rachid Rothman MD;  Location: [facility] CATH LAB/EP;  Service: Cardiology;  Laterality: N/A;       Allergies:    Review of patient's allergies indicates:   Allergen Reactions    Aspirin Hives    Penicillins Hives       Home Medications:  Medications reconciled with patient and sister.    Allopurinol 300 daily  Valtrex 500 daily  Plavix 75mg daily  Fenofibrate 145 daily  Protonix 40 daily  Chemo regimen as below  Lipitor 40 daily  Amlodipine 5 daily    Family History:  No remarkable    Social History:  Alcohol use: Denies  Tobacco use: Denies  Recreational drug use: Denies  Current living situation: [facility]    Review of Systems:  A comprehensive review of systems was negative unless otherwise stated in the HPI.     Health Maintaince :   Primary Care Physician:  Richard Mcnulty Jr., MD     Immunizations:   Currently on File:   There is no immunization history on file for this patient.  Not UTD on routine vaccinations to his knowledge    Cancer Screening:  Colonoscopy:   Not UTD     Objective     ED Intervention: Cefepime, IVF, Decadron and Nebulizer therapy    Physical Examination:    Pulse 98   Temp 98.4 °F (36.9 °C) (Oral)   Resp (!) 21   Ht 6' 1" (1.854 m)   Wt 94.3 kg (208 lb)   SpO2 95%   BMI 27.44 kg/m²      Physical Exam  Constitutional:       Appearance: Normal appearance.   HENT:      Head: Normocephalic and atraumatic.      Nose: No congestion or rhinorrhea.      Mouth/Throat:      Mouth: Mucous membranes are dry.   Eyes:      General: No scleral icterus.     Extraocular Movements: Extraocular movements intact.      Conjunctiva/sclera: Conjunctivae normal.   Cardiovascular:      Rate and Rhythm: Normal rate and regular rhythm. "      Pulses: Normal pulses.      Heart sounds: Normal heart sounds. No murmur heard.  Pulmonary:      Effort: Pulmonary effort is normal. No respiratory distress.      Breath sounds: Normal breath sounds. No wheezing, rhonchi or rales.      Comments: Lungs CTA b/l  Abdominal:      General: Abdomen is flat. Bowel sounds are normal.      Palpations: Abdomen is soft.   Musculoskeletal:      Right lower leg: Edema present.      Left lower leg: Edema present.   Skin:     General: Skin is warm.      Coloration: Skin is not jaundiced.   Neurological:      General: No focal deficit present.      Mental Status: He is alert and oriented to person, place, and time. Mental status is at baseline.      Motor: Weakness (Rt sided, chronic and at baseline) present.   Psychiatric:         Mood and Affect: Mood normal.         Behavior: Behavior normal.       Laboratory:  Recent Labs   Lab 03/05/25 2119 03/05/25 2120   WBC 7.07  --    HGB 10.7*  --      --    MCV 99*  --    NA  --  144   K  --  3.3*   CL  --  112*   CO2  --  19*   BUN  --  23   CREATININE  --  0.7   GLU  --  114*   CALCIUM  --  8.2*   PROT  --  6.0   ALBUMIN  --  3.4*   MG  --  1.4*   AST  --  13   ALT  --  5*   ALKPHOS  --  68   TROPONINI  --  0.025   BNP  --  99     All laboratory data reviewed    Microbiology Data: Reviewed  -UA unremarkable   -Blood Cx x2 pending    EKG Data: Reviewed  Sinus tach, no acute ischemic changes    Radiology Data: Reviewed  Imaging Results               CTA Chest Non-Coronary (PE Studies) (Final result)  Result time 03/06/25 02:36:08      Final result by Wilfredo Guajardo MD (03/06/25 02:36:08)                   Impression:      No acute pulmonary thromboemboli.    Previously described osseous abnormalities are again seen.  Osseous structures remain severely osteoporotic in this patient with history of myeloma myeloma. Interval progression of compression deformities in the spine with worsening at the approximate T6-7 and  approximate T12-L1 levels compared to prior.  Additional severe compression deformities in the lower cervical and upper thoracic spine appears similar to prior.    This report was flagged in Epic as abnormal.        Electronically signed by: Wilfredo Guajardo MD  Date:    03/06/2025  Time:    02:36               Narrative:    EXAMINATION:  CTA CHEST NON CORONARY (PE STUDIES)    CLINICAL HISTORY:  Pulmonary embolism (PE) suspected, high prob;Shortness of breath    TECHNIQUE:  Low dose axial images, sagittal and coronal reformations were obtained from the thoracic inlet to the lung bases following the IV administration of 100 mL of Omnipaque 350.  Contrast timing was optimized to evaluate the pulmonary arteries.  MIP images were performed.    COMPARISON:  02/29/2024.    FINDINGS:    Good contrast bolus opacification of the pulmonary arteries. No acute pulmonary thromboembolism. No hilar or mediastinal mass or lymphadenopathy. No pleural or pericardial effusion. Dependent atelectatic changes in both lungs.  Platelike atelectasis right base.  Limited images through the upper abdomen demonstrate upper pole cyst right kidney, similar to prior.    Previously described osseous abnormalities are again seen.  Osseous structures remain severely osteoporotic in this patient with history of myeloma myeloma.  Interval progression of compression deformities in the spine with worsening at the approximate T6-7 and approximate T12-L1 levels compared to prior.  Additional severe compression deformities in the lower cervical and upper thoracic spine appears similar to prior.                                       X-Ray Chest AP Portable (Final result)  Result time 03/05/25 22:08:21      Final result by Ean Loya DO (03/05/25 22:08:21)                   Impression:      No acute abnormality.      Electronically signed by: Ean Loya  Date:    03/05/2025  Time:    22:08               Narrative:    EXAMINATION:  XR CHEST AP  PORTABLE    CLINICAL HISTORY:  Shortness of breath;    TECHNIQUE:  Single frontal view of the chest was performed.    COMPARISON:  08/30/2024.    FINDINGS:  The lungs are well expanded and clear. No focal opacities are seen. The pleural spaces are clear. The cardiac silhouette is enlarged.  There are calcifications of the aortic arch.  There are degenerative changes.                                         Assessment     Harrison Haywood is a 69 y.o. M with past medical history of MM on current chemotherapeutic regimen who presented to [facility] on 3/5/2025 for acute worsening of ongoing cough and SOB.  Patient is admitted to [facility] for acute hypoxic resp failure.       Plan     #Acute Hypoxic Resp Failure  #Immunocompromised 2/2 MM Tx  #Cough with SOB  Patient presenting with worsening SOB today in setting of ongoing cough for the last 2 months. Non-productive cough, does not have any smoking hx or hx of COPD/reactive airway dz. COVID PNA in 9/2024 without residual issues. Pt found to have WBC to 7 from baseline of ~2 with left shift and elevated pro-leana, SIRS + with tachycardia, lactate wnl. CXR without obvious abn. Patient sats in low 90s ORA and when talking and sitting up with me does drop to 80s. ED provider gave Cefepime, decadron and duonebs with some symptomatic improvement  -Pt on chemo for MM with regimen including Revlimid, Daratumumab and Decadron, last session on 2/24. Follows OP with Dr. Howard, has been tolerating therapy  -No JAIMES but significant fatigue with minimal exertion at his NH  -Flu and Covid Negative  -Does have GERD hx and states that in last few months has been drinking more coke's which aggravate and correlate with inc dry cough  -9/[facility]24 [facility]-PNA, treated with remdesivir + dex and CAP therapy, improved and sent with inhalers to use prn. Admitted in 3/2024 with AHRF in setting of recent RSV infection and was discharged on 2L NC but no longer is wearing this he  reports, was weaned at nursing home  -ORA in mid 90s at rest, not in extremus, suprisingly benign PE with b/l lungs without appreciable crackles or rales  -TTE recently with EF 60-65%, does have some pitting DELANEY but on amlodipine, no recent worsening of DELANEY or orthopnea, BNP wnl and no fluid on CXR    Plan:  Given several markers indicative of infectious etiology, immunocompromised status and with abundance of caution, will treat for CAP with Rocephin + Azithromycin, received cefepime in ED. Got sepsis bolus fluids. Although WBC only 7, is up from his baseline of 2 and has left shift with elevated pro-leana  Has had several admission in past for viral PNA and hypoxia, improved with steroids and nebulized therapies, sent with 2L home O2 in past but was weaned. Will monitor spO2 for need for ongoing O2 requirement  CXR unremarkable and no significant JAIMES, does have ongoing cough and worsening SOB. Given immunocompromised status will get LDH and Fungitell and ABG as initial PJP workup but less likely  CTPE pending given hypercoagulable state, hypoxia and tachycardia on arrival, has Wells of 2.5 indicating mod risk. Will give better assessment of lung parenchyma as well  Duonebs q4 prn, continuous pulse ox  Discuss with Heme/Onc provider in AM concerning risk for pneumonitis with current MM regimen  Blood cx pending, UA unremarkable  Swab for RSV ordered    #Multiple Myeloma, Not in Remission  #Lytic Bone Lesions  -Follows with Dr. Howard at , last visit 2/24/2025  -Dx in 2023, imitated on tx in 1/2025 and on current regimen since 3/2024: q28 day cycles with Revlimid 25 po daily days 1-21, Daratumumab 1800-30K units sub Q day 1, Decadron 20 po daily days 1, 8 , 15, 22, tylenol 650 po and benadryl 25 po to be given 1 hr prior to injections  -On ppx with allopurinol 300 daily and valacyclovir 500 daily, continue while inpatient  -Lytic lesions on recent scans, is following with NSGY, on Zometa q12 weeks and vit D + Tums  daily, can continue while inpatient. Had admission 4/2024 for hip fracture w/ ortho surgery repair  -Renal fxn stable on CMP  -Team will reach out to Dr. Howard during admission to update ([phone])  -Tyelnol 650mg prn for pain  -Continue PT/OT while admitted    #Hypokalemia  #Hypomagnesemia   -Replete K with Kcl 40mEq x1, Mg with 2g IV Mg    #GERD  -Patient has sx consistent with GERD including cough, reflux with acidic taste in mouth, worsened by food triggers  -Chronic cough could be 2/2 GERD  -Continue Protonix 40mg daily, has no red flag sx    #HTN  #HLD  -Continue home amlodipine 5mg daily, continue home lipitor 40mg daily and Fenofibrate 145mg daily    #Hx CVA  -Admitted 3/2024 with acute onset LUE weakness, confusion and makenzie neglect. Found to have R cerebral convexity Infarct thought to be embolic. No LVO. Had SHABNAM 3/8/2024 that had no thrombus or valvular veg and bubble was negative. Sent home on Lipitor and Plavix monotherapy as has allergy to ASA   -Was supposed to have ILR as there was concern for embolic phenomenon but I do not see that this was completed. EKG here not in AF  -Continue statin therapy as above, continue Plavix 75mg daily    #Anxiety/Depression  -Continue Fluoxetine 20mg daily    Healthcare maintenance   -primary care provider is Richard Mcnulty Jr., MD   -[facility]ives at [facility]    Diet: Adult Diabetic  VTE PPx: Lovenox  Code Status: Full  MPOA/NOK: Sister at bedside    Dispo: Pending improvement in Oxygenation Status and infectious workup, Likely discharge 24-48 hrs back to NH    Addison Schmitz DO  [facility], [facility] [place]  [facility] Hospitalist Team A

## 2025-03-06 NOTE — ED NOTES
Pt changed. Pt had massive bowel movement. Pt told when he had to use the bathroom to press button. Pt alert and oriented x4. Sheets changed

## 2025-03-06 NOTE — PLAN OF CARE
Pt would benefit from cont OT services in order to maximize functional independence. Recommending pt return to NH with therapy services upon d/c. Pt is a [facility] resident at [facility], states he works with therapy ~2x/week. Pt endorses mild improvement in shortness of breath this date. Pt drops to upper 80s after stand & side steps, recovers quickly to 93% on RA. Will progress as able.    Problem: Occupational Therapy  Goal: Occupational Therapy Goal  Description: Goals to be met by: 04/06/2025     Patient will increase functional independence with ADLs by performing:    Grooming while seated with Set-up Assistance.  Supine to sit with Stand-by Assistance.  Step transfer with Stand-by Assistance  Toilet transfer to toilet with Stand-by Assistance.    Outcome: Progressing

## 2025-03-06 NOTE — PLAN OF CARE
03/06/25 1722   Admission   Initial VN Admission Questions Complete   Communication Issues? None   Shift   Virtual Nurse - Patient Verbalized Approval Of Camera Use;VN Rounding   Safety/Activity   Patient Rounds bed in low position;placement of personal items at bedside;bed wheels locked;call light in patient/parent reach;toileting offered;visualized patient;clutter free environment maintained   Safety Promotion/Fall Prevention assistive device/personal item within reach;commode/urinal/bedpan at bedside;family expresses understanding of fall risk and prevention;Fall Risk reviewed with patient/family;side rails raised x 2   Positioning   Head of Bed (HOB) Positioning HOB at 30-45 degrees     VN cued into patient's room for introduction with patient's permission.  VN role explained and informed patient/family that VN would be working with bedside nurse and rest of care team.  Plan of care reviewed, pt encouraged to refer to whiteboard to determine staff for the day and pt/family educated on VTE,  fall risk and advised to call for assistance at all times to ensure pt's safety. Pt is without any signs of pain, anxiety, dyspnea or nausea. Patient's chart, labs and vital signs reviewed.  Allowed time for questions.  Will continue to be available as needed.

## 2025-03-06 NOTE — PROGRESS NOTES
"[facility] Medicine Progress Note      Subjective/Interval History      Resting comfortably in bed. On RA. States that he has improved significantly since yesterday. He voices no other complaints at this time.      Objective     Physical Examination:  BP (!) 143/62   Pulse 82   Temp 98.4 °F (36.9 °C) (Oral)   Resp 19   Ht 6' 1" (1.854 m)   Wt 94.3 kg (208 lb)   SpO2 95%   BMI 27.44 kg/m²    General: No acute distress, resting comfortably   HEENT: Atraumatic, normocephalic, moist mucous membranes  Cardiovascular: Regular rate and rhythm, normal S1 and S2, no extra heart sounds or murmurs appreciated   Chest wall: Non-tender to palpation, no gross deformities noted   Back: No abnormal curvature noted, symmetric rise with respiration   Respiratory: Stable on room air, normal work of breathing, no conversational dyspnea, no accessory muscle use noted, clear to auscultation bilaterally, no wheezes or crackles   Abdominal: Non-distended, soft, normoactive bowel sounds, non-tender to palpation, no guarding  Extremities: Atraumatic, non-edematous, moving all four extremities  Pulses: 2+ and symmetric radial artery, dorsalis pedis artery  Skin: Non-diaphoretic, warm, dry  Neurologic: No gross focal neurologic deficits noted      Intake/Output:    Intake/Output Summary (Last 24 hours) at 3/6/2025 1007  Last data filed at 3/6/2025 0359  Gross per 24 hour   Intake 1297.41 ml   Output --   Net 1297.41 ml     Net IO Since Admission: 1,297.41 mL [03/06/25 1007]    Laboratory data: reviewed       Microbiology data: reviewed       EKG data: reviewed       Radiology data: reviewed        Current Medications:     Infusions:       Scheduled:   albuterol-ipratropium  3 mL Nebulization Q4H WAKE    allopurinoL  300 mg Oral Daily    amLODIPine  5 mg Oral Daily    atorvastatin  40 mg Oral Daily    azithromycin  500 mg Intravenous Q24H    calcium carbonate  1,000 mg Oral QHS    cefTRIAXone (Rocephin) IV (PEDS and ADULTS)  1 g " Intravenous Q24H    clopidogreL  75 mg Oral Daily    enoxparin  40 mg Subcutaneous Daily    fenofibrate  145 mg Oral Daily    FLUoxetine  20 mg Oral Daily    magnesium sulfate 2 g IVPB  2 g Intravenous Once    pantoprazole  40 mg Oral Daily    polyethylene glycol  17 g Oral Daily    senna  8.6 mg Oral QHS    valACYclovir  500 mg Oral Daily    vitamin D  1,000 Units Oral Daily    zinc sulfate  220 mg Oral Daily        PRN:    Current Facility-Administered Medications:     acetaminophen, 650 mg, Oral, Q6H PRN    benzonatate, 100 mg, Oral, TID PRN    dextrose 50%, 12.5 g, Intravenous, PRN    dextrose 50%, 25 g, Intravenous, PRN    glucagon (human recombinant), 1 mg, Intramuscular, PRN    glucose, 16 g, Oral, PRN    glucose, 24 g, Oral, PRN    sodium chloride 0.9%, 10 mL, Intravenous, Q12H PRN      Assessment/Plan:      Harrison Haywood is a 69 y.o. M with past medical history of MM on current chemotherapeutic regimen who presented to [facility] on 3/5/2025 for acute worsening of ongoing cough and SOB.  Patient is admitted to [facility] for acute hypoxic resp failure.     #Acute Hypoxic Resp Failure  #Immunocompromised 2/2 MM Tx  #Cough with SOB  Patient presenting with worsening SOB today in setting of ongoing cough for the last 2 months. Non-productive cough, does not have any smoking hx or hx of COPD/reactive airway dz. COVID PNA in 9/2024 without residual issues. Pt found to have WBC to 7 from baseline of ~2 with left shift and elevated pro-leana, SIRS + with tachycardia, lactate wnl. CXR without obvious abn. Patient sats in low 90s ORA and when talking and sitting up with me does drop to 80s. ED provider gave Cefepime, decadron and duonebs with some symptomatic improvement  -Pt on chemo for MM with regimen including Revlimid, Daratumumab and Decadron, last session on 2/24. Follows OP with Dr. Howard, has been tolerating therapy  -No JAIMES but significant fatigue with minimal exertion at his NH  -Flu and Covid  Negative  -Does have GERD hx and states that in last few months has been drinking more coke's which aggravate and correlate with inc dry cough  -9/2024 admission to [facility] for COVID-PNA, treated with remdesivir + dex and CAP therapy, improved and sent with inhalers to use prn. Admitted in 3/2024 with AHRF in setting of recent RSV infection and was discharged on 2L NC but no longer is wearing this he reports, was weaned at nursing home  -ORA in mid 90s at rest, not in extremus, suprisingly benign PE with b/l lungs without appreciable crackles or rales  -TTE recently with EF 60-65%, does have some pitting DELANEY but on amlodipine, no recent worsening of DELANEY or orthopnea, BNP wnl and no fluid on CXR  -Bcx NGTD; RSV antigen nasal nares negative; fungal assay pending  -now on RA     Plan:  Given several markers indicative of infectious etiology, immunocompromised status and with abundance of caution, will treat for CAP with Rocephin + Azithromycin, received cefepime in ED. Got sepsis bolus fluids. Although WBC only 7, is up from his baseline of 2 and has left shift with elevated pro-leana  Has had several admission in past for viral PNA and hypoxia, improved with steroids and nebulized therapies, sent with 2L home O2 in past but was weaned. Will monitor spO2 for need for ongoing O2 requirement  CXR unremarkable and no significant JAIMES, does have ongoing cough and worsening SOB. Given immunocompromised status will get LDH and Fungitell and ABG as initial PJP workup but less likely  CTPE pending given hypercoagulable state, hypoxia and tachycardia on arrival, has Wells of 2.5 indicating mod risk. Will give better assessment of lung parenchyma as well  Duonebs q4 prn, continuous pulse ox  Discuss with Heme/Onc provider in AM concerning risk for pneumonitis with current MM regimen  Blood cx pending, UA unremarkable  Swab for RSV ordered     #Multiple Myeloma, Not in Remission  #Lytic Bone Lesions  -Follows with Dr. Howard at ,  last visit 2/24/2025  -Dx in 2023, imitated on tx in 1/2025 and on current regimen since 3/2024: q28 day cycles with Revlimid 25 po daily days 1-21, Daratumumab 1800-30K units sub Q day 1, Decadron 20 po daily days 1, 8 , 15, 22, tylenol 650 po and benadryl 25 po to be given 1 hr prior to injections  -On ppx with allopurinol 300 daily and valacyclovir 500 daily, continue while inpatient  -Lytic lesions on recent scans, is following with NSGY, on Zometa q12 weeks and vit D + Tums daily, can continue while inpatient. Had admission 4/2024 for hip fracture w/ ortho surgery repair  -Renal fxn stable on CMP  -Team will reach out to Dr. Howard during admission to update ([phone])  -Tyelnol 650mg prn for pain  -Continue PT/OT while admitted     #Hypokalemia  #Hypomagnesemia   -Replete K with Kcl 40mEq x1, Mg with 2g IV Mg     #GERD  -Patient has sx consistent with GERD including cough, reflux with acidic taste in mouth, worsened by food triggers  -Chronic cough could be 2/2 GERD  -Continue Protonix 40mg daily, has no red flag sx     #HTN  #HLD  -Continue home amlodipine 5mg daily, continue home lipitor 40mg daily and Fenofibrate 145mg daily     #Hx CVA  -Admitted 3/2024 with acute onset LUE weakness, confusion and makenzie neglect. Found to have R cerebral convexity Infarct thought to be embolic. No LVO. Had SHABNAM 3/8/2024 that had no thrombus or valvular veg and bubble was negative. Sent home on Lipitor and Plavix monotherapy as has allergy to ASA   -Was supposed to have ILR as there was concern for embolic phenomenon but I do not see that this was completed. EKG here not in AF  -Continue statin therapy as above, continue Plavix 75mg daily     #Anxiety/Depression  -Continue Fluoxetine 20mg daily      Healthcare maintenance   -primary care provider is Richard Mcnulty Jr., MD      Diet: Adult Diabetic  VTE PPx: Lovenox  Code Status: Full  MPOA/NOK: Sister at bedside     Dispo: Pending improvement in Oxygenation Status  and infectious workup. Likely discharge tomorrow in AM.       Joseph Cooper MD ROSALIE   [facility] HO-[facility]   [facility]

## 2025-03-06 NOTE — PLAN OF CARE
Problem: Physical Therapy  Goal: Physical Therapy Goal  Description: Goals to be met by: 2025     Patient will increase functional independence with mobility by performin. Supine to sit with Stand-by Assistance  2. Sit to supine with Stand-by Assistance  3. Sit to stand transfer with Stand-by Assistance using Rolling Walker  4. Bed to chair transfer with Stand-by Assistance using Rolling Walker  5. Gait  x 75 feet with Stand-by Assistance using Rolling Walker.     Outcome: Progressing   Co-eval performed by PT/OT for efficacy of outcomes and safety; pt will benefit from cont acute PT services to maximize functional independence; recommend pt return to NH with therapy services upon d/c; pt resides a [facility] where he works with therapy ~2x/wk; pt reports mild improvement in SOB at this time; noted O2 sats decline to upper 80s following standing and sidestepping, with quick recovery to 93% on RA; will progress as tolerated.

## 2025-03-07 LAB
ALBUMIN SERPL BCP-MCNC: 2.9 G/DL (ref 3.5–5.2)
ALP SERPL-CCNC: 48 U/L (ref 40–150)
ALT SERPL W/O P-5'-P-CCNC: <5 U/L (ref 10–44)
ANION GAP SERPL CALC-SCNC: 10 MMOL/L (ref 8–16)
AST SERPL-CCNC: 10 U/L (ref 10–40)
BASOPHILS # BLD AUTO: 0.01 K/UL (ref 0–0.2)
BASOPHILS NFR BLD: 0.2 % (ref 0–1.9)
BILIRUB SERPL-MCNC: 0.3 MG/DL (ref 0.1–1)
BUN SERPL-MCNC: 19 MG/DL (ref 8–23)
CALCIUM SERPL-MCNC: 7.6 MG/DL (ref 8.7–10.5)
CHLORIDE SERPL-SCNC: 113 MMOL/L (ref 95–110)
CO2 SERPL-SCNC: 21 MMOL/L (ref 23–29)
CREAT SERPL-MCNC: 0.7 MG/DL (ref 0.5–1.4)
DIFFERENTIAL METHOD BLD: ABNORMAL
EOSINOPHIL # BLD AUTO: 0.1 K/UL (ref 0–0.5)
EOSINOPHIL NFR BLD: 1.8 % (ref 0–8)
ERYTHROCYTE [DISTWIDTH] IN BLOOD BY AUTOMATED COUNT: 17.1 % (ref 11.5–14.5)
EST. GFR  (NO RACE VARIABLE): >60 ML/MIN/1.73 M^2
GLUCOSE SERPL-MCNC: 99 MG/DL (ref 70–110)
HCT VFR BLD AUTO: 28 % (ref 40–54)
HGB BLD-MCNC: 8.9 G/DL (ref 14–18)
IMM GRANULOCYTES # BLD AUTO: 0.02 K/UL (ref 0–0.04)
IMM GRANULOCYTES NFR BLD AUTO: 0.4 % (ref 0–0.5)
LYMPHOCYTES # BLD AUTO: 0.5 K/UL (ref 1–4.8)
LYMPHOCYTES NFR BLD: 11.2 % (ref 18–48)
MAGNESIUM SERPL-MCNC: 2.1 MG/DL (ref 1.6–2.6)
MCH RBC QN AUTO: 31.3 PG (ref 27–31)
MCHC RBC AUTO-ENTMCNC: 31.8 G/DL (ref 32–36)
MCV RBC AUTO: 99 FL (ref 82–98)
MONOCYTES # BLD AUTO: 0.7 K/UL (ref 0.3–1)
MONOCYTES NFR BLD: 15.8 % (ref 4–15)
NEUTROPHILS # BLD AUTO: 3.2 K/UL (ref 1.8–7.7)
NEUTROPHILS NFR BLD: 70.6 % (ref 38–73)
NRBC BLD-RTO: 0 /100 WBC
OHS QRS DURATION: 68 MS
OHS QTC CALCULATION: 393 MS
PLATELET # BLD AUTO: 137 K/UL (ref 150–450)
PMV BLD AUTO: 12.5 FL (ref 9.2–12.9)
POTASSIUM SERPL-SCNC: 3.3 MMOL/L (ref 3.5–5.1)
PROT SERPL-MCNC: 5 G/DL (ref 6–8.4)
RBC # BLD AUTO: 2.84 M/UL (ref 4.6–6.2)
SODIUM SERPL-SCNC: 144 MMOL/L (ref 136–145)
WBC # BLD AUTO: 4.55 K/UL (ref 3.9–12.7)

## 2025-03-07 PROCEDURE — 97535 SELF CARE MNGMENT TRAINING: CPT | Mod: CO

## 2025-03-07 PROCEDURE — 25000242 PHARM REV CODE 250 ALT 637 W/ HCPCS

## 2025-03-07 PROCEDURE — 25000003 PHARM REV CODE 250

## 2025-03-07 PROCEDURE — 97116 GAIT TRAINING THERAPY: CPT | Mod: CQ

## 2025-03-07 PROCEDURE — 99900035 HC TECH TIME PER 15 MIN (STAT)

## 2025-03-07 PROCEDURE — 80053 COMPREHEN METABOLIC PANEL: CPT

## 2025-03-07 PROCEDURE — 27000221 HC OXYGEN, UP TO 24 HOURS

## 2025-03-07 PROCEDURE — 85025 COMPLETE CBC W/AUTO DIFF WBC: CPT

## 2025-03-07 PROCEDURE — 94761 N-INVAS EAR/PLS OXIMETRY MLT: CPT

## 2025-03-07 PROCEDURE — 83735 ASSAY OF MAGNESIUM: CPT

## 2025-03-07 PROCEDURE — 97110 THERAPEUTIC EXERCISES: CPT | Mod: CQ

## 2025-03-07 PROCEDURE — 11000001 HC ACUTE MED/SURG PRIVATE ROOM

## 2025-03-07 PROCEDURE — 97530 THERAPEUTIC ACTIVITIES: CPT | Mod: CQ

## 2025-03-07 PROCEDURE — 63600175 PHARM REV CODE 636 W HCPCS

## 2025-03-07 PROCEDURE — 36415 COLL VENOUS BLD VENIPUNCTURE: CPT

## 2025-03-07 PROCEDURE — 94640 AIRWAY INHALATION TREATMENT: CPT

## 2025-03-07 PROCEDURE — 94799 UNLISTED PULMONARY SVC/PX: CPT | Mod: XB

## 2025-03-07 PROCEDURE — 97530 THERAPEUTIC ACTIVITIES: CPT | Mod: CO

## 2025-03-07 RX ORDER — AZITHROMYCIN 250 MG/1
500 TABLET, FILM COATED ORAL ONCE
Status: COMPLETED | OUTPATIENT
Start: 2025-03-08 | End: 2025-03-08

## 2025-03-07 RX ADMIN — CEFTRIAXONE SODIUM 1 G: 1 INJECTION, POWDER, FOR SOLUTION INTRAMUSCULAR; INTRAVENOUS at 04:03

## 2025-03-07 RX ADMIN — POTASSIUM BICARBONATE 25 MEQ: 978 TABLET, EFFERVESCENT ORAL at 10:03

## 2025-03-07 RX ADMIN — ATORVASTATIN CALCIUM 40 MG: 40 TABLET, FILM COATED ORAL at 08:03

## 2025-03-07 RX ADMIN — CLOPIDOGREL BISULFATE 75 MG: 75 TABLET ORAL at 08:03

## 2025-03-07 RX ADMIN — IPRATROPIUM BROMIDE AND ALBUTEROL SULFATE 3 ML: 2.5; .5 SOLUTION RESPIRATORY (INHALATION) at 07:03

## 2025-03-07 RX ADMIN — VALACYCLOVIR HYDROCHLORIDE 500 MG: 500 TABLET, FILM COATED ORAL at 08:03

## 2025-03-07 RX ADMIN — ZINC SULFATE 220 MG (50 MG) CAPSULE 220 MG: CAPSULE at 08:03

## 2025-03-07 RX ADMIN — FENOFIBRATE 145 MG: 145 TABLET ORAL at 08:03

## 2025-03-07 RX ADMIN — IPRATROPIUM BROMIDE AND ALBUTEROL SULFATE 3 ML: 2.5; .5 SOLUTION RESPIRATORY (INHALATION) at 04:03

## 2025-03-07 RX ADMIN — Medication 1000 UNITS: at 08:03

## 2025-03-07 RX ADMIN — FLUOXETINE HYDROCHLORIDE 20 MG: 20 CAPSULE ORAL at 08:03

## 2025-03-07 RX ADMIN — POTASSIUM BICARBONATE 25 MEQ: 978 TABLET, EFFERVESCENT ORAL at 12:03

## 2025-03-07 RX ADMIN — PANTOPRAZOLE SODIUM 40 MG: 40 TABLET, DELAYED RELEASE ORAL at 08:03

## 2025-03-07 RX ADMIN — IPRATROPIUM BROMIDE AND ALBUTEROL SULFATE 3 ML: 2.5; .5 SOLUTION RESPIRATORY (INHALATION) at 12:03

## 2025-03-07 RX ADMIN — ENOXAPARIN SODIUM 40 MG: 40 INJECTION SUBCUTANEOUS at 04:03

## 2025-03-07 RX ADMIN — ALLOPURINOL 300 MG: 100 TABLET ORAL at 08:03

## 2025-03-07 RX ADMIN — POTASSIUM BICARBONATE 25 MEQ: 978 TABLET, EFFERVESCENT ORAL at 02:03

## 2025-03-07 RX ADMIN — CALCIUM CARBONATE (ANTACID) CHEW TAB 500 MG 1000 MG: 500 CHEW TAB at 08:03

## 2025-03-07 RX ADMIN — AMLODIPINE BESYLATE 5 MG: 5 TABLET ORAL at 08:03

## 2025-03-07 RX ADMIN — AZITHROMYCIN MONOHYDRATE 500 MG: 500 INJECTION, POWDER, LYOPHILIZED, FOR SOLUTION INTRAVENOUS at 02:03

## 2025-03-07 NOTE — PLAN OF CARE
Received pt on documented 02; no distress noted. Tolerated tx well; will cont to monitor   Problem: Adult Inpatient Plan of Care  Goal: Plan of Care Review  Outcome: Progressing

## 2025-03-07 NOTE — PROGRESS NOTES
"[facility] Progress Note      Subjective/Interval History      Resting comfortably in bed. On RA. States that he has improved significantly since yesterday. He voices no other complaints at this time.      Objective     Physical Examination:  /76 (BP Location: Left arm, Patient Position: Lying)   Pulse 70   Temp 97.8 °F (36.6 °C) (Oral)   Resp 18   Ht 6' 1" (1.854 m)   Wt 101.3 kg (223 lb 5.2 oz)   SpO2 98%   BMI 29.46 kg/m²    General: No acute distress, resting comfortably   HEENT: Atraumatic, normocephalic, moist mucous membranes  Cardiovascular: Regular rate and rhythm, normal S1 and S2, no extra heart sounds or murmurs appreciated   Chest wall: Non-tender to palpation, no gross deformities noted   Back: No abnormal curvature noted, symmetric rise with respiration   Respiratory: Stable on room air, normal work of breathing, no conversational dyspnea, no accessory muscle use noted, clear to auscultation bilaterally, no wheezes or crackles   Abdominal: Non-distended, soft, normoactive bowel sounds, non-tender to palpation, no guarding  Extremities: Atraumatic, non-edematous, moving all four extremities  Pulses: 2+ and symmetric radial artery, dorsalis pedis artery  Skin: Non-diaphoretic, warm, dry. Bullseye-like rash noted on left upper back   Neurologic: No gross focal neurologic deficits noted      Intake/Output:    Intake/Output Summary (Last 24 hours) at 3/7/2025 0846  Last data filed at 3/7/2025 0623  Gross per 24 hour   Intake 50 ml   Output 400 ml   Net -350 ml     Net IO Since Admission: 947.41 mL [03/07/25 0846]    Laboratory data: reviewed       Microbiology data: reviewed       EKG data: reviewed       Radiology data: reviewed        Current Medications:     Infusions:       Scheduled:   albuterol-ipratropium  3 mL Nebulization Q4H WAKE    allopurinoL  300 mg Oral Daily    amLODIPine  5 mg Oral Daily    atorvastatin  40 mg Oral Daily    azithromycin  500 mg Intravenous Q24H    " calcium carbonate  1,000 mg Oral QHS    cefTRIAXone (Rocephin) IV (PEDS and ADULTS)  1 g Intravenous Q24H    clopidogreL  75 mg Oral Daily    enoxparin  40 mg Subcutaneous Daily    fenofibrate  145 mg Oral Daily    FLUoxetine  20 mg Oral Daily    pantoprazole  40 mg Oral Daily    polyethylene glycol  17 g Oral Daily    potassium bicarbonate  25 mEq Oral Q2H    senna  8.6 mg Oral QHS    valACYclovir  500 mg Oral Daily    vitamin D  1,000 Units Oral Daily    zinc sulfate  220 mg Oral Daily        PRN:    Current Facility-Administered Medications:     acetaminophen, 650 mg, Oral, Q6H PRN    benzonatate, 100 mg, Oral, TID PRN    dextrose 50%, 12.5 g, Intravenous, PRN    dextrose 50%, 25 g, Intravenous, PRN    glucagon (human recombinant), 1 mg, Intramuscular, PRN    glucose, 16 g, Oral, PRN    glucose, 24 g, Oral, PRN    sodium chloride 0.9%, 10 mL, Intravenous, Q12H PRN      Assessment/Plan:      Harrison Haywood is a 69 y.o. M with past medical history of MM on current chemotherapeutic regimen who presented to [facility] on 3/5/2025 for acute worsening of ongoing cough and SOB.  Patient is admitted to [facility] for acute hypoxic resp failure.     #Acute Hypoxic Resp Failure  #Immunocompromised 2/2 MM Tx  #Cough with SOB  Patient presenting with worsening SOB today in setting of ongoing cough for the last 2 months. Non-productive cough, does not have any smoking hx or hx of COPD/reactive airway dz. COVID PNA in 9/2024 without residual issues. Pt found to have WBC to 7 from baseline of ~2 with left shift and elevated pro-leana, SIRS + with tachycardia, lactate wnl. CXR without obvious abn. Patient sats in low 90s ORA and when talking and sitting up with me does drop to 80s. ED provider gave Cefepime, decadron and duonebs with some symptomatic improvement  -Pt on chemo for MM with regimen including Revlimid, Daratumumab and Decadron, last session on 2/24. Follows OP with Dr. Howard, has been tolerating therapy  -No JAIMES but  significant fatigue with minimal exertion at his NH  -Flu and Covid Negative  -Does have GERD hx and states that in last few months has been drinking more coke's which aggravate and correlate with inc dry cough  -9/2024 admission to [facility] for COVID-PNA, treated with remdesivir + dex and CAP therapy, improved and sent with inhalers to use prn. Admitted in 3/2024 with AHRF in setting of recent RSV infection and was discharged on 2L NC but no longer is wearing this he reports, was weaned at nursing home  -ORA in mid 90s at rest, not in extremus, suprisingly benign PE with b/l lungs without appreciable crackles or rales  -TTE recently with EF 60-65%, does have some pitting DELANEY but on amlodipine, no recent worsening of DELANEY or orthopnea, BNP wnl and no fluid on CXR  -Bcx NGTD; RSV antigen nasal nares negative; fungal assay pending  -now on RA     Plan:  Given several markers indicative of infectious etiology, immunocompromised status and with abundance of caution, will treat for CAP with Rocephin + Azithromycin, received cefepime in ED. Got sepsis bolus fluids. Although WBC only 7, is up from his baseline of 2 and has left shift with elevated pro-leana  Has had several admission in past for viral PNA and hypoxia, improved with steroids and nebulized therapies, sent with 2L home O2 in past but was weaned. Will monitor spO2 for need for ongoing O2 requirement  CXR unremarkable and no significant JAIMES, does have ongoing cough and worsening SOB. Given immunocompromised status will get LDH and Fungitell and ABG as initial PJP workup but less likely  CTPE pending given hypercoagulable state, hypoxia and tachycardia on arrival, has Wells of 2.5 indicating mod risk. Will give better assessment of lung parenchyma as well  Duonebs q4 prn, continuous pulse ox  Discuss with Heme/Onc provider in AM concerning risk for pneumonitis with current MM regimen  Blood cx pending, UA unremarkable  Swab for RSV ordered     #Multiple Myeloma, Not  in Remission  #Lytic Bone Lesions  -Follows with Dr. Howard at , last visit 2/24/2025  -Dx in 2023, imitated on tx in 1/2025 and on current regimen since 3/2024: q28 day cycles with Revlimid 25 po daily days 1-21, Daratumumab 1800-30K units sub Q day 1, Decadron 20 po daily days 1, 8 , 15, 22, tylenol 650 po and benadryl 25 po to be given 1 hr prior to injections  -On ppx with allopurinol 300 daily and valacyclovir 500 daily, continue while inpatient  -Lytic lesions on recent scans, is following with NSGY, on Zometa q12 weeks and vit D + Tums daily, can continue while inpatient. Had admission 4/2024 for hip fracture w/ ortho surgery repair  -Renal fxn stable on CMP  -Team will reach out to Dr. Howard during admission to update ([phone])  -Tyelnol 650mg prn for pain  -Continue PT/OT while admitted     #Hypokalemia  #Hypomagnesemia   -Replete K with Kcl 40mEq x1, Mg with 2g IV Mg     #GERD  -Patient has sx consistent with GERD including cough, reflux with acidic taste in mouth, worsened by food triggers  -Chronic cough could be 2/2 GERD  -Continue Protonix 40mg daily, has no red flag sx     #HTN  #HLD  -Continue home amlodipine 5mg daily, continue home lipitor 40mg daily and Fenofibrate 145mg daily     #Hx CVA  -Admitted 3/2024 with acute onset LUE weakness, confusion and makenzie neglect. Found to have R cerebral convexity Infarct thought to be embolic. No LVO. Had SHABNAM 3/8/2024 that had no thrombus or valvular veg and bubble was negative. Sent home on Lipitor and Plavix monotherapy as has allergy to ASA   -Was supposed to have ILR as there was concern for embolic phenomenon but I do not see that this was completed. EKG here not in AF  -Continue statin therapy as above, continue Plavix 75mg daily     #Anxiety/Depression  -Continue Fluoxetine 20mg daily      Healthcare maintenance   -primary care provider is Richard Mcnulty Jr., MD      Diet: Adult Diabetic  VTE PPx: Lovenox  Code Status: Full  MPOA/NOK:  Sister at bedside     Dispo: Pending improvement in Oxygenation Status and infectious workup. Possible dischacrge today        Joseph Cooper MD ROSALIE   [facility] HO-1   [facility]

## 2025-03-07 NOTE — PT/OT/SLP EVAL
Physical Therapy Evaluation    Patient Name:  Harrison Haywood   MRN:  [id number]    Recommendations:     Discharge Recommendations:  (back to NH with therapy services)   Discharge Equipment Recommendations: to be determined by next level of care   Barriers to discharge:  pt requires increased level of assistance    Assessment:     Harrison Haywood is a 69 y.o. male admitted with a medical diagnosis of Acute hypoxic respiratory failure.  He presents with the following impairments/functional limitations: weakness, impaired endurance, impaired sensation, impaired self care skills, impaired functional mobility, gait instability, impaired balance, decreased lower extremity function, decreased upper extremity function, decreased ROM, decreased coordination, impaired cardiopulmonary response to activity, impaired joint extensibility Co-eval performed by PT/OT for efficacy of outcomes and safety; pt will benefit from cont acute PT services to maximize functional independence; recommend pt return to NH with therapy services upon d/c; pt resides a [facility] where he works with therapy ~2x/wk; pt reports mild improvement in SOB at this time; noted O2 sats decline to upper 80s following standing and sidestepping, with quick recovery to 93% on RA; will progress as tolerated. .    Rehab Prognosis: Fair; patient would benefit from acute skilled PT services to address these deficits and reach maximum level of function.    Recent Surgery: * No surgery found *      Plan:     During this hospitalization, patient to be seen 3 x/week to address the identified rehab impairments via gait training, therapeutic activities, therapeutic exercises, neuromuscular re-education and progress toward the following goals:    Plan of Care Expires:  04/06/25    Subjective     Chief Complaint: SOB with activity  Patient/Family Comments/goals: agreeable to therapy  Pain/Comfort:  Pain Rating 1: 0/10    Patients cultural, spiritual, Yazidism conflicts given  the current situation: no    Living Environment:  Pt resides at [facility]  Prior to admission, patients level of function was mostly wheelchair level for mobility, amb short distances with RW during therapy; pt able to transfer to wheelchair and propel self Travon; assistance with bathing, LBD, and wears diapers .  Equipment used at home: walker, rolling. Upon discharge, patient will have assistance from staff at NH.    Objective:     Communicated with nurse prior to session.  Patient found HOB elevated with blood pressure cuff, pulse ox (continuous), telemetry  upon PT entry to room.    General Precautions: Standard, fall  Orthopedic Precautions:N/A   Braces: N/A  Respiratory Status: Room air    Exams:  Cognitive Exam:  Patient is oriented to Person, Place, Time, Situation, and follows one and two step commands  Postural Exam:  Patient presented with the following abnormalities:    -       Rounded shoulders  Sensation:  pt endorses tingling L toes  RLE ROM: WFL except df to neutral  RLE Strength: WFL  LLE ROM: WFL except lacking end range knee ext, remaining WFL                                                                                                                                                 LLE Strength: WFL    Functional Mobility:  Bed Mobility:     Supine to Sit: minimum assistance  Sit to Supine: moderate assistance  Transfers:     Sit to Stand:  contact guard assistance with rolling walker  Gait: Patient took ~6-8 small side steps to HOB with CGA and use of RW; minimal flexed posture, decreased clearance of floor; slow beth  Balance: sit~good, stand~fair+, amb ~fair    AM-PAC 6 CLICK MOBILITY  Total Score:16    Treatment & Education:  -pt educated on role of PT/POC  -pt reports mild improvement of SOB since admit  -O2 sats 90-92% on RW while seated EOB  -O2 sats decreased to upper 80s after standing and side stepping, recovered quickly to 93% on RA with pursed lip breathing  -pt took side steps  to HOB with CGA and use of RW as described above.    Patient left HOB elevated with all lines intact, call button in reach, and nurse notified.    GOALS:   Multidisciplinary Problems       Physical Therapy Goals          Problem: Physical Therapy    Goal Priority Disciplines Outcome Interventions   Physical Therapy Goal     PT, PT/OT Progressing    Description: Goals to be met by: 2025     Patient will increase functional independence with mobility by performin. Supine to sit with Stand-by Assistance  2. Sit to supine with Stand-by Assistance  3. Sit to stand transfer with Stand-by Assistance using Rolling Walker  4. Bed to chair transfer with Stand-by Assistance using Rolling Walker  5. Gait  x 75 feet with Stand-by Assistance using Rolling Walker.                            History:     History reviewed. No pertinent past medical history.    Past Surgical History:   Procedure Laterality Date    ECHOCARDIOGRAM,TRANSESOPHAGEAL N/A 3/8/2024    Procedure: Transesophageal echo (SHABNAM) intra-procedure log documentation;  Surgeon: Rachid Rothman MD;  Location: [facility] CATH LAB/EP;  Service: Cardiology;  Laterality: N/A;       Time Tracking:     PT Received On: 25  PT Start Time: 1058     PT Stop Time: 1116  PT Total Time (min): 18 min     Billable Minutes: Evaluation 9 and Therapeutic Activity 9      2025

## 2025-03-07 NOTE — PROGRESS NOTES
Pharmacist Intervention IV to PO Note    Harrison Haywood is a 69 y.o. male being treated with IV medication azithromycin    Patient Data:    Vital Signs (Most Recent):  Temp: 98.1 °F (36.7 °C) (03/07/25 1120)  Pulse: 92 (03/07/25 1209)  Resp: (!) 22 (03/07/25 1209)  BP: 133/75 (03/07/25 1120)  SpO2: 95 % (03/07/25 1209) Vital Signs (72h Range):  Temp:  [97.7 °F (36.5 °C)-98.4 °F (36.9 °C)]   Pulse:  []   Resp:  [17-30]   BP: (103-143)/(60-77)   SpO2:  [93 %-99 %]      CBC:  Recent Labs   Lab 03/05/25 2119 03/06/25 0329 03/07/25  0528   WBC 7.07 6.39 4.55   RBC 3.37* 3.03* 2.84*   HGB 10.7* 9.5* 8.9*   HCT 33.5* 29.7* 28.0*    129* 137*   MCV 99* 98 99*   MCH 31.8* 31.4* 31.3*   MCHC 31.9* 32.0 31.8*     CMP:     Recent Labs   Lab 03/05/25 2120 03/06/25 0329 03/07/25  0528   * 148* 99   CALCIUM 8.2* 7.7* 7.6*   ALBUMIN 3.4* 3.1* 2.9*   PROT 6.0 5.4* 5.0*    140 144   K 3.3* 3.6 3.3*   CO2 19* 18* 21*   * 111* 113*   BUN 23 20 19   CREATININE 0.7 0.7 0.7   ALKPHOS 68 59 48   ALT 5* <5* <5*   AST 13 13 10   BILITOT 0.4 0.3 0.3       Dietary Orders:  Diet Orders            Diet Adult Regular Standard Tray: Regular starting at 03/06 0227            Based on the following criteria, this patient qualifies for intravenous to oral conversion:  [x] The patients gastrointestinal tract is functioning (tolerating medications via oral or enteral route for 24 hours and tolerating food or enteral feeds for 24 hours).  [x] The patient is hemodynamically stable for 24 hours (heart rate <100 beats per minute, systolic blood pressure >99 mm Hg, and respiratory rate <20 breaths per minute).  [x] The patient shows clinical improvement (afebrile for at least 24 hours and white blood cell count downtrending or normalized). Additionally, the patient must be non-neutropenic (absolute neutrophil count >500 cells/mm3).  [x] For antimicrobials, the patient has received IV therapy for at least 24 hours.    IV  medication azithromycin will be changed to oral medication     Pharmacist's Name: Eduardo Ann  Pharmacist's Extension: [id number]

## 2025-03-07 NOTE — PLAN OF CARE
Patient on oxygen with documented flow.  Will attempt to wean per O2 order protocol.  Pt on RA with documented sats.  The proper method of use, as well as anticipated side effects, of this aerosol treatment are discussed and demonstrated to the patient.   Will continue to monitor.

## 2025-03-07 NOTE — PROGRESS NOTES
"[facility] Progress Note      Subjective/Interval History      Resting comfortably in bed. On RA. Ambulating to and from bathroom without SOB per patient. Voices no other complaints at this time.      Objective     Physical Examination:  /76 (BP Location: Left arm, Patient Position: Lying)   Pulse 70   Temp 97.8 °F (36.6 °C) (Oral)   Resp 18   Ht 6' 1" (1.854 m)   Wt 101.3 kg (223 lb 5.2 oz)   SpO2 98%   BMI 29.46 kg/m²    General: No acute distress, resting comfortably   HEENT: Atraumatic, normocephalic, moist mucous membranes  Cardiovascular: Regular rate and rhythm, normal S1 and S2, no extra heart sounds or murmurs appreciated   Chest wall: Non-tender to palpation, no gross deformities noted   Back: No abnormal curvature noted, symmetric rise with respiration   Respiratory: Stable on room air, normal work of breathing, no conversational dyspnea, no accessory muscle use noted, clear to auscultation bilaterally, no wheezes or crackles   Abdominal: Non-distended, soft, normoactive bowel sounds, non-tender to palpation, no guarding  Extremities: Atraumatic, non-edematous, moving all four extremities  Pulses: 2+ and symmetric radial artery, dorsalis pedis artery  Skin: Non-diaphoretic, warm, dry  Neurologic: No gross focal neurologic deficits noted      Intake/Output:    Intake/Output Summary (Last 24 hours) at 3/7/2025 0915  Last data filed at 3/7/2025 0623  Gross per 24 hour   Intake 50 ml   Output 400 ml   Net -350 ml     Net IO Since Admission: 947.41 mL [03/07/25 0915]    Laboratory data: reviewed       Microbiology data: reviewed       EKG data: reviewed       Radiology data: reviewed        Current Medications:     Infusions:       Scheduled:   albuterol-ipratropium  3 mL Nebulization Q4H WAKE    allopurinoL  300 mg Oral Daily    amLODIPine  5 mg Oral Daily    atorvastatin  40 mg Oral Daily    azithromycin  500 mg Intravenous Q24H    calcium carbonate  1,000 mg Oral QHS    cefTRIAXone " (Rocephin) IV (PEDS and ADULTS)  1 g Intravenous Q24H    clopidogreL  75 mg Oral Daily    enoxparin  40 mg Subcutaneous Daily    fenofibrate  145 mg Oral Daily    FLUoxetine  20 mg Oral Daily    pantoprazole  40 mg Oral Daily    polyethylene glycol  17 g Oral Daily    potassium bicarbonate  25 mEq Oral Q2H    senna  8.6 mg Oral QHS    valACYclovir  500 mg Oral Daily    vitamin D  1,000 Units Oral Daily    zinc sulfate  220 mg Oral Daily        PRN:    Current Facility-Administered Medications:     acetaminophen, 650 mg, Oral, Q6H PRN    benzonatate, 100 mg, Oral, TID PRN    dextrose 50%, 12.5 g, Intravenous, PRN    dextrose 50%, 25 g, Intravenous, PRN    glucagon (human recombinant), 1 mg, Intramuscular, PRN    glucose, 16 g, Oral, PRN    glucose, 24 g, Oral, PRN    sodium chloride 0.9%, 10 mL, Intravenous, Q12H PRN      Assessment/Plan:      Harrison Haywood is a 69 y.o. M with past medical history of MM on current chemotherapeutic regimen who presented to [facility] on 3/5/2025 for acute worsening of ongoing cough and SOB.  Patient is admitted to [facility] for acute hypoxic resp failure.     #Acute Hypoxic Resp Failure  #Immunocompromised 2/2 MM Tx  #Cough with SOB  Patient presenting with worsening SOB today in setting of ongoing cough for the last 2 months. Non-productive cough, does not have any smoking hx or hx of COPD/reactive airway dz. COVID PNA in 9/2024 without residual issues. Pt found to have WBC to 7 from baseline of ~2 with left shift and elevated pro-leana, SIRS + with tachycardia, lactate wnl. CXR without obvious abn. Patient sats in low 90s ORA and when talking and sitting up with me does drop to 80s. ED provider gave Cefepime, decadron and duonebs with some symptomatic improvement  -Pt on chemo for MM with regimen including Revlimid, Daratumumab and Decadron, last session on 2/24. Follows OP with Dr. Howard, has been tolerating therapy  -No JAIMES but significant fatigue with minimal exertion at his  NH  -Flu and Covid Negative  -Does have GERD hx and states that in last few months has been drinking more coke's which aggravate and correlate with inc dry cough  -9/2024 admission to [facility] for COVID-PNA, treated with remdesivir + dex and CAP therapy, improved and sent with inhalers to use prn. Admitted in 3/2024 with AHRF in setting of recent RSV infection and was discharged on 2L NC but no longer is wearing this he reports, was weaned at nursing home  -ORA in mid 90s at rest, not in extremus, suprisingly benign PE with b/l lungs without appreciable crackles or rales  -TTE recently with EF 60-65%, does have some pitting DELANEY but on amlodipine, no recent worsening of DELANEY or orthopnea, BNP wnl and no fluid on CXR  -Bcx NGTD; RSV antigen nasal nares negative; fungal assay pending  -now on RA     Plan:  Given several markers indicative of infectious etiology, immunocompromised status and with abundance of caution, will treat for CAP with Rocephin + Azithromycin, received cefepime in ED. Got sepsis bolus fluids. Although WBC only 7, is up from his baseline of 2 and has left shift with elevated pro-leana  Has had several admission in past for viral PNA and hypoxia, improved with steroids and nebulized therapies, sent with 2L home O2 in past but was weaned. Will monitor spO2 for need for ongoing O2 requirement  CXR unremarkable and no significant JAIMES, does have ongoing cough and worsening SOB. Given immunocompromised status will get LDH and Fungitell and ABG as initial PJP workup but less likely  CTPE pending given hypercoagulable state, hypoxia and tachycardia on arrival, has Wells of 2.5 indicating mod risk. Will give better assessment of lung parenchyma as well  Duonebs q4 prn, continuous pulse ox  Discuss with Heme/Onc provider in AM concerning risk for pneumonitis with current MM regimen  Blood cx pending, UA unremarkable  Swab for RSV ordered     #Multiple Myeloma, Not in Remission  #Lytic Bone Lesions  -Follows with  Dr. Howard at , last visit 2/24/2025  -Dx in 2023, imitated on tx in 1/2025 and on current regimen since 3/2024: q28 day cycles with Revlimid 25 po daily days 1-21, Daratumumab 1800-30K units sub Q day 1, Decadron 20 po daily days 1, 8 , 15, 22, tylenol 650 po and benadryl 25 po to be given 1 hr prior to injections  -On ppx with allopurinol 300 daily and valacyclovir 500 daily, continue while inpatient  -Lytic lesions on recent scans, is following with NSGY, on Zometa q12 weeks and vit D + Tums daily, can continue while inpatient. Had admission 4/2024 for hip fracture w/ ortho surgery repair  -Renal fxn stable on CMP  -Team will reach out to Dr. Howard during admission to update ([phone])  -Tyelnol 650mg prn for pain  -Continue PT/OT while admitted     #Hypokalemia  #Hypomagnesemia   -Replete K with Kcl 40mEq x1, Mg with 2g IV Mg     #GERD  -Patient has sx consistent with GERD including cough, reflux with acidic taste in mouth, worsened by food triggers  -Chronic cough could be 2/2 GERD  -Continue Protonix 40mg daily, has no red flag sx     #HTN  #HLD  -Continue home amlodipine 5mg daily, continue home lipitor 40mg daily and Fenofibrate 145mg daily     #Hx CVA  -Admitted 3/2024 with acute onset LUE weakness, confusion and makenzie neglect. Found to have R cerebral convexity Infarct thought to be embolic. No LVO. Had SHABNAM 3/8/2024 that had no thrombus or valvular veg and bubble was negative. Sent home on Lipitor and Plavix monotherapy as has allergy to ASA   -Was supposed to have ILR as there was concern for embolic phenomenon but I do not see that this was completed. EKG here not in AF  -Continue statin therapy as above, continue Plavix 75mg daily     #Anxiety/Depression  -Continue Fluoxetine 20mg daily      Healthcare maintenance   -primary care provider is Richard Mcnulty Jr., MD      Diet: Adult Diabetic  VTE PPx: Lovenox  Code Status: Full  MPOA/NOK: Sister at bedside     Dispo: Likely discharge  tomorrow in AM.       Joseph Cooper MD ROSALIE   [facility] -1   [facility] Hospitalist Medicine

## 2025-03-07 NOTE — PLAN OF CARE
TORY met with pt at bedside this AM to complete DCA. Pt stated that at he has been at [facility] Phone: [phone] since November of last year. Pt has a WC and RW at the facility that he will use to ambulate. Pt sees his PCP at [facility]. SW will call sister with updates. Pt will return to [facility] at time of d/c. White board updated with CM name and contact information.  Discharge brochure provided.  Pt encouraged to call with any questions or concerns.  Cm will continue to follow pt through transitions of care and assist with any discharge needs.     TIM Amaral  [phone]    No future appointments.     03/07/25 1011   Discharge Assessment   Assessment Type Discharge Planning Assessment   Confirmed/corrected address, phone number and insurance Yes   Confirmed Demographics Correct on Facesheet   Source of Information patient   Communicated WILLEM with patient/caregiver Yes   Reason For Admission sob   People in Home facility resident   Facility Arrived From: [facility]   Do you expect to return to your current living situation? Yes   Do you have help at home or someone to help you manage your care at home? Yes   Prior to hospitilization cognitive status: Alert/Oriented   Current cognitive status: Alert/Oriented   Dressing/Bathing Difficulty no   Do you have any problems with: Errands/Grocery   Home Accessibility wheelchair accessible   Home Layout Able to live on 1st floor   Equipment Currently Used at Home none   Readmission within 30 days? No   Patient currently being followed by outpatient case management? No   Do you currently have service(s) that help you manage your care at home? No   Do you take prescription medications? Yes   Do you have prescription coverage? Yes   Coverage PHN   Do you have any problems affording any of your prescribed medications? No   Is the patient taking medications as prescribed? yes   Who is going to help you get home at discharge? marleen   How do you get to doctors  appointments? health plan transportation   Are you on dialysis? No   Do you take coumadin? No   Discharge Plan A Return to nursing home   DME Needed Upon Discharge  none   Discharge Plan discussed with: Patient   Transition of Care Barriers None

## 2025-03-07 NOTE — PLAN OF CARE
Problem: Adult Inpatient Plan of Care  Goal: Plan of Care Review  Outcome: Progressing     Problem: Adult Inpatient Plan of Care  Goal: Patient-Specific Goal (Individualized)  Outcome: Progressing    VN note:   Patient's chart, labs and vital signs reviewed, will be available to intervene if needed.

## 2025-03-07 NOTE — PLAN OF CARE
Problem: Occupational Therapy  Goal: Occupational Therapy Goal  Description: Goals to be met by: 04/06/2025     Patient will increase functional independence with ADLs by performing:    Grooming while seated with Set-up Assistance. --GOAL MET 3/7  Supine to sit with Stand-by Assistance.  Step transfer with Stand-by Assistance  Toilet transfer to toilet with Stand-by Assistance.    Outcome: Progressing

## 2025-03-07 NOTE — PT/OT/SLP PROGRESS
Occupational Therapy   Treatment    Name: Harrison Haywood  MRN: [id number]  Admitting Diagnosis:  Acute hypoxic respiratory failure       Recommendations:     Discharge Recommendations:  (return to NH with therapy services)  Discharge Equipment Recommendations:  to be determined by next level of care  Barriers to discharge:  None    Assessment:     Harrison Haywood is a 69 y.o. male with a medical diagnosis of Acute hypoxic respiratory failure. Performance deficits affecting function are weakness, impaired endurance, impaired self care skills, impaired functional mobility, gait instability, impaired balance, decreased coordination, decreased ROM, abnormal tone, impaired joint extensibility, decreased upper extremity function, decreased lower extremity function, decreased safety awareness, impaired fine motor, impaired coordination.     Rehab Prognosis:  Good; patient would benefit from acute skilled OT services to address these deficits and reach maximum level of function.       Plan:     Patient to be seen 3 x/week to address the above listed problems via self-care/home management, therapeutic activities, therapeutic exercises  Plan of Care Expires: 04/06/25  Plan of Care Reviewed with: patient, family    Subjective     Chief Complaint: none  Patient/Family Comments/goals: return to PLOF  Pain/Comfort:  Pain Rating 1: 0/10  Pain Rating Post-Intervention 1: 0/10    Objective:     Communicated with: nurseJeaneth prior to session.  Patient found HOB elevated with bed alarm, telemetry, PureWick, oxygen upon OT entry to room.    General Precautions: Standard, fall    Orthopedic Precautions:N/A  Braces: N/A  Respiratory Status: Nasal cannula, flow 1 L/min    Bed Mobility:    Patient completed Scooting/Bridging with CGA to EOB  Patient completed Supine to Sit with contact guard assistance, minimum assistance, with side rail, and increased momentum  Patient completed Sit to Supine with minimum assistance, moderate assistance, and  with leg lift     Functional Mobility/Transfers:  Patient completed Sit <> Stand Transfer with minimum assistance  with  rolling walker   Functional Mobility: 3-4 SS to HOB using RW /c Truman; VCs for RW mgmt/proximity and balance correction (slight posterior lean)    Activities of Daily Living:  Grooming: set-up seated EOB; oral care and facial hygiene    Lower Body Dressing: maximal assistance change socks to larger size; bed level  Toileting: TRData      [facility] 6 Click ADL: 15    Treatment & Education:  Educated on purpose/role of OT  Initially declining therapy, but after encouragement was agreeable  Requires increased time/effort for bed mobility; see above  ADLs and standing as above    Patient left HOB elevated with all lines intact, call button in reach, and bed alarm on    GOALS:   Multidisciplinary Problems       Occupational Therapy Goals          Problem: Occupational Therapy    Goal Priority Disciplines Outcome Interventions   Occupational Therapy Goal     OT, PT/OT Progressing    Description: Goals to be met by: 04/06/2025     Patient will increase functional independence with ADLs by performing:    Grooming while seated with Set-up Assistance. --GOAL MET 3/7  Supine to sit with Stand-by Assistance.  Step transfer with Stand-by Assistance  Toilet transfer to toilet with Stand-by Assistance.                         DME Justifications:  No DME recommended requiring DME justifications    Time Tracking:     OT Date of Treatment: 03/07/25  OT Start Time: 1422  OT Stop Time: 1440  OT Total Time (min): 18 min    Billable Minutes:Self Care/Home Management 10  Therapeutic Activity 8          Number of BREEZY visits since last OT visit: 0    3/7/2025

## 2025-03-07 NOTE — PT/OT/SLP PROGRESS
Physical Therapy Treatment    Patient Name:  Harrison Haywood   MRN:  [id number]    Recommendations:     Discharge Recommendations:  (back to NH with therapy services)  Discharge Equipment Recommendations: to be determined by next level of care  Barriers to discharge:  decreased strength/functional mobility requiring increased level of assistance at this time    Assessment:     Harrison Haywood is a 69 y.o. male admitted with a medical diagnosis of Acute hypoxic respiratory failure.  He presents with the following impairments/functional limitations: weakness, impaired endurance, impaired self care skills, impaired functional mobility, gait instability, impaired balance, decreased lower extremity function, decreased upper extremity function, impaired coordination, decreased ROM, abnormal tone, impaired cardiopulmonary response to activity Pt would continue to benefit from P.T. To address impairments listed above.  .    Rehab Prognosis: Fair; patient would benefit from acute skilled PT services to address these deficits and reach maximum level of function.    Recent Surgery: * No surgery found *      Plan:     During this hospitalization, patient to be seen 3 x/week to address the identified rehab impairments via gait training, therapeutic activities, therapeutic exercises, neuromuscular re-education and progress toward the following goals:    Plan of Care Expires:  04/06/25    Subjective       Patient/Family Comments/goals: Pt agreed to tx.  Pain/Comfort:  Pain Rating 1: 0/10  Pain Rating Post-Intervention 1: 0/10      Objective:     Communicated with RN prior to session.  Patient found HOB elevated with telemetry, Hari upon PT entry to room.     General Precautions: Standard, fall  Orthopedic Precautions: N/A  Braces: N/A  Respiratory Status: Room air     Functional Mobility:  Bed Mobility:     Rolling Left:  contact guard assistance and minimum assistance  Scooting: stand by assistance and to EOB  Supine to Sit:  minimum assistance  Sit to Supine: moderate assistance and BLEs onto EOB  Transfers:     Sit to Stand:  minimum assistance with rolling walker and x 3 trials  Gait: 3 steps fwd/back with RW and Truman/close CGA, seated rest, then ambulated 3 steps fwd/back and 3 side steps to the left up to HOB with Rw and Truman/close CGA.  Pt ambulates with decreased beth, decreased step length, decreased foot to floor clearance, mild trunk flexion requiring occasional assistance for Rw management and stability (when stepping backward).  Balance: sitting fair+/good-, standing fair Rw, gait fair-/fair Rw      AM-PAC 6 CLICK MOBILITY  Turning over in bed (including adjusting bedclothes, sheets and blankets)?: 3  Sitting down on and standing up from a chair with arms (e.g., wheelchair, bedside commode, etc.): 3  Moving from lying on back to sitting on the side of the bed?: 3  Moving to and from a bed to a chair (including a wheelchair)?: 3  Need to walk in hospital room?: 2  Climbing 3-5 steps with a railing?: 1  Basic Mobility Total Score: 15       Treatment & Education:  Pt education on the role of PT.  Pt's oxygen was off upon entering the room.  Wall unit set at 1.5lpm NC.  O2 sats at rest off O2 94%.  O2 donned on pt and RN notified.  O2 sats on O2 @ 1.5lpm NC 95% at rest and 92-93% with gait/therex.  Seated BLE therex: AP (decreased on R), LAQs, hip flexion 10 reps x 2 with vc's for proper technique.  Gentle stretch to B hamstrings as tolerated by pt.  Gait training as above with seated rest breaks secondary to SOB and decreased strength/endurance..  Pt was coughing on and off throughout session. Pt drank a sip of water and continued to cough.  LOLIS Eric notified.  Pt returned to bed at end of tx supine with HOB elevated for ease of breathing/comfort.  RUE elevated on pillow for improved positioning.    Patient left HOB elevated with all lines intact, call button in reach, bed alarm on, and RN notified..    GOALS:    Multidisciplinary Problems       Physical Therapy Goals          Problem: Physical Therapy    Goal Priority Disciplines Outcome Interventions   Physical Therapy Goal     PT, PT/OT Progressing    Description: Goals to be met by: 2025     Patient will increase functional independence with mobility by performin. Supine to sit with Stand-by Assistance  2. Sit to supine with Stand-by Assistance  3. Sit to stand transfer with Stand-by Assistance using Rolling Walker  4. Bed to chair transfer with Stand-by Assistance using Rolling Walker  5. Gait  x 75 feet with Stand-by Assistance using Rolling Walker.                              Time Tracking:     PT Received On: 25  PT Start Time: 1120     PT Stop Time: 1158  PT Total Time (min): 38 min     Billable Minutes: Gait Training 15 and Therapeutic Activity 15 and Therapeutic exercise  8    Treatment Type: Treatment  PT/PTA: PTA     Number of PTA visits since last PT visit: 2025

## 2025-03-08 LAB
ALBUMIN SERPL BCP-MCNC: 3.1 G/DL (ref 3.5–5.2)
ALP SERPL-CCNC: 58 U/L (ref 40–150)
ALT SERPL W/O P-5'-P-CCNC: <5 U/L (ref 10–44)
ANION GAP SERPL CALC-SCNC: 6 MMOL/L (ref 8–16)
AST SERPL-CCNC: 14 U/L (ref 10–40)
BASOPHILS # BLD AUTO: 0.01 K/UL (ref 0–0.2)
BASOPHILS NFR BLD: 0.2 % (ref 0–1.9)
BILIRUB SERPL-MCNC: 0.4 MG/DL (ref 0.1–1)
BUN SERPL-MCNC: 13 MG/DL (ref 8–23)
CALCIUM SERPL-MCNC: 7.8 MG/DL (ref 8.7–10.5)
CHLORIDE SERPL-SCNC: 110 MMOL/L (ref 95–110)
CO2 SERPL-SCNC: 26 MMOL/L (ref 23–29)
CREAT SERPL-MCNC: 0.7 MG/DL (ref 0.5–1.4)
DIFFERENTIAL METHOD BLD: ABNORMAL
EOSINOPHIL # BLD AUTO: 0.3 K/UL (ref 0–0.5)
EOSINOPHIL NFR BLD: 6.7 % (ref 0–8)
ERYTHROCYTE [DISTWIDTH] IN BLOOD BY AUTOMATED COUNT: 17.2 % (ref 11.5–14.5)
EST. GFR  (NO RACE VARIABLE): >60 ML/MIN/1.73 M^2
GLUCOSE SERPL-MCNC: 85 MG/DL (ref 70–110)
HCT VFR BLD AUTO: 29.6 % (ref 40–54)
HGB BLD-MCNC: 9.4 G/DL (ref 14–18)
IMM GRANULOCYTES # BLD AUTO: 0.02 K/UL (ref 0–0.04)
IMM GRANULOCYTES NFR BLD AUTO: 0.4 % (ref 0–0.5)
LYMPHOCYTES # BLD AUTO: 0.5 K/UL (ref 1–4.8)
LYMPHOCYTES NFR BLD: 10.8 % (ref 18–48)
MAGNESIUM SERPL-MCNC: 2 MG/DL (ref 1.6–2.6)
MCH RBC QN AUTO: 31.5 PG (ref 27–31)
MCHC RBC AUTO-ENTMCNC: 31.8 G/DL (ref 32–36)
MCV RBC AUTO: 99 FL (ref 82–98)
MONOCYTES # BLD AUTO: 0.6 K/UL (ref 0.3–1)
MONOCYTES NFR BLD: 14.2 % (ref 4–15)
NEUTROPHILS # BLD AUTO: 3 K/UL (ref 1.8–7.7)
NEUTROPHILS NFR BLD: 67.7 % (ref 38–73)
NRBC BLD-RTO: 0 /100 WBC
PLATELET # BLD AUTO: 141 K/UL (ref 150–450)
PMV BLD AUTO: 12.1 FL (ref 9.2–12.9)
POTASSIUM SERPL-SCNC: 3.8 MMOL/L (ref 3.5–5.1)
PROT SERPL-MCNC: 5.3 G/DL (ref 6–8.4)
RBC # BLD AUTO: 2.98 M/UL (ref 4.6–6.2)
SODIUM SERPL-SCNC: 142 MMOL/L (ref 136–145)
WBC # BLD AUTO: 4.45 K/UL (ref 3.9–12.7)

## 2025-03-08 PROCEDURE — 83735 ASSAY OF MAGNESIUM: CPT

## 2025-03-08 PROCEDURE — 25000242 PHARM REV CODE 250 ALT 637 W/ HCPCS

## 2025-03-08 PROCEDURE — 11000001 HC ACUTE MED/SURG PRIVATE ROOM

## 2025-03-08 PROCEDURE — 94799 UNLISTED PULMONARY SVC/PX: CPT | Mod: XB

## 2025-03-08 PROCEDURE — 63700000 PHARM REV CODE 250 ALT 637 W/O HCPCS: Performed by: INTERNAL MEDICINE

## 2025-03-08 PROCEDURE — 97530 THERAPEUTIC ACTIVITIES: CPT | Mod: CQ

## 2025-03-08 PROCEDURE — 80053 COMPREHEN METABOLIC PANEL: CPT

## 2025-03-08 PROCEDURE — 94640 AIRWAY INHALATION TREATMENT: CPT

## 2025-03-08 PROCEDURE — 63600175 PHARM REV CODE 636 W HCPCS

## 2025-03-08 PROCEDURE — 99900035 HC TECH TIME PER 15 MIN (STAT)

## 2025-03-08 PROCEDURE — 36415 COLL VENOUS BLD VENIPUNCTURE: CPT

## 2025-03-08 PROCEDURE — 97116 GAIT TRAINING THERAPY: CPT | Mod: CQ

## 2025-03-08 PROCEDURE — 94761 N-INVAS EAR/PLS OXIMETRY MLT: CPT

## 2025-03-08 PROCEDURE — 25000003 PHARM REV CODE 250

## 2025-03-08 PROCEDURE — 85025 COMPLETE CBC W/AUTO DIFF WBC: CPT

## 2025-03-08 RX ORDER — AZITHROMYCIN 500 MG/1
500 TABLET, FILM COATED ORAL DAILY
Start: 2025-03-08 | End: 2025-03-08 | Stop reason: HOSPADM

## 2025-03-08 RX ORDER — LEVOFLOXACIN 750 MG/1
750 TABLET ORAL DAILY
Start: 2025-03-08 | End: 2025-03-10

## 2025-03-08 RX ADMIN — VALACYCLOVIR HYDROCHLORIDE 500 MG: 500 TABLET, FILM COATED ORAL at 09:03

## 2025-03-08 RX ADMIN — ATORVASTATIN CALCIUM 40 MG: 40 TABLET, FILM COATED ORAL at 09:03

## 2025-03-08 RX ADMIN — Medication 1000 UNITS: at 09:03

## 2025-03-08 RX ADMIN — AMLODIPINE BESYLATE 5 MG: 5 TABLET ORAL at 09:03

## 2025-03-08 RX ADMIN — IPRATROPIUM BROMIDE AND ALBUTEROL SULFATE 3 ML: 2.5; .5 SOLUTION RESPIRATORY (INHALATION) at 12:03

## 2025-03-08 RX ADMIN — ENOXAPARIN SODIUM 40 MG: 40 INJECTION SUBCUTANEOUS at 05:03

## 2025-03-08 RX ADMIN — AZITHROMYCIN DIHYDRATE 500 MG: 250 TABLET ORAL at 09:03

## 2025-03-08 RX ADMIN — CEFTRIAXONE SODIUM 1 G: 1 INJECTION, POWDER, FOR SOLUTION INTRAMUSCULAR; INTRAVENOUS at 06:03

## 2025-03-08 RX ADMIN — CLOPIDOGREL BISULFATE 75 MG: 75 TABLET ORAL at 09:03

## 2025-03-08 RX ADMIN — ALLOPURINOL 300 MG: 100 TABLET ORAL at 09:03

## 2025-03-08 RX ADMIN — CALCIUM CARBONATE (ANTACID) CHEW TAB 500 MG 1000 MG: 500 CHEW TAB at 08:03

## 2025-03-08 RX ADMIN — PANTOPRAZOLE SODIUM 40 MG: 40 TABLET, DELAYED RELEASE ORAL at 09:03

## 2025-03-08 RX ADMIN — IPRATROPIUM BROMIDE AND ALBUTEROL SULFATE 3 ML: 2.5; .5 SOLUTION RESPIRATORY (INHALATION) at 03:03

## 2025-03-08 RX ADMIN — IPRATROPIUM BROMIDE AND ALBUTEROL SULFATE 3 ML: 2.5; .5 SOLUTION RESPIRATORY (INHALATION) at 07:03

## 2025-03-08 RX ADMIN — ZINC SULFATE 220 MG (50 MG) CAPSULE 220 MG: CAPSULE at 09:03

## 2025-03-08 RX ADMIN — ACETAMINOPHEN 650 MG: 325 TABLET ORAL at 03:03

## 2025-03-08 RX ADMIN — FENOFIBRATE 145 MG: 145 TABLET ORAL at 09:03

## 2025-03-08 RX ADMIN — FLUOXETINE HYDROCHLORIDE 20 MG: 20 CAPSULE ORAL at 09:03

## 2025-03-08 RX ADMIN — IPRATROPIUM BROMIDE AND ALBUTEROL SULFATE 3 ML: 2.5; .5 SOLUTION RESPIRATORY (INHALATION) at 08:03

## 2025-03-08 RX ADMIN — SENNOSIDES 8.6 MG: 8.6 TABLET, FILM COATED ORAL at 08:03

## 2025-03-08 RX ADMIN — BENZONATATE 100 MG: 100 CAPSULE ORAL at 05:03

## 2025-03-08 NOTE — PLAN OF CARE
TORY faxed facility transfer orders to [facility] [phone].   --------------  TORY notified by MD that pt will no longer dc today.        03/08/25 1407   Post-Acute Status   Post-Acute Authorization Placement   Post-Acute Placement Status Referrals Sent   Hospital Resources/Appts/Education Provided Appointments scheduled and added to AVS   Discharge Delays None known at this time   Discharge Plan   Discharge Plan A Return to nursing home

## 2025-03-08 NOTE — PROGRESS NOTES
"[facility] Medicine Progress Note      Subjective/Interval History      Resting comfortably in bed. On 1L NC. Eating and drink well. Voices no other complaints at this time.      Objective     Physical Examination:  /78 (BP Location: Left arm, Patient Position: Lying)   Pulse 82   Temp 98 °F (36.7 °C) (Oral)   Resp 18   Ht 6' 1" (1.854 m)   Wt 101.3 kg (223 lb 5.2 oz)   SpO2 97%   BMI 29.46 kg/m²    General: No acute distress, resting comfortably   HEENT: Atraumatic, normocephalic, moist mucous membranes  Cardiovascular: Regular rate and rhythm, normal S1 and S2, no extra heart sounds or murmurs appreciated   Chest wall: Non-tender to palpation, no gross deformities noted   Back: No abnormal curvature noted, symmetric rise with respiration   Respiratory: Stable on room air, normal work of breathing, no conversational dyspnea, no accessory muscle use noted, clear to auscultation bilaterally, no wheezes or crackles   Abdominal: Non-distended, soft, normoactive bowel sounds, non-tender to palpation, no guarding  Extremities: Atraumatic, non-edematous, moving all four extremities  Skin: Non-diaphoretic, warm, dry  Neurologic: No gross focal neurologic deficits noted      Intake/Output:    Intake/Output Summary (Last 24 hours) at 3/8/2025 0948  Last data filed at 3/8/2025 0439  Gross per 24 hour   Intake --   Output 500 ml   Net -500 ml     Net IO Since Admission: 447.41 mL [03/08/25 0948]    Laboratory data: reviewed       Microbiology data: reviewed       EKG data: reviewed       Radiology data: reviewed        Current Medications:     Infusions:       Scheduled:   albuterol-ipratropium  3 mL Nebulization Q4H WAKE    allopurinoL  300 mg Oral Daily    amLODIPine  5 mg Oral Daily    atorvastatin  40 mg Oral Daily    azithromycin  500 mg Oral Once    calcium carbonate  1,000 mg Oral QHS    cefTRIAXone (Rocephin) IV (PEDS and ADULTS)  1 g Intravenous Q24H    clopidogreL  75 mg Oral Daily    enoxparin  40 mg " Subcutaneous Daily    fenofibrate  145 mg Oral Daily    FLUoxetine  20 mg Oral Daily    pantoprazole  40 mg Oral Daily    polyethylene glycol  17 g Oral Daily    senna  8.6 mg Oral QHS    valACYclovir  500 mg Oral Daily    vitamin D  1,000 Units Oral Daily    zinc sulfate  220 mg Oral Daily        PRN:    Current Facility-Administered Medications:     acetaminophen, 650 mg, Oral, Q6H PRN    benzonatate, 100 mg, Oral, TID PRN    dextrose 50%, 12.5 g, Intravenous, PRN    dextrose 50%, 25 g, Intravenous, PRN    glucagon (human recombinant), 1 mg, Intramuscular, PRN    glucose, 16 g, Oral, PRN    glucose, 24 g, Oral, PRN    sodium chloride 0.9%, 10 mL, Intravenous, Q12H PRN      Assessment/Plan:      Harrison Haywood is a 69 y.o. M with past medical history of MM on current chemotherapeutic regimen who [place] [facility] [place] for acute worsening of ongoing cough and SOB.  Patient is admitted to [facility] for acute hypoxic resp failure.     #Acute Hypoxic Resp Failure  #Immunocompromised 2/2 MM Tx  #Cough with SOB  Patient presenting with worsening SOB today in setting of ongoing cough for the last 2 months. Non-productive cough, does not have any smoking hx or hx of COPD/reactive airway dz. COVID PNA in 9/2024 without residual issues. Pt found to have WBC to 7 from baseline of ~2 with left shift and elevated pro-leana, SIRS + with tachycardia, lactate wnl. CXR without obvious abn. Patient sats in low 90s ORA and when talking and sitting up with me does drop to 80s. ED provider gave Cefepime, decadron and duonebs with some symptomatic improvement  -Pt on chemo for MM with regimen including Revlimid, Daratumumab and Decadron, last session on 2/24. Follows OP with Dr. Howard, has been tolerating therapy  -No JAIMES but significant fatigue with minimal exertion at his NH  -Flu and Covid Negative  -Does have GERD hx and states that in last few months has been drinking more coke's which aggravate and correlate with inc dry  cough  -9/2024 admission to [facility] for COVID-PNA, treated with remdesivir + dex and CAP therapy, improved and sent with inhalers to use prn. Admitted in 3/2024 with AHRF in setting of recent RSV infection and was discharged on 2L NC but no longer is wearing this he reports, was weaned at nursing home  -ORA in mid 90s at rest, not in extremus, suprisingly benign PE with b/l lungs without appreciable crackles or rales  -TTE recently with EF 60-65%, does have some pitting DELANEY but on amlodipine, no recent worsening of DELANEY or orthopnea, BNP wnl and no fluid on CXR  -Bcx NGTD; RSV antigen nasal nares negative; fungal assay pending  -now on RA     Plan:  Given several markers indicative of infectious etiology, immunocompromised status and with abundance of caution, treated for CAP with Rocephin + Azithromycin, received cefepime in ED. Got sepsis bolus fluids. Although WBC only 7, is up from his baseline of 2 and has left shift with elevated pro-leana  Has had several admission in past for viral PNA and hypoxia, improved with steroids and nebulized therapies, sent with 2L home O2 in past but was weaned. Will monitor spO2 for need for ongoing O2 requirement  CXR unremarkable and no significant JAIMES, does have ongoing cough and worsening SOB. Given immunocompromised status will get LDH and Fungitell and ABG as initial PJP workup but less likely  CTPE negative for PE   Duonebs q4 prn, continuous pulse ox while inpatient   Discuss with Heme/Onc provider in AM concerning risk for pneumonitis with current MM regimen  Blood cx NGTD, UA unremarkable, RSV negative      #Multiple Myeloma, Not in Remission  #Lytic Bone Lesions  -Follows with Dr. Howard at , last visit 2/24/2025  -Dx in 2023, imitated on tx in 1/2025 and on current regimen since 3/2024: q28 day cycles with Revlimid 25 po daily days 1-21, Daratumumab 1800-30K units sub Q day 1, Decadron 20 po daily days 1, 8 , 15, 22, tylenol 650 po and benadryl 25 po to be given 1 hr  prior to injections  -On ppx with allopurinol 300 daily and valacyclovir 500 daily, continue while inpatient  -Lytic lesions on recent scans, is following with NSGY, on Zometa q12 weeks and vit D + Tums daily, can continue while inpatient. Had admission 4/2024 for hip fracture w/ ortho surgery repair  -Renal fxn stable on CMP  -Team will reach out to Dr. Howard during admission to update ([phone])  -Tyelnol 650mg prn for pain  -Continue PT/OT while admitted     #Hypokalemia  #Hypomagnesemia   -Replete K with Kcl 40mEq x1, Mg with 2g IV Mg     #GERD  -Patient has sx consistent with GERD including cough, reflux with acidic taste in mouth, worsened by food triggers  -Chronic cough could be 2/2 GERD  -Continue Protonix 40mg daily, has no red flag sx     #HTN  #HLD  -Continue home amlodipine 5mg daily, continue home lipitor 40mg daily and Fenofibrate 145mg daily     #Hx CVA  -Admitted 3/2024 with acute onset LUE weakness, confusion and makenzie neglect. Found to have R cerebral convexity Infarct thought to be embolic. No LVO. Had SHABNAM 3/8/2024 that had no thrombus or valvular veg and bubble was negative. Sent home on Lipitor and Plavix monotherapy as has allergy to ASA   -Was supposed to have ILR as there was concern for embolic phenomenon but I do not see that this was completed. EKG here not in AF  -Continue statin therapy as above, continue Plavix 75mg daily     #Anxiety/Depression  -Continue Fluoxetine 20mg daily      Healthcare maintenance   -primary care provider is Richard Mcnulty Jr., MD      Diet: Adult Diabetic  VTE PPx: Lovenox  Code Status: Full  MPOA/NOK: Sister at bedside     Dispo: Likely discharge Mon to NH.     Sara Arellano MD   [facility]   [facility]

## 2025-03-08 NOTE — PLAN OF CARE
Problem: Adult Inpatient Plan of Care  Goal: Plan of Care Review  Outcome: Progressing  Goal: Patient-Specific Goal (Individualized)  Outcome: Progressing  Goal: Absence of Hospital-Acquired Illness or Injury  Outcome: Progressing  Goal: Optimal Comfort and Wellbeing  Outcome: Progressing  Goal: Readiness for Transition of Care  Outcome: Progressing     Problem: Pneumonia  Goal: Fluid Balance  Outcome: Progressing  Goal: Resolution of Infection Signs and Symptoms  Outcome: Progressing  Goal: Effective Oxygenation and Ventilation  Outcome: Progressing     Problem: Wound  Goal: Optimal Coping  Outcome: Progressing  Goal: Optimal Functional Ability  Outcome: Progressing  Goal: Absence of Infection Signs and Symptoms  Outcome: Progressing  Goal: Improved Oral Intake  Outcome: Progressing     Problem: Mobility Impairment  Goal: Optimal Mobility  Outcome: Progressing     Problem: Fall Injury Risk  Goal: Absence of Fall and Fall-Related Injury  Outcome: Progressing     Problem: Pulmonary Impairment  Goal: Improved Activity Tolerance  Outcome: Progressing  Goal: Effective Airway Clearance  Outcome: Progressing  Goal: Optimal Gas Exchange  Outcome: Progressing     Problem: Comorbidity Management  Goal: Blood Pressure in Desired Range  Outcome: Progressing

## 2025-03-08 NOTE — PT/OT/SLP PROGRESS
Physical Therapy Treatment    Patient Name:  Harrison Haywood   MRN:  [id number]    Recommendations:     Discharge Recommendations:  (Return to Nursing Home with therapy services)  Discharge Equipment Recommendations: to be determined by next level of care  Barriers to discharge:  Increased level of assistance needed, decreased functional mobility endurance and strength    Assessment:     Harrison Haywood is a 69 y.o. male admitted with a medical diagnosis of Acute hypoxic respiratory failure.  He presents with the following impairments/functional limitations: weakness, impaired endurance, impaired self care skills, impaired functional mobility, gait instability, impaired balance, decreased coordination, decreased upper extremity function, decreased lower extremity function, decreased safety awareness, decreased ROM, impaired coordination, impaired skin, edema, impaired cardiopulmonary response to activity, impaired fine motor. Pt able to perform 2 ambulation training trials ~15 ft and ~40 ft with RW 2 L of O2 donned requiring min/close CGA. SpO2 levels fluctuated between 94-96% -118. Recommending back to nursing home with therapy services upon discharge.    Rehab Prognosis: Good and Fair; patient would benefit from acute skilled PT services to address these deficits and reach maximum level of function.    Recent Surgery: * No surgery found *      Plan:     During this hospitalization, patient to be seen 3 x/week to address the identified rehab impairments via gait training, therapeutic activities, therapeutic exercises, neuromuscular re-education and progress toward the following goals:    Plan of Care Expires:  04/06/25    Subjective     Chief Complaint: None Expressed  Patient/Family Comments/goals: None Expressed  Pain/Comfort:  Pain Rating 1: 0/10  Pain Rating Post-Intervention 1: 0/10      Objective:     Communicated with nurse prior to session.  Patient found HOB elevated with bed alarm, oxygen, PureWick,  "telemetry upon PT entry to room.     General Precautions: Standard, fall  Orthopedic Precautions: N/A  Braces: N/A  Respiratory Status: Nasal cannula, flow 1.5 and 2L (2L during ambulation secondary to continuous coughing)  L/min     Functional Mobility:  Bed Mobility:     Rolling Left:  contact guard assistance  Scooting: contact guard assistance and to EOB  Supine to Sit: contact guard assistance and minimum assistance  Sit to Supine: minimum assistance, moderate assistance, and assistance to advance BLE's  Transfers:     Sit to Stand:  contact guard assistance and minimum assistance with rolling walker  Gait: 2 trials ~15 ft and ~40 ft with RW, 2L of O2 donned requiring min/close CGA      AM-PAC 6 CLICK MOBILITY  Turning over in bed (including adjusting bedclothes, sheets and blankets)?: 4  Sitting down on and standing up from a chair with arms (e.g., wheelchair, bedside commode, etc.): 3  Moving from lying on back to sitting on the side of the bed?: 3  Moving to and from a bed to a chair (including a wheelchair)?: 3  Need to walk in hospital room?: 3  Climbing 3-5 steps with a railing?: 2  Basic Mobility Total Score: 18       Treatment & Education:  Upon entering room pt requested to be able to ambulate in hallway. Pt instructed in and performed 1 x 10 hip/knee flexion(marches) BLE's standing within RW boundaries requiring CGA. Decreased ROM demonstrated. Pt also able to perform 2 ambulation training trials with RW 2 L of O2 donned, requiring min/close CGA. 1st trial ~15 ft and 2nd trial ~40 ft. At 2nd trial at ~20 ft pt stated "I need to sit". Chair was brought behind pt however, PTA encouraged pt to continue with ambulation back to room. Pt able to complete 2nd trial without sitting. When returned to room pt's SpO2 taken 95/96%. Pt ambulates with a decreased step length bilaterally, slow gait speed, an increased trunk flexion with scapular protraction. Pt instructed to lift and open chest throughout ambulation " as well as while sitting EOB. SpO2 and HR fluctuated throughout session 94-96% /118. Pt with consistent coughing throughout session.    Patient left HOB elevated with all lines intact, call button in reach, bed alarm on, and nurse notified..    GOALS:   Multidisciplinary Problems       Physical Therapy Goals          Problem: Physical Therapy    Goal Priority Disciplines Outcome Interventions   Physical Therapy Goal     PT, PT/OT Progressing    Description: Goals to be met by: 2025     Patient will increase functional independence with mobility by performin. Supine to sit with Stand-by Assistance  2. Sit to supine with Stand-by Assistance  3. Sit to stand transfer with Stand-by Assistance using Rolling Walker  4. Bed to chair transfer with Stand-by Assistance using Rolling Walker  5. Gait  x 75 feet with Stand-by Assistance using Rolling Walker.                          DME Justifications:  No DME recommended requiring DME justifications    Time Tracking:     PT Received On: 25  PT Start Time: 741     PT Stop Time: 813  PT Total Time (min): 32 min     Billable Minutes: Gait Training 20 and Therapeutic Activity 12    Treatment Type: Treatment  PT/PTA: PTA     Number of PTA visits since last PT visit: 2     2025

## 2025-03-08 NOTE — PLAN OF CARE
Problem: Adult Inpatient Plan of Care  Goal: Plan of Care Review  Outcome: Progressing  Goal: Patient-Specific Goal (Individualized)  Outcome: Progressing  Goal: Absence of Hospital-Acquired Illness or Injury  Outcome: Progressing  Goal: Optimal Comfort and Wellbeing  Outcome: Progressing  Goal: Readiness for Transition of Care  Outcome: Progressing     Problem: Pneumonia  Goal: Fluid Balance  Outcome: Progressing  Goal: Resolution of Infection Signs and Symptoms  Outcome: Progressing  Goal: Effective Oxygenation and Ventilation  Outcome: Progressing     Problem: Wound  Goal: Optimal Coping  Outcome: Progressing  Goal: Optimal Functional Ability  Outcome: Progressing  Goal: Absence of Infection Signs and Symptoms  Outcome: Progressing  Goal: Improved Oral Intake  Outcome: Progressing  Goal: Optimal Pain Control and Function  Outcome: Progressing  Goal: Skin Health and Integrity  Outcome: Progressing  Goal: Optimal Wound Healing  Outcome: Progressing     Problem: Mobility Impairment  Goal: Optimal Mobility  Outcome: Progressing     Problem: Fall Injury Risk  Goal: Absence of Fall and Fall-Related Injury  Outcome: Progressing     Problem: Pulmonary Impairment  Goal: Improved Activity Tolerance  Outcome: Progressing  Goal: Effective Airway Clearance  Outcome: Progressing  Goal: Optimal Gas Exchange  Outcome: Progressing     Problem: Comorbidity Management  Goal: Blood Pressure in Desired Range  Outcome: Progressing      right upper arm

## 2025-03-08 NOTE — PLAN OF CARE
Problem: Adult Inpatient Plan of Care  Goal: Plan of Care Review  Outcome: Progressing  Goal: Patient-Specific Goal (Individualized)  Outcome: Progressing  Goal: Readiness for Transition of Care  Outcome: Progressing     Problem: Pneumonia  Goal: Resolution of Infection Signs and Symptoms  Outcome: Progressing  Goal: Effective Oxygenation and Ventilation  Outcome: Progressing     Problem: Wound  Goal: Skin Health and Integrity  Outcome: Progressing     Problem: Mobility Impairment  Goal: Optimal Mobility  Outcome: Progressing     Problem: Oncology Care  Goal: Improved Activity Tolerance  Outcome: Progressing     Problem: Fall Injury Risk  Goal: Absence of Fall and Fall-Related Injury  Outcome: Progressing     Problem: Pulmonary Impairment  Goal: Effective Airway Clearance  Outcome: Progressing  Goal: Optimal Gas Exchange  Outcome: Progressing     Problem: Comorbidity Management  Goal: Blood Pressure in Desired Range  Outcome: Progressing

## 2025-03-08 NOTE — ACP (ADVANCE CARE PLANNING)
[facility]  [address] [place]  [address]  [phone]           FACILITY TRANSFER ORDERS           Admit to:  Regular/[facility] Bed (NH)                                                    Diagnoses:   Active Hospital Problems    Diagnosis  POA    Cough [R05.9]  Yes    Multiple myeloma [C90.00]  Yes    History of CVA (cerebrovascular accident) [Z86.73]  Not Applicable    Chronic back pain [M54.9, G89.29]  Yes    Anxiety and depression [F41.9, F32.A]  Yes    Multiple myeloma not having achieved remission [C90.00]  Yes    Primary hypertension [I10]  Yes    Other hyperlipidemia [E78.49]  Yes      Resolved Hospital Problems    Diagnosis Date Resolved POA    *Acute hypoxic respiratory failure [J96.01] 03/10/2025 Yes    Hypokalemia [E87.6] 03/10/2025 Yes    Shortness of breath [R06.02] 03/10/2025 Yes    SOB (shortness of breath) [R06.02] 03/10/2025 Yes        Goals of Care Treatment Preferences:  Code Status: Full Code         Allergies:  Review of patient's allergies indicates:   Allergen Reactions    Aspirin Hives    Penicillins Hives       Vitals:   Routine, once monthly ([facility] Nursing patients)     Once weekly ([facility] Nursing patients)       Diet: Renal Diet  Activities   - Up in a chair each morning as tolerated       LABS:                       PT-INR each week for 1 month then monthly              Pre-albumin each month for 3 months              TSH every year        CONSULTS:                 Physical Therapy to evaluate and treat                 Occupational Therapy to evaluate and treat                 Speech Therapy   to evaluate and treat                 Nutrition to evaluate and recommend diet                   Medications: Discontinue all previous medication orders, if any. See new list below.     Medication List        START taking these medications      levoFLOXacin 750 MG tablet  Commonly known as: LEVAQUIN  Take 1 tablet (750 mg total) by mouth once daily. For  treatment of Pneumonia. for 3 days            CONTINUE taking these medications      acetaminophen 325 MG tablet  Commonly known as: TYLENOL  Take 650 mg by mouth every 8 (eight) hours as needed for Pain.     allopurinoL 300 MG tablet  Commonly known as: ZYLOPRIM  Take 300 mg by mouth once daily.     amLODIPine 5 MG tablet  Commonly known as: NORVASC  Take 5 mg by mouth once daily.     ARGINAID 4.5 gram-156 mg/9.2 gram Pwpk  Generic drug: arginine-vitamin C-vitamin E  Take 1 packet by mouth 2 (two) times a day. 8 ounces water     atorvastatin 40 MG tablet  Commonly known as: LIPITOR  Take 1 tablet (40 mg total) by mouth once daily.     benzonatate 100 MG capsule  Commonly known as: TESSALON  Take 100 mg by mouth every 8 (eight) hours as needed for Cough.     bisacodyL 5 mg EC tablet  Commonly known as: DULCOLAX  Take 10 mg by mouth daily as needed for Constipation.     budesonide 90 mcg/actuation Aepb  Commonly known as: PULMICORT FLEXHALER  Inhale 2 puffs (180 mcg total) into the lungs every 12 (twelve) hours as needed. Controller     calcium carbonate 600 mg calcium (1,500 mg) Tab  Commonly known as: OS-SONIA  Take 1,200 mg by mouth every evening.     celecoxib 200 MG capsule  Commonly known as: CeleBREX  Take 200 mg by mouth 2 (two) times daily.     clopidogreL 75 mg tablet  Commonly known as: PLAVIX  Take 1 tablet (75 mg total) by mouth once daily.     dexAMETHasone 4 MG Tab  Commonly known as: DECADRON  Take 20 mg by mouth every Monday. Five 4mg tablets with breakfast weekly     fenofibrate 145 MG tablet  Commonly known as: TRICOR  Take 145 mg by mouth once daily.     FLUoxetine 20 MG capsule  Take 20 mg by mouth once daily.     MCKENNA-TUSSIN 100 mg/5 mL syrup  Generic drug: guaiFENesin 100 mg/5 ml  Take 200 mg by mouth every 4 (four) hours as needed for Cough.     HYDROcodone-acetaminophen 5-325 mg per tablet  Commonly known as: NORCO  Take 1 tablet by mouth every 6 (six) hours as needed for Pain.     *  albuterol-ipratropium 2.5 mg-0.5 mg/3 mL nebulizer solution  Commonly known as: DUO-NEB  Take 3 mLs by nebulization every 6 (six) hours as needed (cough and chest congestion).     lenalidomide 25 mg Cap  Take 25 mg by mouth nightly On days 1-21; every 28 day cycle.     ondansetron 4 MG tablet  Commonly known as: ZOFRAN  Take 4 mg by mouth every 6 (six) hours as needed (vomiting).     ONE DAILY MULTIVITAMIN per tablet  Generic drug: multivitamin  Take 1 tablet by mouth once daily.     pantoprazole 40 MG tablet  Commonly known as: PROTONIX  Take 40 mg by mouth once daily.     PROMOD PROTEIN Liqd  Generic drug: protein supplement  Take 30 mLs by mouth 2 (two) times a day.     pulse oximeter device  Commonly known as: pulse oximeter  by Apply Externally route 2 (two) times a day. Use twice daily at 8 AM and 3 PM and record the value in [facility] as directed.     REFRESH TEARS 0.5 % Drop  Generic drug: carboxymethylcellulose sodium  Place 2 drops into both eyes 3 (three) times daily.     Saline NasaL 0.65 % nasal spray  Generic drug: sodium chloride  1 spray by Nasal route 2 (two) times daily as needed.     valACYclovir 500 MG tablet  Commonly known as: VALTREX  Take 500 mg by mouth 2 (two) times daily.     VITAMIN C 500 MG tablet  Generic drug: ascorbic acid (vitamin C)  Take 500 mg by mouth 2 (two) times daily.     vitamin D 1000 units Tab  Commonly known as: VITAMIN D3  Take 1,000 Units by mouth once daily.           * This list has 1 medication(s) that are the same as other medications prescribed for you. Read the directions carefully, and ask your doctor or other care provider to review them with you.                STOP taking these medications      albuterol-budesonide 90-80 mcg/actuation  Commonly known as: Airsupra     doxycycline 100 MG Cap  Commonly known as: VIBRAMYCIN            ASK your doctor about these medications      * ipratropium-albuteroL  mcg/actuation inhaler  Commonly known as:  CombiVENT  Inhale 1 puff into the lungs every 6 (six) hours as needed for Wheezing. Rescue     zinc sulfate 50 mg zinc (220 mg) capsule  Commonly known as: ZINCATE  Take 220 mg by mouth once daily.           * This list has 1 medication(s) that are the same as other medications prescribed for you. Read the directions carefully, and ask your doctor or other care provider to review them with you.

## 2025-03-09 LAB
1,3 BETA GLUCAN SER-MCNC: <31 PG/ML
ALBUMIN SERPL BCP-MCNC: 3.3 G/DL (ref 3.5–5.2)
ALP SERPL-CCNC: 58 U/L (ref 40–150)
ALT SERPL W/O P-5'-P-CCNC: 7 U/L (ref 10–44)
ANION GAP SERPL CALC-SCNC: 6 MMOL/L (ref 8–16)
AST SERPL-CCNC: 14 U/L (ref 10–40)
BASOPHILS # BLD AUTO: 0.01 K/UL (ref 0–0.2)
BASOPHILS NFR BLD: 0.2 % (ref 0–1.9)
BILIRUB SERPL-MCNC: 0.4 MG/DL (ref 0.1–1)
BUN SERPL-MCNC: 11 MG/DL (ref 8–23)
CALCIUM SERPL-MCNC: 8.5 MG/DL (ref 8.7–10.5)
CHLORIDE SERPL-SCNC: 110 MMOL/L (ref 95–110)
CO2 SERPL-SCNC: 23 MMOL/L (ref 23–29)
CREAT SERPL-MCNC: 0.7 MG/DL (ref 0.5–1.4)
DIFFERENTIAL METHOD BLD: ABNORMAL
EOSINOPHIL # BLD AUTO: 0.2 K/UL (ref 0–0.5)
EOSINOPHIL NFR BLD: 3.6 % (ref 0–8)
ERYTHROCYTE [DISTWIDTH] IN BLOOD BY AUTOMATED COUNT: 17.2 % (ref 11.5–14.5)
EST. GFR  (NO RACE VARIABLE): >60 ML/MIN/1.73 M^2
FUNGITELL COMMENTS: NEGATIVE
GLUCOSE SERPL-MCNC: 70 MG/DL (ref 70–110)
HCT VFR BLD AUTO: 33.5 % (ref 40–54)
HGB BLD-MCNC: 10.5 G/DL (ref 14–18)
IMM GRANULOCYTES # BLD AUTO: 0.01 K/UL (ref 0–0.04)
IMM GRANULOCYTES NFR BLD AUTO: 0.2 % (ref 0–0.5)
LYMPHOCYTES # BLD AUTO: 0.6 K/UL (ref 1–4.8)
LYMPHOCYTES NFR BLD: 12.6 % (ref 18–48)
MCH RBC QN AUTO: 31 PG (ref 27–31)
MCHC RBC AUTO-ENTMCNC: 31.3 G/DL (ref 32–36)
MCV RBC AUTO: 99 FL (ref 82–98)
MONOCYTES # BLD AUTO: 0.7 K/UL (ref 0.3–1)
MONOCYTES NFR BLD: 15.1 % (ref 4–15)
NEUTROPHILS # BLD AUTO: 3 K/UL (ref 1.8–7.7)
NEUTROPHILS NFR BLD: 68.3 % (ref 38–73)
NRBC BLD-RTO: 0 /100 WBC
PLATELET # BLD AUTO: 163 K/UL (ref 150–450)
PMV BLD AUTO: 11.8 FL (ref 9.2–12.9)
POTASSIUM SERPL-SCNC: 3.7 MMOL/L (ref 3.5–5.1)
PROT SERPL-MCNC: 5.8 G/DL (ref 6–8.4)
RBC # BLD AUTO: 3.39 M/UL (ref 4.6–6.2)
SODIUM SERPL-SCNC: 139 MMOL/L (ref 136–145)
WBC # BLD AUTO: 4.45 K/UL (ref 3.9–12.7)

## 2025-03-09 PROCEDURE — 94664 DEMO&/EVAL PT USE INHALER: CPT

## 2025-03-09 PROCEDURE — 94640 AIRWAY INHALATION TREATMENT: CPT

## 2025-03-09 PROCEDURE — 94799 UNLISTED PULMONARY SVC/PX: CPT | Mod: XB

## 2025-03-09 PROCEDURE — 25000242 PHARM REV CODE 250 ALT 637 W/ HCPCS

## 2025-03-09 PROCEDURE — 99900035 HC TECH TIME PER 15 MIN (STAT)

## 2025-03-09 PROCEDURE — 63600175 PHARM REV CODE 636 W HCPCS

## 2025-03-09 PROCEDURE — 27000646 HC AEROBIKA DEVICE

## 2025-03-09 PROCEDURE — 36415 COLL VENOUS BLD VENIPUNCTURE: CPT

## 2025-03-09 PROCEDURE — 85025 COMPLETE CBC W/AUTO DIFF WBC: CPT

## 2025-03-09 PROCEDURE — 11000001 HC ACUTE MED/SURG PRIVATE ROOM

## 2025-03-09 PROCEDURE — 94761 N-INVAS EAR/PLS OXIMETRY MLT: CPT

## 2025-03-09 PROCEDURE — 25000003 PHARM REV CODE 250

## 2025-03-09 PROCEDURE — 80053 COMPREHEN METABOLIC PANEL: CPT

## 2025-03-09 RX ADMIN — CEFTRIAXONE SODIUM 1 G: 1 INJECTION, POWDER, FOR SOLUTION INTRAMUSCULAR; INTRAVENOUS at 05:03

## 2025-03-09 RX ADMIN — ATORVASTATIN CALCIUM 40 MG: 40 TABLET, FILM COATED ORAL at 09:03

## 2025-03-09 RX ADMIN — VALACYCLOVIR HYDROCHLORIDE 500 MG: 500 TABLET, FILM COATED ORAL at 09:03

## 2025-03-09 RX ADMIN — PANTOPRAZOLE SODIUM 40 MG: 40 TABLET, DELAYED RELEASE ORAL at 09:03

## 2025-03-09 RX ADMIN — IPRATROPIUM BROMIDE AND ALBUTEROL SULFATE 3 ML: 2.5; .5 SOLUTION RESPIRATORY (INHALATION) at 04:03

## 2025-03-09 RX ADMIN — FENOFIBRATE 145 MG: 145 TABLET ORAL at 09:03

## 2025-03-09 RX ADMIN — Medication 1000 UNITS: at 09:03

## 2025-03-09 RX ADMIN — IPRATROPIUM BROMIDE AND ALBUTEROL SULFATE 3 ML: 2.5; .5 SOLUTION RESPIRATORY (INHALATION) at 07:03

## 2025-03-09 RX ADMIN — ALLOPURINOL 300 MG: 100 TABLET ORAL at 09:03

## 2025-03-09 RX ADMIN — SENNOSIDES 8.6 MG: 8.6 TABLET, FILM COATED ORAL at 09:03

## 2025-03-09 RX ADMIN — ENOXAPARIN SODIUM 40 MG: 40 INJECTION SUBCUTANEOUS at 04:03

## 2025-03-09 RX ADMIN — CALCIUM CARBONATE (ANTACID) CHEW TAB 500 MG 1000 MG: 500 CHEW TAB at 09:03

## 2025-03-09 RX ADMIN — IPRATROPIUM BROMIDE AND ALBUTEROL SULFATE 3 ML: 2.5; .5 SOLUTION RESPIRATORY (INHALATION) at 11:03

## 2025-03-09 RX ADMIN — AMLODIPINE BESYLATE 5 MG: 5 TABLET ORAL at 09:03

## 2025-03-09 RX ADMIN — ZINC SULFATE 220 MG (50 MG) CAPSULE 220 MG: CAPSULE at 09:03

## 2025-03-09 RX ADMIN — FLUOXETINE HYDROCHLORIDE 20 MG: 20 CAPSULE ORAL at 09:03

## 2025-03-09 RX ADMIN — CLOPIDOGREL BISULFATE 75 MG: 75 TABLET ORAL at 09:03

## 2025-03-09 NOTE — PROGRESS NOTES
"[facility] Medicine Progress Note      Subjective/Interval History      Patient states that he is much improved. He does complain of some level of SOB at rest and with ambulation.     Discussed with nursing at bedside about doing another 6 min walk test this AM.      Objective     Physical Examination:  /76   Pulse 77   Temp 98.5 °F (36.9 °C) (Oral)   Resp 19   Ht 6' 1" (1.854 m)   Wt 97.8 kg (215 lb 9.8 oz)   SpO2 97%   BMI 28.45 kg/m²    General: No acute distress, resting comfortably   HEENT: Atraumatic, normocephalic, moist mucous membranes  Cardiovascular: Regular rate and rhythm, normal S1 and S2, no extra heart sounds or murmurs appreciated   Chest wall: Non-tender to palpation, no gross deformities noted   Back: No abnormal curvature noted, symmetric rise with respiration   Respiratory: Stable on room air, normal work of breathing, no conversational dyspnea, no accessory muscle use noted, clear to auscultation bilaterally, no wheezes or crackles   Abdominal: Non-distended, soft, normoactive bowel sounds, non-tender to palpation, no guarding  Extremities: Atraumatic, non-edematous, moving all four extremities  Skin: Non-diaphoretic, warm, dry  Neurologic: No gross focal neurologic deficits noted      Intake/Output:    Intake/Output Summary (Last 24 hours) at 3/9/2025 0941  Last data filed at 3/9/2025 0453  Gross per 24 hour   Intake 240 ml   Output 700 ml   Net -460 ml     Net IO Since Admission: -12.59 mL [03/09/25 0941]    Laboratory data: reviewed     Microbiology data: reviewed     EKG data: reviewed     Radiology data: reviewed      Current Medications:     Infusions:       Scheduled:   albuterol-ipratropium  3 mL Nebulization Q4H WAKE    allopurinoL  300 mg Oral Daily    amLODIPine  5 mg Oral Daily    atorvastatin  40 mg Oral Daily    calcium carbonate  1,000 mg Oral QHS    cefTRIAXone (Rocephin) IV (PEDS and ADULTS)  1 g Intravenous Q24H    clopidogreL  75 mg Oral Daily    enoxparin  40 " mg Subcutaneous Daily    fenofibrate  145 mg Oral Daily    FLUoxetine  20 mg Oral Daily    pantoprazole  40 mg Oral Daily    polyethylene glycol  17 g Oral Daily    senna  8.6 mg Oral QHS    valACYclovir  500 mg Oral Daily    vitamin D  1,000 Units Oral Daily    zinc sulfate  220 mg Oral Daily        PRN:    Current Facility-Administered Medications:     acetaminophen, 650 mg, Oral, Q6H PRN    benzonatate, 100 mg, Oral, TID PRN    dextrose 50%, 12.5 g, Intravenous, PRN    dextrose 50%, 25 g, Intravenous, PRN    glucagon (human recombinant), 1 mg, Intramuscular, PRN    glucose, 16 g, Oral, PRN    glucose, 24 g, Oral, PRN    sodium chloride 0.9%, 10 mL, Intravenous, Q12H PRN      Assessment/Plan:     Harrison Haywood is a 69 y.o. M with past medical history of MM on current chemotherapeutic regimen who presented to [facility] on 3/5/2025 for acute worsening of ongoing cough and SOB.  Patient is admitted to [facility] for acute hypoxic resp failure.      #Acute Hypoxic Resp Failure  #Immunocompromised 2/2 MM Tx  #Cough with SOB  Patient presenting with worsening SOB today in setting of ongoing cough for the last 2 months. Non-productive cough, does not have any smoking hx or hx of COPD/reactive airway dz. COVID PNA in 9/2024 without residual issues. Pt found to have WBC to 7 from baseline of ~2 with left shift and elevated pro-leana, SIRS + with tachycardia, lactate wnl. CXR without obvious abn. Patient sats in low 90s ORA and when talking and sitting up with me does drop to 80s. ED provider gave Cefepime, decadron and duonebs with some symptomatic improvement  -Pt on chemo for MM with regimen including Revlimid, Daratumumab and Decadron, last session on 2/24. Follows OP with Dr. Howard, has been tolerating therapy  -No JAIMES but significant fatigue with minimal exertion at his NH  -Flu and Covid Negative  -Does have GERD hx and states that in last few months has been drinking more coke's which aggravate and correlate with inc  dry cough  -9/2024 admission to [facility] for COVID-PNA, treated with remdesivir + dex and CAP therapy, improved and sent with inhalers to use prn. Admitted in 3/2024 with AHRF in setting of recent RSV infection and was discharged on 2L NC but no longer is wearing this he reports, was weaned at nursing home  -ORA in mid 90s at rest, not in extremus, suprisingly benign PE with b/l lungs without appreciable crackles or rales  -TTE recently with EF 60-65%, does have some pitting DELANEY but on amlodipine, no recent worsening of DELANEY or orthopnea, BNP wnl and no fluid on CXR  -Bcx NGTD; RSV antigen nasal nares negative; fungal assay pending  -now on RA     Plan:  Given several markers indicative of infectious etiology, immunocompromised status and with abundance of caution, treated for CAP with Rocephin + Azithromycin, received cefepime in ED. Got sepsis bolus fluids. Although WBC only 7, is up from his baseline of 2 and has left shift with elevated pro-leana  Has had several admission in past for viral PNA and hypoxia, improved with steroids and nebulized therapies, sent with 2L home O2 in past but was weaned. Will monitor spO2 for need for ongoing O2 requirement  CXR unremarkable and no significant JAIMES, does have ongoing cough and worsening SOB. Given immunocompromised status will get LDH and Fungitell and ABG as initial PJP workup but less likely  CTPE negative for PE   Duonebs q4 prn, continuous pulse ox while inpatient   Discuss with Heme/Onc provider in AM concerning risk for pneumonitis with current MM regimen  Blood cx NGTD, UA unremarkable, RSV negative   Flutter valve q4      #Multiple Myeloma, Not in Remission  #Lytic Bone Lesions  -Follows with Dr. Howard at , last visit 2/24/2025  -Dx in 2023, imitated on tx in 1/2025 and on current regimen since 3/2024: q28 day cycles with Revlimid 25 po daily days 1-21, Daratumumab 1800-30K units sub Q day 1, Decadron 20 po daily days 1, 8 , 15, 22, tylenol 650 po and benadryl  25 po to be given 1 hr prior to injections  -On ppx with allopurinol 300 daily and valacyclovir 500 daily, continue while inpatient  -Lytic lesions on recent scans, is following with NSGY, on Zometa q12 weeks and vit D + Tums daily, can continue while inpatient. Had admission 4/2024 for hip fracture w/ ortho surgery repair  -Renal fxn stable on CMP  -Team will reach out to Dr. Howard during admission to update ([phone])  -Tyelnol 650mg prn for pain  -Continue PT/OT while admitted     #Hypokalemia  #Hypomagnesemia   -Replete K with Kcl 40mEq x1, Mg with 2g IV Mg     #GERD  -Patient has sx consistent with GERD including cough, reflux with acidic taste in mouth, worsened by food triggers  -Chronic cough could be 2/2 GERD  -Continue Protonix 40mg daily, has no red flag sx     #HTN  #HLD  -Continue home amlodipine 5mg daily, continue home lipitor 40mg daily and Fenofibrate 145mg daily     #Hx CVA  -Admitted 3/2024 with acute onset LUE weakness, confusion and makenzie neglect. Found to have R cerebral convexity Infarct thought to be embolic. No LVO. Had SHABNAM 3/8/2024 that had no thrombus or valvular veg and bubble was negative. Sent home on Lipitor and Plavix monotherapy as has allergy to ASA   -Was supposed to have ILR as there was concern for embolic phenomenon but I do not see that this was completed. EKG here not in AF  -Continue statin therapy as above, continue Plavix 75mg daily     #Anxiety/Depression  -Continue Fluoxetine 20mg daily        Healthcare maintenance   -primary care provider is Richard Mcnulty Jr., MD                 Diet: Adult Diabetic  VTE PPx: Lovenox  Code Status: Full  MPOA/NOK: Sister at bedside     Dispo: Likely discharge today to NH pending passing 6 minute walk test      Ignacio Cooper MD ROSALIE  [facility] -1   [facility]ist Medicine Team A

## 2025-03-09 NOTE — CARE UPDATE
HOME OXYGEN EVALUATION    A 6 minute walk test was ordered, he was 95% on room air at rest and had desaturation to 92% while ambulating in place on room air. Could not ambulate further than bedside due to leg weakness, evaluation stopped after 1 minute due to patient's fatigue. No indication for home O2 at this time.    Bret Osman DO MPH  [facility] Internal Medicine \[facility]\""III  [facility] Hospitalist Team A

## 2025-03-09 NOTE — DISCHARGE SUMMARY
[facility] Medicine Discharge Summary    Primary Team: [facility] Hospitalist Team A  Attending Physician: Ayden Yi MD  Resident: Dr. Osman/Sea   Intern: Dr. Arellano/Kenneth     Date of Admit: 3/5/2025  Date of Discharge: 3/9/2025    Discharge to: NH  Condition: Stable    Discharge Diagnoses     Problem List[1]    Consultants and Procedures     Consultants:  N/a    Procedures:   N/a    Brief History of Present Illness      Harrison Haywood is a 69 y.o. M with past medical history of multiple myeloma on chemotherapy, HTN, HLD, CVA (2024), anxiety, and depression who presented on 3/5/2025 for Shortness of Breath.       The patient was in their usual state of health until day prior to admission when he started to notice worsening shortness of breath and some subjective chills. Patient claimed that he also has an ongoing cough for several months that was worse, non-productive over this time. Patient denied any fever, dysuria, H/A, facial tenderness, allergic rhinitis sx. Patient was found to have sats in the high 80s in the ED, received duonebs, steroids and stated he felt somewhat improved. Of note, has been admitted to [facility] several times in past with complications of AHRF in setting of viral pneumonia, typically treated with short course of steroids and abx. Patient claims he does feel quite fatigued with activity and does not respond well to illness since he started chemo for his MM.  Patient states that his cough has been ongoing for months, was admitted in 3/2024 and 9/2024 and during those admissions noted to have cough at baseline. Last chemo session was 2/24 and states that he is tolerating the therapy well, does have ongoing back pain as a result of his MM that is stable.    While admitted, pt was treated for acute hypoxic respiratory failure. He received antibiotics for CAP coverage and duonebs,. He was able to be weaned off oxygen and was stable for discharge     For the full HPI please refer to the  History & Physical from this admission.    Hospital Course By Problem with Pertinent Findings     #Acute Hypoxic Resp Failure  #Immunocompromised 2/2 MM Tx  #Cough with SOB  Patient presenting with worsening SOB in setting of ongoing cough for the last 2 months. Non-productive cough, does not have any smoking hx or hx of COPD/reactive airway dz. COVID PNA in 9/2024 without residual issues. Pt found to have WBC to 7 from baseline of ~2 with left shift and elevated pro-leana, SIRS + with tachycardia, lactate wnl. CXR without obvious abn. Patient sats in low 90s ORA and when talking and sitting up does drop to 80s. ED provider gave Cefepime, decadron and duonebs with some symptomatic improvement  -Pt on chemo for MM with regimen including Revlimid, Daratumumab and Decadron, last session on 2/24. Follows OP with Dr. Howard, has been tolerating therapy  -No JAIMES but significant fatigue with minimal exertion at his NH  -Flu and Covid Negative  -Does have GERD hx and states that in last few months has been drinking more coke's which aggravate and correlate with inc dry cough  -9/2024 admission to [facility] for COVID-PNA, treated with remdesivir + dex and CAP therapy, improved and sent with inhalers to use prn. Admitted in 3/2024 with AHRF in setting of recent RSV infection and was discharged on 2L NC but no longer is wearing this he reports, was weaned at nursing home  -ORA in mid 90s at rest, not in extremus, suprisingly benign PE with b/l lungs without appreciable crackles or rales  -TTE recently with EF 60-65%, does have some pitting DELANEY but on amlodipine, no recent worsening of DELANEY or orthopnea, BNP wnl and no fluid on CXR  -Bcx NGTD; RSV antigen nasal nares negative; fungal assay pending   Plan:  Given several markers indicative of infectious etiology, immunocompromised status and with abundance of caution, treated for CAP with Rocephin + Azithromycin, received cefepime in ED. Got sepsis bolus fluids. Although WBC only 7,  is up from his baseline of 2 and has left shift with elevated pro-leana  Has had several admission in past for viral PNA and hypoxia, improved with steroids and nebulized therapies, sent with 2L home O2 in past but was weaned. Will monitor spO2 for need for ongoing O2 requirement  CXR unremarkable and no significant JAIMES, does have ongoing cough and worsening SOB. Given immunocompromised status will get LDH and Fungitell and ABG as initial PJP workup but less likely  CTPE negative for PE   Duonebs q4 prn, continuous pulse ox while inpatient   Discuss with Heme/Onc provider in AM concerning risk for pneumonitis with current MM regimen  Blood cx NGTD, UA unremarkable, RSV negative      #Multiple Myeloma, Not in Remission  #Lytic Bone Lesions  -Follows with Dr. Howard at , last visit 2/24/2025  -Dx in 2023, imitated on tx in 1/2025 and on current regimen since 3/2024: q28 day cycles with Revlimid 25 po daily days 1-21, Daratumumab 1800-30K units sub Q day 1, Decadron 20 po daily days 1, 8 , 15, 22, tylenol 650 po and benadryl 25 po to be given 1 hr prior to injections  -On ppx with allopurinol 300 daily and valacyclovir 500 daily, continue while inpatient  -Lytic lesions on recent scans, is following with NSGY, on Zometa q12 weeks and vit D + Tums daily, can continue while inpatient. Had admission 4/2024 for hip fracture w/ ortho surgery repair  -Renal fxn stable on CMP  -Team will reach out to Dr. Howard during admission to update ([phone])  -Tylenol 650mg prn for pain  -Continue PT/OT while admitted     #Hypokalemia  #Hypomagnesemia   -Replete prn for K>4, Mg >2     #GERD  -Patient has sx consistent with GERD including cough, reflux with acidic taste in mouth, worsened by food triggers  -Chronic cough could be 2/2 GERD  -Continue Protonix 40mg daily, has no red flag sx     #HTN  #HLD  -Continue home amlodipine 5mg daily, continue home lipitor 40mg daily and Fenofibrate 145mg daily     #Hx CVA  -Admitted  "3/2024 with acute onset LUE weakness, confusion and makenzie neglect. Found to have R cerebral convexity Infarct thought to be embolic. No LVO. Had SHABNAM 3/8/2024 that had no thrombus or valvular veg and bubble was negative. Sent home on Lipitor and Plavix monotherapy as has allergy to ASA   -Was supposed to have ILR as there was concern for embolic phenomenon but I do not see that this was completed. EKG here not in AF  -Continue statin therapy as above, continue Plavix 75mg daily     #Anxiety/Depression  -Continue Fluoxetine 20mg daily        Healthcare maintenance   -primary care provider is Richard Mcnulty Jr., MD       Discharge vital signs/physical exam    /76   Pulse 77   Temp 98.5 °F (36.9 °C) (Oral)   Resp 19   Ht 6' 1" (1.854 m)   Wt 97.8 kg (215 lb 9.8 oz)   SpO2 97%   BMI 28.45 kg/m²      General: No acute distress, resting comfortably   HEENT: Atraumatic, normocephalic, moist mucous membranes  Cardiovascular: Regular rate and rhythm, normal S1 and S2, no extra heart sounds or murmurs appreciated   Chest wall: Non-tender to palpation, no gross deformities noted   Back: No abnormal curvature noted, symmetric rise with respiration   Respiratory: Stable on room air, normal work of breathing, no conversational dyspnea, no accessory muscle use noted, clear to auscultation bilaterally, no wheezes or crackles   Abdominal: Non-distended, soft, normoactive bowel sounds, non-tender to palpation, no guarding  Extremities: Atraumatic, non-edematous, moving all four extremities  Pulses: 2+ and symmetric radial artery, dorsalis pedis artery  Skin: Non-diaphoretic, warm, dry  Neurologic: No gross focal neurologic deficits noted      Discharge Medications        Medication List        START taking these medications      levoFLOXacin 750 MG tablet  Commonly known as: LEVAQUIN  Take 1 tablet (750 mg total) by mouth once daily. for 5 days            CONTINUE taking these medications      acetaminophen 325 MG " tablet  Commonly known as: TYLENOL     allopurinoL 300 MG tablet  Commonly known as: ZYLOPRIM     amLODIPine 5 MG tablet  Commonly known as: NORVASC     ARGINAID 4.5 gram-156 mg/9.2 gram Pwpk  Generic drug: arginine-vitamin C-vitamin E     atorvastatin 40 MG tablet  Commonly known as: LIPITOR  Take 1 tablet (40 mg total) by mouth once daily.     benzonatate 100 MG capsule  Commonly known as: TESSALON     bisacodyL 5 mg EC tablet  Commonly known as: DULCOLAX     budesonide 90 mcg/actuation Aepb  Commonly known as: PULMICORT FLEXHALER  Inhale 2 puffs (180 mcg total) into the lungs every 12 (twelve) hours as needed. Controller     calcium carbonate 600 mg calcium (1,500 mg) Tab  Commonly known as: OS-SONIA     celecoxib 200 MG capsule  Commonly known as: CeleBREX     clopidogreL 75 mg tablet  Commonly known as: PLAVIX  Take 1 tablet (75 mg total) by mouth once daily.     dexAMETHasone 4 MG Tab  Commonly known as: DECADRON     fenofibrate 145 MG tablet  Commonly known as: TRICOR     FLUoxetine 20 MG capsule     MCKENNA-TUSSIN 100 mg/5 mL syrup  Generic drug: guaiFENesin 100 mg/5 ml     HYDROcodone-acetaminophen 5-325 mg per tablet  Commonly known as: NORCO     * albuterol-ipratropium 2.5 mg-0.5 mg/3 mL nebulizer solution  Commonly known as: DUO-NEB     lenalidomide 25 mg Cap     ondansetron 4 MG tablet  Commonly known as: ZOFRAN     ONE DAILY MULTIVITAMIN per tablet  Generic drug: multivitamin     pantoprazole 40 MG tablet  Commonly known as: PROTONIX     PROMOD PROTEIN Liqd  Generic drug: protein supplement     pulse oximeter device  Commonly known as: pulse oximeter  by Apply Externally route 2 (two) times a day. Use twice daily at 8 AM and 3 PM and [facility] as directed.     REFRESH TEARS 0.5 % Drop  Generic drug: carboxymethylcellulose sodium     Saline NasaL 0.65 % nasal spray  Generic drug: sodium chloride     valACYclovir 500 MG tablet  Commonly known as: VALTREX     VITAMIN C 500 MG tablet  Generic  drug: ascorbic acid (vitamin C)     vitamin D 1000 units Tab  Commonly known as: VITAMIN D3           * This list has 1 medication(s) that are the same as other medications prescribed for you. Read the directions carefully, and ask your doctor or other care provider to review them with you.                STOP taking these medications      albuterol-budesonide 90-80 mcg/actuation  Commonly known as: Airsupra     doxycycline 100 MG Cap  Commonly known as: VIBRAMYCIN            ASK your doctor about these medications      * ipratropium-albuteroL  mcg/actuation inhaler  Commonly known as: CombiVENT  Inhale 1 puff into the lungs every 6 (six) hours as needed for Wheezing. Rescue     zinc sulfate 50 mg zinc (220 mg) capsule  Commonly known as: ZINCATE           * This list has 1 medication(s) that are the same as other medications prescribed for you. Read the directions carefully, and ask your doctor or other care provider to review them with you.                   Where to Get Your Medications        Information about where to get these medications is not yet available    Ask your nurse or doctor about these medications  levoFLOXacin 750 MG tablet          Discharge Information:   Diet:  Cardiac     Physical Activity:  As tolerated             Instructions:  1. Take all medications as prescribed  2. Keep all follow-up appointments  3. Return to the hospital or call your primary care physicians if any worsening symptoms such as fever, chest pain, shortness of breath, return of symptoms, or any other concerns.    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

## 2025-03-10 VITALS
TEMPERATURE: 99 F | BODY MASS INDEX: 27.85 KG/M2 | RESPIRATION RATE: 20 BRPM | OXYGEN SATURATION: 99 % | HEART RATE: 104 BPM | HEIGHT: 73 IN | DIASTOLIC BLOOD PRESSURE: 78 MMHG | WEIGHT: 210.13 LBS | SYSTOLIC BLOOD PRESSURE: 133 MMHG

## 2025-03-10 PROBLEM — J96.01 ACUTE HYPOXIC RESPIRATORY FAILURE: Status: RESOLVED | Noted: 2024-02-29 | Resolved: 2025-03-10

## 2025-03-10 PROBLEM — R06.02 SOB (SHORTNESS OF BREATH): Status: RESOLVED | Noted: 2024-10-15 | Resolved: 2025-03-10

## 2025-03-10 PROBLEM — R06.02 SHORTNESS OF BREATH: Status: RESOLVED | Noted: 2025-03-06 | Resolved: 2025-03-10

## 2025-03-10 PROBLEM — E87.6 HYPOKALEMIA: Status: RESOLVED | Noted: 2025-03-06 | Resolved: 2025-03-10

## 2025-03-10 PROCEDURE — 25000242 PHARM REV CODE 250 ALT 637 W/ HCPCS

## 2025-03-10 PROCEDURE — 94799 UNLISTED PULMONARY SVC/PX: CPT

## 2025-03-10 PROCEDURE — 99900035 HC TECH TIME PER 15 MIN (STAT)

## 2025-03-10 PROCEDURE — 97530 THERAPEUTIC ACTIVITIES: CPT | Mod: CO

## 2025-03-10 PROCEDURE — 94640 AIRWAY INHALATION TREATMENT: CPT

## 2025-03-10 PROCEDURE — 27000646 HC AEROBIKA DEVICE

## 2025-03-10 PROCEDURE — 94664 DEMO&/EVAL PT USE INHALER: CPT

## 2025-03-10 PROCEDURE — 63600175 PHARM REV CODE 636 W HCPCS

## 2025-03-10 PROCEDURE — 94761 N-INVAS EAR/PLS OXIMETRY MLT: CPT

## 2025-03-10 PROCEDURE — 25000003 PHARM REV CODE 250

## 2025-03-10 RX ORDER — LEVOFLOXACIN 750 MG/1
750 TABLET ORAL DAILY
Start: 2025-03-10 | End: 2025-03-13

## 2025-03-10 RX ORDER — LEVOFLOXACIN 750 MG/1
750 TABLET ORAL DAILY
Start: 2025-03-10 | End: 2025-03-10

## 2025-03-10 RX ADMIN — PANTOPRAZOLE SODIUM 40 MG: 40 TABLET, DELAYED RELEASE ORAL at 08:03

## 2025-03-10 RX ADMIN — IPRATROPIUM BROMIDE AND ALBUTEROL SULFATE 3 ML: 2.5; .5 SOLUTION RESPIRATORY (INHALATION) at 11:03

## 2025-03-10 RX ADMIN — ATORVASTATIN CALCIUM 40 MG: 40 TABLET, FILM COATED ORAL at 08:03

## 2025-03-10 RX ADMIN — Medication 1000 UNITS: at 08:03

## 2025-03-10 RX ADMIN — ZINC SULFATE 220 MG (50 MG) CAPSULE 220 MG: CAPSULE at 08:03

## 2025-03-10 RX ADMIN — FLUOXETINE HYDROCHLORIDE 20 MG: 20 CAPSULE ORAL at 08:03

## 2025-03-10 RX ADMIN — CLOPIDOGREL BISULFATE 75 MG: 75 TABLET ORAL at 08:03

## 2025-03-10 RX ADMIN — IPRATROPIUM BROMIDE AND ALBUTEROL SULFATE 3 ML: 2.5; .5 SOLUTION RESPIRATORY (INHALATION) at 09:03

## 2025-03-10 RX ADMIN — VALACYCLOVIR HYDROCHLORIDE 500 MG: 500 TABLET, FILM COATED ORAL at 08:03

## 2025-03-10 RX ADMIN — ALLOPURINOL 300 MG: 100 TABLET ORAL at 08:03

## 2025-03-10 RX ADMIN — FENOFIBRATE 145 MG: 145 TABLET ORAL at 08:03

## 2025-03-10 RX ADMIN — AMLODIPINE BESYLATE 5 MG: 5 TABLET ORAL at 08:03

## 2025-03-10 RX ADMIN — CEFTRIAXONE SODIUM 1 G: 1 INJECTION, POWDER, FOR SOLUTION INTRAMUSCULAR; INTRAVENOUS at 06:03

## 2025-03-10 NOTE — PLAN OF CARE
CESAR sent Facility Transfer Orders via EPIC. Cm will continue to follow pt through transitions of care and assist with any discharge needs.    9:32 cesar spoke with Halie at [facility], she will review the orders and get back with CESAR.     11:55am CESAR spoke with pt's sister Arminda [phone] to complete final dc assessment. Pt will be able to return to [facility]. Report can be called to [phone]. Pt will go to room 438A. Pt will dc via [employer] for 1pm. Rounds completed on pt. All questions addressed. Bedside nurse to discuss d/c medications. Discussed importance to attend all f/u appts and take medications as prescribed. Verbalized understanding. Case Management to sign off.        TIM Amaral  [phone]     03/10/25 0754   Final Note   Assessment Type Final Discharge Note   Anticipated Discharge Disposition [facility]   What phone number can be called within the next 1-3 days to see how you are doing after discharge? [phone]   Post-Acute Status   Post-Acute Authorization Placement   Coverage phn   Discharge Delays None known at this time

## 2025-03-10 NOTE — PT/OT/SLP PROGRESS
Occupational Therapy   Treatment    Name: Harrison Haywood  MRN: [id number]  Admitting Diagnosis:  Acute hypoxic respiratory failure       Recommendations:     Discharge Recommendations:  (Return to NH with Part B services)  Discharge Equipment Recommendations:  to be determined by next level of care  Barriers to discharge:  None    Assessment:     Harrison Haywood is a 69 y.o. male with a medical diagnosis of Acute hypoxic respiratory failure. Performance deficits affecting function are weakness, impaired endurance, impaired self care skills, impaired functional mobility, gait instability, impaired balance, decreased coordination, decreased upper extremity function, decreased lower extremity function, decreased ROM, impaired coordination, abnormal tone, impaired joint extensibility, impaired fine motor, impaired skin.     Rehab Prognosis:  Fair; patient would benefit from acute skilled OT services to address these deficits and reach maximum level of function.       Plan:     Patient to be seen 3 x/week to address the above listed problems via self-care/home management, therapeutic activities, therapeutic exercises  Plan of Care Expires: 04/06/25  Plan of Care Reviewed with: patient    Subjective     Chief Complaint: none  Patient/Family Comments/goals: return to PLOF  Pain/Comfort:  Pain Rating 1: 0/10  Pain Rating Post-Intervention 1: 0/10    Objective:     Communicated with: Danny roberts prior to session.  Patient found HOB elevated with bed alarm, telemetry, PureWick upon OT entry to room.    General Precautions: Standard, fall    Orthopedic Precautions:N/A  Braces: N/A  Respiratory Status: Room air     Occupational Performance:     Bed Mobility:    Patient completed Scooting/Bridging with CGA to EOB; Dep x 2 to HOB  Patient completed Supine to Sit with moderate assistance  Patient completed Sit to Supine with moderate assistance and with leg lift     Functional Mobility/Transfers:  Patient completed Sit <> Stand  Transfer with minimum assistance  with  rolling walker   Functional Mobility: 3-4 SS to HOB using RW and Radha; VCs for RW mgmt/proximity    Activities of Daily Living:  Lower Body Dressing: maximal assistance cintia L sock bed level  Toileting: edda      [facility] 6 Click ADL: 15    Treatment & Education:  Patient alert and agreeable to therapy  Noted pad saturated with urine; pad changed while patient in stance  Flexed posture and difficulty moving RW  Reported fatigue and declined further activity    Patient left HOB elevated with all lines intact, call button in reach, bed alarm on, and PCT present    GOALS:   Multidisciplinary Problems       Occupational Therapy Goals          Problem: Occupational Therapy    Goal Priority Disciplines Outcome Interventions   Occupational Therapy Goal     OT, PT/OT Progressing    Description: Goals to be met by: 04/06/2025     Patient will increase functional independence with ADLs by performing:    Grooming while seated with Set-up Assistance. --GOAL MET 3/7  Supine to sit with Stand-by Assistance.  Step transfer with Stand-by Assistance  Toilet transfer to toilet with Stand-by Assistance.                         DME Justifications:  No DME recommended requiring DME justifications    Time Tracking:     OT Date of Treatment: 03/10/25  OT Start Time: 1100  OT Stop Time: 1113  OT Total Time (min): 13 min    Billable Minutes:Therapeutic Activity 13          Number of BREEZY visits since last OT visit: 0    3/10/2025

## 2025-03-10 NOTE — PLAN OF CARE
Pt on RA with documented sats. Lung clearance therapy. The proper method of use, as well as anticipated side effects, of this aerosol treatment are discussed and demonstrated to the patient.  Lung expansion therapy. Will continue to monitor.

## 2025-03-10 NOTE — PROGRESS NOTES
AVS prepared by VN. AVS to be placed in nursing home packet by bedside nurse. VN available for any further needs.   03/10/25 0813   Nurse Notification   Charge Nurse Notified? Yes   Name of Charge Nurse Rosie/Yazmin   Bedside Nurse Notified? Yes   Name of Bedside Nurse Danny   Nurse Notfication Method Secure Chat   Nurse Notified Of Orders    Notification    Notified? Yes   Name of  Roger ANTOINE    Notification Method Secure Chat    Notified Of Discharge Status

## 2025-03-10 NOTE — PT/OT/SLP PROGRESS
"Physical Therapy      Patient Name:  Harrison Haywood   MRN:  [id number]    1112  -Patient agitated upon entering the room.  Pt was arguing with his wife and slamming UEs into the bed.  PTA asked if it would be better to return later with pt's wife stating, "Yes, he just got back in bed."  Spoke with LOLIS Arce who reports she and PCT just finished assisting pt with toileting and then to bed with demonstrating increased agitation. Will follow up later today if time permits.    "

## 2025-03-10 NOTE — DISCHARGE SUMMARY
[facility] Medicine Discharge Summary    Primary Team: [facility] Hospitalist Team A  Attending Physician: Dr. Ayden Yi   Resident: Dr. Osman/Sea   Intern: Dr. Arellano/Kenneth     Date of Admit: 3/5/2025  Date of Discharge: 3/10/2025    Discharge to: NH  Condition: Stable.     Despite stability of his condition on discharge, NH will need to re-evaluate need for home O2 once patient's ambulation has improved.    Discharge Diagnoses     Problem List[1]    Consultants and Procedures     Consultants:  N/a    Procedures:   N/a    Brief History of Present Illness      Harrison Haywood is a 69 y.o. M with past medical history of multiple myeloma on chemotherapy, HTN, HLD, CVA (2024), anxiety, and depression who presented on 3/5/2025 for Shortness of Breath.       The patient was in their usual state of health until day prior to admission when he started to notice worsening shortness of breath and some subjective chills. Patient claimed that he also has an ongoing cough for several months that was worse, non-productive over this time. Patient denied any fever, dysuria, H/A, facial tenderness, allergic rhinitis sx. Patient was found to have sats in the high 80s in the ED, received duonebs, steroids and stated he felt somewhat improved. Of note, has been admitted to [facility] several times in past with complications of AHRF in setting of viral pneumonia, typically treated with short course of steroids and abx. Patient claims he does feel quite fatigued with activity and does not respond well to illness since he started chemo for his MM.  Patient states that his cough has been ongoing for months, was admitted in 3/2024 and 9/2024 and during those admissions noted to have cough at baseline. Last chemo session was 2/24 and states that he is tolerating the therapy well, does have ongoing back pain as a result of his MM that is stable.    While admitted, pt was treated for acute hypoxic respiratory failure. He received antibiotics  for CAP coverage and duonebs,. He was able to be weaned off oxygen and was stable for discharge     Two days prior to discharge was anticipated discharge originally but because patient desatted to 60s only by standing up decided to keep patient for an additional day. Subsequently, did flutter valve q4 to optimize lung function. Additionally, instructed patient to use IS as much as possible before discharge. Performed 6 minutes walk test to determine oxygen requirement day before discharge. He was 95% on RA and had desaturation to 92% while ambulating in place on room air. He could not ambulate further than bedside due to leg weakness stopped after 1 minute due to patient fatigue. As patient unable to exert himself enough to become hypoxic decided not to send to [facility] on oxygen requirement .     For the full HPI please refer to the History & Physical from this admission.    Hospital Course By Problem with Pertinent Findings     #Acute Hypoxic Resp Failure  #Immunocompromised 2/2 MM Tx  #Cough with SOB  Patient presenting with worsening SOB in setting of ongoing cough for the last 2 months. Non-productive cough, does not have any smoking hx or hx of COPD/reactive airway dz. COVID PNA in 9/2024 without residual issues. Pt found to have WBC to 7 from baseline of ~2 with left shift and elevated pro-leana, SIRS + with tachycardia, lactate wnl. CXR without obvious abn. Patient sats in low 90s ORA and when talking and sitting up does drop to 80s. ED provider gave Cefepime, decadron and duonebs with some symptomatic improvement  -Pt on chemo for MM with regimen including Revlimid, Daratumumab and Decadron, last session on 2/24. Follows OP with Dr. Howard, has been tolerating therapy  -No JAIMES but significant fatigue with minimal exertion at his NH  -Flu and Covid Negative  -Does have GERD hx and states that in last few months has been drinking more coke's which aggravate and correlate with inc dry cough  -9/2024 [facility]  [facility], treated with remdesivir + dex and CAP therapy, improved and sent with inhalers to use prn. Admitted in 3/2024 with AHRF in setting of recent RSV infection and was discharged on 2L NC but no longer is wearing this he reports, was weaned at nursing home  -ORA in mid 90s at rest, not in extremus, suprisingly benign PE with b/l lungs without appreciable crackles or rales  -TTE recently with EF 60-65%, does have some pitting DELANEY but on amlodipine, no recent worsening of DELANEY or orthopnea, BNP wnl and no fluid on CXR  -Bcx NGTD; RSV antigen nasal nares negative; fungal assay pending   Plan:  Given several markers indicative of infectious etiology, immunocompromised status and with abundance of caution, treated for CAP with Rocephin + Azithromycin, received cefepime in ED. Got sepsis bolus fluids. Although WBC only 7, is up from his baseline of 2 and has left shift with elevated pro-leana  Has had several admission in past for viral PNA and hypoxia, improved with steroids and nebulized therapies, sent with 2L home O2 in past but was weaned. Will monitor spO2 for need for ongoing O2 requirement  CXR unremarkable and no significant JAIMES, does have ongoing cough and worsening SOB. Given immunocompromised status will get LDH and Fungitell and ABG as initial PJP workup but less likely  CTPE negative for PE   Duonebs q4 prn, continuous pulse ox while inpatient   Discuss with Heme/Onc provider in AM concerning risk for pneumonitis with current MM regimen  Blood cx NGTD, UA unremarkable, RSV negative      #Multiple Myeloma, Not in Remission  #Lytic Bone Lesions  -Follows with Dr. Howard at , last visit 2/24/2025  -Dx in 2023, imitated on tx in 1/2025 and on current regimen since 3/2024: q28 day cycles with Revlimid 25 po daily days 1-21, Daratumumab 1800-30K units sub Q day 1, Decadron 20 po daily days 1, 8 , 15, 22, tylenol 650 po and benadryl 25 po to be given 1 hr prior to injections  -On ppx with  "allopurinol 300 daily and valacyclovir 500 daily, continue while inpatient  -Lytic lesions on recent scans, is following with NSGY, on Zometa q12 weeks and vit D + Tums daily, can continue while inpatient. Had admission 4/2024 for hip fracture w/ ortho surgery repair  -Renal fxn stable on CMP  -Team will reach out to Dr. Howard during admission to update ([phone])  -Tylenol 650mg prn for pain  -Continue PT/OT while admitted     #Hypokalemia  #Hypomagnesemia   -Replete prn for K>4, Mg >2     #GERD  -Patient has sx consistent with GERD including cough, reflux with acidic taste in mouth, worsened by food triggers  -Chronic cough could be 2/2 GERD  -Continue Protonix 40mg daily, has no red flag sx     #HTN  #HLD  -Continue home amlodipine 5mg daily, continue home lipitor 40mg daily and Fenofibrate 145mg daily     #Hx CVA  -Admitted 3/2024 with acute onset LUE weakness, confusion and makenzie neglect. Found to have R cerebral convexity Infarct thought to be embolic. No LVO. Had SHABNAM 3/8/2024 that had no thrombus or valvular veg and bubble was negative. Sent home on Lipitor and Plavix monotherapy as has allergy to ASA   -Was supposed to have ILR as there was concern for embolic phenomenon but I do not see that this was completed. EKG here not in AF  -Continue statin therapy as above, continue Plavix 75mg daily     #Anxiety/Depression  -Continue Fluoxetine 20mg daily    This patient prior to discharge desatted to 92's standing up and decision was made to not send home on o2 since unable to ambulate well enough to ascertain if hypoxic upon ambulation for a sustained period of time.    Re-evaluate need for home O2 once patient's ambulation has improved         Healthcare maintenance   -primary care provider is Richard Mcnulty Jr., MD       Discharge vital signs/physical exam    /78 (BP Location: Left arm, Patient Position: Lying)   Pulse 104   Temp 98.5 °F (36.9 °C) (Oral)   Resp 20   Ht 6' 1" (1.854 m)   " Wt 95.3 kg (210 lb 1.6 oz)   SpO2 99%   BMI 27.72 kg/m²      General: No acute distress, resting comfortably   HEENT: Atraumatic, normocephalic, moist mucous membranes  Cardiovascular: Regular rate and rhythm, normal S1 and S2, no extra heart sounds or murmurs appreciated   Chest wall: Non-tender to palpation, no gross deformities noted   Back: No abnormal curvature noted, symmetric rise with respiration   Respiratory: Stable on room air, normal work of breathing, no conversational dyspnea, no accessory muscle use noted, clear to auscultation bilaterally, no wheezes or crackles   Abdominal: Non-distended, soft, normoactive bowel sounds, non-tender to palpation, no guarding  Extremities: Atraumatic, non-edematous, moving all four extremities  Pulses: 2+ and symmetric radial artery, dorsalis pedis artery  Skin: Non-diaphoretic, warm, dry  Neurologic: No gross focal neurologic deficits noted      Discharge Medications        Medication List        START taking these medications      levoFLOXacin 750 MG tablet  Commonly known as: LEVAQUIN  Take 1 tablet (750 mg total) by mouth once daily. For treatment of Pneumonia. for 3 days            CONTINUE taking these medications      acetaminophen 325 MG tablet  Commonly known as: TYLENOL     allopurinoL 300 MG tablet  Commonly known as: ZYLOPRIM     amLODIPine 5 MG tablet  Commonly known as: NORVASC     ARGINAID 4.5 gram-156 mg/9.2 gram Pwpk  Generic drug: arginine-vitamin C-vitamin E     atorvastatin 40 MG tablet  Commonly known as: LIPITOR  Take 1 tablet (40 mg total) by mouth once daily.     benzonatate 100 MG capsule  Commonly known as: TESSALON     bisacodyL 5 mg EC tablet  Commonly known as: DULCOLAX     budesonide 90 mcg/actuation Aepb  Commonly known as: PULMICORT FLEXHALER  Inhale 2 puffs (180 mcg total) into the lungs every 12 (twelve) hours as needed. Controller     calcium carbonate 600 mg calcium (1,500 mg) Tab  Commonly known as: OS-SONIA     celecoxib 200 MG  capsule  Commonly known as: CeleBREX     clopidogreL 75 mg tablet  Commonly known as: PLAVIX  Take 1 tablet (75 mg total) by mouth once daily.     dexAMETHasone 4 MG Tab  Commonly known as: DECADRON     fenofibrate 145 MG tablet  Commonly known as: TRICOR     FLUoxetine 20 MG capsule     MCKENNA-TUSSIN 100 mg/5 mL syrup  Generic drug: guaiFENesin 100 mg/5 ml     HYDROcodone-acetaminophen 5-325 mg per tablet  Commonly known as: NORCO     * albuterol-ipratropium 2.5 mg-0.5 mg/3 mL nebulizer solution  Commonly known as: DUO-NEB     lenalidomide 25 mg Cap     ondansetron 4 MG tablet  Commonly known as: ZOFRAN     ONE DAILY MULTIVITAMIN per tablet  Generic drug: multivitamin     pantoprazole 40 MG tablet  Commonly known as: PROTONIX     PROMOD PROTEIN Liqd  Generic drug: protein supplement     pulse oximeter device  Commonly known as: pulse oximeter  by Apply Externally route 2 (two) times a day. Use twice daily at 8 AM and 3 PM and record the value in [employer] as directed.     REFRESH TEARS 0.5 % Drop  Generic drug: carboxymethylcellulose sodium     Saline NasaL 0.65 % nasal spray  Generic drug: sodium chloride     valACYclovir 500 MG tablet  Commonly known as: VALTREX     VITAMIN C 500 MG tablet  Generic drug: ascorbic acid (vitamin C)     vitamin D 1000 units Tab  Commonly known as: VITAMIN D3           * This list has 1 medication(s) that are the same as other medications prescribed for you. Read the directions carefully, and ask your doctor or other care provider to review them with you.                STOP taking these medications      albuterol-budesonide 90-80 mcg/actuation  Commonly known as: Airsupra     doxycycline 100 MG Cap  Commonly known as: VIBRAMYCIN            ASK your doctor about these medications      * ipratropium-albuteroL  mcg/actuation inhaler  Commonly known as: CombiVENT  Inhale 1 puff into the lungs every 6 (six) hours as needed for Wheezing. Rescue     zinc sulfate 50 mg zinc (220 mg)  capsule  Commonly known as: ZINCATE           * This list has 1 medication(s) that are the same as other medications prescribed for you. Read the directions carefully, and ask your doctor or other care provider to review them with you.                   Where to Get Your Medications        Information about where to get these medications is not yet available    Ask your nurse or doctor about these medications  levoFLOXacin 750 MG tablet          Discharge Information:   Diet:  Cardiac     Physical Activity:  As tolerated             Instructions:  1. Take all medications as prescribed  2. Keep all follow-up appointments  3. Return to the hospital or call your primary care physicians if any worsening symptoms such as fever, chest pain, shortness of breath, return of symptoms, or any other concerns.      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

## 2025-03-11 LAB
BACTERIA BLD CULT: NORMAL
BACTERIA BLD CULT: NORMAL

## 2025-03-11 NOTE — PHYSICIAN QUERY
Due to the conflicting clinical picture, please clinically validate the diagnosis of acute hypoxic respiratory failure. If validated, please provide additional clinical support for the diagnosis.  The respiratory condition is confirmed. Additional clinical support/decision making indicators for the diagnosis include (please specify): community-acquired pneumonia.

## 2025-05-26 ENCOUNTER — HOSPITAL ENCOUNTER (INPATIENT)
Facility: HOSPITAL | Age: 70
LOS: 2 days | Discharge: HOME | DRG: 394 | End: 2025-05-29
Attending: EMERGENCY MEDICINE | Admitting: INTERNAL MEDICINE
Payer: MEDICARE

## 2025-05-26 DIAGNOSIS — R19.7 ACUTE DIARRHEA: ICD-10-CM

## 2025-05-26 DIAGNOSIS — E86.0 DEHYDRATION: ICD-10-CM

## 2025-05-26 DIAGNOSIS — R19.7 DIARRHEA: ICD-10-CM

## 2025-05-26 DIAGNOSIS — M25.532 LEFT WRIST PAIN: ICD-10-CM

## 2025-05-26 DIAGNOSIS — R14.0 ABDOMINAL DISTENSION: ICD-10-CM

## 2025-05-26 DIAGNOSIS — R19.7 DIARRHEA, UNSPECIFIED TYPE: Primary | ICD-10-CM

## 2025-05-26 LAB
ABSOLUTE EOSINOPHIL (OHS): 0.06 K/UL
ABSOLUTE MONOCYTE (OHS): 0.32 K/UL (ref 0.3–1)
ABSOLUTE NEUTROPHIL COUNT (OHS): 2.52 K/UL (ref 1.8–7.7)
ALBUMIN SERPL BCP-MCNC: 3.2 G/DL (ref 3.5–5.2)
ALP SERPL-CCNC: 50 UNIT/L (ref 40–150)
ALT SERPL W/O P-5'-P-CCNC: 6 UNIT/L (ref 10–44)
ANION GAP (OHS): 8 MMOL/L (ref 8–16)
AST SERPL-CCNC: 13 UNIT/L (ref 11–45)
BASOPHILS # BLD AUTO: 0.01 K/UL
BASOPHILS NFR BLD AUTO: 0.3 %
BILIRUB SERPL-MCNC: 0.5 MG/DL (ref 0.1–1)
BUN SERPL-MCNC: 14 MG/DL (ref 8–23)
C DIFF GDH STL QL: NEGATIVE
C DIFF TOX A+B STL QL IA: NEGATIVE
CALCIUM SERPL-MCNC: 8.2 MG/DL (ref 8.7–10.5)
CHLORIDE SERPL-SCNC: 117 MMOL/L (ref 95–110)
CO2 SERPL-SCNC: 14 MMOL/L (ref 23–29)
CREAT SERPL-MCNC: 0.7 MG/DL (ref 0.5–1.4)
CREAT SERPL-MCNC: 0.8 MG/DL (ref 0.5–1.4)
ERYTHROCYTE [DISTWIDTH] IN BLOOD BY AUTOMATED COUNT: 16.9 % (ref 11.5–14.5)
FIO2: 21 %
GFR SERPLBLD CREATININE-BSD FMLA CKD-EPI: >60 ML/MIN/1.73/M2
GLUCOSE SERPL-MCNC: 98 MG/DL (ref 70–110)
HCT VFR BLD AUTO: 34.3 % (ref 40–54)
HGB BLD-MCNC: 11.1 GM/DL (ref 14–18)
IMM GRANULOCYTES # BLD AUTO: 0.04 K/UL (ref 0–0.04)
IMM GRANULOCYTES NFR BLD AUTO: 1.2 % (ref 0–0.5)
LACTATE SERPL-SCNC: 1.3 MMOL/L (ref 0.5–2.2)
LYMPHOCYTES # BLD AUTO: 0.28 K/UL (ref 1–4.8)
MCH RBC QN AUTO: 31.6 PG (ref 27–31)
MCHC RBC AUTO-ENTMCNC: 32.4 G/DL (ref 32–36)
MCV RBC AUTO: 98 FL (ref 82–98)
NUCLEATED RBC (/100WBC) (OHS): 0 /100 WBC
OB PNL STL: NEGATIVE
PCO2 BLDA: 36.6 MMHG (ref 35–45)
PH SMN: 7.33 [PH] (ref 7.35–7.45)
PLATELET # BLD AUTO: 143 K/UL (ref 150–450)
PMV BLD AUTO: 12 FL (ref 9.2–12.9)
PO2 BLDA: 33.1 MMHG (ref 40–60)
POC BASE DEFICIT: -6.4 MMOL/L (ref -2–2)
POC HCO3: 19 MMOL/L (ref 24–28)
POC PERFORMED BY: ABNORMAL
POC SATURATED O2: 64.3 % (ref 95–100)
POTASSIUM SERPL-SCNC: 3.1 MMOL/L (ref 3.5–5.1)
PROT SERPL-MCNC: 5.9 GM/DL (ref 6–8.4)
RBC # BLD AUTO: 3.51 M/UL (ref 4.6–6.2)
RELATIVE EOSINOPHIL (OHS): 1.9 %
RELATIVE LYMPHOCYTE (OHS): 8.7 % (ref 18–48)
RELATIVE MONOCYTE (OHS): 9.9 % (ref 4–15)
RELATIVE NEUTROPHIL (OHS): 78 % (ref 38–73)
SAMPLE: NORMAL
SODIUM SERPL-SCNC: 139 MMOL/L (ref 136–145)
SPECIMEN SOURCE: ABNORMAL
WBC # BLD AUTO: 3.23 K/UL (ref 3.9–12.7)

## 2025-05-26 PROCEDURE — 83605 ASSAY OF LACTIC ACID: CPT | Performed by: EMERGENCY MEDICINE

## 2025-05-26 PROCEDURE — 87045 FECES CULTURE AEROBIC BACT: CPT | Performed by: EMERGENCY MEDICINE

## 2025-05-26 PROCEDURE — 89055 LEUKOCYTE ASSESSMENT FECAL: CPT | Performed by: EMERGENCY MEDICINE

## 2025-05-26 PROCEDURE — 96360 HYDRATION IV INFUSION INIT: CPT

## 2025-05-26 PROCEDURE — 99285 EMERGENCY DEPT VISIT HI MDM: CPT | Mod: 25

## 2025-05-26 PROCEDURE — 25000003 PHARM REV CODE 250: Performed by: EMERGENCY MEDICINE

## 2025-05-26 PROCEDURE — 82565 ASSAY OF CREATININE: CPT

## 2025-05-26 PROCEDURE — 80053 COMPREHEN METABOLIC PANEL: CPT | Performed by: EMERGENCY MEDICINE

## 2025-05-26 PROCEDURE — 87046 STOOL CULTR AEROBIC BACT EA: CPT | Performed by: EMERGENCY MEDICINE

## 2025-05-26 PROCEDURE — 87427 SHIGA-LIKE TOXIN AG IA: CPT | Performed by: EMERGENCY MEDICINE

## 2025-05-26 PROCEDURE — 87329 GIARDIA AG IA: CPT | Performed by: EMERGENCY MEDICINE

## 2025-05-26 PROCEDURE — 85025 COMPLETE CBC W/AUTO DIFF WBC: CPT | Performed by: EMERGENCY MEDICINE

## 2025-05-26 PROCEDURE — 87449 NOS EACH ORGANISM AG IA: CPT | Performed by: EMERGENCY MEDICINE

## 2025-05-26 PROCEDURE — 25500020 PHARM REV CODE 255: Performed by: EMERGENCY MEDICINE

## 2025-05-26 PROCEDURE — 82272 OCCULT BLD FECES 1-3 TESTS: CPT | Performed by: EMERGENCY MEDICINE

## 2025-05-26 RX ORDER — LOPERAMIDE HYDROCHLORIDE 2 MG/1
4 CAPSULE ORAL
Status: COMPLETED | OUTPATIENT
Start: 2025-05-26 | End: 2025-05-26

## 2025-05-26 RX ADMIN — IOHEXOL 100 ML: 350 INJECTION, SOLUTION INTRAVENOUS at 10:05

## 2025-05-26 RX ADMIN — SODIUM CHLORIDE 1000 ML: 9 INJECTION, SOLUTION INTRAVENOUS at 10:05

## 2025-05-26 RX ADMIN — LOPERAMIDE HYDROCHLORIDE 4 MG: 2 CAPSULE ORAL at 11:05

## 2025-05-27 PROBLEM — R14.0 ABDOMINAL DISTENSION: Status: ACTIVE | Noted: 2025-05-27

## 2025-05-27 PROBLEM — R19.7 DIARRHEA: Status: ACTIVE | Noted: 2025-05-27

## 2025-05-27 LAB
ABSOLUTE EOSINOPHIL (OHS): 0.09 K/UL
ABSOLUTE MONOCYTE (OHS): 0.27 K/UL (ref 0.3–1)
ABSOLUTE NEUTROPHIL COUNT (OHS): 2.37 K/UL (ref 1.8–7.7)
ALBUMIN SERPL BCP-MCNC: 3.2 G/DL (ref 3.5–5.2)
ALP SERPL-CCNC: 48 UNIT/L (ref 40–150)
ALT SERPL W/O P-5'-P-CCNC: 7 UNIT/L (ref 10–44)
ANION GAP (OHS): 8 MMOL/L (ref 8–16)
AST SERPL-CCNC: 13 UNIT/L (ref 11–45)
BASOPHILS # BLD AUTO: 0.01 K/UL
BASOPHILS NFR BLD AUTO: 0.3 %
BILIRUB SERPL-MCNC: 0.7 MG/DL (ref 0.1–1)
BUN SERPL-MCNC: 11 MG/DL (ref 8–23)
C COLI+JEJ+UPSA DNA STL QL NAA+NON-PROBE: NEGATIVE
CALCIUM SERPL-MCNC: 8.3 MG/DL (ref 8.7–10.5)
CHLORIDE SERPL-SCNC: 116 MMOL/L (ref 95–110)
CO2 SERPL-SCNC: 17 MMOL/L (ref 23–29)
CREAT SERPL-MCNC: 0.7 MG/DL (ref 0.5–1.4)
CRYPTOSP AG SPEC QL: NEGATIVE
ERYTHROCYTE [DISTWIDTH] IN BLOOD BY AUTOMATED COUNT: 16.9 % (ref 11.5–14.5)
G LAMBLIA AG STL QL IA: NEGATIVE
GFR SERPLBLD CREATININE-BSD FMLA CKD-EPI: >60 ML/MIN/1.73/M2
GLUCOSE SERPL-MCNC: 103 MG/DL (ref 70–110)
HCT VFR BLD AUTO: 32.7 % (ref 40–54)
HGB BLD-MCNC: 10.7 GM/DL (ref 14–18)
IMM GRANULOCYTES # BLD AUTO: 0.01 K/UL (ref 0–0.04)
IMM GRANULOCYTES NFR BLD AUTO: 0.3 % (ref 0–0.5)
LYMPHOCYTES # BLD AUTO: 0.19 K/UL (ref 1–4.8)
MAGNESIUM SERPL-MCNC: 1.8 MG/DL (ref 1.6–2.6)
MCH RBC QN AUTO: 32 PG (ref 27–31)
MCHC RBC AUTO-ENTMCNC: 32.7 G/DL (ref 32–36)
MCV RBC AUTO: 98 FL (ref 82–98)
NUCLEATED RBC (/100WBC) (OHS): 0 /100 WBC
PHOSPHATE SERPL-MCNC: 1.7 MG/DL (ref 2.7–4.5)
PLATELET # BLD AUTO: 126 K/UL (ref 150–450)
PMV BLD AUTO: 12.2 FL (ref 9.2–12.9)
POTASSIUM SERPL-SCNC: 3.3 MMOL/L (ref 3.5–5.1)
PROT SERPL-MCNC: 5.7 GM/DL (ref 6–8.4)
RBC # BLD AUTO: 3.34 M/UL (ref 4.6–6.2)
RELATIVE EOSINOPHIL (OHS): 3.1 %
RELATIVE LYMPHOCYTE (OHS): 6.5 % (ref 18–48)
RELATIVE MONOCYTE (OHS): 9.2 % (ref 4–15)
RELATIVE NEUTROPHIL (OHS): 80.6 % (ref 38–73)
SODIUM SERPL-SCNC: 141 MMOL/L (ref 136–145)
WBC # BLD AUTO: 2.94 K/UL (ref 3.9–12.7)
WBC #/AREA STL HPF: NORMAL /[HPF]

## 2025-05-27 PROCEDURE — 84460 ALANINE AMINO (ALT) (SGPT): CPT

## 2025-05-27 PROCEDURE — 83735 ASSAY OF MAGNESIUM: CPT

## 2025-05-27 PROCEDURE — 36415 COLL VENOUS BLD VENIPUNCTURE: CPT

## 2025-05-27 PROCEDURE — 25000003 PHARM REV CODE 250

## 2025-05-27 PROCEDURE — 11000001 HC ACUTE MED/SURG PRIVATE ROOM

## 2025-05-27 PROCEDURE — 63600175 PHARM REV CODE 636 W HCPCS

## 2025-05-27 PROCEDURE — 99900035 HC TECH TIME PER 15 MIN (STAT)

## 2025-05-27 PROCEDURE — 85025 COMPLETE CBC W/AUTO DIFF WBC: CPT

## 2025-05-27 PROCEDURE — 84100 ASSAY OF PHOSPHORUS: CPT

## 2025-05-27 PROCEDURE — 96361 HYDRATE IV INFUSION ADD-ON: CPT

## 2025-05-27 PROCEDURE — 94761 N-INVAS EAR/PLS OXIMETRY MLT: CPT

## 2025-05-27 RX ORDER — ACETAMINOPHEN 500 MG
1000 TABLET ORAL EVERY 6 HOURS PRN
Status: CANCELLED | OUTPATIENT
Start: 2025-05-27

## 2025-05-27 RX ORDER — CLOPIDOGREL BISULFATE 75 MG/1
75 TABLET ORAL DAILY
COMMUNITY

## 2025-05-27 RX ORDER — ATORVASTATIN CALCIUM 40 MG/1
40 TABLET, FILM COATED ORAL DAILY
COMMUNITY

## 2025-05-27 RX ORDER — NALOXONE HCL 0.4 MG/ML
0.02 VIAL (ML) INJECTION
Status: DISCONTINUED | OUTPATIENT
Start: 2025-05-27 | End: 2025-05-29 | Stop reason: HOSPADM

## 2025-05-27 RX ORDER — ENOXAPARIN SODIUM 100 MG/ML
40 INJECTION SUBCUTANEOUS EVERY 24 HOURS
Status: DISCONTINUED | OUTPATIENT
Start: 2025-05-27 | End: 2025-05-29 | Stop reason: HOSPADM

## 2025-05-27 RX ORDER — ALLOPURINOL 100 MG/1
300 TABLET ORAL DAILY
Status: DISCONTINUED | OUTPATIENT
Start: 2025-05-27 | End: 2025-05-29 | Stop reason: HOSPADM

## 2025-05-27 RX ORDER — GLUCAGON 1 MG
1 KIT INJECTION
Status: DISCONTINUED | OUTPATIENT
Start: 2025-05-27 | End: 2025-05-29 | Stop reason: HOSPADM

## 2025-05-27 RX ORDER — FUROSEMIDE 20 MG/1
20 TABLET ORAL DAILY
COMMUNITY
Start: 2025-05-19

## 2025-05-27 RX ORDER — POTASSIUM CHLORIDE 750 MG/1
10 TABLET, EXTENDED RELEASE ORAL DAILY
Status: ON HOLD | COMMUNITY
Start: 2025-04-15 | End: 2025-05-29

## 2025-05-27 RX ORDER — HYDROCODONE BITARTRATE AND ACETAMINOPHEN 5; 325 MG/1; MG/1
1 TABLET ORAL EVERY 6 HOURS PRN
Refills: 0 | Status: DISCONTINUED | OUTPATIENT
Start: 2025-05-27 | End: 2025-05-29 | Stop reason: HOSPADM

## 2025-05-27 RX ORDER — SODIUM CHLORIDE, SODIUM LACTATE, POTASSIUM CHLORIDE, CALCIUM CHLORIDE 600; 310; 30; 20 MG/100ML; MG/100ML; MG/100ML; MG/100ML
INJECTION, SOLUTION INTRAVENOUS CONTINUOUS
Status: ACTIVE | OUTPATIENT
Start: 2025-05-27 | End: 2025-05-27

## 2025-05-27 RX ORDER — ALBUTEROL SULFATE 90 UG/1
2 INHALANT RESPIRATORY (INHALATION) EVERY 6 HOURS PRN
Status: ACTIVE | OUTPATIENT
Start: 2025-05-27 | End: 2025-05-28

## 2025-05-27 RX ORDER — ATORVASTATIN CALCIUM 40 MG/1
40 TABLET, FILM COATED ORAL DAILY
Status: DISCONTINUED | OUTPATIENT
Start: 2025-05-27 | End: 2025-05-29 | Stop reason: HOSPADM

## 2025-05-27 RX ORDER — IBUPROFEN 200 MG
16 TABLET ORAL
Status: DISCONTINUED | OUTPATIENT
Start: 2025-05-27 | End: 2025-05-29 | Stop reason: HOSPADM

## 2025-05-27 RX ORDER — APIXABAN 5 MG/1
5 TABLET, FILM COATED ORAL 2 TIMES DAILY
COMMUNITY
Start: 2025-05-21

## 2025-05-27 RX ORDER — FLUOXETINE 20 MG/1
20 CAPSULE ORAL DAILY
Status: DISCONTINUED | OUTPATIENT
Start: 2025-05-27 | End: 2025-05-29 | Stop reason: HOSPADM

## 2025-05-27 RX ORDER — VALACYCLOVIR HYDROCHLORIDE 500 MG/1
500 TABLET, FILM COATED ORAL 2 TIMES DAILY
Status: DISCONTINUED | OUTPATIENT
Start: 2025-05-27 | End: 2025-05-29 | Stop reason: HOSPADM

## 2025-05-27 RX ORDER — CLOPIDOGREL BISULFATE 75 MG/1
75 TABLET ORAL DAILY
Status: DISCONTINUED | OUTPATIENT
Start: 2025-05-27 | End: 2025-05-29 | Stop reason: HOSPADM

## 2025-05-27 RX ORDER — SODIUM CHLORIDE 0.9 % (FLUSH) 0.9 %
10 SYRINGE (ML) INJECTION EVERY 12 HOURS PRN
Status: DISCONTINUED | OUTPATIENT
Start: 2025-05-27 | End: 2025-05-29 | Stop reason: HOSPADM

## 2025-05-27 RX ORDER — LOPERAMIDE HYDROCHLORIDE 2 MG/1
2 CAPSULE ORAL 4 TIMES DAILY PRN
Status: DISCONTINUED | OUTPATIENT
Start: 2025-05-27 | End: 2025-05-27

## 2025-05-27 RX ORDER — IBUPROFEN 200 MG
24 TABLET ORAL
Status: DISCONTINUED | OUTPATIENT
Start: 2025-05-27 | End: 2025-05-29 | Stop reason: HOSPADM

## 2025-05-27 RX ADMIN — VALACYCLOVIR HYDROCHLORIDE 500 MG: 500 TABLET, FILM COATED ORAL at 08:05

## 2025-05-27 RX ADMIN — CLOPIDOGREL BISULFATE 75 MG: 75 TABLET, FILM COATED ORAL at 08:05

## 2025-05-27 RX ADMIN — POTASSIUM BICARBONATE 50 MEQ: 977.5 TABLET, EFFERVESCENT ORAL at 06:05

## 2025-05-27 RX ADMIN — SODIUM CHLORIDE, POTASSIUM CHLORIDE, SODIUM LACTATE AND CALCIUM CHLORIDE: 600; 310; 30; 20 INJECTION, SOLUTION INTRAVENOUS at 05:05

## 2025-05-27 RX ADMIN — FLUOXETINE HYDROCHLORIDE 20 MG: 20 CAPSULE ORAL at 08:05

## 2025-05-27 RX ADMIN — ATORVASTATIN CALCIUM 40 MG: 40 TABLET, FILM COATED ORAL at 08:05

## 2025-05-27 RX ADMIN — ALLOPURINOL 300 MG: 100 TABLET ORAL at 08:05

## 2025-05-27 RX ADMIN — SODIUM CHLORIDE, POTASSIUM CHLORIDE, SODIUM LACTATE AND CALCIUM CHLORIDE: 600; 310; 30; 20 INJECTION, SOLUTION INTRAVENOUS at 04:05

## 2025-05-28 PROBLEM — M25.532 LEFT WRIST PAIN: Status: ACTIVE | Noted: 2025-05-28

## 2025-05-28 LAB
ABSOLUTE EOSINOPHIL (OHS): 0.06 K/UL
ABSOLUTE MONOCYTE (OHS): 0.23 K/UL (ref 0.3–1)
ABSOLUTE NEUTROPHIL COUNT (OHS): 2.04 K/UL (ref 1.8–7.7)
ALBUMIN SERPL BCP-MCNC: 2.8 G/DL (ref 3.5–5.2)
ALP SERPL-CCNC: 46 UNIT/L (ref 40–150)
ALT SERPL W/O P-5'-P-CCNC: 6 UNIT/L (ref 10–44)
ANION GAP (OHS): 6 MMOL/L (ref 8–16)
ANION GAP (OHS): 7 MMOL/L (ref 8–16)
AST SERPL-CCNC: 15 UNIT/L (ref 11–45)
BASOPHILS # BLD AUTO: 0.01 K/UL
BASOPHILS NFR BLD AUTO: 0.4 %
BILIRUB SERPL-MCNC: 0.5 MG/DL (ref 0.1–1)
BUN SERPL-MCNC: 10 MG/DL (ref 8–23)
BUN SERPL-MCNC: 9 MG/DL (ref 8–23)
CALCIUM SERPL-MCNC: 7.8 MG/DL (ref 8.7–10.5)
CALCIUM SERPL-MCNC: 8 MG/DL (ref 8.7–10.5)
CHLORIDE SERPL-SCNC: 116 MMOL/L (ref 95–110)
CHLORIDE SERPL-SCNC: 118 MMOL/L (ref 95–110)
CO2 SERPL-SCNC: 18 MMOL/L (ref 23–29)
CO2 SERPL-SCNC: 20 MMOL/L (ref 23–29)
CREAT SERPL-MCNC: 0.6 MG/DL (ref 0.5–1.4)
CREAT SERPL-MCNC: 0.6 MG/DL (ref 0.5–1.4)
E COLI SXT1 STL QL IA: POSITIVE
E COLI SXT2 STL QL IA: NEGATIVE
ERYTHROCYTE [DISTWIDTH] IN BLOOD BY AUTOMATED COUNT: 16.8 % (ref 11.5–14.5)
GFR SERPLBLD CREATININE-BSD FMLA CKD-EPI: >60 ML/MIN/1.73/M2
GFR SERPLBLD CREATININE-BSD FMLA CKD-EPI: >60 ML/MIN/1.73/M2
GLUCOSE SERPL-MCNC: 80 MG/DL (ref 70–110)
GLUCOSE SERPL-MCNC: 96 MG/DL (ref 70–110)
HCT VFR BLD AUTO: 29.6 % (ref 40–54)
HGB BLD-MCNC: 9.6 GM/DL (ref 14–18)
IMM GRANULOCYTES # BLD AUTO: 0.02 K/UL (ref 0–0.04)
IMM GRANULOCYTES NFR BLD AUTO: 0.8 % (ref 0–0.5)
LYMPHOCYTES # BLD AUTO: 0.18 K/UL (ref 1–4.8)
MAGNESIUM SERPL-MCNC: 1.6 MG/DL (ref 1.6–2.6)
MAGNESIUM SERPL-MCNC: 1.7 MG/DL (ref 1.6–2.6)
MCH RBC QN AUTO: 31.6 PG (ref 27–31)
MCHC RBC AUTO-ENTMCNC: 32.4 G/DL (ref 32–36)
MCV RBC AUTO: 97 FL (ref 82–98)
NUCLEATED RBC (/100WBC) (OHS): 0 /100 WBC
PHOSPHATE SERPL-MCNC: 2 MG/DL (ref 2.7–4.5)
PLATELET # BLD AUTO: 119 K/UL (ref 150–450)
PMV BLD AUTO: 12.8 FL (ref 9.2–12.9)
POTASSIUM SERPL-SCNC: 3 MMOL/L (ref 3.5–5.1)
POTASSIUM SERPL-SCNC: 3.1 MMOL/L (ref 3.5–5.1)
PROT SERPL-MCNC: 5.2 GM/DL (ref 6–8.4)
RBC # BLD AUTO: 3.04 M/UL (ref 4.6–6.2)
RELATIVE EOSINOPHIL (OHS): 2.4 %
RELATIVE LYMPHOCYTE (OHS): 7.1 % (ref 18–48)
RELATIVE MONOCYTE (OHS): 9.1 % (ref 4–15)
RELATIVE NEUTROPHIL (OHS): 80.2 % (ref 38–73)
SODIUM SERPL-SCNC: 142 MMOL/L (ref 136–145)
SODIUM SERPL-SCNC: 143 MMOL/L (ref 136–145)
WBC # BLD AUTO: 2.54 K/UL (ref 3.9–12.7)

## 2025-05-28 PROCEDURE — 25000003 PHARM REV CODE 250

## 2025-05-28 PROCEDURE — 36415 COLL VENOUS BLD VENIPUNCTURE: CPT

## 2025-05-28 PROCEDURE — 84295 ASSAY OF SERUM SODIUM: CPT

## 2025-05-28 PROCEDURE — 85025 COMPLETE CBC W/AUTO DIFF WBC: CPT

## 2025-05-28 PROCEDURE — 25000003 PHARM REV CODE 250: Performed by: STUDENT IN AN ORGANIZED HEALTH CARE EDUCATION/TRAINING PROGRAM

## 2025-05-28 PROCEDURE — 11000001 HC ACUTE MED/SURG PRIVATE ROOM

## 2025-05-28 PROCEDURE — 99222 1ST HOSP IP/OBS MODERATE 55: CPT | Mod: ,,, | Performed by: STUDENT IN AN ORGANIZED HEALTH CARE EDUCATION/TRAINING PROGRAM

## 2025-05-28 PROCEDURE — 83735 ASSAY OF MAGNESIUM: CPT

## 2025-05-28 PROCEDURE — 80053 COMPREHEN METABOLIC PANEL: CPT

## 2025-05-28 PROCEDURE — 84100 ASSAY OF PHOSPHORUS: CPT

## 2025-05-28 PROCEDURE — 63600175 PHARM REV CODE 636 W HCPCS: Performed by: STUDENT IN AN ORGANIZED HEALTH CARE EDUCATION/TRAINING PROGRAM

## 2025-05-28 PROCEDURE — 63600175 PHARM REV CODE 636 W HCPCS

## 2025-05-28 RX ORDER — SODIUM,POTASSIUM PHOSPHATES 280-250MG
1 POWDER IN PACKET (EA) ORAL
Status: COMPLETED | OUTPATIENT
Start: 2025-05-28 | End: 2025-05-29

## 2025-05-28 RX ORDER — SODIUM CHLORIDE AND POTASSIUM CHLORIDE 150; 900 MG/100ML; MG/100ML
INJECTION, SOLUTION INTRAVENOUS CONTINUOUS
Status: DISPENSED | OUTPATIENT
Start: 2025-05-28 | End: 2025-05-28

## 2025-05-28 RX ORDER — MAGNESIUM SULFATE HEPTAHYDRATE 40 MG/ML
2 INJECTION, SOLUTION INTRAVENOUS ONCE
Status: COMPLETED | OUTPATIENT
Start: 2025-05-28 | End: 2025-05-28

## 2025-05-28 RX ADMIN — POTASSIUM BICARBONATE 20 MEQ: 391 TABLET, EFFERVESCENT ORAL at 09:05

## 2025-05-28 RX ADMIN — ALLOPURINOL 300 MG: 100 TABLET ORAL at 09:05

## 2025-05-28 RX ADMIN — VALACYCLOVIR HYDROCHLORIDE 500 MG: 500 TABLET, FILM COATED ORAL at 09:05

## 2025-05-28 RX ADMIN — FLUOXETINE HYDROCHLORIDE 20 MG: 20 CAPSULE ORAL at 09:05

## 2025-05-28 RX ADMIN — MAGNESIUM SULFATE HEPTAHYDRATE 2 G: 40 INJECTION, SOLUTION INTRAVENOUS at 11:05

## 2025-05-28 RX ADMIN — ATORVASTATIN CALCIUM 40 MG: 40 TABLET, FILM COATED ORAL at 09:05

## 2025-05-28 RX ADMIN — CLOPIDOGREL BISULFATE 75 MG: 75 TABLET, FILM COATED ORAL at 09:05

## 2025-05-28 RX ADMIN — POTASSIUM & SODIUM PHOSPHATES POWDER PACK 280-160-250 MG 1 PACKET: 280-160-250 PACK at 09:05

## 2025-05-28 RX ADMIN — POTASSIUM & SODIUM PHOSPHATES POWDER PACK 280-160-250 MG 1 PACKET: 280-160-250 PACK at 11:05

## 2025-05-28 RX ADMIN — SODIUM CHLORIDE AND POTASSIUM CHLORIDE: 9; 1.49 INJECTION, SOLUTION INTRAVENOUS at 09:05

## 2025-05-29 VITALS
HEART RATE: 85 BPM | DIASTOLIC BLOOD PRESSURE: 80 MMHG | TEMPERATURE: 99 F | BODY MASS INDEX: 29.51 KG/M2 | RESPIRATION RATE: 17 BRPM | HEIGHT: 73 IN | OXYGEN SATURATION: 97 % | WEIGHT: 222.69 LBS | SYSTOLIC BLOOD PRESSURE: 143 MMHG

## 2025-05-29 PROBLEM — R19.7 DIARRHEA: Status: RESOLVED | Noted: 2025-05-27 | Resolved: 2025-05-29

## 2025-05-29 PROBLEM — M25.532 LEFT WRIST PAIN: Status: RESOLVED | Noted: 2025-05-28 | Resolved: 2025-05-29

## 2025-05-29 PROBLEM — R14.0 ABDOMINAL DISTENSION: Status: RESOLVED | Noted: 2025-05-27 | Resolved: 2025-05-29

## 2025-05-29 LAB
ABSOLUTE EOSINOPHIL (OHS): 0.04 K/UL
ABSOLUTE MONOCYTE (OHS): 0.26 K/UL (ref 0.3–1)
ABSOLUTE NEUTROPHIL COUNT (OHS): 2.38 K/UL (ref 1.8–7.7)
ALBUMIN SERPL BCP-MCNC: 2.8 G/DL (ref 3.5–5.2)
ALP SERPL-CCNC: 46 UNIT/L (ref 40–150)
ALT SERPL W/O P-5'-P-CCNC: 9 UNIT/L (ref 10–44)
ANION GAP (OHS): 11 MMOL/L (ref 8–16)
ANION GAP (OHS): 5 MMOL/L (ref 8–16)
AST SERPL-CCNC: 16 UNIT/L (ref 11–45)
BACTERIA STL CULT: NORMAL
BASOPHILS # BLD AUTO: 0 K/UL
BASOPHILS NFR BLD AUTO: 0 %
BILIRUB SERPL-MCNC: 0.5 MG/DL (ref 0.1–1)
BUN SERPL-MCNC: 10 MG/DL (ref 8–23)
BUN SERPL-MCNC: 9 MG/DL (ref 8–23)
CALCIUM SERPL-MCNC: 7.5 MG/DL (ref 8.7–10.5)
CALCIUM SERPL-MCNC: 7.5 MG/DL (ref 8.7–10.5)
CHLORIDE SERPL-SCNC: 111 MMOL/L (ref 95–110)
CHLORIDE SERPL-SCNC: 115 MMOL/L (ref 95–110)
CO2 SERPL-SCNC: 20 MMOL/L (ref 23–29)
CO2 SERPL-SCNC: 21 MMOL/L (ref 23–29)
CREAT SERPL-MCNC: 0.6 MG/DL (ref 0.5–1.4)
CREAT SERPL-MCNC: 0.6 MG/DL (ref 0.5–1.4)
ERYTHROCYTE [DISTWIDTH] IN BLOOD BY AUTOMATED COUNT: 16.9 % (ref 11.5–14.5)
GFR SERPLBLD CREATININE-BSD FMLA CKD-EPI: >60 ML/MIN/1.73/M2
GFR SERPLBLD CREATININE-BSD FMLA CKD-EPI: >60 ML/MIN/1.73/M2
GLUCOSE SERPL-MCNC: 103 MG/DL (ref 70–110)
GLUCOSE SERPL-MCNC: 106 MG/DL (ref 70–110)
HCT VFR BLD AUTO: 29.3 % (ref 40–54)
HGB BLD-MCNC: 9.6 GM/DL (ref 14–18)
IMM GRANULOCYTES # BLD AUTO: 0.01 K/UL (ref 0–0.04)
IMM GRANULOCYTES NFR BLD AUTO: 0.3 % (ref 0–0.5)
LYMPHOCYTES # BLD AUTO: 0.21 K/UL (ref 1–4.8)
MAGNESIUM SERPL-MCNC: 1.6 MG/DL (ref 1.6–2.6)
MAGNESIUM SERPL-MCNC: 2.3 MG/DL (ref 1.6–2.6)
MCH RBC QN AUTO: 31.8 PG (ref 27–31)
MCHC RBC AUTO-ENTMCNC: 32.8 G/DL (ref 32–36)
MCV RBC AUTO: 97 FL (ref 82–98)
NUCLEATED RBC (/100WBC) (OHS): 0 /100 WBC
PHOSPHATE SERPL-MCNC: 2.2 MG/DL (ref 2.7–4.5)
PHOSPHATE SERPL-MCNC: 2.3 MG/DL (ref 2.7–4.5)
PLATELET # BLD AUTO: 118 K/UL (ref 150–450)
PMV BLD AUTO: 12.2 FL (ref 9.2–12.9)
POTASSIUM SERPL-SCNC: 2.9 MMOL/L (ref 3.5–5.1)
POTASSIUM SERPL-SCNC: 3.2 MMOL/L (ref 3.5–5.1)
PROT SERPL-MCNC: 5 GM/DL (ref 6–8.4)
RBC # BLD AUTO: 3.02 M/UL (ref 4.6–6.2)
RELATIVE EOSINOPHIL (OHS): 1.4 %
RELATIVE LYMPHOCYTE (OHS): 7.2 % (ref 18–48)
RELATIVE MONOCYTE (OHS): 9 % (ref 4–15)
RELATIVE NEUTROPHIL (OHS): 82.1 % (ref 38–73)
SODIUM SERPL-SCNC: 140 MMOL/L (ref 136–145)
SODIUM SERPL-SCNC: 143 MMOL/L (ref 136–145)
WBC # BLD AUTO: 2.9 K/UL (ref 3.9–12.7)

## 2025-05-29 PROCEDURE — 25000003 PHARM REV CODE 250

## 2025-05-29 PROCEDURE — 83735 ASSAY OF MAGNESIUM: CPT

## 2025-05-29 PROCEDURE — 84100 ASSAY OF PHOSPHORUS: CPT | Performed by: STUDENT IN AN ORGANIZED HEALTH CARE EDUCATION/TRAINING PROGRAM

## 2025-05-29 PROCEDURE — 36415 COLL VENOUS BLD VENIPUNCTURE: CPT

## 2025-05-29 PROCEDURE — 85025 COMPLETE CBC W/AUTO DIFF WBC: CPT

## 2025-05-29 PROCEDURE — 63600175 PHARM REV CODE 636 W HCPCS: Performed by: STUDENT IN AN ORGANIZED HEALTH CARE EDUCATION/TRAINING PROGRAM

## 2025-05-29 PROCEDURE — 25000003 PHARM REV CODE 250: Performed by: STUDENT IN AN ORGANIZED HEALTH CARE EDUCATION/TRAINING PROGRAM

## 2025-05-29 PROCEDURE — 80053 COMPREHEN METABOLIC PANEL: CPT

## 2025-05-29 PROCEDURE — 83735 ASSAY OF MAGNESIUM: CPT | Performed by: STUDENT IN AN ORGANIZED HEALTH CARE EDUCATION/TRAINING PROGRAM

## 2025-05-29 PROCEDURE — 84100 ASSAY OF PHOSPHORUS: CPT

## 2025-05-29 RX ORDER — SODIUM CHLORIDE AND POTASSIUM CHLORIDE 150; 900 MG/100ML; MG/100ML
INJECTION, SOLUTION INTRAVENOUS CONTINUOUS
Status: DISCONTINUED | OUTPATIENT
Start: 2025-05-29 | End: 2025-05-29

## 2025-05-29 RX ORDER — SODIUM,POTASSIUM PHOSPHATES 280-250MG
1 POWDER IN PACKET (EA) ORAL
Status: COMPLETED | OUTPATIENT
Start: 2025-05-29 | End: 2025-05-29

## 2025-05-29 RX ORDER — MAGNESIUM SULFATE HEPTAHYDRATE 40 MG/ML
2 INJECTION, SOLUTION INTRAVENOUS ONCE
Status: COMPLETED | OUTPATIENT
Start: 2025-05-29 | End: 2025-05-29

## 2025-05-29 RX ORDER — POTASSIUM CHLORIDE 750 MG/1
20 TABLET, EXTENDED RELEASE ORAL DAILY
Start: 2025-05-29 | End: 2026-05-29

## 2025-05-29 RX ADMIN — POTASSIUM & SODIUM PHOSPHATES POWDER PACK 280-160-250 MG 1 PACKET: 280-160-250 PACK at 11:05

## 2025-05-29 RX ADMIN — POTASSIUM & SODIUM PHOSPHATES POWDER PACK 280-160-250 MG 1 PACKET: 280-160-250 PACK at 06:05

## 2025-05-29 RX ADMIN — VALACYCLOVIR HYDROCHLORIDE 500 MG: 500 TABLET, FILM COATED ORAL at 08:05

## 2025-05-29 RX ADMIN — ATORVASTATIN CALCIUM 40 MG: 40 TABLET, FILM COATED ORAL at 08:05

## 2025-05-29 RX ADMIN — MAGNESIUM SULFATE HEPTAHYDRATE 2 G: 40 INJECTION, SOLUTION INTRAVENOUS at 08:05

## 2025-05-29 RX ADMIN — ALLOPURINOL 300 MG: 100 TABLET ORAL at 08:05

## 2025-05-29 RX ADMIN — POTASSIUM BICARBONATE 25 MEQ: 977.5 TABLET, EFFERVESCENT ORAL at 02:05

## 2025-05-29 RX ADMIN — CLOPIDOGREL BISULFATE 75 MG: 75 TABLET, FILM COATED ORAL at 08:05

## 2025-05-29 RX ADMIN — SODIUM CHLORIDE AND POTASSIUM CHLORIDE: 9; 1.49 INJECTION, SOLUTION INTRAVENOUS at 08:05

## 2025-05-29 RX ADMIN — FLUOXETINE HYDROCHLORIDE 20 MG: 20 CAPSULE ORAL at 08:05

## 2025-05-29 RX ADMIN — POTASSIUM & SODIUM PHOSPHATES POWDER PACK 280-160-250 MG 1 PACKET: 280-160-250 PACK at 08:05

## 2025-06-25 ENCOUNTER — HOSPITAL ENCOUNTER (INPATIENT)
Facility: HOSPITAL | Age: 70
LOS: 5 days | Discharge: HOME | DRG: 193 | End: 2025-06-30
Attending: EMERGENCY MEDICINE | Admitting: INTERNAL MEDICINE
Payer: MEDICARE

## 2025-06-25 DIAGNOSIS — B34.8 INFECTION DUE TO PARAINFLUENZA VIRUS 3: ICD-10-CM

## 2025-06-25 DIAGNOSIS — E87.20 LACTIC ACIDOSIS: ICD-10-CM

## 2025-06-25 DIAGNOSIS — J96.01 ACUTE RESPIRATORY FAILURE WITH HYPOXIA: ICD-10-CM

## 2025-06-25 DIAGNOSIS — R05.9 COUGH, UNSPECIFIED TYPE: ICD-10-CM

## 2025-06-25 DIAGNOSIS — J96.01 ACUTE HYPOXEMIC RESPIRATORY FAILURE: ICD-10-CM

## 2025-06-25 DIAGNOSIS — J18.9 PNEUMONIA DUE TO INFECTIOUS ORGANISM, UNSPECIFIED LATERALITY, UNSPECIFIED PART OF LUNG: ICD-10-CM

## 2025-06-25 DIAGNOSIS — B34.0 ADENOVIRUS INFECTION: ICD-10-CM

## 2025-06-25 DIAGNOSIS — J96.01 ACUTE HYPOXIC RESPIRATORY FAILURE: Primary | ICD-10-CM

## 2025-06-25 DIAGNOSIS — I82.4Y3 ACUTE DEEP VEIN THROMBOSIS (DVT) OF PROXIMAL VEIN OF BOTH LOWER EXTREMITIES: ICD-10-CM

## 2025-06-25 DIAGNOSIS — J20.9 ACUTE BRONCHITIS, UNSPECIFIED ORGANISM: ICD-10-CM

## 2025-06-25 DIAGNOSIS — I10 PRIMARY HYPERTENSION: ICD-10-CM

## 2025-06-25 DIAGNOSIS — R06.02 SHORTNESS OF BREATH: ICD-10-CM

## 2025-06-25 DIAGNOSIS — R53.1 LEFT-SIDED WEAKNESS: ICD-10-CM

## 2025-06-25 DIAGNOSIS — R06.03 RESPIRATORY DISTRESS: ICD-10-CM

## 2025-06-25 DIAGNOSIS — C90.00 MULTIPLE MYELOMA, REMISSION STATUS UNSPECIFIED: ICD-10-CM

## 2025-06-25 DIAGNOSIS — D53.9 MACROCYTIC ANEMIA: ICD-10-CM

## 2025-06-25 DIAGNOSIS — I63.9 CEREBROVASCULAR ACCIDENT (CVA), UNSPECIFIED MECHANISM: ICD-10-CM

## 2025-06-25 DIAGNOSIS — Z13.6 SCREENING FOR CARDIOVASCULAR CONDITION: ICD-10-CM

## 2025-06-25 DIAGNOSIS — R05.8 RESPIRATORY TRACT CONGESTION WITH COUGH: ICD-10-CM

## 2025-06-25 LAB
ABSOLUTE EOSINOPHIL (OHS): 0.1 K/UL
ABSOLUTE MONOCYTE (OHS): 0.67 K/UL (ref 0.3–1)
ABSOLUTE NEUTROPHIL COUNT (OHS): 5.56 K/UL (ref 1.8–7.7)
ALBUMIN SERPL BCP-MCNC: 3.6 G/DL (ref 3.5–5.2)
ALP SERPL-CCNC: 55 UNIT/L (ref 40–150)
ALT SERPL W/O P-5'-P-CCNC: 9 UNIT/L (ref 10–44)
ANION GAP (OHS): 7 MMOL/L (ref 8–16)
APTT PPP: 27.1 SECONDS (ref 21–32)
AST SERPL-CCNC: 15 UNIT/L (ref 11–45)
BASOPHILS # BLD AUTO: 0.03 K/UL
BASOPHILS NFR BLD AUTO: 0.4 %
BILIRUB SERPL-MCNC: 0.7 MG/DL (ref 0.1–1)
BNP SERPL-MCNC: 75 PG/ML (ref 0–99)
BUN SERPL-MCNC: 23 MG/DL (ref 8–23)
CALCIUM SERPL-MCNC: 8.9 MG/DL (ref 8.7–10.5)
CHLORIDE SERPL-SCNC: 108 MMOL/L (ref 95–110)
CO2 SERPL-SCNC: 25 MMOL/L (ref 23–29)
CREAT SERPL-MCNC: 1.1 MG/DL (ref 0.5–1.4)
CTP QC/QA: YES
D DIMER PPP IA.FEU-MCNC: 0.52 MG/L FEU
ERYTHROCYTE [DISTWIDTH] IN BLOOD BY AUTOMATED COUNT: 17.2 % (ref 11.5–14.5)
FERRITIN SERPL-MCNC: 644.7 NG/ML (ref 20–300)
FIO2: 100 %
GFR SERPLBLD CREATININE-BSD FMLA CKD-EPI: >60 ML/MIN/1.73/M2
GLUCOSE SERPL-MCNC: 123 MG/DL (ref 70–110)
HCT VFR BLD AUTO: 36 % (ref 40–54)
HCT VFR BLD CALC: 36.7 % (ref 36–54)
HGB BLD-MCNC: 11.2 GM/DL (ref 14–18)
HGB BLD-MCNC: 12 G/DL (ref 9–18)
IMM GRANULOCYTES # BLD AUTO: 0.04 K/UL (ref 0–0.04)
IMM GRANULOCYTES NFR BLD AUTO: 0.6 % (ref 0–0.5)
INR PPP: 1.1 (ref 0.8–1.2)
LACTATE SERPL-SCNC: 2.4 MMOL/L (ref 0.5–2.2)
LACTATE SERPL-SCNC: 4.3 MMOL/L (ref 0.5–2.2)
LDH SERPL L TO P-CCNC: 3.8 MMOL/L (ref 0.5–2.2)
LYMPHOCYTES # BLD AUTO: 0.27 K/UL (ref 1–4.8)
MAGNESIUM SERPL-MCNC: 1.6 MG/DL (ref 1.6–2.6)
MCH RBC QN AUTO: 31.2 PG (ref 27–31)
MCHC RBC AUTO-ENTMCNC: 31.1 G/DL (ref 32–36)
MCV RBC AUTO: 100 FL (ref 82–98)
NUCLEATED RBC (/100WBC) (OHS): 0 /100 WBC
OHS QRS DURATION: 74 MS
OHS QRS DURATION: 98 MS
OHS QTC CALCULATION: 398 MS
OHS QTC CALCULATION: 469 MS
PCO2 BLDA: 41.7 MMHG (ref 35–45)
PH SMN: 7.35 [PH] (ref 7.35–7.45)
PLATELET # BLD AUTO: 152 K/UL (ref 150–450)
PMV BLD AUTO: 12.1 FL (ref 9.2–12.9)
PO2 BLDA: 41.6 MMHG (ref 40–60)
POC BASE DEFICIT: -2.4 MMOL/L (ref -2–2)
POC HCO3: 23.1 MMOL/L (ref 24–28)
POC IONIZED CALCIUM: 1.15 MMOL/L (ref 1.06–1.42)
POC MOLECULAR INFLUENZA A AGN: NEGATIVE
POC MOLECULAR INFLUENZA B AGN: NEGATIVE
POC PERFORMED BY: ABNORMAL
POC SATURATED O2: 73.3 % (ref 95–100)
POTASSIUM BLD-SCNC: 4 MMOL/L (ref 3.5–5.1)
POTASSIUM SERPL-SCNC: 4 MMOL/L (ref 3.5–5.1)
PROCALCITONIN SERPL-MCNC: 0.2 NG/ML
PROT SERPL-MCNC: 6.5 GM/DL (ref 6–8.4)
PROTHROMBIN TIME: 12 SECONDS (ref 9–12.5)
RBC # BLD AUTO: 3.59 M/UL (ref 4.6–6.2)
RELATIVE EOSINOPHIL (OHS): 1.5 %
RELATIVE LYMPHOCYTE (OHS): 4 % (ref 18–48)
RELATIVE MONOCYTE (OHS): 10 % (ref 4–15)
RELATIVE NEUTROPHIL (OHS): 83.5 % (ref 38–73)
SODIUM BLD-SCNC: 141 MMOL/L (ref 136–145)
SODIUM SERPL-SCNC: 140 MMOL/L (ref 136–145)
SPECIMEN SOURCE: ABNORMAL
TROPONIN I SERPL DL<=0.01 NG/ML-MCNC: 0.02 NG/ML
TSH SERPL-ACNC: 0.51 UIU/ML (ref 0.4–4)
WBC # BLD AUTO: 6.67 K/UL (ref 3.9–12.7)

## 2025-06-25 PROCEDURE — 83605 ASSAY OF LACTIC ACID: CPT

## 2025-06-25 PROCEDURE — 93005 ELECTROCARDIOGRAM TRACING: CPT

## 2025-06-25 PROCEDURE — 94644 CONT INHLJ TX 1ST HOUR: CPT

## 2025-06-25 PROCEDURE — 99900035 HC TECH TIME PER 15 MIN (STAT)

## 2025-06-25 PROCEDURE — 99285 EMERGENCY DEPT VISIT HI MDM: CPT | Mod: 25

## 2025-06-25 PROCEDURE — 82803 BLOOD GASES ANY COMBINATION: CPT

## 2025-06-25 PROCEDURE — 27000190 HC CPAP FULL FACE MASK W/VALVE

## 2025-06-25 PROCEDURE — 93010 ELECTROCARDIOGRAM REPORT: CPT | Mod: ,,, | Performed by: INTERNAL MEDICINE

## 2025-06-25 PROCEDURE — 80053 COMPREHEN METABOLIC PANEL: CPT

## 2025-06-25 PROCEDURE — 25000003 PHARM REV CODE 250

## 2025-06-25 PROCEDURE — 94640 AIRWAY INHALATION TREATMENT: CPT

## 2025-06-25 PROCEDURE — 93010 ELECTROCARDIOGRAM REPORT: CPT | Mod: 76,,, | Performed by: INTERNAL MEDICINE

## 2025-06-25 PROCEDURE — 84443 ASSAY THYROID STIM HORMONE: CPT

## 2025-06-25 PROCEDURE — 63600175 PHARM REV CODE 636 W HCPCS

## 2025-06-25 PROCEDURE — 85610 PROTHROMBIN TIME: CPT | Performed by: EMERGENCY MEDICINE

## 2025-06-25 PROCEDURE — 5A09357 ASSISTANCE WITH RESPIRATORY VENTILATION, LESS THAN 24 CONSECUTIVE HOURS, CONTINUOUS POSITIVE AIRWAY PRESSURE: ICD-10-PCS | Performed by: INTERNAL MEDICINE

## 2025-06-25 PROCEDURE — 94761 N-INVAS EAR/PLS OXIMETRY MLT: CPT

## 2025-06-25 PROCEDURE — 83605 ASSAY OF LACTIC ACID: CPT | Performed by: EMERGENCY MEDICINE

## 2025-06-25 PROCEDURE — 83880 ASSAY OF NATRIURETIC PEPTIDE: CPT

## 2025-06-25 PROCEDURE — 85379 FIBRIN DEGRADATION QUANT: CPT

## 2025-06-25 PROCEDURE — 85730 THROMBOPLASTIN TIME PARTIAL: CPT | Performed by: EMERGENCY MEDICINE

## 2025-06-25 PROCEDURE — 84145 PROCALCITONIN (PCT): CPT | Performed by: EMERGENCY MEDICINE

## 2025-06-25 PROCEDURE — 87150 DNA/RNA AMPLIFIED PROBE: CPT

## 2025-06-25 PROCEDURE — 27100171 HC OXYGEN HIGH FLOW UP TO 24 HOURS

## 2025-06-25 PROCEDURE — 94660 CPAP INITIATION&MGMT: CPT

## 2025-06-25 PROCEDURE — 27000646 HC AEROBIKA DEVICE

## 2025-06-25 PROCEDURE — 94667 MNPJ CHEST WALL 1ST: CPT

## 2025-06-25 PROCEDURE — 82728 ASSAY OF FERRITIN: CPT

## 2025-06-25 PROCEDURE — 25000242 PHARM REV CODE 250 ALT 637 W/ HCPCS

## 2025-06-25 PROCEDURE — 87040 BLOOD CULTURE FOR BACTERIA: CPT

## 2025-06-25 PROCEDURE — 84484 ASSAY OF TROPONIN QUANT: CPT

## 2025-06-25 PROCEDURE — 87070 CULTURE OTHR SPECIMN AEROBIC: CPT

## 2025-06-25 PROCEDURE — 94664 DEMO&/EVAL PT USE INHALER: CPT

## 2025-06-25 PROCEDURE — 85025 COMPLETE CBC W/AUTO DIFF WBC: CPT

## 2025-06-25 PROCEDURE — 21400001 HC TELEMETRY ROOM

## 2025-06-25 PROCEDURE — 96374 THER/PROPH/DIAG INJ IV PUSH: CPT

## 2025-06-25 PROCEDURE — 83735 ASSAY OF MAGNESIUM: CPT | Performed by: EMERGENCY MEDICINE

## 2025-06-25 PROCEDURE — 11000001 HC ACUTE MED/SURG PRIVATE ROOM

## 2025-06-25 RX ORDER — IBUPROFEN 200 MG
24 TABLET ORAL
Status: DISCONTINUED | OUTPATIENT
Start: 2025-06-25 | End: 2025-06-30 | Stop reason: HOSPADM

## 2025-06-25 RX ORDER — FENOFIBRATE 145 MG/1
145 TABLET, FILM COATED ORAL DAILY
Status: DISCONTINUED | OUTPATIENT
Start: 2025-06-26 | End: 2025-06-30 | Stop reason: HOSPADM

## 2025-06-25 RX ORDER — ALLOPURINOL 100 MG/1
300 TABLET ORAL DAILY
Status: DISCONTINUED | OUTPATIENT
Start: 2025-06-26 | End: 2025-06-30 | Stop reason: HOSPADM

## 2025-06-25 RX ORDER — AMLODIPINE BESYLATE 5 MG/1
5 TABLET ORAL DAILY
Status: DISCONTINUED | OUTPATIENT
Start: 2025-06-26 | End: 2025-06-30 | Stop reason: HOSPADM

## 2025-06-25 RX ORDER — FLUOXETINE 20 MG/1
20 CAPSULE ORAL DAILY
Status: DISCONTINUED | OUTPATIENT
Start: 2025-06-26 | End: 2025-06-30 | Stop reason: HOSPADM

## 2025-06-25 RX ORDER — CHOLECALCIFEROL (VITAMIN D3) 25 MCG
1000 TABLET ORAL DAILY
Status: DISCONTINUED | OUTPATIENT
Start: 2025-06-26 | End: 2025-06-30 | Stop reason: HOSPADM

## 2025-06-25 RX ORDER — ALBUTEROL SULFATE 2.5 MG/.5ML
15 SOLUTION RESPIRATORY (INHALATION)
Status: COMPLETED | OUTPATIENT
Start: 2025-06-25 | End: 2025-06-25

## 2025-06-25 RX ORDER — POTASSIUM CHLORIDE 20 MEQ/1
20 TABLET, EXTENDED RELEASE ORAL DAILY
COMMUNITY
Start: 2025-06-25

## 2025-06-25 RX ORDER — IBUPROFEN 200 MG
16 TABLET ORAL
Status: DISCONTINUED | OUTPATIENT
Start: 2025-06-25 | End: 2025-06-30 | Stop reason: HOSPADM

## 2025-06-25 RX ORDER — FERROUS SULFATE 325(65) MG
325 TABLET, DELAYED RELEASE (ENTERIC COATED) ORAL DAILY
COMMUNITY

## 2025-06-25 RX ORDER — NALOXONE HCL 0.4 MG/ML
0.02 VIAL (ML) INJECTION
Status: DISCONTINUED | OUTPATIENT
Start: 2025-06-25 | End: 2025-06-30 | Stop reason: HOSPADM

## 2025-06-25 RX ORDER — LANOLIN ALCOHOL/MO/W.PET/CERES
1 CREAM (GRAM) TOPICAL EVERY OTHER DAY
Status: DISCONTINUED | OUTPATIENT
Start: 2025-06-26 | End: 2025-06-30 | Stop reason: HOSPADM

## 2025-06-25 RX ORDER — BISACODYL 5 MG
10 TABLET, DELAYED RELEASE (ENTERIC COATED) ORAL DAILY PRN
Status: DISCONTINUED | OUTPATIENT
Start: 2025-06-25 | End: 2025-06-30 | Stop reason: HOSPADM

## 2025-06-25 RX ORDER — GLUCAGON 1 MG
1 KIT INJECTION
Status: DISCONTINUED | OUTPATIENT
Start: 2025-06-25 | End: 2025-06-30 | Stop reason: HOSPADM

## 2025-06-25 RX ORDER — HYDROXYZINE PAMOATE 25 MG/1
25 CAPSULE ORAL ONCE
Status: COMPLETED | OUTPATIENT
Start: 2025-06-25 | End: 2025-06-25

## 2025-06-25 RX ORDER — SODIUM CHLORIDE 0.9 % (FLUSH) 0.9 %
10 SYRINGE (ML) INJECTION EVERY 12 HOURS PRN
Status: DISCONTINUED | OUTPATIENT
Start: 2025-06-25 | End: 2025-06-30 | Stop reason: HOSPADM

## 2025-06-25 RX ORDER — IPRATROPIUM BROMIDE AND ALBUTEROL SULFATE 2.5; .5 MG/3ML; MG/3ML
3 SOLUTION RESPIRATORY (INHALATION) EVERY 6 HOURS PRN
COMMUNITY
Start: 2025-06-24

## 2025-06-25 RX ORDER — FLUTICASONE PROPIONATE 50 MCG
2 SPRAY, SUSPENSION (ML) NASAL DAILY
Status: DISCONTINUED | OUTPATIENT
Start: 2025-06-26 | End: 2025-06-30 | Stop reason: HOSPADM

## 2025-06-25 RX ORDER — CLOPIDOGREL BISULFATE 75 MG/1
75 TABLET ORAL DAILY
Status: DISCONTINUED | OUTPATIENT
Start: 2025-06-26 | End: 2025-06-30 | Stop reason: HOSPADM

## 2025-06-25 RX ORDER — IPRATROPIUM BROMIDE AND ALBUTEROL SULFATE 2.5; .5 MG/3ML; MG/3ML
3 SOLUTION RESPIRATORY (INHALATION) EVERY 6 HOURS PRN
Status: ACTIVE | OUTPATIENT
Start: 2025-06-25 | End: 2025-06-27

## 2025-06-25 RX ORDER — CALCIUM CARBONATE 200(500)MG
500 TABLET,CHEWABLE ORAL DAILY
Status: DISCONTINUED | OUTPATIENT
Start: 2025-06-26 | End: 2025-06-30 | Stop reason: HOSPADM

## 2025-06-25 RX ORDER — GUAIFENESIN 600 MG/1
1200 TABLET, EXTENDED RELEASE ORAL 2 TIMES DAILY
COMMUNITY

## 2025-06-25 RX ORDER — LEVOFLOXACIN 750 MG/1
750 TABLET, FILM COATED ORAL DAILY
Status: ON HOLD | COMMUNITY
Start: 2025-06-24 | End: 2025-06-27 | Stop reason: HOSPADM

## 2025-06-25 RX ORDER — LEVOFLOXACIN 500 MG/1
500 TABLET, FILM COATED ORAL NIGHTLY
Status: ON HOLD | COMMUNITY
End: 2025-06-27 | Stop reason: HOSPADM

## 2025-06-25 RX ORDER — MAGNESIUM SULFATE HEPTAHYDRATE 40 MG/ML
2 INJECTION, SOLUTION INTRAVENOUS ONCE
Status: COMPLETED | OUTPATIENT
Start: 2025-06-26 | End: 2025-06-26

## 2025-06-25 RX ORDER — LEVOTHYROXINE SODIUM 25 UG/1
25 TABLET ORAL
Status: DISCONTINUED | OUTPATIENT
Start: 2025-06-26 | End: 2025-06-30 | Stop reason: HOSPADM

## 2025-06-25 RX ORDER — VALACYCLOVIR HYDROCHLORIDE 500 MG/1
500 TABLET, FILM COATED ORAL 2 TIMES DAILY
Status: DISCONTINUED | OUTPATIENT
Start: 2025-06-25 | End: 2025-06-30 | Stop reason: HOSPADM

## 2025-06-25 RX ORDER — LEVOTHYROXINE SODIUM 25 UG/1
25 TABLET ORAL
COMMUNITY
Start: 2025-06-17

## 2025-06-25 RX ORDER — ATORVASTATIN CALCIUM 40 MG/1
40 TABLET, FILM COATED ORAL DAILY
Status: DISCONTINUED | OUTPATIENT
Start: 2025-06-26 | End: 2025-06-30 | Stop reason: HOSPADM

## 2025-06-25 RX ORDER — IPRATROPIUM BROMIDE AND ALBUTEROL SULFATE 2.5; .5 MG/3ML; MG/3ML
3 SOLUTION RESPIRATORY (INHALATION) EVERY 6 HOURS
Status: DISCONTINUED | OUTPATIENT
Start: 2025-06-25 | End: 2025-06-25

## 2025-06-25 RX ORDER — ASCORBIC ACID 500 MG
500 TABLET ORAL 2 TIMES DAILY
Status: DISCONTINUED | OUTPATIENT
Start: 2025-06-25 | End: 2025-06-30 | Stop reason: HOSPADM

## 2025-06-25 RX ORDER — PANTOPRAZOLE SODIUM 40 MG/1
40 TABLET, DELAYED RELEASE ORAL DAILY
Status: DISCONTINUED | OUTPATIENT
Start: 2025-06-26 | End: 2025-06-30 | Stop reason: HOSPADM

## 2025-06-25 RX ORDER — GUAIFENESIN 600 MG/1
1200 TABLET, EXTENDED RELEASE ORAL 2 TIMES DAILY
Status: DISCONTINUED | OUTPATIENT
Start: 2025-06-25 | End: 2025-06-30 | Stop reason: HOSPADM

## 2025-06-25 RX ORDER — CEFTRIAXONE 1 G/1
1 INJECTION, POWDER, FOR SOLUTION INTRAMUSCULAR; INTRAVENOUS
Status: COMPLETED | OUTPATIENT
Start: 2025-06-25 | End: 2025-06-25

## 2025-06-25 RX ADMIN — ALBUTEROL SULFATE 15 MG: 2.5 SOLUTION RESPIRATORY (INHALATION) at 01:06

## 2025-06-25 RX ADMIN — CEFTRIAXONE SODIUM 1 G: 1 INJECTION, POWDER, FOR SOLUTION INTRAMUSCULAR; INTRAVENOUS at 03:06

## 2025-06-25 RX ADMIN — APIXABAN 5 MG: 5 TABLET, FILM COATED ORAL at 10:06

## 2025-06-25 RX ADMIN — IPRATROPIUM BROMIDE AND ALBUTEROL SULFATE 3 ML: .5; 3 SOLUTION RESPIRATORY (INHALATION) at 11:06

## 2025-06-25 RX ADMIN — HYDROXYZINE PAMOATE 25 MG: 25 CAPSULE ORAL at 05:06

## 2025-06-25 RX ADMIN — GUAIFENESIN 1200 MG: 600 TABLET, EXTENDED RELEASE ORAL at 10:06

## 2025-06-25 RX ADMIN — OXYCODONE HYDROCHLORIDE AND ACETAMINOPHEN 500 MG: 500 TABLET ORAL at 10:06

## 2025-06-25 RX ADMIN — AZITHROMYCIN MONOHYDRATE 500 MG: 500 INJECTION, POWDER, LYOPHILIZED, FOR SOLUTION INTRAVENOUS at 03:06

## 2025-06-25 RX ADMIN — VALACYCLOVIR HYDROCHLORIDE 500 MG: 500 TABLET, FILM COATED ORAL at 10:06

## 2025-06-25 NOTE — ED NOTES
Pt bib EMS from [facility] with respiratory distress. Per pt, SOB started this afternoon and requested to be sent to this facility. Per NH nurse, pt was dx with pneumonia and started on abx yesterday. Pt AAOx4,

## 2025-06-25 NOTE — ED PROVIDER NOTES
Encounter Date: 6/25/2025       History     Chief Complaint   Patient presents with    Respiratory Distress     70-year-old male with multiple myeloma complicated by spinal lytic lesions, DVTs, distant CVA with residual left-sided weakness hypertension hyperlipidemia, anxiety presenting via EMS for respiratory distress.  Patient reports he was in his usual state of health yesterday.  This morning, patient reports shortness of breath that began after missing a meal and has worsened since onset.  Patient arrived on 12 L non-rebreather.  He denies chest pain, fevers/chills, new leg swelling, cough, congestion, any other symptoms.    The history is provided by the patient. No  was used.     Review of patient's allergies indicates:   Allergen Reactions    Aspirin Hives    Penicillins Hives     History reviewed. No pertinent past medical history.  Past Surgical History:   Procedure Laterality Date    ECHOCARDIOGRAM,TRANSESOPHAGEAL N/A 3/8/2024    Procedure: Transesophageal echo (SHABNAM) intra-procedure log documentation;  Surgeon: Rachid Rothman MD;  Location: [facility] CATH LAB/EP;  Service: Cardiology;  Laterality: N/A;     No family history on file.  Social History[1]      Physical Exam     Initial Vitals [06/25/25 1324]   BP Pulse Resp Temp SpO2   138/75 (!) 123 (!) 30 97.9 °F (36.6 °C) 100 %      MAP       --         Physical Exam    Nursing note and vitals reviewed.  Constitutional:   Chronically ill-appearing  Speaking in short sentences   Eyes: Conjunctivae and EOM are normal.   Cardiovascular:  Regular rhythm.   Tachycardia present.         Pulmonary/Chest: Accessory muscle usage present. Tachypnea noted. He is in respiratory distress. He has wheezes in the right upper field, the right middle field, the left upper field and the left middle field.   Coarse breath sounds throughout   Abdominal: Abdomen is soft. There is no abdominal tenderness.   Musculoskeletal:      Comments: 2+ pitting edema  bilateral lower extremities   Swelling of left lower extremity greater than right     Neurological: He is alert.         ED Course   Procedures  Labs Reviewed   COMPREHENSIVE METABOLIC PANEL - Abnormal       Result Value    Sodium 140      Potassium 4.0      Chloride 108      CO2 25      Glucose 123 (*)     BUN 23      Creatinine 1.1      Calcium 8.9      Protein Total 6.5      Albumin 3.6      Bilirubin Total 0.7      ALP 55      AST 15      ALT 9 (*)     Anion Gap 7 (*)     eGFR >60     CBC WITH DIFFERENTIAL - Abnormal    WBC 6.67      RBC 3.59 (*)     HGB 11.2 (*)     HCT 36.0 (*)      (*)     MCH 31.2 (*)     MCHC 31.1 (*)     RDW 17.2 (*)     Platelet Count 152      MPV 12.1      Nucleated RBC 0      Neut % 83.5 (*)     Lymph % 4.0 (*)     Mono % 10.0      Eos % 1.5      Basophil % 0.4      Imm Grans % 0.6 (*)     Neut # 5.56      Lymph # 0.27 (*)     Mono # 0.67      Eos # 0.10      Baso # 0.03      Imm Grans # 0.04     D DIMER, QUANTITATIVE - Abnormal    D-Dimer 0.52 (*)    TROPONIN I - Normal    Troponin-I 0.021     B-TYPE NATRIURETIC PEPTIDE - Normal    BNP 75     MAGNESIUM - Normal    Magnesium  1.6     APTT - Normal    PTT 27.1     PROTIME-INR - Normal    PT 12.0      INR 1.1     PROCALCITONIN - Normal    Procalcitonin 0.20     CULTURE, BLOOD   CULTURE, BLOOD   CBC W/ AUTO DIFFERENTIAL    Narrative:     The following orders were created for panel order CBC Auto Differential.  Procedure                               Abnormality         Status                     ---------                               -----------         ------                     CBC with Differential[[id number]]       Abnormal            Final result                 Please view results for these tests on the individual orders.   LACTIC ACID, PLASMA   POCT INFLUENZA A/B MOLECULAR     EKG Readings: (Independently Interpreted)   Initial Reading: No STEMI. Previous EKG: Compared with most recent EKG Previous EKG Date: 03/05/2025.  Rhythm: Sinus Tachycardia. Heart Rate: 130. Other Impression: Baseline artifact limiting interpretation.  AL/QRS/QTC within normal limits     ECG Results              EKG 12-lead (In process)        Collection Time Result Time QRS Duration [facility] QTC Calculation    06/25/25 14:17:41 06/25/25 15:33:21 98 469                     In process by Interface, Lab In [facility] (06/25/25 15:33:29)                   Narrative:    Test Reason : Z13.6,    Vent. Rate : 113 BPM     Atrial Rate : 113 BPM     P-R Int : 170 ms          QRS Dur :  98 ms      QT Int : 342 ms       P-R-T Axes :  49  65  48 degrees    QTcB Int : 469 ms    Sinus tachycardia with frequent and consecutive Premature ventricular  complexes  Septal infarct (cited on or before 25-Jun-2025)  Possible Inferior infarct ,age undetermined  Abnormal ECG  When compared with ECG of 25-Jun-2025 13:27,  Premature ventricular complexes are now Present  QRS duration has increased  Borderline criteria for Inferior infarct are now Present  Serial changes of evolving Septal infarct Present    Referred By: AAAREFERRAL SELF           Confirmed By:                                      EKG 12-lead (In process)        Collection Time Result Time QRS Duration [facility] QTC Calculation    06/25/25 13:27:52 06/25/25 15:32:53 74 398                     In process by Interface, Lab In [facility] (06/25/25 15:32:59)                   Narrative:    Test Reason : R06.02,    Vent. Rate : 120 BPM     Atrial Rate : 120 BPM     P-R Int : 200 ms          QRS Dur :  74 ms      QT Int : 282 ms       P-R-T Axes :  58  62  59 degrees    QTcB Int : 398 ms    Sinus tachycardia  Septal infarct ,age undetermined  Inferior injury pattern    ACUTE MI / STEMI    Abnormal ECG  When compared with ECG of 05-Mar-2025 21:20,  Fusion complexes are no longer Present    Referred By: AAAREFERRAL SELF           Confirmed By:                                   Imaging Results              X-Ray Chest 1 View (Final result)  Result  time 06/25/25 14:28:14      Final result by Benjamin Gustafson MD (06/25/25 14:28:14)                   Impression:      As above.      Electronically signed by: Benjamin Gustafson  Date:    06/25/2025  Time:    14:28               Narrative:    EXAMINATION:  XR CHEST 1 VIEW    CLINICAL HISTORY:  shortness of breath;    TECHNIQUE:  Single frontal view of the chest was performed.    COMPARISON:  Chest radiograph 03/05/2025; CT chest 03/06/2025    FINDINGS:  Lines and tubes: None.    Heart and mediastinum: Magnified by AP technique.  Apical lordotic positioning.    Pleura: No pleural effusion or pneumothorax.    Lungs: Lung volumes are moderate.  No focal consolidations or evidence of pulmonary edema.    Soft tissue/bone: Osteopenia and degenerative changes, most severe in the left glenohumeral joint.  Known thoracic compression fractures are poorly visualized.                                       Medications   azithromycin (ZITHROMAX) 500 mg in 0.9% NaCl 250 mL IVPB (admixture device) (500 mg Intravenous New Bag 6/25/25 1521)   albuterol sulfate nebulizer solution 15 mg (15 mg Nebulization Given 6/25/25 1331)   cefTRIAXone injection 1 g (1 g Intravenous Given 6/25/25 1509)     Medical Decision Making  70-year-old male with multiple myeloma complicated by spinal lytic lesions, DVTs, distant CVA with residual left-sided weakness hypertension hyperlipidemia, anxiety presenting via EMS for respiratory distress.    Differential diagnosis includes, but is not limited to, COPD, asthma, CHF, ACS, pneumonia, pneumothorax, pulmonary embolism.    In emergency department, patient initially tachycardic, tachypneic and with supraclavicular retractions.  Started patient on BiPAP for work of breathing.  Patient with significant wheezing on exam, so also given continuous breathing treatment.  On re-evaluation, patient with improved air movement.  Coarse breath sounds still appreciated.  Patient diagnosed with pneumonia and started on  Levaquin yesterday.  Started patient on ceftriaxone and azithromycin in the emergency department.  No elevation in BNP or troponin that be concerning for ACS or CHF.  No dysrhythmia or ischemia on EKG.  Age adjusted D-dimer within normal limits and patient feeling better after breathing treatments, so advanced imaging deferred at this time.  Discussed patient with Hospital Medicine, who agreed to admit patient to their service.      Amount and/or Complexity of Data Reviewed  Independent Historian: caregiver     Details: Family reporting patient with worsening cough over the past week  Labs: ordered. Decision-making details documented in ED Course.  Radiology: ordered.  ECG/medicine tests: ordered and independent interpretation performed. Decision-making details documented in ED Course.    Risk  Prescription drug management.  Decision regarding hospitalization.  Risk Details: Patient received DuoNebs, ceftriaxone, azithromycin in the emergency department.               ED Course as of 06/25/25 1607   Wed Jun 25, 2025   1336 POCT Venous Blood Gas(!!)  Elevated lactate likely 2/2 breathing treatments  No acidosis or alkalosis [BH]   1439 CBC Auto Differential(!)  Anemia, most recent 9.6   No thrombocytopenia or leukocytosis [BH]   1439 Comprehensive Metabolic Panel(!)  No electrolyte abnormalities that would explain patient's symptoms [BH]   1439 Brain Natriuretic Peptide  Within normal limits, lowering concern for CHF [BH]   1439 Troponin I  Within normal limits, lowering concern for ACS [BH]   1514 D dimer, quantitative(!)  Age-adjusted negative [BH]      ED Course User Index  [BH] Kenrick Mancia MD                           Clinical Impression:  Final diagnoses:  [R06.02] Shortness of breath  [Z13.6] Screening for cardiovascular condition  [J96.01] Acute hypoxic respiratory failure (Primary)  [J18.9] Pneumonia due to infectious organism, unspecified laterality, unspecified part of lung          ED Disposition  Condition    Admit                     Kenrick Mancia MD  Resident  06/25/25 1609         [1]   Social History  Tobacco Use    Smoking status: Never        Kenrick Mancia MD  Resident  06/25/25 2373

## 2025-06-25 NOTE — H&P
[facility] Medicine H&P Note     Admitting Team: [facility] Hospitalist Team A  Attending Physician: Meghan Simmons MD  Resident: Dr. Russell   Intern: Dr. Arellano     Date of Admit: 6/25/2025    Chief Complaint     Shortness of breath x 3 days     Subjective:      History of Present Illness:  Harrison Haywood is a 70 y.o. male with past medical history of multiple myeloma with spinal lytic lesions, hx of DVT, CVA with residual left sided weakness, HTN, HLD, and anxiety who presented on 6/25/2025 for Respiratory Distress      The patient was in their usual state of health which includes residing at [facility] and using a wheelchair which he can use his legs to move around with and using a walker to go to the bathroom until approximately two days prior to presentation when he began having some shortness of breath. He had a CXR performed at the facility and was started on albuterol treatments and levaquin. On the morning of presentation, his breathing acutely worsened and he felt short of breath leading to his presentation. He reports having a cough with some sputum production. He uses diapers at the nursing home. He feels the swelling in his legs are improved from prior when he was initially diagnosed with bilateral DVTs. Denied fever, chills, chest pain. Patient repeatedly asking to remove BiPAP on exam and also asking for food stating he is hungry.       Past Medical History:  multiple myeloma with spinal lytic lesion  hx of DVT  CVA with residual left sided weakness  HTN  HLD  Anxiety/depression    Past Surgical History:  Past Surgical History:   Procedure Laterality Date    ECHOCARDIOGRAM,TRANSESOPHAGEAL N/A 3/8/2024    Procedure: Transesophageal echo (SHABNAM) intra-procedure log documentation;  Surgeon: Rachid Rothman MD;  Location: [facility] CATH LAB/EP;  Service: Cardiology;  Laterality: N/A;       Allergies:  Review of patient's allergies indicates:   Allergen Reactions    Aspirin Hives    Penicillins Hives        Home Medications:  Medications reconciled with NH.    Current Outpatient Medications   Medication Instructions    acetaminophen (TYLENOL) 650 mg, Every 8 hours PRN    albuterol-ipratropium (DUO-NEB) 2.5 mg-0.5 mg/3 mL nebulizer solution 3 mLs, Every 6 hours PRN    allopurinoL (ZYLOPRIM) 300 mg, Daily    amLODIPine (NORVASC) 5 mg, Daily    arginine-vitamin C-vitamin E (ARGINAID) 4.5 gram-156 mg/9.2 gram PwPk 1 packet, 2 times daily    ascorbic acid (vitamin C) (VITAMIN C) 500 mg, 2 times daily    atorvastatin (LIPITOR) 40 mg, Daily    benzonatate (TESSALON) 100 mg, Every 8 hours PRN    bisacodyL (DULCOLAX) 10 mg, Daily PRN    budesonide (PULMICORT FLEXHALER) 180 mcg, Inhalation, Every 12 hours PRN, Controller    calcium carbonate (OS-SONIA) 1,200 mg, Nightly    celecoxib (CELEBREX) 200 mg, 2 times daily    clopidogreL (PLAVIX) 75 mg, Daily    dexAMETHasone (DECADRON) 20 mg, Every Monday    [Paused] ELIQUIS 5 mg, 2 times daily    fenofibrate (TRICOR) 145 mg, Daily    ferrous sulfate 325 mg, Daily    FLUoxetine 20 mg, Daily    [Paused] furosemide (LASIX) 20 mg, Daily    guaiFENesin (MUCINEX) 1,200 mg, 2 times daily    guaiFENesin 100 mg/5 ml (MCKENNA-TUSSIN) 200 mg, Every 4 hours PRN    HYDROcodone-acetaminophen (NORCO) 5-325 mg per tablet 1 tablet, Every 6 hours PRN    lenalidomide 25 mg, Nightly    levoFLOXacin (LEVAQUIN) 750 mg, Daily    levoFLOXacin (LEVAQUIN) 500 mg, Nightly    levothyroxine (SYNTHROID) 25 mcg, Before breakfast    multivitamin (ONE DAILY MULTIVITAMIN) per tablet 1 tablet, Daily    ondansetron (ZOFRAN) 4 mg, Every 6 hours PRN    pantoprazole (PROTONIX) 40 mg, Daily    potassium chloride SA (K-DUR,KLOR-CON) 20 MEQ tablet 20 mEq, Daily    protein supplement (PROMOD PROTEIN) Liqd 30 mLs, 2 times daily    REFRESH TEARS 0.5 % Drop 2 drops, 3 times daily    sodium chloride (SALINE NASAL) 0.65 % nasal spray 1 spray, 2 times daily PRN    valACYclovir (VALTREX) 500 mg, 2 times daily    vitamin D (VITAMIN  "D3) 1,000 Units, Daily       Family History:  No family history on file.    Social History:  Alcohol use: denies   Tobacco use: denies   Recreational drug use: denies   Current living situation: resides at [facility]     Review of Systems:  A comprehensive review of systems was negative unless otherwise stated in the HPI.     Health Maintaince :   Primary Care Physician:  Guanakito Mendez MD     Immunizations:   Currently on File:   There is no immunization history on file for this patient.       Objective     ED Vitals: T 97.9, , /75, RR 30, O2 100% on 15 NRB, BMI 29.38    ED Intervention: albuterol, BiPAP     Physical Examination:  /75 (BP Location: Left arm)   Pulse (!) 113   Temp 97.9 °F (36.6 °C) (Oral)   Resp (!) 32   Ht 6' 1" (1.854 m)   Wt 101 kg (222 lb 10.6 oz)   SpO2 98%   BMI 29.38 kg/m²    General: No acute distress, anxious appearing   HEENT: Atraumatic, normocephalic, moist mucous membranes  Cardiovascular: tachycardic, regular rhythm, normal S1 and S2, no extra heart sounds or murmurs appreciated   Chest wall: Non-tender to palpation, no gross deformities noted   Back: No abnormal curvature noted, symmetric rise with respiration   Respiratory: Stable on BiPAP, tachypneic, conversational dyspnea, no accessory muscle use noted, clear to auscultation bilaterally, no wheezes or crackles   Abdominal: Non-distended, soft, normoactive bowel sounds, non-tender to palpation, no guarding  Extremities: Atraumatic, 2+ pitting edema, moving all four extremities  Pulses: 2+ and symmetric radial artery, dorsalis pedis artery  Skin: Non-diaphoretic, warm, dry  Neurologic: No gross focal neurologic deficits noted ; residual right sided weakness in UE     Laboratory:  Recent Labs   Lab 06/25/25  1329 06/25/25  1359   WBC 6.67  --    HGB 11.2*  --      --    *  --      --    K 4.0  --      --    CO2 25  --    BUN 23  --    CREATININE 1.1  --    *  --  "   CALCIUM 8.9  --    PROT 6.5  --    ALBUMIN 3.6  --    MG  --  1.6   AST 15  --    ALT 9*  --    ALKPHOS 55  --    TROPONINI 0.021  --    BNP 75  --        All laboratory data reviewed    Microbiology Data:Reviewed    EKG Data: Reviewed    Radiology Data:Reviewed  X-Ray Chest 1 View   Final Result      As above.         Electronically signed by: Benjamin Gustafson   Date:    2025   Time:    14:28      X-Ray Chest PA And Lateral    (Results Pending)   \     Assessment/Plan     Harrison Haywood is a 70 y.o. male with past medical history of multiple myeloma with spinal lytic lesions, hx of DVT, CVA with residual left sided weakness, HTN, HLD, and anxiety who presented on 2025 for Respiratory Distress.    Shortness of breath   Acute Respiratory Distress  SIRS +   Lactic acidosis   - pt presenting with SOB x 3 days with acute worsening the day of presentation  - started on levaquin at NH   - SIRS criteria met in ED with tachycardia to 123, tachypneic to 30   - WBC 6.67 (increased from baseline ~3)  - procal 0.20   - BC ordered in ED   - flu negative, resp panel pending, resp culture pending   - d-dimer 0.52, pt has been on eliquis for DVT    - BNP 75, trop 0.021  - lactic acid 4.3   - CXR without focal consolidation  - VB.352, pCO2 41.7, pO2 41.6    - received ctx and azithro in ED   - initially requiring BiPAP in ED due to work of breathing, able to wean to NC   PLAN:   - trend lactic acid   - supportive care: duonebs, chest physiotherapy, mucinex, flonase   - resp panel pending, resp culture pending     Multiple Myeloma   - Dx in , imitated on tx in 2025 and on current regimen since 3/2024: q28 day cycles with Revlimid 25 po daily days 1-21, Daratumumab 1800-30K units sub Q day 1, Decadron 20 po daily days 1, 8 , 15, 22, tylenol 650 po and benadryl 25 po to be given 1 hr prior to injections   - ECOG 3 with known lytic lesions   - last cycle on  with q28 cycles followed by Dr. Howard   - continue ppx:  allopurinol 300 mg daily and valtrex 500 mg daily   - continue calcium carbonate, vitamin D      Hypertension   Hyperlipidemia   - continue home amlodipine 5 mg   - continue lipitor and fenofibrate     Bilateral LE DVT  - DVT study + for bilateral LE DVT on 4/15/2025  - continue eliquis 5 mg BID     Hx of CVA with residual left sided deficits  - CVA in 2024   - continue plavix     Hypothyroidism   - continue home synthroid 25 mcg   - TSH ordered    Macrocytic anemia  - hgb 11.2,  (baseline hgb ~ 10)  - home med: ferrous sulfate   - b12, folate, iron studies ordered    Anxiety   Depression   - continue zoloft     GERD  - continue protonix        Healthcare maintenance   -primary care provider is Guanakito Mendez MD     Diet: NPO pending improvement, ensure stability off of BiPAP  VTE PPx: eliquis   Code Status: Full     Dispo: admit for shortness of breath, respiratory distress   Estimated LOS: 2-3 days       Sara Arellano MD   [employer] Internal Medicine PGY-1

## 2025-06-25 NOTE — ED NOTES
Per lab, lactate 4.3. Repeat EKG mused. Admitting team notified. Pt unable to produce sputum sample at this time.

## 2025-06-25 NOTE — Clinical Note
Diagnosis: Acute hypoxic respiratory failure [[id number]]   Future Attending Provider: BENITO MELENDEZ [[id number]]   Reason for IP Medical Treatment  (Clinical interventions that can only be accomplished in the IP setting? ) :: PNA, acute hypoxic respiratory failure on BiPAP   Plans for Post-Acute care--if anticipated (pick the single best option):: I. Nursing Home (shelter)

## 2025-06-25 NOTE — PHARMACY MED REC
"  [facility]           Pharmacy  Admission Medication History     The home medication history was taken by Radha Ojeda.      Medication history obtained from Medications listed below were obtained from: Nursing home    Based on information gathered for medication list, you may go to "Admission" then "Reconcile Home Medications" tabs to review and/or act upon those items.     The home medication list has been updated by the Pharmacy department.   Please read ALL comments highlighted in yellow.   Please address this information as you see fit.    Feel free to contact us if you have any questions or require assistance.        No current facility-administered medications on file prior to encounter.     Current Outpatient Medications on File Prior to Encounter   Medication Sig Dispense Refill    acetaminophen (TYLENOL) 325 MG tablet Take 650 mg by mouth every 8 (eight) hours as needed for Pain. 2 tablets      albuterol-ipratropium (DUO-NEB) 2.5 mg-0.5 mg/3 mL nebulizer solution Take 3 mLs by nebulization every 6 (six) hours as needed for Shortness of Breath (cough).      allopurinoL (ZYLOPRIM) 300 MG tablet Take 300 mg by mouth once daily.      amLODIPine (NORVASC) 5 MG tablet Take 5 mg by mouth once daily.      arginine-vitamin C-vitamin E (ARGINAID) 4.5 gram-156 mg/9.2 gram PwPk Take 1 packet by mouth 2 (two) times a day. 8 ounces water      ascorbic acid, vitamin C, (VITAMIN C) 500 MG tablet Take 500 mg by mouth 2 (two) times daily.      atorvastatin (LIPITOR) 40 MG tablet Take 40 mg by mouth once daily.      benzonatate (TESSALON) 100 MG capsule Take 100 mg by mouth every 8 (eight) hours as needed for Cough.      bisacodyL (DULCOLAX) 5 mg EC tablet Take 10 mg by mouth daily as needed for Constipation.      budesonide (PULMICORT FLEXHALER) 90 mcg/actuation AePB Inhale 2 puffs (180 mcg total) into the lungs every 12 (twelve) hours as needed. Controller      calcium carbonate (OS-SONIA) 600 mg " calcium (1,500 mg) Tab Take 1,200 mg by mouth every evening. 2 tablets      celecoxib (CELEBREX) 200 MG capsule Take 200 mg by mouth 2 (two) times daily.      clopidogreL (PLAVIX) 75 mg tablet Take 75 mg by mouth once daily.      dexAMETHasone (DECADRON) 4 MG Tab Take 20 mg by mouth every Monday. Five 4mg tablets with breakfast weekly      fenofibrate (TRICOR) 145 MG tablet Take 145 mg by mouth once daily.      ferrous sulfate 325 (65 FE) MG EC tablet Take 325 mg by mouth once daily.      FLUoxetine 20 MG capsule Take 20 mg by mouth once daily.      guaiFENesin (MUCINEX) 600 mg 12 hr tablet Take 1,200 mg by mouth 2 (two) times daily.      guaiFENesin 100 mg/5 ml (MCKENNA-TUSSIN) 100 mg/5 mL syrup Take 200 mg by mouth every 4 (four) hours as needed for Cough.      HYDROcodone-acetaminophen (NORCO) 5-325 mg per tablet Take 1 tablet by mouth every 6 (six) hours as needed for Pain.      lenalidomide 25 mg Cap Take 25 mg by mouth nightly On days 1-21; every 28 day cycle.      levoFLOXacin (LEVAQUIN) 500 MG tablet Take 500 mg by mouth nightly.      levoFLOXacin (LEVAQUIN) 750 MG tablet Take 750 mg by mouth once daily.      levothyroxine (SYNTHROID) 25 MCG tablet Take 25 mcg by mouth before breakfast.      multivitamin (ONE DAILY MULTIVITAMIN) per tablet Take 1 tablet by mouth once daily.      ondansetron (ZOFRAN) 4 MG tablet Take 4 mg by mouth every 6 (six) hours as needed (vomiting).      pantoprazole (PROTONIX) 40 MG tablet Take 40 mg by mouth once daily.      potassium chloride SA (K-DUR,KLOR-CON) 20 MEQ tablet Take 20 mEq by mouth once daily.      REFRESH TEARS 0.5 % Drop Place 2 drops into both eyes 3 (three) times daily.      sodium chloride (SALINE NASAL) 0.65 % nasal spray 1 spray by Nasal route 2 (two) times daily as needed.      valACYclovir (VALTREX) 500 MG tablet Take 500 mg by mouth 2 (two) times daily.      vitamin D (VITAMIN D3) 1000 units Tab Take 1,000 Units by mouth once daily.      [Paused] ELIQUIS 5 mg  Tab Take 5 mg by mouth 2 (two) times daily.      [Paused] furosemide (LASIX) 20 MG tablet Take 20 mg by mouth once daily.      protein supplement (PROMOD PROTEIN) Liqd Take 30 mLs by mouth 2 (two) times a day.         Please address this information as you see fit.  Feel free to contact us if you have any questions or require assistance.    Radha Ojeda  [phone]                .

## 2025-06-25 NOTE — ED NOTES
Admitting team at bedside. Took pt off bipap and on to 4L NC. Pt tolerating well at this time. Will continue to monitor. Per admitting team, disregard ABG. Call light at bedside

## 2025-06-26 PROBLEM — J96.01 ACUTE HYPOXEMIC RESPIRATORY FAILURE: Status: RESOLVED | Noted: 2024-10-15 | Resolved: 2025-06-26

## 2025-06-26 PROBLEM — B34.8 INFECTION DUE TO PARAINFLUENZA VIRUS 3: Status: ACTIVE | Noted: 2025-06-26

## 2025-06-26 PROBLEM — J20.9 ACUTE BRONCHITIS: Status: ACTIVE | Noted: 2025-06-26

## 2025-06-26 PROBLEM — I63.9 CEREBROVASCULAR ACCIDENT (CVA): Status: ACTIVE | Noted: 2025-06-26

## 2025-06-26 PROBLEM — D53.9 MACROCYTIC ANEMIA: Status: ACTIVE | Noted: 2025-06-26

## 2025-06-26 PROBLEM — B34.0 ADENOVIRUS INFECTION: Status: ACTIVE | Noted: 2025-06-26

## 2025-06-26 PROBLEM — I82.4Y3 ACUTE DEEP VEIN THROMBOSIS (DVT) OF PROXIMAL VEIN OF BOTH LOWER EXTREMITIES: Status: ACTIVE | Noted: 2025-06-26

## 2025-06-26 LAB
ABSOLUTE EOSINOPHIL (OHS): 0.08 K/UL
ABSOLUTE MONOCYTE (OHS): 0.55 K/UL (ref 0.3–1)
ABSOLUTE NEUTROPHIL COUNT (OHS): 3.36 K/UL (ref 1.8–7.7)
ALBUMIN SERPL BCP-MCNC: 3.1 G/DL (ref 3.5–5.2)
ALP SERPL-CCNC: 51 UNIT/L (ref 40–150)
ALT SERPL W/O P-5'-P-CCNC: 8 UNIT/L (ref 10–44)
AMPHET UR QL SCN: NEGATIVE
ANION GAP (OHS): 8 MMOL/L (ref 8–16)
AST SERPL-CCNC: 19 UNIT/L (ref 11–45)
B PERT DNA NPH QL NAA+PROBE: NOT DETECTED
BARBITURATE SCN PRESENT UR: NEGATIVE
BASOPHILS # BLD AUTO: 0.04 K/UL
BASOPHILS NFR BLD AUTO: 0.9 %
BENZODIAZ UR QL SCN: NEGATIVE
BILIRUB SERPL-MCNC: 0.7 MG/DL (ref 0.1–1)
BILIRUB UR QL STRIP.AUTO: NEGATIVE
BUN SERPL-MCNC: 17 MG/DL (ref 8–23)
C PNEUM DNA LOWER RESP QL NAA+NON-PROBE: NOT DETECTED
CALCIUM SERPL-MCNC: 8.4 MG/DL (ref 8.7–10.5)
CANNABINOIDS UR QL SCN: NEGATIVE
CHLORIDE SERPL-SCNC: 108 MMOL/L (ref 95–110)
CLARITY UR: CLEAR
CO2 SERPL-SCNC: 22 MMOL/L (ref 23–29)
COCAINE UR QL SCN: NEGATIVE
COLOR UR AUTO: YELLOW
CREAT SERPL-MCNC: 0.8 MG/DL (ref 0.5–1.4)
CREAT UR-MCNC: 123.2 MG/DL (ref 23–375)
ERYTHROCYTE [DISTWIDTH] IN BLOOD BY AUTOMATED COUNT: 17.2 % (ref 11.5–14.5)
FLUAV RNA NPH QL NAA+NON-PROBE: NOT DETECTED
FLUBV RNA NPH QL NAA+NON-PROBE: NOT DETECTED
FOLATE SERPL-MCNC: 14.8 NG/ML (ref 4–24)
GFR SERPLBLD CREATININE-BSD FMLA CKD-EPI: >60 ML/MIN/1.73/M2
GLUCOSE SERPL-MCNC: 99 MG/DL (ref 70–110)
GLUCOSE UR QL STRIP: NEGATIVE
HADV DNA NPH QL NAA+NON-PROBE: DETECTED
HCOV 229E RNA NPH QL NAA+NON-PROBE: NOT DETECTED
HCOV HKU1 RNA NPH QL NAA+NON-PROBE: NOT DETECTED
HCOV NL63 RNA NPH QL NAA+NON-PROBE: NOT DETECTED
HCOV OC43 RNA NPH QL NAA+NON-PROBE: NOT DETECTED
HCT VFR BLD AUTO: 30.6 % (ref 40–54)
HGB BLD-MCNC: 9.7 GM/DL (ref 14–18)
HGB UR QL STRIP: NEGATIVE
HMPV RNA LOWER RESP QL NAA+NON-PROBE: NOT DETECTED
HMPV RNA NPH QL NAA+NON-PROBE: NOT DETECTED
HPIV1 RNA NPH QL NAA+NON-PROBE: NOT DETECTED
HPIV2 RNA NPH QL NAA+NON-PROBE: NOT DETECTED
HPIV3 RNA NPH QL NAA+NON-PROBE: DETECTED
HPIV4 RNA NPH QL NAA+NON-PROBE: NOT DETECTED
IMM GRANULOCYTES # BLD AUTO: 0.02 K/UL (ref 0–0.04)
IMM GRANULOCYTES NFR BLD AUTO: 0.5 % (ref 0–0.5)
IRON SATN MFR SERPL: 29 % (ref 20–50)
IRON SERPL-MCNC: 83 UG/DL (ref 45–160)
KETONES UR QL STRIP: NEGATIVE
LACTATE SERPL-SCNC: 1 MMOL/L (ref 0.5–2.2)
LEUKOCYTE ESTERASE UR QL STRIP: NEGATIVE
LYMPHOCYTES # BLD AUTO: 0.27 K/UL (ref 1–4.8)
MAGNESIUM SERPL-MCNC: 2.1 MG/DL (ref 1.6–2.6)
MCH RBC QN AUTO: 31.5 PG (ref 27–31)
MCHC RBC AUTO-ENTMCNC: 31.7 G/DL (ref 32–36)
MCV RBC AUTO: 99 FL (ref 82–98)
METHADONE UR QL SCN: NEGATIVE
NITRITE UR QL STRIP: NEGATIVE
NUCLEATED RBC (/100WBC) (OHS): 1 /100 WBC
OHS QRS DURATION: 100 MS
OHS QRS DURATION: 96 MS
OHS QTC CALCULATION: 486 MS
OHS QTC CALCULATION: 488 MS
OPIATES UR QL SCN: NEGATIVE
PCP UR QL: NEGATIVE
PH UR STRIP: 6 [PH]
PHOSPHATE SERPL-MCNC: 2.9 MG/DL (ref 2.7–4.5)
PLATELET # BLD AUTO: 126 K/UL (ref 150–450)
PMV BLD AUTO: 13 FL (ref 9.2–12.9)
POTASSIUM SERPL-SCNC: 3.7 MMOL/L (ref 3.5–5.1)
PROT SERPL-MCNC: 5.6 GM/DL (ref 6–8.4)
PROT UR QL STRIP: NEGATIVE
RBC # BLD AUTO: 3.08 M/UL (ref 4.6–6.2)
RELATIVE EOSINOPHIL (OHS): 1.9 %
RELATIVE LYMPHOCYTE (OHS): 6.3 % (ref 18–48)
RELATIVE MONOCYTE (OHS): 12.7 % (ref 4–15)
RELATIVE NEUTROPHIL (OHS): 77.7 % (ref 38–73)
RSV RNA NPH QL NAA+NON-PROBE: NOT DETECTED
RSV RNA NPH QL NAA+NON-PROBE: NOT DETECTED
RV+EV RNA NPH QL NAA+NON-PROBE: NOT DETECTED
SARS-COV-2 RDRP RESP QL NAA+PROBE: NEGATIVE
SARS-COV-2 RNA RESP QL NAA+PROBE: NOT DETECTED
SODIUM SERPL-SCNC: 138 MMOL/L (ref 136–145)
SP GR UR STRIP: 1.02
SPECIMEN SOURCE: ABNORMAL
TIBC SERPL-MCNC: 286 UG/DL (ref 250–450)
TRANSFERRIN SERPL-MCNC: 193 MG/DL (ref 200–375)
UROBILINOGEN UR STRIP-ACNC: NEGATIVE EU/DL
VIT B12 SERPL-MCNC: 683 PG/ML (ref 210–950)
WBC # BLD AUTO: 4.32 K/UL (ref 3.9–12.7)

## 2025-06-26 PROCEDURE — 25000003 PHARM REV CODE 250

## 2025-06-26 PROCEDURE — 80053 COMPREHEN METABOLIC PANEL: CPT

## 2025-06-26 PROCEDURE — 0202U NFCT DS 22 TRGT SARS-COV-2: CPT

## 2025-06-26 PROCEDURE — 84100 ASSAY OF PHOSPHORUS: CPT

## 2025-06-26 PROCEDURE — 36415 COLL VENOUS BLD VENIPUNCTURE: CPT

## 2025-06-26 PROCEDURE — 97530 THERAPEUTIC ACTIVITIES: CPT

## 2025-06-26 PROCEDURE — 97165 OT EVAL LOW COMPLEX 30 MIN: CPT

## 2025-06-26 PROCEDURE — 80307 DRUG TEST PRSMV CHEM ANLYZR: CPT

## 2025-06-26 PROCEDURE — 83735 ASSAY OF MAGNESIUM: CPT

## 2025-06-26 PROCEDURE — 81003 URINALYSIS AUTO W/O SCOPE: CPT

## 2025-06-26 PROCEDURE — 85025 COMPLETE CBC W/AUTO DIFF WBC: CPT

## 2025-06-26 PROCEDURE — 25000242 PHARM REV CODE 250 ALT 637 W/ HCPCS

## 2025-06-26 PROCEDURE — 63700000 PHARM REV CODE 250 ALT 637 W/O HCPCS

## 2025-06-26 PROCEDURE — 27000221 HC OXYGEN, UP TO 24 HOURS

## 2025-06-26 PROCEDURE — 82607 VITAMIN B-12: CPT

## 2025-06-26 PROCEDURE — 82746 ASSAY OF FOLIC ACID SERUM: CPT

## 2025-06-26 PROCEDURE — U0002 COVID-19 LAB TEST NON-CDC: HCPCS

## 2025-06-26 PROCEDURE — 83605 ASSAY OF LACTIC ACID: CPT

## 2025-06-26 PROCEDURE — 97162 PT EVAL MOD COMPLEX 30 MIN: CPT

## 2025-06-26 PROCEDURE — 94640 AIRWAY INHALATION TREATMENT: CPT

## 2025-06-26 PROCEDURE — 11000001 HC ACUTE MED/SURG PRIVATE ROOM

## 2025-06-26 PROCEDURE — 63600175 PHARM REV CODE 636 W HCPCS

## 2025-06-26 PROCEDURE — 83540 ASSAY OF IRON: CPT

## 2025-06-26 PROCEDURE — 97535 SELF CARE MNGMENT TRAINING: CPT

## 2025-06-26 PROCEDURE — 21400001 HC TELEMETRY ROOM

## 2025-06-26 PROCEDURE — 99900035 HC TECH TIME PER 15 MIN (STAT)

## 2025-06-26 PROCEDURE — 94761 N-INVAS EAR/PLS OXIMETRY MLT: CPT

## 2025-06-26 PROCEDURE — 92610 EVALUATE SWALLOWING FUNCTION: CPT

## 2025-06-26 RX ORDER — IPRATROPIUM BROMIDE AND ALBUTEROL SULFATE 2.5; .5 MG/3ML; MG/3ML
3 SOLUTION RESPIRATORY (INHALATION) EVERY 4 HOURS
Status: DISCONTINUED | OUTPATIENT
Start: 2025-06-26 | End: 2025-06-30 | Stop reason: HOSPADM

## 2025-06-26 RX ORDER — AZITHROMYCIN 250 MG/1
500 TABLET, FILM COATED ORAL DAILY
Status: DISCONTINUED | OUTPATIENT
Start: 2025-06-26 | End: 2025-06-29

## 2025-06-26 RX ORDER — PREDNISONE 20 MG/1
40 TABLET ORAL DAILY
Status: COMPLETED | OUTPATIENT
Start: 2025-06-26 | End: 2025-06-29

## 2025-06-26 RX ORDER — CEFTRIAXONE 1 G/1
1 INJECTION, POWDER, FOR SOLUTION INTRAMUSCULAR; INTRAVENOUS
Status: DISCONTINUED | OUTPATIENT
Start: 2025-06-26 | End: 2025-06-29

## 2025-06-26 RX ADMIN — ALLOPURINOL 300 MG: 100 TABLET ORAL at 02:06

## 2025-06-26 RX ADMIN — AMLODIPINE BESYLATE 5 MG: 5 TABLET ORAL at 02:06

## 2025-06-26 RX ADMIN — VALACYCLOVIR HYDROCHLORIDE 500 MG: 500 TABLET, FILM COATED ORAL at 08:06

## 2025-06-26 RX ADMIN — CLOPIDOGREL 75 MG: 75 TABLET ORAL at 11:06

## 2025-06-26 RX ADMIN — CEFTRIAXONE SODIUM 1 G: 1 INJECTION, POWDER, FOR SOLUTION INTRAMUSCULAR; INTRAVENOUS at 02:06

## 2025-06-26 RX ADMIN — PREDNISONE 40 MG: 20 TABLET ORAL at 11:06

## 2025-06-26 RX ADMIN — IPRATROPIUM BROMIDE AND ALBUTEROL SULFATE 3 ML: 2.5; .5 SOLUTION RESPIRATORY (INHALATION) at 07:06

## 2025-06-26 RX ADMIN — IPRATROPIUM BROMIDE AND ALBUTEROL SULFATE 3 ML: 2.5; .5 SOLUTION RESPIRATORY (INHALATION) at 11:06

## 2025-06-26 RX ADMIN — Medication 1000 UNITS: at 02:06

## 2025-06-26 RX ADMIN — OXYCODONE HYDROCHLORIDE AND ACETAMINOPHEN 500 MG: 500 TABLET ORAL at 08:06

## 2025-06-26 RX ADMIN — GUAIFENESIN 1200 MG: 600 TABLET, EXTENDED RELEASE ORAL at 08:06

## 2025-06-26 RX ADMIN — THERA TABS 1 TABLET: TAB at 02:06

## 2025-06-26 RX ADMIN — AZITHROMYCIN DIHYDRATE 500 MG: 250 TABLET ORAL at 11:06

## 2025-06-26 RX ADMIN — GUAIFENESIN 1200 MG: 600 TABLET, EXTENDED RELEASE ORAL at 11:06

## 2025-06-26 RX ADMIN — FENOFIBRATE 145 MG: 145 TABLET ORAL at 02:06

## 2025-06-26 RX ADMIN — FERROUS SULFATE TAB 325 MG (65 MG ELEMENTAL FE) 1 EACH: 325 (65 FE) TAB at 02:06

## 2025-06-26 RX ADMIN — IPRATROPIUM BROMIDE AND ALBUTEROL SULFATE 3 ML: 2.5; .5 SOLUTION RESPIRATORY (INHALATION) at 03:06

## 2025-06-26 RX ADMIN — CALCIUM CARBONATE (ANTACID) CHEW TAB 500 MG 500 MG: 500 CHEW TAB at 02:06

## 2025-06-26 RX ADMIN — OXYCODONE HYDROCHLORIDE AND ACETAMINOPHEN 500 MG: 500 TABLET ORAL at 02:06

## 2025-06-26 RX ADMIN — APIXABAN 5 MG: 5 TABLET, FILM COATED ORAL at 08:06

## 2025-06-26 RX ADMIN — MAGNESIUM SULFATE HEPTAHYDRATE 2 G: 40 INJECTION, SOLUTION INTRAVENOUS at 12:06

## 2025-06-26 RX ADMIN — LEVOTHYROXINE SODIUM 25 MCG: 0.03 TABLET ORAL at 06:06

## 2025-06-26 RX ADMIN — VALACYCLOVIR HYDROCHLORIDE 500 MG: 500 TABLET, FILM COATED ORAL at 11:06

## 2025-06-26 RX ADMIN — FLUOXETINE HYDROCHLORIDE 20 MG: 20 CAPSULE ORAL at 02:06

## 2025-06-26 RX ADMIN — APIXABAN 5 MG: 5 TABLET, FILM COATED ORAL at 11:06

## 2025-06-26 RX ADMIN — ATORVASTATIN CALCIUM 40 MG: 40 TABLET, FILM COATED ORAL at 02:06

## 2025-06-26 RX ADMIN — PANTOPRAZOLE SODIUM 40 MG: 40 TABLET, DELAYED RELEASE ORAL at 02:06

## 2025-06-26 NOTE — PLAN OF CARE
OT evaluation completed, coeval overlap with PT. Patient with PLOF of being a snf resident at [facility] x~2 years, with usual function of modified independence for bed to wheelchair transfers via step pivot with rolling walker, supervision for wheelchair to bedside commode over true toilet with grab bar, and variance assist for all ADLs, and does not use home O2. No reported recent falls. On evaluation this date patient denies feeling short of breath, but on 4L of O2 and appears to have shortness of breath. Demonstrates impaired convergence with R eye. Grossly requiring moderate assist with rolling walker for transfer to bedside commode and dependence for LB ADLs, minimal to setup assist for UB ADLs. Will continue to follow. Recommend return to nursing home when paloma with low intensity therapy.    Problem: Occupational Therapy  Goal: Occupational Therapy Goal  Description: Goals to be met by: 7/23/2025     Patient will increase functional independence with ADLs by performing:    UE Dressing with Contact Guard Assistance with easy on clothing  LE Dressing with Maximum Assistance.  Grooming while seated with Set-up Assistance.  Sitting at edge of bed x8 minutes with Supervision.  Toilet transfer to bedside commode with Stand-by Assistance via least restrictive device.  100% reciprocation of at least two techniques for energy conservation/ fatigue management  6/26/2025 1443 by Bailey Martins, OT  Outcome: Progressing

## 2025-06-26 NOTE — NURSING
VIRTUAL NURSE: Pt arrived to unit. Permission received to turn camera to view patient. VIP model explained  Admission questions completed.  Plan of care reviewed with patient. Educated patient on fall risk/POC. Call light within reach, side rails up x2. Patient instucted to call staff for assistance.      06/25/25 2000   Admission Complete   Admission Complete by VN Complete

## 2025-06-26 NOTE — PLAN OF CARE
Problem: Pneumonia  Goal: Effective Oxygenation and Ventilation  Outcome: Progressing     Problem: Pneumonia  Goal: Fluid Balance  Outcome: Not Progressing  Goal: Resolution of Infection Signs and Symptoms  Outcome: Not Progressing

## 2025-06-26 NOTE — NURSING
Rapid Response Nurse Follow-up Note     Followed up with patient for proactive rounding.   No acute issues at this time. Pt awake and alert, on 4 L NC. VSS. COVID rule out. Denies SOB/CP. No complaints. Labs and chart reviewed. Team will continue to follow  Please call Rapid Response RN, Jason Felix, RN with any questions or concerns at [phone].

## 2025-06-26 NOTE — PT/OT/SLP EVAL
Speech Language Pathology Evaluation  Bedside Swallow    Patient Name:  Harrison Haywood   MRN:  [id number]  Admitting Diagnosis: Acute hypoxic respiratory failure    Recommendations:                 General Recommendations:  No further ST needs  Diet recommendations:  Regular Diet - IDDSI Level 7 (heart healthy), Thin liquids - IDDSI Level 0   Aspiration Precautions: 1 bite/sip at a time, Alternating bites/sips, Avoid talking while eating, Feed only when awake/alert, Frequent oral care, HOB to 90 degrees, Meds whole 1 at a time, Monitor for s/s of aspiration, Remain upright 30 minutes post meal, Small bites/sips, Standard aspiration precautions, and Wear oxygen during intake   General Precautions: Standard, aspiration, airborne, respiratory, droplet, fall (L sided weakness, SOB)  Communication strategies:  none  Discharge recommendations:      Barriers to Discharge:  None    Assessment:     Harrison Haywood is a 70 y.o. male with an admittance of respiratory distress. He presents with acute hypoxic respiratory failure, CVA, DVT, Lactic Acidosis, macrocytic anemia, Acute bronchitis. with past medical history of multiple myeloma with spinal lytic lesions, hx of DVT, CVA with residual left sided weakness, HTN, HLD, and anxiety who presented on 6/25/2025 for Respiratory Distress. Patient able to safely consume solids and liquids independently.    History:     History reviewed. No pertinent past medical history.    Past Surgical History:   Procedure Laterality Date    ECHOCARDIOGRAM,TRANSESOPHAGEAL N/A 3/8/2024    Procedure: Transesophageal echo (SHABNAM) intra-procedure log documentation;  Surgeon: Rachid Rothman MD;  Location: [facility] CATH LAB/EP;  Service: Cardiology;  Laterality: N/A;       Chest X-Rays:  Lines and tubes: None.     Heart and mediastinum: Magnified by AP technique.  Apical lordotic positioning.     Pleura: No pleural effusion or pneumothorax.     Lungs: Lung volumes are moderate.  No focal consolidations or  "evidence of pulmonary edema.     Soft tissue/bone: Osteopenia and degenerative changes, most severe in the left glenohumeral joint.  Known thoracic compression fractures are poorly visualized.     Prior diet: regular/thin.    Subjective     Pt was seen at lunch time at the bedside for a swallow eval. Upon entry pt was sitting on the side of the bed eating lunch, feeding himself with no assistance. Pt was awake and alert. Pt oriented to self, place, time, current president and year.     Patient goals: "I am done eating."     Pain/Comfort:  Pain Rating 1: 0/10    Respiratory Status: NC    Objective:     Oral Musculature Evaluation  Oral Musculature: WFL  Dentition: present and adequate  Secretion Management: adequate  Mucosal Quality: good  Mandibular Strength and Mobility: WFL  Oral Labial Strength and Mobility: WFL  Lingual Strength and Mobility: WFL  Velar Elevation: WFL  Buccal Strength and Mobility: WFL  Volitional Cough: good  Volitional Swallow: good  Voice Prior to PO Intake: good    Bedside Swallow Eval:   Consistencies Assessed:  Thin liquids water  Solids meat/chicken     Oral Phase:   WFL  Pt properly masticated foods in a timely manner.   Pt had adequate anterior to posterior bolus transfer.     Pharyngeal Phase:   no overt clinical signs/symptoms of aspiration    Compensatory Strategies  Volitional cough/throat clear    Treatment: SLP educated pt to eat/chew one bite of food at a time and take sips of thin liquid in between bites. Pt is safe to consume a regular/heart healthy diet with thin liquids. NO further ST needs.     Goals:   Multidisciplinary Problems       SLP Goals       Not on file                    Plan:     Patient to be seen:      Plan of Care expires:     Plan of Care reviewed with:  patient, sibling   SLP Follow-Up:  No       Time Tracking:     SLP Treatment Date:      Speech Start Time:  1225  Speech Stop Time:  1238     Speech Total Time (min):  13 min    Billable Minutes: Eval Swallow " and Oral Function 13    06/26/2025

## 2025-06-26 NOTE — PROGRESS NOTES
"[facility] Medicine Progress Note      Subjective/Interval History      Patient reports feeling his breathing has improved. Continues to have productive cough. Denies fever, chills, chest pain.      Objective     Physical Examination:  /75 (BP Location: Right arm, Patient Position: Lying)   Pulse 92   Temp 98.3 °F (36.8 °C) (Oral)   Resp (!) 22   Ht 6' 1" (1.854 m)   Wt 104.1 kg (229 lb 8 oz)   SpO2 98%   BMI 30.28 kg/m²    General: No acute distress, anxious appearing   HEENT: Atraumatic, normocephalic, moist mucous membranes  Cardiovascular: tachycardic, regular rhythm, normal S1 and S2, no extra heart sounds or murmurs appreciated   Chest wall: Non-tender to palpation, no gross deformities noted   Back: No abnormal curvature noted, symmetric rise with respiration   Respiratory: Stable on BiPAP, tachypneic, conversational dyspnea, no accessory muscle use noted, clear to auscultation bilaterally, no wheezes or crackles   Abdominal: Non-distended, soft, normoactive bowel sounds, non-tender to palpation, no guarding  Extremities: Atraumatic, 2+ pitting edema, moving all four extremities  Pulses: 2+ and symmetric radial artery, dorsalis pedis artery  Skin: Non-diaphoretic, warm, dry  Neurologic: No gross focal neurologic deficits noted ; residual right sided weakness in UE     Intake/Output:    Intake/Output Summary (Last 24 hours) at 6/26/2025 0813  Last data filed at 6/26/2025 0445  Gross per 24 hour   Intake 400.07 ml   Output 400 ml   Net 0.07 ml     Net IO Since Admission: 0.07 mL [06/26/25 0813]    Laboratory data: reviewed     Microbiology data: reviewed     EKG data: reviewed     Radiology data: reviewed      Current Medications:     Infusions:       Scheduled:   albuterol-ipratropium  3 mL Nebulization Q4H    allopurinoL  300 mg Oral Daily    amLODIPine  5 mg Oral Daily    apixaban  5 mg Oral BID    ascorbic acid (vitamin C)  500 mg Oral BID    atorvastatin  40 mg Oral Daily    calcium " carbonate  500 mg Oral Daily    clopidogreL  75 mg Oral Daily    fenofibrate  145 mg Oral Daily    ferrous sulfate  1 tablet Oral Every other day    FLUoxetine  20 mg Oral Daily    fluticasone propionate  2 spray Each Nostril Daily    guaiFENesin  1,200 mg Oral BID    levothyroxine  25 mcg Oral Before breakfast    multivitamin  1 tablet Oral Daily    pantoprazole  40 mg Oral Daily    predniSONE  40 mg Oral Daily    valACYclovir  500 mg Oral BID    vitamin D  1,000 Units Oral Daily        PRN:    Current Facility-Administered Medications:     albuterol-ipratropium, 3 mL, Nebulization, Q6H PRN    bisacodyL, 10 mg, Oral, Daily PRN    dextrose 50%, 12.5 g, Intravenous, PRN    dextrose 50%, 25 g, Intravenous, PRN    glucagon (human recombinant), 1 mg, Intramuscular, PRN    glucose, 16 g, Oral, PRN    glucose, 24 g, Oral, PRN    naloxone, 0.02 mg, Intravenous, PRN    sodium chloride 0.9%, 10 mL, Intravenous, Q12H PRN      Assessment/Plan:     Harrison Haywood is a 70 y.o. male with past medical history of multiple myeloma with spinal lytic lesions, hx of DVT, CVA with residual left sided weakness, HTN, HLD, and anxiety who presented on 2025 for Respiratory Distress.     Shortness of breath   Acute Respiratory Distress  SIRS +   Lactic acidosis, resolved  - pt presenting with SOB x 3 days with acute worsening the day of presentation  - started on levaquin at NH   - SIRS criteria met in ED with tachycardia to 123, tachypneic to 30   - WBC 6.67 (increased from baseline ~3)  - procal 0.20   - flu negative, resp panel pending, resp culture pending   - d-dimer 0.52, pt has been on eliquis for DVT    - BNP 75, trop 0.021  - lactic acid 4.3 > 2.4 > 1.0   - CXR without focal consolidation  - VB.352, pCO2 41.7, pO2 41.6    - received ctx and azithro in ED   - initially requiring BiPAP in ED due to work of breathing, able to wean to NC   PLAN:   - continue supportive care: duonebs, chest physiotherapy, mucinex, flonase   -  resp panel pending, resp culture pending   - BC pending   - started prednisone 40 mg daily      Multiple Myeloma   - Dx in 2023, imitated on tx in 1/2025 and on current regimen since 3/2024: q28 day cycles with Revlimid 25 po daily days 1-21, Daratumumab 1800-30K units sub Q day 1, Decadron 20 po daily days 1, 8 , 15, 22, tylenol 650 po and benadryl 25 po to be given 1 hr prior to injections   - ECOG 3 with known lytic lesions   - last cycle on 6/17 with q28 cycles followed by Dr. Howard   - continue ppx: allopurinol 300 mg daily and valtrex 500 mg daily   - continue calcium carbonate, vitamin D       Hypertension   Hyperlipidemia   - continue home amlodipine 5 mg   - continue lipitor and fenofibrate      Bilateral LE DVT  - DVT study + for bilateral LE DVT on 4/15/2025  - continue eliquis 5 mg BID      Hx of CVA with residual left sided deficits  - CVA in 2024   - continue plavix      Hypothyroidism   - continue home synthroid 25 mcg   - TSH wnl      Macrocytic anemia  - hgb 11.2,  (baseline hgb ~ 10)  - home med: ferrous sulfate   - b12, folate, iron studies pending      Anxiety   Depression   - continue zoloft      GERD  - continue protonix         Healthcare maintenance   -primary care provider is Guanakito Mendez MD      Diet: NPO pending improvement, ensure stability off of BiPAP  VTE PPx: eliquis   Code Status: Full      Dispo: continue supportive care for shortness of breath  Estimated LOS: 1-2 days         Sara Arellano MD   [employer] Internal Medicine PGY-1

## 2025-06-26 NOTE — PROGRESS NOTES
The sw spoke to Halie at [facility] and she confirms this is their [facility] resident since 11/9/23. She states she will be able to extract the pt's info form EPIC,so the sw doesn't have to send any info on the pt. The sw placed the pt's copied chart in his cubby.           06/26/25 1335   Post-Acute Status   Post-Acute Authorization Placement   Post-Acute Placement Status Pending medical clearance/testing   Discharge Plan   Discharge Plan A Return to nursing home   Discharge Plan B Other  (TBD)

## 2025-06-26 NOTE — PLAN OF CARE
Problem: Physical Therapy  Goal: Physical Therapy Goal  Description: Goals to be met by: 25     Patient will increase functional independence with mobility by performin. Supine to sit with Stand-by Assistance  2. Sit to supine with Stand-by Assistance  3. Sit to stand transfer with Stand-by Assistance with use of RW.   4. Bed to chair transfer with Stand-by Assistance using Rolling Walker  5. Gait  x 15 feet with Stand-by Assistance using Rolling Walker.   6. Wheelchair propulsion x150 feet with Stand-by Assistance using BLE to pedal    Outcome: Progressing     PT/OT co-evaluation completed due to anticipated complexity of pt's presentation. Pt's PLOF: [facility] resident at [facility] ~2 years; assist with dressing, toileting and bathing; was able to complete transfer bed to chair MOD I. Pt at this time CGA-MOD A with bed mobility and MOD/MIN A with use of RW with transfers/short distance mobility. PT recommending return to NH with  staff assistance. Therapy will continue to progress pt as able.

## 2025-06-26 NOTE — NURSING
"PROACTIVE ROUNDING NURSE AI ALERT       AI alert received.    Chart Reviewed: 06/25/2025, 8:34 PM    MRN: [id number]  Bed: K402/K402 A    Dx: Acute respiratory distress    Harrison Haywood has no past medical history on file.    Last VS: /64   Pulse 97   Temp 98.6 °F (37 °C) (Oral)   Resp 18   Ht 6' 1" (1.854 m)   Wt 104.1 kg (229 lb 8 oz)   SpO2 96%   BMI 30.28 kg/m²     24H Vital Sign Range:  Temp:  [97.9 °F (36.6 °C)-98.6 °F (37 °C)]   Pulse:  []   Resp:  [18-32]   BP: (106-138)/(64-75)   SpO2:  [96 %-100 %]     Level of Consciousness (AVPU): alert    Recent Labs     06/25/25  1324 06/25/25  1329   WBC  --  6.67   HGB  --  11.2*   HCT 36.7 36.0*   PLT  --  152       Recent Labs     06/25/25  1329 06/25/25  1359     --    K 4.0  --      --    CO2 25  --    BUN 23  --    CREATININE 1.1  --    *  --    MG  --  1.6        Recent Labs     06/25/25  1324   PH 7.352   PCO2 41.7   PO2 41.6   HCO3 23.1*   POCSATURATED 73.3*        OXYGEN:  Flow (L/min) (Oxygen Therapy): 4          MEWS score: 1    Notified Charge nurse, Sue and Bedside nurse, Jacinda Chart, labs and vitals reviewed. No acute issues at this time.    Temp 98.6 (37)  /64 MAP 78  HR 97  RR 18  SpO2 96% on 4L NC.    No additional concerns verbalized at this time. Instructed to call Proactive Rounding at [phone] for further concerns or assistance.    Ashley Jeffery RN       "

## 2025-06-26 NOTE — PLAN OF CARE
Problem: SLP  Goal: SLP Goal  Description: Short Term Goals:  1. Pt will participate in a clinical swallow eval to determine least restrictive diet. - MET    Outcome: Met     Pt participated in swallow eval this date. Pt safe to continue regular/thin liquid diet. ST will sign off at this time. Please reconsult if future concerns arise.

## 2025-06-26 NOTE — PT/OT/SLP EVAL
Occupational Therapy   Evaluation and treatment    Name: Harrison Haywood  MRN: [id number]  Admitting Diagnosis: Acute hypoxic respiratory failure  Recent Surgery: * No surgery found *      Recommendations:     Discharge Recommendations: Low Intensity Therapy (return to nursing home)  Discharge Equipment Recommendations:  none  Barriers to discharge:  Other (Comment) (medical workup)    Assessment:     Harrison Haywood is a 70 y.o. male with a medical diagnosis of Acute hypoxic respiratory failure.  He presents with .The primary encounter diagnosis was Acute hypoxic respiratory failure. Diagnoses of Shortness of breath, Screening for cardiovascular condition, Pneumonia due to infectious organism, unspecified laterality, unspecified part of lung, Respiratory distress, Acute hypoxemic respiratory failure, Acute respiratory failure with hypoxia, Cough, unspecified type, Left-sided weakness, Lactic acidosis, Multiple myeloma, remission status unspecified, Macrocytic anemia, Cerebrovascular accident (CVA), unspecified mechanism, Acute deep vein thrombosis (DVT) of proximal vein of both lower extremities, Primary hypertension, and Acute bronchitis, unspecified organism were also pertinent to this visit.  . Performance deficits affecting function: weakness, impaired endurance, decreased ROM, impaired muscle length, abnormal tone, visual deficits, decreased coordination, impaired self care skills, impaired functional mobility, gait instability, decreased safety awareness, impaired balance, pain, edema, impaired cardiopulmonary response to activity, decreased upper extremity function.      OT evaluation completed, coeval overlap with PT. On evaluation this date patient denies feeling short of breath, but on 4L of O2 and appears to have shortness of breath. Demonstrates impaired convergence with R eye. Grossly requiring moderate assist with rolling walker for transfer to bedside commode and dependence for LB ADLs, minimal to setup  "assist for UB ADLs. Will continue to follow. Recommend return to nursing home when [place] with low intensity therapy.        Rehab Prognosis: Good; patient would benefit from acute skilled OT services to address these deficits and reach maximum level of function.       Plan:     Patient to be seen 3 x/week to address the above listed problems via self-care/home management, therapeutic activities, therapeutic exercises  Plan of Care Expires: 07/24/25  Plan of Care Reviewed with: patient    Subjective     Chief Complaint: feel uncomfortable in my stomach  Patient/Family Comments/goals: "think I need to use the bathroom" ; post bowel movement -"feel so much better"    Occupational Profile:  Living Environment/Previous level of function: Patient with PLOF of being a [facility] resident at [facility] x~2 years, with usual function of modified independence for bed to wheelchair transfers via step pivot with rolling walker, supervision for wheelchair to bedside commode over true toilet with grab bar, and variance assist for all ADLs - wears pullups/diapers, and does not use home O2. No reported recent falls. Self reports hx of spinal meningitis at 3 months old resulting in R sided hemiparesis mostly impacting R UE inclusive of tremor. Reports CVA in 2024 residual mild L UE weakness. Retired  type work.  Equipment Used at Home: hospital bed, wheelchair, grab bar, bedside commode, walker, rolling (equipment per facility)  Assistance upon Discharge: staff at nursing home    Pain/Comfort:  Pain Rating 1: 0/10  Pain Addressed 1: Reposition  Pain Rating Post-Intervention 1: 0/10      Objective:     Communicated with: nursing prior to session.  Patient found HOB elevated with bed alarm, telemetry, oxygen (waffle overlay) upon OT entry to room.    General Precautions: Standard,  (Covid and additional respiratory rule out - utilized airborne/ droplet/ contact precautions)  Orthopedic Precautions: N/A  Braces: " N/A  Respiratory Status: Nasal cannula, flow 4 L/min    Occupational Performance:    Bed Mobility:    Patient completed Supine to Sit with CGA, min verbal cues for breathing, scooting to eob with CGA  Patient completed Sit to Supine with moderate assist for BLE management    Functional Mobility/Transfers:  Patient completed Sit <> Stand Transfer with moderate assist from low bedside commode; minimal assist from elevated bed  with  rolling walker and min verbal cues   Patient completed Toilet Transfer Step Transfer technique with rolling walker with  moderate assist from bed < > bedside commode < > bed, followed by few lateral side steps with rolling walker, and moderate assist for rolling walker management    Activities of Daily Living:  Grooming: minimal assist; sitting eob  Lower Body Dressing: total assistance    Toileting: total assistance ; context of bedside commode; continent bowel movement    Cognitive/Visual Perceptual:  Cognitive/Psychosocial Skills:     -       Oriented to: Person, Place, Time, and Situation   -       Follows Commands/attention:Follows two-step commands  -       Mood/Affect/Coping skills/emotional control:appreciative; fatigued  Visual/Perceptual:  impaired convergence with R eye which deviates inward     Physical Exam:  Edema:  slight B LE - educated on / removed socks off due to constricting at ankles  Upper Extremity Range of Motion:     -       Right Upper Extremity: Deficits: chronic; RUE in flexed posture at elbow and wrist, limited extension at hand/ digits; grossly moves scaption 0 - ~20 degrees  -       Left Upper Extremity: proximal mild limitations; distal WFL  Upper Extremity Strength:    -       Right Upper Extremity: impaired/chronic; tremor at rest; mildly accepts antigravity; poor R grasp  -       Left Upper Extremity: 3+/5 in available range; decreased L hand /  strength    AMPAC 6 Click ADL:  AMPAC Total Score: 11    Treatment & Education:  Patient educated on role  of OT. Occupational profile developed.   Observed shortness of breath at rest.  Instructed intermittently during session to perform pursed lip breathing with fair reciprocation. SpO2 remains < 97% on 4L.  ADL / transfer training as above.   Instructed to avoid straining during toileting.  Instructed to shift hips forward in prep to stand from bedside commode.   Declines sitting up in bedside chair, agreeable though for bed to be placed in more upright position. Floated / elevated BLE.   Needs in reach, reciprocates call light.    Patient left HOB elevated with all lines intact, call button in reach, bed alarm on, and nursing notified    GOALS:   Multidisciplinary Problems       Occupational Therapy Goals          Problem: Occupational Therapy    Goal Priority Disciplines Outcome Interventions   Occupational Therapy Goal     OT, PT/OT Progressing    Description: Goals to be met by: 7/23/2025     Patient will increase functional independence with ADLs by performing:    UE Dressing with Contact Guard Assistance with easy on clothing  LE Dressing with Maximum Assistance.  Grooming while seated with Set-up Assistance.  Sitting at edge of bed x8 minutes with Supervision.  Toilet transfer to bedside commode with Stand-by Assistance via least restrictive device.  100% reciprocation of at least two techniques for energy conservation/ fatigue management                         History:       Harrison Haywood is a 70 y.o. male with past medical history of multiple myeloma with spinal lytic lesions, hx of DVT, CVA with residual left sided weakness, HTN, HLD, and anxiety who presented on 6/25/2025 for Respiratory Distress.        Past Surgical History:   Procedure Laterality Date    ECHOCARDIOGRAM,TRANSESOPHAGEAL N/A 3/8/2024    Procedure: Transesophageal echo (SHABNAM) intra-procedure log documentation;  Surgeon: Rachid Rothman MD;  Location: [facility] CATH LAB/EP;  Service: Cardiology;  Laterality: N/A;       Time Tracking:     OT  Date of Treatment: 06/26/25  OT Start Time: 1056  OT Stop Time: 1128  OT Total Time (min): 32 min    Billable Minutes:Evaluation 8 min  Self Care/Home Management 15 min  Therapeutic Activity 9 min    6/26/2025

## 2025-06-26 NOTE — NURSING
Pt arrived to room 402 per stretcher with transporter. Transferred to bed per nsg staff and transporter. Head to toe skin assessment done with LOLIS Brady. Pt jl well. NAD noted. Will continue to monitor and report to next shift.

## 2025-06-26 NOTE — PT/OT/SLP EVAL
Physical Therapy Evaluation and Treatment    Patient Name:  Harrison Haywood   MRN:  [id number]    Recommendations:     Discharge Recommendations: Low Intensity Therapy (return to NH with 24/7 staff assistance)   Discharge Equipment Recommendations: none   Barriers to discharge: limited functional endurance/independence    Assessment:     Harrison Haywood is a 70 y.o. male admitted with a medical diagnosis of Acute hypoxic respiratory failure.  He presents with the following impairments/functional limitations: weakness, impaired endurance, impaired self care skills, impaired functional mobility, gait instability, impaired balance, decreased lower extremity function, decreased ROM, edema, impaired skin, impaired cardiopulmonary response to activity.    PT/OT co-evaluation completed due to anticipated complexity of pt's presentation. Pt's PLOF: nursing home resident at [facility] ~2 years; assist with dressing, toileting and bathing; was able to complete transfer bed to chair MOD I. Pt at this time CGA-MOD A with bed mobility and MOD/MIN A with use of RW with transfers/short distance mobility. PT recommending return to NH with 24/7 staff assistance. Therapy will continue to progress pt as able.     Rehab Prognosis: Good; patient would benefit from acute skilled PT services to address these deficits and reach maximum level of function.    Recent Surgery: * No surgery found *      Plan:     During this hospitalization, patient to be seen 3 x/week to address the identified rehab impairments via gait training, therapeutic activities, therapeutic exercises, neuromuscular re-education, wheelchair management/training and progress toward the following goals:    Plan of Care Expires:  07/26/25    Subjective     Chief Complaint: needs to have a bowel movement  Patient/Family Comments/goals: to progress mobility  Pain/Comfort:  Pain Rating 1: 0/10  Pain Rating Post-Intervention 1: 0/10    Patients cultural, spiritual, Taoist conflicts  given the current situation: no    Living Environment:  nursing home resident at [facility] ~2 years; assist with dressing, toileting and bathing; was able to complete transfer bed to chair MOD I and propel w/c with BLE.  Equipment used at home: walker, rolling, wheelchair (per facility).  DME owned (not currently used): none.  Upon discharge, patient will have assistance from NH staff.    Objective:     Communicated with Nurse prior to session.  Patient found HOB elevated with bed alarm, oxygen (waffle overlay)  upon PT entry to room.    General Precautions: Standard, fall; covid rule out at time of evaluation.   Orthopedic Precautions:N/A   Braces: N/A  Respiratory Status: Nasal cannula, flow 4 L/min; 98-99%    Exams:  Cognitive Exam:  Patient is oriented to Person, Place, Time, and Situation  Sensation:    -       Intact  light/touch to BLE  RLE ROM: WFL except knee extension and ankle PF/DF; due to weakness  RLE Strength: 2+/5 hip flexion, 2/5 knee extension, 0/5 ankle PF/DF  LLE ROM: WFL  LLE Strength: WFL    Functional Mobility:  Bed Mobility:     Supine to Sit: contact guard assistance  Sit to Supine: moderate assistance  Transfers:     Sit to Stand:  MOD/MIN A with rolling walker; depending on height of chair  Bed to Chair: moderate assistance with  rolling walker  using  Step Transfer  Gait: ~4 BLE forward/turning/retro and lateral steps to navigate to and from BSC; use of RW and MOD A      AM-PAC 6 CLICK MOBILITY  Total Score:12       Treatment & Education:  Pt educated on role of PT.   Pt noted to be SOB with increased mobility; improved with seated rest breaks; SpO2 98-99%.   Verbal cues for proper hand positioning with use of RW/transfers.   Pt reports having R sided weakness from having spinal meningitis at 3 months old and mild L sided (UE deficits) from 2024 CVA. Noted BUE tremors throughout session.   Pt with noted BLE ankle swelling; socks removed to improve circulation.   Pt educated on use of call  button for mobility needs; pt understanding.      Patient left HOB elevated with all lines intact, call button in reach, bed alarm on, and Nurse notified.    GOALS:   Multidisciplinary Problems       Physical Therapy Goals          Problem: Physical Therapy    Goal Priority Disciplines Outcome Interventions   Physical Therapy Goal     PT, PT/OT Progressing    Description: Goals to be met by: 25     Patient will increase functional independence with mobility by performin. Supine to sit with Stand-by Assistance  2. Sit to supine with Stand-by Assistance  3. Sit to stand transfer with Stand-by Assistance with use of RW.   4. Bed to chair transfer with Stand-by Assistance using Rolling Walker  5. Gait  x 15 feet with Stand-by Assistance using Rolling Walker.   6. Wheelchair propulsion x150 feet with Stand-by Assistance using BLE to pedal                         History:     History reviewed. No pertinent past medical history.    Past Surgical History:   Procedure Laterality Date    ECHOCARDIOGRAM,TRANSESOPHAGEAL N/A 3/8/2024    Procedure: Transesophageal echo (SHABNAM) intra-procedure log documentation;  Surgeon: Rachid Rothman MD;  Location: [facility] CATH LAB/EP;  Service: Cardiology;  Laterality: N/A;       Time Tracking:     PT Received On: 25  PT Start Time: 1056     PT Stop Time: 1128  PT Total Time (min): 32 min With OT    Billable Minutes: Evaluation 10 and Therapeutic Activity 15      2025

## 2025-06-26 NOTE — PLAN OF CARE
The sw met with the pt and his sister Arminda Garduno [phone] who was at bedside during the assessment. The pt's a resident at [facility] since 11/9/23 and will return after d/c. The staff at the nh assist the pt as needed with his ADL's. The pt self propels himself in the w/c at the nh and uses the rw. The sw completed the white board in the pt's room with her name ane contact info. The sw encouraged the pt and Arminda to call if they have any further questions or concerns. Arminda is very involved in the pt's care. The sw will continue to follow the pt throughout his transitions of care and will assist with any d/c needs. The pt's not on coumadin and he isn't a dialysis pt.     [facility] - Telemetry  Initial Discharge Assessment       Primary Care Provider: Guanakito Mendez MD    Admission Diagnosis: Shortness of breath [R06.02]  Screening for cardiovascular condition [Z13.6]  Respiratory distress [R06.03]  Pneumonia due to infectious organism, unspecified laterality, unspecified part of lung [J18.9]  Acute hypoxic respiratory failure [J96.01]    Admission Date: 6/25/2025  Expected Discharge Date: 6/27/2025    Transition of Care Barriers: (P) None    Payor: [facility] / Plan: [facility] [facility] / Product Type: Medicare Advantage /     Extended Emergency Contact Information  Primary Emergency Contact: Arminda Garduno  Mobile Phone: [phone]  Relation: Sister  Preferred language: English   needed? No    Discharge Plan A: (P) Return to nursing home  Discharge Plan B: (P) Other (TBD)      [facility] [address]  [address]  [facility] [address]  Phone: [phone] Fax: [phone]    [id number] - [place] [address] - [address] AT [facility] [address]  [address] [place] [address]  Phone: [phone] Fax: [phone]      Initial Assessment (most recent)       Adult Discharge Assessment - 06/26/25 5720           Discharge Assessment    Assessment Type Discharge Planning Assessment (P)      Confirmed/corrected address, phone number and insurance Yes (P)      Confirmed Demographics Correct on Facesheet (P)      Source of Information patient;family (P)      Communicated WILLEM with patient/caregiver Yes (P)      People in Home facility resident (P)      Facility Arrived From: [facility] (P)      Do you have help at home or someone to help you manage your care at home? Yes (P)      Who are your caregiver(s) and their phone number(s)? the staff at the nh (P)      Prior to hospitilization cognitive status: Alert/Oriented (P)      Current cognitive status: Alert/Oriented (P)      Walking or Climbing Stairs Difficulty yes (P)      Walking or Climbing Stairs ambulation difficulty, requires equipment;stair climbing difficulty, requires equipment;transferring difficulty, requires equipment (P)      Dressing/Bathing Difficulty yes (P)      Dressing/Bathing bathing difficulty, requires equipment (P)      Home Accessibility wheelchair accessible (P)      Home Layout Able to live on 1st floor (P)      Equipment Currently Used at Home wheelchair;walker, rolling (P)      Readmission within 30 days? No (P)      Patient currently being followed by outpatient case management? No (P)      Do you currently have service(s) that help you manage your care at home? No (P)      Do you take prescription medications? Yes (P)      Do you have prescription coverage? Yes (P)      Do you have any problems affording any of your prescribed medications? No (P)      Is the patient taking medications as prescribed? yes (P)      Who is going to help you get home at discharge? The pt will require transportation back to the nh (P)      How do you get to doctors appointments? other (see comments) (P)    the nh arranges    Are you on dialysis? No (P)      Do you take coumadin? No (P)      Discharge Plan A Return to nursing home (P)      Discharge Plan B  Other (P)    TBD    DME Needed Upon Discharge  none (P)      Discharge Plan discussed with: Patient;Sibling (P)      Name(s) and Number(s) Arminda Garduno(sister)[phone] (P)      Transition of Care Barriers None (P)

## 2025-06-26 NOTE — PLAN OF CARE
Problem: Adult Inpatient Plan of Care  Goal: Plan of Care Review  Outcome: Progressing     Problem: Pneumonia  Goal: Effective Oxygenation and Ventilation  Outcome: Progressing     Problem: Skin Injury Risk Increased  Goal: Skin Health and Integrity  Outcome: Progressing

## 2025-06-27 PROBLEM — J96.01 ACUTE HYPOXIC RESPIRATORY FAILURE: Status: RESOLVED | Noted: 2024-02-29 | Resolved: 2025-06-27

## 2025-06-27 PROBLEM — E87.20 LACTIC ACIDOSIS: Status: RESOLVED | Noted: 2025-06-25 | Resolved: 2025-06-27

## 2025-06-27 LAB
ABSOLUTE EOSINOPHIL (OHS): 0.02 K/UL
ABSOLUTE MONOCYTE (OHS): 0.52 K/UL (ref 0.3–1)
ABSOLUTE NEUTROPHIL COUNT (OHS): 3.41 K/UL (ref 1.8–7.7)
ALBUMIN SERPL BCP-MCNC: 3 G/DL (ref 3.5–5.2)
ALP SERPL-CCNC: 49 UNIT/L (ref 40–150)
ALT SERPL W/O P-5'-P-CCNC: 9 UNIT/L (ref 10–44)
ANION GAP (OHS): 5 MMOL/L (ref 8–16)
AST SERPL-CCNC: 15 UNIT/L (ref 11–45)
BASOPHILS # BLD AUTO: 0.02 K/UL
BASOPHILS NFR BLD AUTO: 0.5 %
BILIRUB SERPL-MCNC: 0.5 MG/DL (ref 0.1–1)
BUN SERPL-MCNC: 17 MG/DL (ref 8–23)
CALCIUM SERPL-MCNC: 8.4 MG/DL (ref 8.7–10.5)
CHLORIDE SERPL-SCNC: 108 MMOL/L (ref 95–110)
CO2 SERPL-SCNC: 23 MMOL/L (ref 23–29)
CREAT SERPL-MCNC: 0.7 MG/DL (ref 0.5–1.4)
ERYTHROCYTE [DISTWIDTH] IN BLOOD BY AUTOMATED COUNT: 16.8 % (ref 11.5–14.5)
GFR SERPLBLD CREATININE-BSD FMLA CKD-EPI: >60 ML/MIN/1.73/M2
GLUCOSE SERPL-MCNC: 103 MG/DL (ref 70–110)
HCT VFR BLD AUTO: 29.8 % (ref 40–54)
HGB BLD-MCNC: 9.4 GM/DL (ref 14–18)
IMM GRANULOCYTES # BLD AUTO: 0.02 K/UL (ref 0–0.04)
IMM GRANULOCYTES NFR BLD AUTO: 0.5 % (ref 0–0.5)
LYMPHOCYTES # BLD AUTO: 0.37 K/UL (ref 1–4.8)
MAGNESIUM SERPL-MCNC: 2.1 MG/DL (ref 1.6–2.6)
MCH RBC QN AUTO: 30.9 PG (ref 27–31)
MCHC RBC AUTO-ENTMCNC: 31.5 G/DL (ref 32–36)
MCV RBC AUTO: 98 FL (ref 82–98)
MRSA ID BY PCR (OHS): NEGATIVE
NUCLEATED RBC (/100WBC) (OHS): 0 /100 WBC
PHOSPHATE SERPL-MCNC: 3 MG/DL (ref 2.7–4.5)
PLATELET # BLD AUTO: 127 K/UL (ref 150–450)
PMV BLD AUTO: 12.7 FL (ref 9.2–12.9)
POTASSIUM SERPL-SCNC: 3.7 MMOL/L (ref 3.5–5.1)
PROT SERPL-MCNC: 5.4 GM/DL (ref 6–8.4)
RBC # BLD AUTO: 3.04 M/UL (ref 4.6–6.2)
RELATIVE EOSINOPHIL (OHS): 0.5 %
RELATIVE LYMPHOCYTE (OHS): 8.5 % (ref 18–48)
RELATIVE MONOCYTE (OHS): 11.9 % (ref 4–15)
RELATIVE NEUTROPHIL (OHS): 78.1 % (ref 38–73)
SODIUM SERPL-SCNC: 136 MMOL/L (ref 136–145)
STAPH AUREUS ID BY PCR (OHS): NEGATIVE
WBC # BLD AUTO: 4.36 K/UL (ref 3.9–12.7)

## 2025-06-27 PROCEDURE — 94664 DEMO&/EVAL PT USE INHALER: CPT

## 2025-06-27 PROCEDURE — 85025 COMPLETE CBC W/AUTO DIFF WBC: CPT

## 2025-06-27 PROCEDURE — 63600175 PHARM REV CODE 636 W HCPCS

## 2025-06-27 PROCEDURE — 21400001 HC TELEMETRY ROOM

## 2025-06-27 PROCEDURE — 27000221 HC OXYGEN, UP TO 24 HOURS

## 2025-06-27 PROCEDURE — 25000003 PHARM REV CODE 250

## 2025-06-27 PROCEDURE — 83735 ASSAY OF MAGNESIUM: CPT

## 2025-06-27 PROCEDURE — 97116 GAIT TRAINING THERAPY: CPT | Mod: CQ

## 2025-06-27 PROCEDURE — 11000001 HC ACUTE MED/SURG PRIVATE ROOM

## 2025-06-27 PROCEDURE — 63700000 PHARM REV CODE 250 ALT 637 W/O HCPCS

## 2025-06-27 PROCEDURE — 94640 AIRWAY INHALATION TREATMENT: CPT

## 2025-06-27 PROCEDURE — 99900035 HC TECH TIME PER 15 MIN (STAT)

## 2025-06-27 PROCEDURE — 94761 N-INVAS EAR/PLS OXIMETRY MLT: CPT

## 2025-06-27 PROCEDURE — 97530 THERAPEUTIC ACTIVITIES: CPT | Mod: CQ

## 2025-06-27 PROCEDURE — 87641 MR-STAPH DNA AMP PROBE: CPT

## 2025-06-27 PROCEDURE — 25000242 PHARM REV CODE 250 ALT 637 W/ HCPCS

## 2025-06-27 PROCEDURE — 84100 ASSAY OF PHOSPHORUS: CPT

## 2025-06-27 PROCEDURE — 36415 COLL VENOUS BLD VENIPUNCTURE: CPT

## 2025-06-27 PROCEDURE — 82040 ASSAY OF SERUM ALBUMIN: CPT

## 2025-06-27 RX ORDER — PREDNISONE 20 MG/1
40 TABLET ORAL DAILY
Qty: 4 TABLET | Refills: 0 | Status: SHIPPED | OUTPATIENT
Start: 2025-06-28 | End: 2025-06-29

## 2025-06-27 RX ORDER — BUDESONIDE AND FORMOTEROL FUMARATE DIHYDRATE 160; 4.5 UG/1; UG/1
2 AEROSOL RESPIRATORY (INHALATION) 2 TIMES DAILY PRN
Qty: 10.2 G | Refills: 0 | Status: SHIPPED | OUTPATIENT
Start: 2025-06-27 | End: 2026-06-27

## 2025-06-27 RX ORDER — FLUTICASONE PROPIONATE 50 MCG
2 SPRAY, SUSPENSION (ML) NASAL DAILY
Qty: 16 G | Refills: 0 | Status: SHIPPED | OUTPATIENT
Start: 2025-06-28

## 2025-06-27 RX ORDER — BENZONATATE 100 MG/1
100 CAPSULE ORAL 3 TIMES DAILY PRN
Status: DISCONTINUED | OUTPATIENT
Start: 2025-06-27 | End: 2025-06-30 | Stop reason: HOSPADM

## 2025-06-27 RX ADMIN — Medication 1000 UNITS: at 08:06

## 2025-06-27 RX ADMIN — VALACYCLOVIR HYDROCHLORIDE 500 MG: 500 TABLET, FILM COATED ORAL at 08:06

## 2025-06-27 RX ADMIN — GUAIFENESIN 1200 MG: 600 TABLET, EXTENDED RELEASE ORAL at 08:06

## 2025-06-27 RX ADMIN — ATORVASTATIN CALCIUM 40 MG: 40 TABLET, FILM COATED ORAL at 08:06

## 2025-06-27 RX ADMIN — IPRATROPIUM BROMIDE AND ALBUTEROL SULFATE 3 ML: 2.5; .5 SOLUTION RESPIRATORY (INHALATION) at 03:06

## 2025-06-27 RX ADMIN — BENZONATATE 100 MG: 100 CAPSULE ORAL at 01:06

## 2025-06-27 RX ADMIN — LEVOTHYROXINE SODIUM 25 MCG: 0.03 TABLET ORAL at 05:06

## 2025-06-27 RX ADMIN — IPRATROPIUM BROMIDE AND ALBUTEROL SULFATE 3 ML: 2.5; .5 SOLUTION RESPIRATORY (INHALATION) at 12:06

## 2025-06-27 RX ADMIN — FENOFIBRATE 145 MG: 145 TABLET ORAL at 08:06

## 2025-06-27 RX ADMIN — CALCIUM CARBONATE (ANTACID) CHEW TAB 500 MG 500 MG: 500 CHEW TAB at 08:06

## 2025-06-27 RX ADMIN — THERA TABS 1 TABLET: TAB at 08:06

## 2025-06-27 RX ADMIN — CEFTRIAXONE SODIUM 1 G: 1 INJECTION, POWDER, FOR SOLUTION INTRAMUSCULAR; INTRAVENOUS at 02:06

## 2025-06-27 RX ADMIN — PREDNISONE 40 MG: 20 TABLET ORAL at 08:06

## 2025-06-27 RX ADMIN — FLUOXETINE HYDROCHLORIDE 20 MG: 20 CAPSULE ORAL at 08:06

## 2025-06-27 RX ADMIN — AMLODIPINE BESYLATE 5 MG: 5 TABLET ORAL at 08:06

## 2025-06-27 RX ADMIN — BENZONATATE 100 MG: 100 CAPSULE ORAL at 10:06

## 2025-06-27 RX ADMIN — CLOPIDOGREL 75 MG: 75 TABLET ORAL at 08:06

## 2025-06-27 RX ADMIN — PANTOPRAZOLE SODIUM 40 MG: 40 TABLET, DELAYED RELEASE ORAL at 08:06

## 2025-06-27 RX ADMIN — OXYCODONE HYDROCHLORIDE AND ACETAMINOPHEN 500 MG: 500 TABLET ORAL at 08:06

## 2025-06-27 RX ADMIN — IPRATROPIUM BROMIDE AND ALBUTEROL SULFATE 3 ML: 2.5; .5 SOLUTION RESPIRATORY (INHALATION) at 11:06

## 2025-06-27 RX ADMIN — APIXABAN 5 MG: 5 TABLET, FILM COATED ORAL at 08:06

## 2025-06-27 RX ADMIN — AZITHROMYCIN DIHYDRATE 500 MG: 250 TABLET ORAL at 08:06

## 2025-06-27 RX ADMIN — IPRATROPIUM BROMIDE AND ALBUTEROL SULFATE 3 ML: 2.5; .5 SOLUTION RESPIRATORY (INHALATION) at 07:06

## 2025-06-27 RX ADMIN — ALLOPURINOL 300 MG: 100 TABLET ORAL at 08:06

## 2025-06-27 NOTE — PROGRESS NOTES
"[facility] Medicine Progress Note      Subjective/Interval History      Patient reports feeling his breathing has improved. Continues to have productive cough. Denies fever, chills, chest pain. Receiving a breathing treatment on exam this AM.      Objective     Physical Examination:  /75 (BP Location: Left arm, Patient Position: Lying)   Pulse 82   Temp 98 °F (36.7 °C) (Oral)   Resp 18   Ht 6' 1" (1.854 m)   Wt 104.1 kg (229 lb 8 oz)   SpO2 98%   BMI 30.28 kg/m²    General: No acute distress  HEENT: Atraumatic, normocephalic, moist mucous membranes  Cardiovascular: tachycardic, regular rhythm, normal S1 and S2, no extra heart sounds or murmurs appreciated   Chest wall: Non-tender to palpation, no gross deformities noted   Back: No abnormal curvature noted, symmetric rise with respiration   Respiratory: Stable on 1LNC, no conversational dyspnea, no accessory muscle use noted, clear to auscultation bilaterally, no wheezes or crackles   Abdominal: Non-distended, soft, normoactive bowel sounds, non-tender to palpation, no guarding  Extremities: Atraumatic, moving all four extremities  Skin: Non-diaphoretic, warm, dry  Neurologic: No gross focal neurologic deficits noted ; residual right sided weakness in UE     Intake/Output:    Intake/Output Summary (Last 24 hours) at 6/27/2025 0812  Last data filed at 6/26/2025 2004  Gross per 24 hour   Intake 34.17 ml   Output 1250 ml   Net -1215.83 ml     Net IO Since Admission: -1,215.76 mL [06/27/25 0812]    Laboratory data: reviewed     Microbiology data: reviewed     EKG data: reviewed     Radiology data: reviewed      Current Medications:     Infusions:       Scheduled:   albuterol-ipratropium  3 mL Nebulization Q4H    allopurinoL  300 mg Oral Daily    amLODIPine  5 mg Oral Daily    apixaban  5 mg Oral BID    ascorbic acid (vitamin C)  500 mg Oral BID    atorvastatin  40 mg Oral Daily    azithromycin  500 mg Oral Daily    calcium carbonate  500 mg Oral Daily    " cefTRIAXone (Rocephin) IV (PEDS and ADULTS)  1 g Intravenous Q24H    clopidogreL  75 mg Oral Daily    fenofibrate  145 mg Oral Daily    ferrous sulfate  1 tablet Oral Every other day    FLUoxetine  20 mg Oral Daily    fluticasone propionate  2 spray Each Nostril Daily    guaiFENesin  1,200 mg Oral BID    levothyroxine  25 mcg Oral Before breakfast    multivitamin  1 tablet Oral Daily    pantoprazole  40 mg Oral Daily    predniSONE  40 mg Oral Daily    valACYclovir  500 mg Oral BID    vitamin D  1,000 Units Oral Daily        PRN:    Current Facility-Administered Medications:     albuterol-ipratropium, 3 mL, Nebulization, Q6H PRN    bisacodyL, 10 mg, Oral, Daily PRN    dextrose 50%, 12.5 g, Intravenous, PRN    dextrose 50%, 25 g, Intravenous, PRN    glucagon (human recombinant), 1 mg, Intramuscular, PRN    glucose, 16 g, Oral, PRN    glucose, 24 g, Oral, PRN    naloxone, 0.02 mg, Intravenous, PRN    sodium chloride 0.9%, 10 mL, Intravenous, Q12H PRN      Assessment/Plan:     Harrison Haywood is a 70 y.o. male with past medical history of multiple myeloma with spinal lytic lesions, hx of DVT, CVA with residual left sided weakness, HTN, HLD, and anxiety who presented on 2025 for Respiratory Distress.     Acute Hypoxic Respiratory Failure  Parainfluenza Virus   Adenovirus   SIRS +   Lactic acidosis, resolved  - pt presenting with SOB x 3 days with acute worsening the day of presentation  - started on levaquin at NH   - SIRS criteria met in ED with tachycardia to 123, tachypneic to 30   - WBC 6.67 (increased from baseline ~3)  - procal 0.20   - flu negative, resp panel positive for adenovirus and parainfluenza virus, resp culture rare GNRs, rare GPC   - d-dimer 0.52, pt has been on eliquis for DVT    - BNP 75, trop 0.021  - lactic acid 4.3 > 2.4 > 1.0   - CXR without focal consolidation  - VB.352, pCO2 41.7, pO2 41.6    - received ctx and azithro in ED   - initially requiring BiPAP in ED due to work of breathing,  able to wean to [facility] NGTD  PLAN:   - continue supportive care: duonebs, chest physiotherapy, mucinex, flonase   - started prednisone 40 mg daily, continue CAP coverage      Multiple Myeloma   - Dx in 2023, imitated on tx in 1/2025 and on current regimen since 3/2024: q28 day cycles with Revlimid 25 po daily days 1-21, Daratumumab 1800-30K units sub Q day 1, Decadron 20 po daily days 1, 8 , 15, 22, tylenol 650 po and benadryl 25 po to be given 1 hr prior to injections   - ECOG 3 with known lytic lesions   - last cycle on 6/17 with q28 cycles followed by Dr. Howard   - continue ppx: allopurinol 300 mg daily and valtrex 500 mg daily   - continue calcium carbonate, vitamin D       Hypertension   Hyperlipidemia   - continue home amlodipine 5 mg   - continue lipitor and fenofibrate      Bilateral LE DVT  - DVT study + for bilateral LE DVT on 4/15/2025  - continue eliquis 5 mg BID      Hx of CVA with residual left sided deficits  - CVA in 2024   - continue plavix      Hypothyroidism   - continue home synthroid 25 mcg   - TSH wnl      Macrocytic anemia  - hgb 11.2,  (baseline hgb ~ 10)  - home med: ferrous sulfate   - b12 683, folate 14.8, iron studies TSAT 29, ferritin 644     Anxiety   Depression   - continue zoloft      GERD  - continue protonix         Healthcare maintenance   -primary care provider is Guanakito Mendez MD      Diet: cardiac   VTE PPx: eliquis   Code Status: Full      Dispo: continue supportive care for shortness of breath  Estimated LOS: 1-2 days         Sara Arellano MD   [facility] PGY-1

## 2025-06-27 NOTE — PT/OT/SLP PROGRESS
Occupational Therapy  Visit Attempt    Patient Name:  Harrison Haywood   MRN:  [id number]    Patient not seen today secondary to Patient fatigue -- reports he just got back to bed after being in chair all morning after P.T. Declining OT at this time.     6/27/2025

## 2025-06-27 NOTE — PT/OT/SLP PROGRESS
Physical Therapy Treatment    Patient Name:  Harrison Haywood   MRN:  [id number]    Recommendations:     Discharge Recommendations: Low Intensity Therapy  Discharge Equipment Recommendations: none  Barriers to discharge: decreased mobility,strength and endurance    Assessment:     Harrison Haywood is a 70 y.o. male admitted with a medical diagnosis of Acute hypoxic respiratory failure.  He presents with the following impairments/functional limitations: weakness, impaired endurance, impaired functional mobility, gait instability, impaired balance, decreased upper extremity function, decreased lower extremity function, decreased ROM, impaired coordination, impaired cardiopulmonary response to activity,pt with increased cough and requires some assistance with mobility,pt will benefit from low intensity therapy upon discharge.    Rehab Prognosis: Fair; patient would benefit from acute skilled PT services to address these deficits and reach maximum level of function.    Recent Surgery: * No surgery found *      Plan:     During this hospitalization, patient to be seen 3 x/week to address the identified rehab impairments via gait training, therapeutic activities, therapeutic exercises, neuromuscular re-education and progress toward the following goals:    Plan of Care Expires:  07/26/25    Subjective     Chief Complaint: n/a  Patient/Family Comments/goals: pt agreeable to up in chair.  Pain/Comfort:  Pain Rating 1:  (no c/o's)      Objective:     Communicated with nsg prior to session.  Patient found supine with bed alarm, peripheral IV, telemetry upon PT entry to room.     General Precautions: Standard, fall  Orthopedic Precautions: N/A  Braces: N/A  Respiratory Status: Room air     Functional Mobility:  Bed Mobility:     Supine to Sit: minimum assistance and moderate assistance  Transfers:     Sit to Stand:  minimum assistance with rolling walker  Bed to Chair: minimum assistance with  rolling walker  using  ambulation  Gait:  amb ~14' with RW and Min A  Balance: fair standing balance with RW      AM-PAC 6 CLICK MOBILITY  Turning over in bed (including adjusting bedclothes, sheets and blankets)?: 3  Sitting down on and standing up from a chair with arms (e.g., wheelchair, bedside commode, etc.): 3  Moving from lying on back to sitting on the side of the bed?: 2  Moving to and from a bed to a chair (including a wheelchair)?: 3  Need to walk in hospital room?: 3  Climbing 3-5 steps with a railing?: 1  Basic Mobility Total Score: 15       Treatment & Education: pt sat EOB ~3 mins with S,increased time required with mobility,needs in reach      Patient left up in chair with all lines intact, call button in reach, chair alarm on, and nsg present..    GOALS: see general POC  Multidisciplinary Problems       Physical Therapy Goals          Problem: Physical Therapy    Goal Priority Disciplines Outcome Interventions   Physical Therapy Goal     PT, PT/OT Progressing    Description: Goals to be met by: 25     Patient will increase functional independence with mobility by performin. Supine to sit with Stand-by Assistance  2. Sit to supine with Stand-by Assistance  3. Sit to stand transfer with Stand-by Assistance with use of RW.   4. Bed to chair transfer with Stand-by Assistance using Rolling Walker  5. Gait  x 15 feet with Stand-by Assistance using Rolling Walker.   6. Wheelchair propulsion x150 feet with Stand-by Assistance using BLE to pedal                         DME Justifications:  No DME recommended requiring DME justifications    Time Tracking:     PT Received On: 25  PT Start Time: 838     PT Stop Time: 902  PT Total Time (min): 24 min     Billable Minutes: Gait Training 14 and Therapeutic Activity 10    Treatment Type: Treatment  PT/PTA: PTA     Number of PTA visits since last PT visit: 2025

## 2025-06-27 NOTE — CARE UPDATE
[facility]    FACILITY TRANSFER ORDERS      Patient Name: Harrison Haywood  YOB: 1955    PCP: Guanakito Mendez MD   PCP Address: [address] [place] [address] / [place] [address]  PCP Phone Number: [phone]  PCP Fax: [phone]    Encounter Date: 06/30/2025    Admit to: [facility]     Vital Signs:  Routine    Maintain oxygen saturation >92%     Diagnoses:   Active Hospital Problems    Diagnosis  POA    *Acute respiratory failure with hypoxia [J96.01]  Yes    Respiratory tract congestion with cough [R05.8]  Yes    Macrocytic anemia [D53.9]  Yes    Acute deep vein thrombosis (DVT) of proximal vein of both lower extremities [I82.4Y3]  Yes    Cerebrovascular accident (CVA) [I63.9]  Yes    Acute bronchitis [J20.9]  Yes    Adenovirus infection [B34.0]  Yes    Infection due to parainfluenza virus 3 [B34.8]  Yes    Multiple myeloma [C90.00]  Yes    Tremor [R25.1]  Yes    Anxiety and depression [F41.9, F32.A]  Yes    Pancytopenia [D61.818]  Yes    Primary hypertension [I10]  Yes    Other hyperlipidemia [E78.49]  Yes      Resolved Hospital Problems    Diagnosis Date Resolved POA    Acute respiratory distress [R06.03] 06/25/2025 No    Lactic acidosis [E87.20] 06/27/2025 Yes    Acute hypoxemic respiratory failure [J96.01] 06/26/2025 Yes    Acute hypoxic respiratory failure [J96.01] 06/27/2025 Yes       Allergies:  Review of patient's allergies indicates:   Allergen Reactions    Aspirin Hives    Penicillins Hives       Diet: cardiac diet    Activities: Activity as tolerated    Goals of Care Treatment Preferences:  Code Status: Full Code      Nursing: per facility      Labs: per facility     CONSULTS:    None     MISCELLANEOUS CARE:  Oxygen prn to maintain oxygen saturation >92%    WOUND CARE ORDERS  None    Medications: Review discharge medications with patient and family and provide education.         Medication List        PAUSE taking these medications      celecoxib 200 MG capsule  Wait  to take this until your doctor or other care provider tells you to start again.  Commonly known as: CeleBREX  Take 200 mg by mouth 2 (two) times daily.     furosemide 20 MG tablet  Wait to take this until your doctor or other care provider tells you to start again.  Commonly known as: LASIX  Take 20 mg by mouth once daily.     potassium chloride SA 20 MEQ tablet  Wait to take this until your doctor or other care provider tells you to start again.  Commonly known as: K-DUR,KLOR-CON  Take 20 mEq by mouth once daily.            START taking these medications      budesonide-formoterol 160-4.5 mcg 160-4.5 mcg/actuation Hfaa  Commonly known as: SYMBICORT  Inhale 2 puffs into the lungs 2 (two) times daily as needed (shortness of breath, wheezing). Controller     fluticasone propionate 50 mcg/actuation nasal spray  Commonly known as: FLONASE  2 sprays (100 mcg total) by Each Nostril route once daily.            CHANGE how you take these medications      guaiFENesin 600 mg 12 hr tablet  Commonly known as: MUCINEX  Take 1,200 mg by mouth 2 (two) times daily.  What changed: Another medication with the same name was removed. Continue taking this medication, and follow the directions you see here.            CONTINUE taking these medications      acetaminophen 325 MG tablet  Commonly known as: TYLENOL  Take 650 mg by mouth every 8 (eight) hours as needed for Pain. 2 tablets     albuterol-ipratropium 2.5 mg-0.5 mg/3 mL nebulizer solution  Commonly known as: DUO-NEB  Take 3 mLs by nebulization every 6 (six) hours as needed for Shortness of Breath (cough).     allopurinoL 300 MG tablet  Commonly known as: ZYLOPRIM  Take 300 mg by mouth once daily.     amLODIPine 5 MG tablet  Commonly known as: NORVASC  Take 5 mg by mouth once daily.     ARGINAID 4.5 gram-156 mg/9.2 gram Pwpk  Generic drug: arginine-vitamin C-vitamin E  Take 1 packet by mouth 2 (two) times a day. 8 ounces water     atorvastatin 40 MG tablet  Commonly known as:  LIPITOR  Take 40 mg by mouth once daily.     benzonatate 100 MG capsule  Commonly known as: TESSALON  Take 100 mg by mouth every 8 (eight) hours as needed for Cough.     bisacodyL 5 mg EC tablet  Commonly known as: DULCOLAX  Take 10 mg by mouth daily as needed for Constipation.     calcium carbonate 600 mg calcium (1,500 mg) Tab  Commonly known as: OS-SONIA  Take 1,200 mg by mouth every evening. 2 tablets     clopidogreL 75 mg tablet  Commonly known as: PLAVIX  Take 75 mg by mouth once daily.     dexAMETHasone 4 MG Tab  Commonly known as: DECADRON  Take 20 mg by mouth every Monday. Five 4mg tablets with breakfast weekly     ELIQUIS 5 mg Tab  Generic drug: apixaban  Take 1 tablet (5 mg total) by mouth 2 (two) times daily.     fenofibrate 145 MG tablet  Commonly known as: TRICOR  Take 145 mg by mouth once daily.     ferrous sulfate 325 (65 FE) MG EC tablet  Take 325 mg by mouth once daily.     FLUoxetine 20 MG capsule  Take 20 mg by mouth once daily.     HYDROcodone-acetaminophen 5-325 mg per tablet  Commonly known as: NORCO  Take 1 tablet by mouth every 6 (six) hours as needed for Pain.     lenalidomide 25 mg Cap  Take 25 mg by mouth nightly On days 1-21; every 28 day cycle.     levothyroxine 25 MCG tablet  Commonly known as: SYNTHROID  Take 25 mcg by mouth before breakfast.     ondansetron 4 MG tablet  Commonly known as: ZOFRAN  Take 4 mg by mouth every 6 (six) hours as needed (vomiting).     ONE DAILY MULTIVITAMIN per tablet  Generic drug: multivitamin  Take 1 tablet by mouth once daily.     pantoprazole 40 MG tablet  Commonly known as: PROTONIX  Take 40 mg by mouth once daily.     PROMOD PROTEIN Liqd  Generic drug: protein supplement  Take 30 mLs by mouth 2 (two) times a day.     REFRESH TEARS 0.5 % Drop  Generic drug: carboxymethylcellulose sodium  Place 2 drops into both eyes 3 (three) times daily.     Saline NasaL 0.65 % nasal spray  Generic drug: sodium chloride  1 spray by Nasal route 2 (two) times daily as  needed.     valACYclovir 500 MG tablet  Commonly known as: VALTREX  Take 500 mg by mouth 2 (two) times daily.     VITAMIN C 500 MG tablet  Generic drug: ascorbic acid (vitamin C)  Take 500 mg by mouth 2 (two) times daily.     vitamin D 1000 units Tab  Commonly known as: VITAMIN D3  Take 1,000 Units by mouth once daily.            STOP taking these medications      budesonide 90 mcg/actuation Aepb  Commonly known as: PULMICORT FLEXHALER     levoFLOXacin 500 MG tablet  Commonly known as: LEVAQUIN     levoFLOXacin 750 MG tablet  Commonly known as: LEVAQUIN                Immunizations Administered as of 6/30/2025       No immunizations on file.                     _________________________________  Sara Arellano MD  06/30/2025

## 2025-06-27 NOTE — PLAN OF CARE
Problem: Physical Therapy  Goal: Physical Therapy Goal  Description: Goals to be met by: 25     Patient will increase functional independence with mobility by performin. Supine to sit with Stand-by Assistance  2. Sit to supine with Stand-by Assistance  3. Sit to stand transfer with Stand-by Assistance with use of RW.   4. Bed to chair transfer with Stand-by Assistance using Rolling Walker  5. Gait  x 15 feet with Stand-by Assistance using Rolling Walker.   6. Wheelchair propulsion x150 feet with Stand-by Assistance using BLE to pedal    Outcome: Progressing

## 2025-06-27 NOTE — PLAN OF CARE
Problem: Adult Inpatient Plan of Care  Goal: Plan of Care Review  Outcome: Not Progressing  Goal: Patient-Specific Goal (Individualized)  Outcome: Not Progressing  Goal: Absence of Hospital-Acquired Illness or Injury  Outcome: Not Progressing  Goal: Optimal Comfort and Wellbeing  Outcome: Not Progressing  Goal: Readiness for Transition of Care  Outcome: Not Progressing     Problem: Pneumonia  Goal: Fluid Balance  Outcome: Not Progressing  Goal: Resolution of Infection Signs and Symptoms  Outcome: Not Progressing  Goal: Effective Oxygenation and Ventilation  Outcome: Not Progressing     Problem: Wound  Goal: Optimal Coping  Outcome: Not Progressing  Goal: Optimal Functional Ability  Outcome: Not Progressing  Goal: Absence of Infection Signs and Symptoms  Outcome: Not Progressing  Goal: Improved Oral Intake  Outcome: Not Progressing  Goal: Optimal Pain Control and Function  Outcome: Not Progressing  Goal: Skin Health and Integrity  Outcome: Not Progressing  Goal: Optimal Wound Healing  Outcome: Not Progressing     Problem: Skin Injury Risk Increased  Goal: Skin Health and Integrity  Outcome: Not Progressing

## 2025-06-27 NOTE — PLAN OF CARE
TORY was informed of pt's anticipated discharge date of tomorrow. Pt resides at [facility]. TORY sent [facility] facility transfer orders for tomorrow. TORY spoke with Halie from [facility]. TORY informed Halie of pt's discharge date of tomorrow and that facility transfer orders were sent. Halie verbalized understanding and stated pt can return tomorrow. Halie stated she will leave a message to intake tomorrow stating that pt can return tomorrow.     TORY will continue to follow.        06/27/25 1127   Post-Acute Status   Post-Acute Authorization Placement   Discharge Plan   Discharge Plan A Return to nursing home

## 2025-06-28 PROBLEM — R05.8 RESPIRATORY TRACT CONGESTION WITH COUGH: Status: ACTIVE | Noted: 2025-06-28

## 2025-06-28 LAB
ABSOLUTE EOSINOPHIL (OHS): 0.02 K/UL
ABSOLUTE MONOCYTE (OHS): 0.58 K/UL (ref 0.3–1)
ABSOLUTE NEUTROPHIL COUNT (OHS): 3.45 K/UL (ref 1.8–7.7)
ALBUMIN SERPL BCP-MCNC: 3.1 G/DL (ref 3.5–5.2)
ALP SERPL-CCNC: 49 UNIT/L (ref 40–150)
ALT SERPL W/O P-5'-P-CCNC: 7 UNIT/L (ref 10–44)
ANION GAP (OHS): 7 MMOL/L (ref 8–16)
AST SERPL-CCNC: 14 UNIT/L (ref 11–45)
BASOPHILS # BLD AUTO: 0.01 K/UL
BASOPHILS NFR BLD AUTO: 0.2 %
BILIRUB SERPL-MCNC: 0.4 MG/DL (ref 0.1–1)
BUN SERPL-MCNC: 15 MG/DL (ref 8–23)
CALCIUM SERPL-MCNC: 8.5 MG/DL (ref 8.7–10.5)
CHLORIDE SERPL-SCNC: 111 MMOL/L (ref 95–110)
CO2 SERPL-SCNC: 22 MMOL/L (ref 23–29)
CREAT SERPL-MCNC: 0.7 MG/DL (ref 0.5–1.4)
ERYTHROCYTE [DISTWIDTH] IN BLOOD BY AUTOMATED COUNT: 16.5 % (ref 11.5–14.5)
GFR SERPLBLD CREATININE-BSD FMLA CKD-EPI: >60 ML/MIN/1.73/M2
GLUCOSE SERPL-MCNC: 95 MG/DL (ref 70–110)
HCT VFR BLD AUTO: 29.2 % (ref 40–54)
HGB BLD-MCNC: 9.5 GM/DL (ref 14–18)
IMM GRANULOCYTES # BLD AUTO: 0.02 K/UL (ref 0–0.04)
IMM GRANULOCYTES NFR BLD AUTO: 0.4 % (ref 0–0.5)
LYMPHOCYTES # BLD AUTO: 0.43 K/UL (ref 1–4.8)
MAGNESIUM SERPL-MCNC: 2.1 MG/DL (ref 1.6–2.6)
MCH RBC QN AUTO: 31.8 PG (ref 27–31)
MCHC RBC AUTO-ENTMCNC: 32.5 G/DL (ref 32–36)
MCV RBC AUTO: 98 FL (ref 82–98)
MRSA PCR SCRN (OHS): DETECTED
NUCLEATED RBC (/100WBC) (OHS): 0 /100 WBC
PHOSPHATE SERPL-MCNC: 3.1 MG/DL (ref 2.7–4.5)
PLATELET # BLD AUTO: 142 K/UL (ref 150–450)
PMV BLD AUTO: 12.2 FL (ref 9.2–12.9)
POTASSIUM SERPL-SCNC: 3.4 MMOL/L (ref 3.5–5.1)
PROT SERPL-MCNC: 5.5 GM/DL (ref 6–8.4)
RBC # BLD AUTO: 2.99 M/UL (ref 4.6–6.2)
RELATIVE EOSINOPHIL (OHS): 0.4 %
RELATIVE LYMPHOCYTE (OHS): 9.5 % (ref 18–48)
RELATIVE MONOCYTE (OHS): 12.9 % (ref 4–15)
RELATIVE NEUTROPHIL (OHS): 76.6 % (ref 38–73)
SODIUM SERPL-SCNC: 140 MMOL/L (ref 136–145)
WBC # BLD AUTO: 4.51 K/UL (ref 3.9–12.7)

## 2025-06-28 PROCEDURE — 63600175 PHARM REV CODE 636 W HCPCS

## 2025-06-28 PROCEDURE — 25000003 PHARM REV CODE 250

## 2025-06-28 PROCEDURE — 11000001 HC ACUTE MED/SURG PRIVATE ROOM

## 2025-06-28 PROCEDURE — 63700000 PHARM REV CODE 250 ALT 637 W/O HCPCS

## 2025-06-28 PROCEDURE — 25000242 PHARM REV CODE 250 ALT 637 W/ HCPCS

## 2025-06-28 PROCEDURE — 36415 COLL VENOUS BLD VENIPUNCTURE: CPT

## 2025-06-28 PROCEDURE — 99900035 HC TECH TIME PER 15 MIN (STAT)

## 2025-06-28 PROCEDURE — 84100 ASSAY OF PHOSPHORUS: CPT

## 2025-06-28 PROCEDURE — 94640 AIRWAY INHALATION TREATMENT: CPT

## 2025-06-28 PROCEDURE — 21400001 HC TELEMETRY ROOM

## 2025-06-28 PROCEDURE — 85025 COMPLETE CBC W/AUTO DIFF WBC: CPT

## 2025-06-28 PROCEDURE — 83735 ASSAY OF MAGNESIUM: CPT

## 2025-06-28 PROCEDURE — 94761 N-INVAS EAR/PLS OXIMETRY MLT: CPT

## 2025-06-28 PROCEDURE — 80053 COMPREHEN METABOLIC PANEL: CPT

## 2025-06-28 PROCEDURE — 94664 DEMO&/EVAL PT USE INHALER: CPT

## 2025-06-28 RX ORDER — ACETYLCYSTEINE 100 MG/ML
4 SOLUTION ORAL; RESPIRATORY (INHALATION) 4 TIMES DAILY
Status: DISCONTINUED | OUTPATIENT
Start: 2025-06-28 | End: 2025-06-30 | Stop reason: HOSPADM

## 2025-06-28 RX ORDER — ACETYLCYSTEINE 100 MG/ML
70 SOLUTION ORAL; RESPIRATORY (INHALATION) ONCE
Status: DISCONTINUED | OUTPATIENT
Start: 2025-06-28 | End: 2025-06-28

## 2025-06-28 RX ORDER — ACETYLCYSTEINE 100 MG/ML
70 SOLUTION ORAL; RESPIRATORY (INHALATION)
Status: DISCONTINUED | OUTPATIENT
Start: 2025-06-28 | End: 2025-06-28

## 2025-06-28 RX ADMIN — CEFTRIAXONE SODIUM 1 G: 1 INJECTION, POWDER, FOR SOLUTION INTRAMUSCULAR; INTRAVENOUS at 02:06

## 2025-06-28 RX ADMIN — PANTOPRAZOLE SODIUM 40 MG: 40 TABLET, DELAYED RELEASE ORAL at 09:06

## 2025-06-28 RX ADMIN — PREDNISONE 40 MG: 20 TABLET ORAL at 09:06

## 2025-06-28 RX ADMIN — ACETYLCYSTEINE 4 ML: 100 INHALANT RESPIRATORY (INHALATION) at 11:06

## 2025-06-28 RX ADMIN — ALLOPURINOL 300 MG: 100 TABLET ORAL at 09:06

## 2025-06-28 RX ADMIN — VALACYCLOVIR HYDROCHLORIDE 500 MG: 500 TABLET, FILM COATED ORAL at 08:06

## 2025-06-28 RX ADMIN — BENZONATATE 100 MG: 100 CAPSULE ORAL at 05:06

## 2025-06-28 RX ADMIN — FLUOXETINE HYDROCHLORIDE 20 MG: 20 CAPSULE ORAL at 09:06

## 2025-06-28 RX ADMIN — APIXABAN 5 MG: 5 TABLET, FILM COATED ORAL at 08:06

## 2025-06-28 RX ADMIN — APIXABAN 5 MG: 5 TABLET, FILM COATED ORAL at 09:06

## 2025-06-28 RX ADMIN — BENZONATATE 100 MG: 100 CAPSULE ORAL at 02:06

## 2025-06-28 RX ADMIN — Medication 1000 UNITS: at 09:06

## 2025-06-28 RX ADMIN — IPRATROPIUM BROMIDE AND ALBUTEROL SULFATE 3 ML: 2.5; .5 SOLUTION RESPIRATORY (INHALATION) at 03:06

## 2025-06-28 RX ADMIN — OXYCODONE HYDROCHLORIDE AND ACETAMINOPHEN 500 MG: 500 TABLET ORAL at 09:06

## 2025-06-28 RX ADMIN — IPRATROPIUM BROMIDE AND ALBUTEROL SULFATE 3 ML: 2.5; .5 SOLUTION RESPIRATORY (INHALATION) at 11:06

## 2025-06-28 RX ADMIN — IPRATROPIUM BROMIDE AND ALBUTEROL SULFATE 3 ML: 2.5; .5 SOLUTION RESPIRATORY (INHALATION) at 07:06

## 2025-06-28 RX ADMIN — ACETYLCYSTEINE 4 ML: 100 INHALANT RESPIRATORY (INHALATION) at 07:06

## 2025-06-28 RX ADMIN — AMLODIPINE BESYLATE 5 MG: 5 TABLET ORAL at 09:06

## 2025-06-28 RX ADMIN — THERA TABS 1 TABLET: TAB at 09:06

## 2025-06-28 RX ADMIN — POTASSIUM BICARBONATE 25 MEQ: 977.5 TABLET, EFFERVESCENT ORAL at 12:06

## 2025-06-28 RX ADMIN — AZITHROMYCIN DIHYDRATE 500 MG: 250 TABLET ORAL at 09:06

## 2025-06-28 RX ADMIN — GUAIFENESIN 1200 MG: 600 TABLET, EXTENDED RELEASE ORAL at 08:06

## 2025-06-28 RX ADMIN — ACETYLCYSTEINE 4 ML: 100 INHALANT RESPIRATORY (INHALATION) at 03:06

## 2025-06-28 RX ADMIN — FERROUS SULFATE TAB 325 MG (65 MG ELEMENTAL FE) 1 EACH: 325 (65 FE) TAB at 09:06

## 2025-06-28 RX ADMIN — ATORVASTATIN CALCIUM 40 MG: 40 TABLET, FILM COATED ORAL at 09:06

## 2025-06-28 RX ADMIN — OXYCODONE HYDROCHLORIDE AND ACETAMINOPHEN 500 MG: 500 TABLET ORAL at 08:06

## 2025-06-28 RX ADMIN — CLOPIDOGREL 75 MG: 75 TABLET ORAL at 09:06

## 2025-06-28 RX ADMIN — VALACYCLOVIR HYDROCHLORIDE 500 MG: 500 TABLET, FILM COATED ORAL at 09:06

## 2025-06-28 RX ADMIN — LEVOTHYROXINE SODIUM 25 MCG: 0.03 TABLET ORAL at 05:06

## 2025-06-28 RX ADMIN — FENOFIBRATE 145 MG: 145 TABLET ORAL at 09:06

## 2025-06-28 RX ADMIN — GUAIFENESIN 1200 MG: 600 TABLET, EXTENDED RELEASE ORAL at 09:06

## 2025-06-28 NOTE — PROGRESS NOTES
"[facility] Medicine Progress Note      Subjective/Interval History       Continues to have productive cough, chest congestion. Breathing improving slowly each day. On room air. Denies fever, chills, chest pain.     Objective     Physical Examination:  /72   Pulse 71   Temp 98.6 °F (37 °C) (Oral)   Resp 16   Ht 6' 1" (1.854 m)   Wt 104.1 kg (229 lb 8 oz)   SpO2 (!) 93%   BMI 30.28 kg/m²    General: No acute distress  HEENT: Atraumatic, normocephalic, moist mucous membranes  Cardiovascular: tachycardic, regular rhythm, normal S1 and S2, no extra heart sounds or murmurs appreciated   Chest wall: Non-tender to palpation, no gross deformities noted   Back: No abnormal curvature noted, symmetric rise with respiration   Respiratory: Stable on RA, chest congestion, some wheezing  Abdominal: Non-distended, soft, normoactive bowel sounds, non-tender to palpation, no guarding  Extremities: Atraumatic, moving all four extremities  Skin: Non-diaphoretic, warm, dry  Neurologic: No gross focal neurologic deficits noted ; residual right sided weakness in UE     Intake/Output:    Intake/Output Summary (Last 24 hours) at 6/28/2025 0705  Last data filed at 6/28/2025 0527  Gross per 24 hour   Intake 720 ml   Output 1301 ml   Net -581 ml     Net IO Since Admission: -1,796.76 mL [06/28/25 0705]    Laboratory data: reviewed     Microbiology data: reviewed     EKG data: reviewed     Radiology data: reviewed      Current Medications:     Infusions:       Scheduled:   albuterol-ipratropium  3 mL Nebulization Q4H    allopurinoL  300 mg Oral Daily    amLODIPine  5 mg Oral Daily    apixaban  5 mg Oral BID    ascorbic acid (vitamin C)  500 mg Oral BID    atorvastatin  40 mg Oral Daily    azithromycin  500 mg Oral Daily    calcium carbonate  500 mg Oral Daily    cefTRIAXone (Rocephin) IV (PEDS and ADULTS)  1 g Intravenous Q24H    clopidogreL  75 mg Oral Daily    fenofibrate  145 mg Oral Daily    ferrous sulfate  1 tablet Oral Every " other day    FLUoxetine  20 mg Oral Daily    fluticasone propionate  2 spray Each Nostril Daily    guaiFENesin  1,200 mg Oral BID    levothyroxine  25 mcg Oral Before breakfast    multivitamin  1 tablet Oral Daily    pantoprazole  40 mg Oral Daily    predniSONE  40 mg Oral Daily    valACYclovir  500 mg Oral BID    vitamin D  1,000 Units Oral Daily        PRN:    Current Facility-Administered Medications:     benzonatate, 100 mg, Oral, TID PRN    bisacodyL, 10 mg, Oral, Daily PRN    dextrose 50%, 12.5 g, Intravenous, PRN    dextrose 50%, 25 g, Intravenous, PRN    glucagon (human recombinant), 1 mg, Intramuscular, PRN    glucose, 16 g, Oral, PRN    glucose, 24 g, Oral, PRN    naloxone, 0.02 mg, Intravenous, PRN    sodium chloride 0.9%, 10 mL, Intravenous, Q12H PRN      Assessment/Plan:     Harrison Haywood is a 70 y.o. male with past medical history of multiple myeloma with spinal lytic lesions, hx of DVT, CVA with residual left sided weakness, HTN, HLD, and anxiety who presented on 2025 for Respiratory Distress.     Acute Hypoxic Respiratory Failure  Parainfluenza Virus   Adenovirus   SIRS +   Lactic acidosis, resolved  - pt presenting with SOB x 3 days with acute worsening the day of presentation  - started on levaquin at NH   - SIRS criteria met in ED with tachycardia to 123, tachypneic to 30   - WBC 6.67 (increased from baseline ~3)  - procal 0.20   - flu negative, resp panel positive for adenovirus and parainfluenza virus, resp culture rare GNRs, rare GPC   - d-dimer 0.52, pt has been on eliquis for DVT    - BNP 75, trop 0.021  - lactic acid 4.3 > 2.4 > 1.0   - CXR without focal consolidation  - VB.352, pCO2 41.7, pO2 41.6    - received ctx and azithro in ED   - initially requiring BiPAP in ED due to work of breathing, able to wean to NC   - BC  + Staph, MRSA negative likely contaminent  PLAN:   - continue supportive care: duonebs, chest physiotherapy, mucinex, flonase   - started prednisone 40 mg daily,  continue CAP coverage      Multiple Myeloma   - Dx in 2023, imitated on tx in 1/2025 and on current regimen since 3/2024: q28 day cycles with Revlimid 25 po daily days 1-21, Daratumumab 1800-30K units sub Q day 1, Decadron 20 po daily days 1, 8 , 15, 22, tylenol 650 po and benadryl 25 po to be given 1 hr prior to injections   - ECOG 3 with known lytic lesions   - last cycle on 6/17 with q28 cycles followed by Dr. Howard   - continue ppx: allopurinol 300 mg daily and valtrex 500 mg daily   - continue calcium carbonate, vitamin D       Hypertension   Hyperlipidemia   - continue home amlodipine 5 mg   - continue lipitor and fenofibrate      Bilateral LE DVT  - DVT study + for bilateral LE DVT on 4/15/2025  - continue eliquis 5 mg BID      Hx of CVA with residual left sided deficits  - CVA in 2024   - continue plavix      Hypothyroidism   - continue home synthroid 25 mcg   - TSH wnl      Macrocytic anemia  - hgb 11.2,  (baseline hgb ~ 10)  - home med: ferrous sulfate   - b12 683, folate 14.8, iron studies TSAT 29, ferritin 644     Anxiety   Depression   - continue zoloft      GERD  - continue protonix         Healthcare maintenance   -primary care provider is Guanakito Mendez MD      Diet: cardiac   VTE PPx: eliquis   Code Status: Full      Dispo: discharge NH today vs tomorrow pending improvement of breathing  Estimated LOS: 1-2 days      Yessica Salmon MD  [employer]  [facility]

## 2025-06-29 LAB
ABSOLUTE EOSINOPHIL (OHS): 0.02 K/UL
ABSOLUTE MONOCYTE (OHS): 0.57 K/UL (ref 0.3–1)
ABSOLUTE NEUTROPHIL COUNT (OHS): 4.11 K/UL (ref 1.8–7.7)
ALBUMIN SERPL BCP-MCNC: 3.3 G/DL (ref 3.5–5.2)
ALP SERPL-CCNC: 47 UNIT/L (ref 40–150)
ALT SERPL W/O P-5'-P-CCNC: 8 UNIT/L (ref 10–44)
ANION GAP (OHS): 7 MMOL/L (ref 8–16)
AST SERPL-CCNC: 14 UNIT/L (ref 11–45)
BACTERIA BLD CULT: ABNORMAL
BASOPHILS # BLD AUTO: 0.01 K/UL
BASOPHILS NFR BLD AUTO: 0.2 %
BILIRUB SERPL-MCNC: 0.5 MG/DL (ref 0.1–1)
BUN SERPL-MCNC: 16 MG/DL (ref 8–23)
CALCIUM SERPL-MCNC: 8.7 MG/DL (ref 8.7–10.5)
CHLORIDE SERPL-SCNC: 109 MMOL/L (ref 95–110)
CO2 SERPL-SCNC: 23 MMOL/L (ref 23–29)
CREAT SERPL-MCNC: 0.7 MG/DL (ref 0.5–1.4)
ERYTHROCYTE [DISTWIDTH] IN BLOOD BY AUTOMATED COUNT: 16.6 % (ref 11.5–14.5)
GFR SERPLBLD CREATININE-BSD FMLA CKD-EPI: >60 ML/MIN/1.73/M2
GLUCOSE SERPL-MCNC: 84 MG/DL (ref 70–110)
GRAM STN SPEC: ABNORMAL
HCT VFR BLD AUTO: 30.5 % (ref 40–54)
HGB BLD-MCNC: 10.1 GM/DL (ref 14–18)
IMM GRANULOCYTES # BLD AUTO: 0.02 K/UL (ref 0–0.04)
IMM GRANULOCYTES NFR BLD AUTO: 0.4 % (ref 0–0.5)
LYMPHOCYTES # BLD AUTO: 0.45 K/UL (ref 1–4.8)
MAGNESIUM SERPL-MCNC: 2 MG/DL (ref 1.6–2.6)
MCH RBC QN AUTO: 31.9 PG (ref 27–31)
MCHC RBC AUTO-ENTMCNC: 33.1 G/DL (ref 32–36)
MCV RBC AUTO: 96 FL (ref 82–98)
NUCLEATED RBC (/100WBC) (OHS): 0 /100 WBC
PHOSPHATE SERPL-MCNC: 3.2 MG/DL (ref 2.7–4.5)
PLATELET # BLD AUTO: 150 K/UL (ref 150–450)
PMV BLD AUTO: 11.5 FL (ref 9.2–12.9)
POTASSIUM SERPL-SCNC: 3.5 MMOL/L (ref 3.5–5.1)
PROT SERPL-MCNC: 5.7 GM/DL (ref 6–8.4)
RBC # BLD AUTO: 3.17 M/UL (ref 4.6–6.2)
RELATIVE EOSINOPHIL (OHS): 0.4 %
RELATIVE LYMPHOCYTE (OHS): 8.7 % (ref 18–48)
RELATIVE MONOCYTE (OHS): 11 % (ref 4–15)
RELATIVE NEUTROPHIL (OHS): 79.3 % (ref 38–73)
SODIUM SERPL-SCNC: 139 MMOL/L (ref 136–145)
WBC # BLD AUTO: 5.18 K/UL (ref 3.9–12.7)

## 2025-06-29 PROCEDURE — 94664 DEMO&/EVAL PT USE INHALER: CPT

## 2025-06-29 PROCEDURE — 94640 AIRWAY INHALATION TREATMENT: CPT

## 2025-06-29 PROCEDURE — 63600175 PHARM REV CODE 636 W HCPCS

## 2025-06-29 PROCEDURE — 25000242 PHARM REV CODE 250 ALT 637 W/ HCPCS

## 2025-06-29 PROCEDURE — 80053 COMPREHEN METABOLIC PANEL: CPT

## 2025-06-29 PROCEDURE — 83735 ASSAY OF MAGNESIUM: CPT

## 2025-06-29 PROCEDURE — 25000003 PHARM REV CODE 250

## 2025-06-29 PROCEDURE — 84100 ASSAY OF PHOSPHORUS: CPT

## 2025-06-29 PROCEDURE — 85025 COMPLETE CBC W/AUTO DIFF WBC: CPT

## 2025-06-29 PROCEDURE — 94761 N-INVAS EAR/PLS OXIMETRY MLT: CPT

## 2025-06-29 PROCEDURE — 99900035 HC TECH TIME PER 15 MIN (STAT)

## 2025-06-29 PROCEDURE — 21400001 HC TELEMETRY ROOM

## 2025-06-29 PROCEDURE — 36415 COLL VENOUS BLD VENIPUNCTURE: CPT

## 2025-06-29 RX ORDER — PREDNISONE 20 MG/1
40 TABLET ORAL DAILY
Qty: 2 TABLET | Refills: 0 | Status: SHIPPED | OUTPATIENT
Start: 2025-06-29 | End: 2025-06-30 | Stop reason: HOSPADM

## 2025-06-29 RX ADMIN — IPRATROPIUM BROMIDE AND ALBUTEROL SULFATE 3 ML: 2.5; .5 SOLUTION RESPIRATORY (INHALATION) at 07:06

## 2025-06-29 RX ADMIN — PREDNISONE 40 MG: 20 TABLET ORAL at 09:06

## 2025-06-29 RX ADMIN — CEFTRIAXONE SODIUM 1 G: 1 INJECTION, POWDER, FOR SOLUTION INTRAMUSCULAR; INTRAVENOUS at 02:06

## 2025-06-29 RX ADMIN — ACETYLCYSTEINE 4 ML: 100 INHALANT RESPIRATORY (INHALATION) at 07:06

## 2025-06-29 RX ADMIN — THERA TABS 1 TABLET: TAB at 09:06

## 2025-06-29 RX ADMIN — ACETYLCYSTEINE 4 ML: 100 INHALANT RESPIRATORY (INHALATION) at 11:06

## 2025-06-29 RX ADMIN — POTASSIUM BICARBONATE 25 MEQ: 977.5 TABLET, EFFERVESCENT ORAL at 09:06

## 2025-06-29 RX ADMIN — VALACYCLOVIR HYDROCHLORIDE 500 MG: 500 TABLET, FILM COATED ORAL at 09:06

## 2025-06-29 RX ADMIN — LEVOTHYROXINE SODIUM 25 MCG: 0.03 TABLET ORAL at 05:06

## 2025-06-29 RX ADMIN — APIXABAN 5 MG: 5 TABLET, FILM COATED ORAL at 08:06

## 2025-06-29 RX ADMIN — APIXABAN 5 MG: 5 TABLET, FILM COATED ORAL at 09:06

## 2025-06-29 RX ADMIN — ATORVASTATIN CALCIUM 40 MG: 40 TABLET, FILM COATED ORAL at 09:06

## 2025-06-29 RX ADMIN — GUAIFENESIN 1200 MG: 600 TABLET, EXTENDED RELEASE ORAL at 09:06

## 2025-06-29 RX ADMIN — IPRATROPIUM BROMIDE AND ALBUTEROL SULFATE 3 ML: 2.5; .5 SOLUTION RESPIRATORY (INHALATION) at 11:06

## 2025-06-29 RX ADMIN — OXYCODONE HYDROCHLORIDE AND ACETAMINOPHEN 500 MG: 500 TABLET ORAL at 08:06

## 2025-06-29 RX ADMIN — IPRATROPIUM BROMIDE AND ALBUTEROL SULFATE 3 ML: 2.5; .5 SOLUTION RESPIRATORY (INHALATION) at 03:06

## 2025-06-29 RX ADMIN — FLUOXETINE HYDROCHLORIDE 20 MG: 20 CAPSULE ORAL at 09:06

## 2025-06-29 RX ADMIN — GUAIFENESIN 1200 MG: 600 TABLET, EXTENDED RELEASE ORAL at 08:06

## 2025-06-29 RX ADMIN — FENOFIBRATE 145 MG: 145 TABLET ORAL at 09:06

## 2025-06-29 RX ADMIN — OXYCODONE HYDROCHLORIDE AND ACETAMINOPHEN 500 MG: 500 TABLET ORAL at 09:06

## 2025-06-29 RX ADMIN — Medication 1000 UNITS: at 09:06

## 2025-06-29 RX ADMIN — VALACYCLOVIR HYDROCHLORIDE 500 MG: 500 TABLET, FILM COATED ORAL at 08:06

## 2025-06-29 RX ADMIN — PANTOPRAZOLE SODIUM 40 MG: 40 TABLET, DELAYED RELEASE ORAL at 09:06

## 2025-06-29 RX ADMIN — ACETYLCYSTEINE 4 ML: 100 INHALANT RESPIRATORY (INHALATION) at 03:06

## 2025-06-29 RX ADMIN — AMLODIPINE BESYLATE 5 MG: 5 TABLET ORAL at 09:06

## 2025-06-29 RX ADMIN — CLOPIDOGREL 75 MG: 75 TABLET ORAL at 09:06

## 2025-06-29 RX ADMIN — ALLOPURINOL 300 MG: 100 TABLET ORAL at 09:06

## 2025-06-29 NOTE — PLAN OF CARE
Problem: Adult Inpatient Plan of Care  Goal: Plan of Care Review  6/29/2025 1709 by Litzy Rivera RN  Outcome: Progressing  6/29/2025 1124 by Litzy Rivera RN  Outcome: Progressing  Goal: Patient-Specific Goal (Individualized)  6/29/2025 1709 by Litzy Rievra RN  Outcome: Progressing  6/29/2025 1124 by Litzy Rivera RN  Outcome: Progressing  Goal: Absence of Hospital-Acquired Illness or Injury  6/29/2025 1709 by Litzy Rivera RN  Outcome: Progressing  6/29/2025 1124 by Lityz Rivera RN  Outcome: Progressing  Goal: Optimal Comfort and Wellbeing  6/29/2025 1709 by Litzy Rivera RN  Outcome: Progressing  6/29/2025 1124 by Litzy Rivera RN  Outcome: Progressing  Goal: Readiness for Transition of Care  6/29/2025 1709 by iLtzy Rivera RN  Outcome: Progressing  6/29/2025 1124 by Litzy Rivera RN  Outcome: Progressing     Problem: Pneumonia  Goal: Fluid Balance  Outcome: Progressing  Goal: Resolution of Infection Signs and Symptoms  Outcome: Progressing  Goal: Effective Oxygenation and Ventilation  6/29/2025 1709 by Litzy Rivera RN  Outcome: Progressing  6/29/2025 1124 by Litzy Rivera RN  Outcome: Progressing

## 2025-06-29 NOTE — PROGRESS NOTES
"[facility] Medicine Progress Note      Subjective/Interval History       No complaints on examination. No obvious distress noted.      Objective     Physical Examination:  /73 (BP Location: Right arm, Patient Position: Sitting)   Pulse 109   Temp 98.1 °F (36.7 °C) (Oral)   Resp 20   Ht 6' 1" (1.854 m)   Wt 104.1 kg (229 lb 8 oz)   SpO2 (!) 90%   BMI 30.28 kg/m²    General: No acute distress  HEENT: Atraumatic, normocephalic, moist mucous membranes  Cardiovascular: tachycardic, regular rhythm, normal S1 and S2, no extra heart sounds or murmurs appreciated   Chest wall: Non-tender to palpation, no gross deformities noted   Back: No abnormal curvature noted, symmetric rise with respiration   Respiratory: Stable on RA,  some wheezing  Abdominal: Non-distended, soft, normoactive bowel sounds, non-tender to palpation, no guarding  Extremities: Atraumatic, moving all four extremities  Skin: Non-diaphoretic, warm, dry  Neurologic: No gross focal neurologic deficits noted ; residual right sided weakness in UE     Intake/Output:    Intake/Output Summary (Last 24 hours) at 6/29/2025 0913  Last data filed at 6/29/2025 0654  Gross per 24 hour   Intake --   Output 1325 ml   Net -1325 ml     Net IO Since Admission: -3,121.76 mL [06/29/25 0913]    Laboratory data: reviewed     Microbiology data: reviewed     EKG data: reviewed     Radiology data: reviewed      Current Medications:     Infusions:       Scheduled:   acetylcysteine 100 mg/ml (10%)  4 mL Nebulization QID    albuterol-ipratropium  3 mL Nebulization Q4H    allopurinoL  300 mg Oral Daily    amLODIPine  5 mg Oral Daily    apixaban  5 mg Oral BID    ascorbic acid (vitamin C)  500 mg Oral BID    atorvastatin  40 mg Oral Daily    calcium carbonate  500 mg Oral Daily    cefTRIAXone (Rocephin) IV (PEDS and ADULTS)  1 g Intravenous Q24H    clopidogreL  75 mg Oral Daily    fenofibrate  145 mg Oral Daily    ferrous sulfate  1 tablet Oral Every other day    " FLUoxetine  20 mg Oral Daily    fluticasone propionate  2 spray Each Nostril Daily    guaiFENesin  1,200 mg Oral BID    levothyroxine  25 mcg Oral Before breakfast    multivitamin  1 tablet Oral Daily    pantoprazole  40 mg Oral Daily    potassium bicarbonate  25 mEq Oral Q2H    predniSONE  40 mg Oral Daily    valACYclovir  500 mg Oral BID    vitamin D  1,000 Units Oral Daily        PRN:    Current Facility-Administered Medications:     benzonatate, 100 mg, Oral, TID PRN    bisacodyL, 10 mg, Oral, Daily PRN    dextrose 50%, 12.5 g, Intravenous, PRN    dextrose 50%, 25 g, Intravenous, PRN    glucagon (human recombinant), 1 mg, Intramuscular, PRN    glucose, 16 g, Oral, PRN    glucose, 24 g, Oral, PRN    naloxone, 0.02 mg, Intravenous, PRN    sodium chloride 0.9%, 10 mL, Intravenous, Q12H PRN      Assessment/Plan:     Harrison Haywood is a 70 y.o. male with past medical history of multiple myeloma with spinal lytic lesions, hx of DVT, CVA with residual left sided weakness, HTN, HLD, and anxiety who presented on 2025 for Respiratory Distress.     Acute Hypoxic Respiratory Failure  Parainfluenza Virus   Adenovirus   SIRS +   Lactic acidosis, resolved  - pt presenting with SOB x 3 days with acute worsening the day of presentation  - started on levaquin at NH   - SIRS criteria met in ED with tachycardia to 123, tachypneic to 30   - WBC 6.67 (increased from baseline ~3)  - procal 0.20   - flu negative, resp panel positive for adenovirus and parainfluenza virus, resp culture rare GNRs, rare GPC   - d-dimer 0.52, pt has been on eliquis for DVT    - BNP 75, trop 0.021  - lactic acid 4.3 > 2.4 > 1.0   - CXR without focal consolidation  - VB.352, pCO2 41.7, pO2 41.6    - received ctx and azithro in ED   - initially requiring BiPAP in ED due to work of breathing, able to wean to NC   - BC  + Staph, MRSA negative likely contaminent  PLAN:   - continue supportive care: duonebs, chest physiotherapy, mucinex, flonase    -continue CAP coverage w/ prednisone 40 mg daily for 5 doses     Multiple Myeloma   - Dx in 2023, imitated on tx in 1/2025 and on current regimen since 3/2024: q28 day cycles with Revlimid 25 po daily days 1-21, Daratumumab 1800-30K units sub Q day 1, Decadron 20 po daily days 1, 8 , 15, 22, tylenol 650 po and benadryl 25 po to be given 1 hr prior to injections   - ECOG 3 with known lytic lesions   - last cycle on 6/17 with q28 cycles followed by Dr. Howard   - continue ppx: allopurinol 300 mg daily and valtrex 500 mg daily   - continue calcium carbonate, vitamin D       Hypertension   Hyperlipidemia   - continue home amlodipine 5 mg   - continue lipitor and fenofibrate      Bilateral LE DVT  - DVT study + for bilateral LE DVT on 4/15/2025  - continue eliquis 5 mg BID      Hx of CVA with residual left sided deficits  - CVA in 2024   - continue plavix      Hypothyroidism   - continue home synthroid 25 mcg   - TSH wnl      Macrocytic anemia  - hgb 11.2,  (baseline hgb ~ 10)  - home med: ferrous sulfate   - b12 683, folate 14.8, iron studies TSAT 29, ferritin 644     Anxiety   Depression   - continue zoloft      GERD  - continue protonix         Healthcare maintenance   -primary care provider is Guanakito Mendez MD      Diet: cardiac   VTE PPx: eliquis   Code Status: Full      Dispo: d/c to NH today   Estimated LOS: 1-2 days      Ailyn Russell MD  [employer]  [facility]

## 2025-06-29 NOTE — DISCHARGE INSTRUCTIONS
Our goal at [facility] is to always give you outstanding care and exceptional service. You may receive a survey from [employer] by mail, text or e-mail in the next 24-48 hours asking about the care you received with us. The survey should only take 5-10 minutes to complete and is very important to us.     Your feedback provides us with a way to recognize our staff who work tirelessly to provide the best care! Also, your responses help us learn how to improve when your experience was below our aspiration of excellence. We are always looking for ways to improve your stay. We WILL use your feedback to continue making improvements to help us provide the highest quality care. We keep your personal information and feedback confidential. We appreciate your time completing this survey and can't wait to hear from you!!!    We look forward to your continued care with us! Thanks so much for choosing [facility] for your healthcare needs!

## 2025-06-29 NOTE — PLAN OF CARE
Discharge to nursing home was unable to proceed due to late hour. Transport arrived at 1750 for patient pickup. Prior to departure, patient required a BM, which delayed readiness. Transport departed at 1815, after 1800 cutoff for facility acceptance. Providers and case management were notified. Family updated on delay and plan.

## 2025-06-29 NOTE — PLAN OF CARE
The sw was informed by the team that this pt will return to his skilled nursing nh [facility]. The sw spoke to the pt's nurse Philipp [phone] at the nh and she states she was not notified by [facility] or her Supervisor the pt was returning today. The sw offered to resend her the d/c order that were sent to [facility] Friday but she states they can't accept the pt back today. The sw notified the team of this info.     2:40pm The sw spoke to Halie in Admissions who states she did notify the staff that this pt was returning yesterday and she did give them a packet. She states Antonette sent her the info and she confirmed the pt was able to return yesterday. She sent the pt's script to their pharmacy Friday. The sw called the nh back and notified the pt's nurse Philipp of this info. The sw faxed her the pt's d/c orders to her at [phone]. She states she will review paula and call the sw back. She states she did see the pt's new medications the sw informed her he would be on when he returns.     3:37pm The sw spoke to Treasure the pt's current nurse who states Philipp is gone for the day. The sw refaxed the d/c orders b/c Treasure states she didn't receive them. The sw gave the pt's nurse the contact info to call report to Treasure. The pt's going to room 438-A and Treasure's the [address] nurse. The sw will request w/c van transport for 5:00pm via [facility]. The sw will bring the copied chart to the pt's nurse. The sw left a voice message for the pt's sister Arminda [phone] informing her of the info mentioned above.        06/29/25 1429   Post-Acute Status   Post-Acute Authorization Placement   Post-Acute Placement Status Pending post-acute provider review/more information requested   Discharge Delays (!) Post-Acute Set-up   Discharge Plan   Discharge Plan A Return to nursing home   Discharge Plan B Other

## 2025-06-29 NOTE — PLAN OF CARE
06/28/25 1920   Patient Assessment/Suction   Level of Consciousness (AVPU) alert   Respiratory Effort Normal;Unlabored   Expansion/Accessory Muscles/Retractions no retractions;no use of accessory muscles   All Lung Fields Breath Sounds coarse;wheezes, expiratory;Anterior:;Lateral:   Rhythm/Pattern, Respiratory depth regular;unlabored;pattern regular   Cough Frequency infrequent   Cough Type congested   PRE-TX-O2   Device (Oxygen Therapy) room air   SpO2 96 %   Pulse Oximetry Type Intermittent   $ Pulse Oximetry - Multiple Charge Pulse Oximetry - Multiple   Pulse 84   Resp 18   Aerosol Therapy   $ Aerosol Therapy Charges Aerosol Treatment   Daily Review of Necessity (SVN) completed   Respiratory Treatment Status (SVN) given   Treatment Route (SVN) mask   Patient Position HOB elevated   Post Treatment Assessment (SVN) breath sounds improved   Signs of Intolerance (SVN) none   Breath Sounds Post-Respiratory Treatment   Throughout All Fields Post-Treatment All Fields   Throughout All Fields Post-Treatment aeration increased   Post-treatment Heart Rate (beats/min) 84   Post-treatment Resp Rate (breaths/min) 18   Vibratory PEP Therapy   $ Vibratory PEP Charges Aerobika Therapy   $ Vibratory PEP Tech Time Charges 15 min   Daily Review of Necessity (PEP Therapy) completed   Type (PEP Therapy) vibratory/oscillatory   Device (PEP Therapy) flutter   Route (PEP Therapy) mouthpiece   Breaths per Cycle (PEP Therapy) 10   Cycles (PEP Therapy) 1   Settings (PEP Therapy) PEP 4   Patient Position (PEP Therapy) HOB elevated   Post Treatment Assessment (PEP) breath sounds improved   Signs of Intolerance (PEP Therapy) none   Respiratory Evaluation   $ Care Plan Tech Time 15 min

## 2025-06-29 NOTE — PLAN OF CARE
Problem: Adult Inpatient Plan of Care  Goal: Plan of Care Review  Outcome: Progressing  Goal: Patient-Specific Goal (Individualized)  Outcome: Progressing  Goal: Absence of Hospital-Acquired Illness or Injury  Outcome: Progressing  Goal: Optimal Comfort and Wellbeing  Outcome: Progressing  Goal: Readiness for Transition of Care  Outcome: Progressing     Problem: Pneumonia  Goal: Fluid Balance  Outcome: Progressing  Goal: Resolution of Infection Signs and Symptoms  Outcome: Progressing  Goal: Effective Oxygenation and Ventilation  Outcome: Progressing     Problem: Wound  Goal: Optimal Coping  Outcome: Progressing  Goal: Optimal Functional Ability  Outcome: Progressing  Goal: Absence of Infection Signs and Symptoms  Outcome: Progressing  Goal: Improved Oral Intake  Outcome: Progressing  Goal: Optimal Pain Control and Function  Outcome: Progressing  Goal: Skin Health and Integrity  Outcome: Progressing  Goal: Optimal Wound Healing  Outcome: Progressing     Problem: Skin Injury Risk Increased  Goal: Skin Health and Integrity  Outcome: Progressing     Problem: Wound  Goal: Optimal Coping  Outcome: Progressing  Goal: Optimal Functional Ability  Outcome: Progressing  Goal: Absence of Infection Signs and Symptoms  Outcome: Progressing  Goal: Improved Oral Intake  Outcome: Progressing  Goal: Optimal Pain Control and Function  Outcome: Progressing  Goal: Skin Health and Integrity  Outcome: Progressing  Goal: Optimal Wound Healing  Outcome: Progressing

## 2025-06-30 VITALS
OXYGEN SATURATION: 96 % | DIASTOLIC BLOOD PRESSURE: 75 MMHG | RESPIRATION RATE: 18 BRPM | WEIGHT: 229.5 LBS | HEIGHT: 73 IN | SYSTOLIC BLOOD PRESSURE: 127 MMHG | BODY MASS INDEX: 30.42 KG/M2 | TEMPERATURE: 98 F | HEART RATE: 77 BPM

## 2025-06-30 LAB
BACTERIA SPT CULT: NORMAL
GRAM STN SPEC: NORMAL

## 2025-06-30 PROCEDURE — 94664 DEMO&/EVAL PT USE INHALER: CPT

## 2025-06-30 PROCEDURE — 63600175 PHARM REV CODE 636 W HCPCS

## 2025-06-30 PROCEDURE — 25000242 PHARM REV CODE 250 ALT 637 W/ HCPCS

## 2025-06-30 PROCEDURE — 25000003 PHARM REV CODE 250

## 2025-06-30 PROCEDURE — 27000646 HC AEROBIKA DEVICE

## 2025-06-30 PROCEDURE — 94640 AIRWAY INHALATION TREATMENT: CPT

## 2025-06-30 PROCEDURE — 99900035 HC TECH TIME PER 15 MIN (STAT)

## 2025-06-30 PROCEDURE — 94761 N-INVAS EAR/PLS OXIMETRY MLT: CPT

## 2025-06-30 RX ORDER — PREDNISONE 20 MG/1
40 TABLET ORAL ONCE
Status: COMPLETED | OUTPATIENT
Start: 2025-06-30 | End: 2025-06-30

## 2025-06-30 RX ADMIN — CLOPIDOGREL 75 MG: 75 TABLET ORAL at 08:06

## 2025-06-30 RX ADMIN — AMLODIPINE BESYLATE 5 MG: 5 TABLET ORAL at 08:06

## 2025-06-30 RX ADMIN — LEVOTHYROXINE SODIUM 25 MCG: 0.03 TABLET ORAL at 05:06

## 2025-06-30 RX ADMIN — FERROUS SULFATE TAB 325 MG (65 MG ELEMENTAL FE) 1 EACH: 325 (65 FE) TAB at 08:06

## 2025-06-30 RX ADMIN — ALLOPURINOL 300 MG: 100 TABLET ORAL at 08:06

## 2025-06-30 RX ADMIN — FENOFIBRATE 145 MG: 145 TABLET ORAL at 08:06

## 2025-06-30 RX ADMIN — OXYCODONE HYDROCHLORIDE AND ACETAMINOPHEN 500 MG: 500 TABLET ORAL at 08:06

## 2025-06-30 RX ADMIN — THERA TABS 1 TABLET: TAB at 08:06

## 2025-06-30 RX ADMIN — Medication 1000 UNITS: at 08:06

## 2025-06-30 RX ADMIN — PREDNISONE 40 MG: 20 TABLET ORAL at 08:06

## 2025-06-30 RX ADMIN — ACETYLCYSTEINE 4 ML: 100 INHALANT RESPIRATORY (INHALATION) at 08:06

## 2025-06-30 RX ADMIN — PANTOPRAZOLE SODIUM 40 MG: 40 TABLET, DELAYED RELEASE ORAL at 10:06

## 2025-06-30 RX ADMIN — APIXABAN 5 MG: 5 TABLET, FILM COATED ORAL at 08:06

## 2025-06-30 RX ADMIN — GUAIFENESIN 1200 MG: 600 TABLET, EXTENDED RELEASE ORAL at 08:06

## 2025-06-30 RX ADMIN — ATORVASTATIN CALCIUM 40 MG: 40 TABLET, FILM COATED ORAL at 08:06

## 2025-06-30 RX ADMIN — FLUOXETINE HYDROCHLORIDE 20 MG: 20 CAPSULE ORAL at 08:06

## 2025-06-30 RX ADMIN — VALACYCLOVIR HYDROCHLORIDE 500 MG: 500 TABLET, FILM COATED ORAL at 08:06

## 2025-06-30 RX ADMIN — IPRATROPIUM BROMIDE AND ALBUTEROL SULFATE 3 ML: 2.5; .5 SOLUTION RESPIRATORY (INHALATION) at 04:06

## 2025-06-30 RX ADMIN — IPRATROPIUM BROMIDE AND ALBUTEROL SULFATE 3 ML: 2.5; .5 SOLUTION RESPIRATORY (INHALATION) at 08:06

## 2025-06-30 NOTE — CARE UPDATE
06/29/25 1921   Patient Assessment/Suction   Level of Consciousness (AVPU) alert   Respiratory Effort Normal;Unlabored   Expansion/Accessory Muscles/Retractions no retractions;no use of accessory muscles   All Lung Fields Breath Sounds Anterior:   JOVANNY Breath Sounds diminished;clear   RUL Breath Sounds coarse   Cough Frequency frequent   Cough Type no productive sputum   PRE-TX-O2   Device (Oxygen Therapy) room air   SpO2 95 %   Pulse Oximetry Type Intermittent   $ Pulse Oximetry - Multiple Charge Pulse Oximetry - Multiple   Pulse 73   Resp 20   Aerosol Therapy   $ Aerosol Therapy Charges Aerosol Treatment   Daily Review of Necessity (SVN) completed   Respiratory Treatment Status (SVN) given   Treatment Route (SVN) mask;oxygen   Patient Position HOB elevated   Post Treatment Assessment (SVN) breath sounds unchanged   Signs of Intolerance (SVN) none   Breath Sounds Post-Respiratory Treatment   Post-treatment Heart Rate (beats/min) 75   Post-treatment Resp Rate (breaths/min) 20   Respiratory Evaluation   $ Care Plan Tech Time 15 min

## 2025-06-30 NOTE — PROGRESS NOTES
The sw was [facility]. The sw requested a [facility] w/c transport back. The pt's sister was notified and she was in agreement with the d/c plan. The pt was cleared for d/c.     [facility] - Telemetry  Discharge Final Note    Primary Care Provider: Guanakito Mendez MD    Expected Discharge Date: 6/30/2025    Final Discharge Note (most recent)       Final Note - 06/30/25 0755          Post-Acute Status    Post-Acute Authorization Placement     Post-Acute Placement Status Pending post-acute provider review/more information requested     Discharge Delays Post-Acute Set-up                     Important Message from Medicare             After-discharge care                Destination       [facility]   Service: Nursing Home    [address]  [place] [address]   Phone: [phone]

## 2025-06-30 NOTE — PLAN OF CARE
Medicare Message     Important Message from Medicare regarding Discharge Appeal Rights Other (comments)Important Message from Medicare regarding Discharge Appeal Rights. Other (comments). Has comment. Taken on 6/30/25 1346   Date IMM was signed 6/30/2025   Time IMM was signed 1110

## 2025-06-30 NOTE — PROGRESS NOTES
"[facility] Hospital Medicine Progress Note      Subjective/Interval History      Patient was discharged back to NH yesterday. Transportation had arrived and patient had to have a BM. During this time, transportation then stated NH would not accept pt after 6PM and left.     Patient resting comfortably on exam this AM. Will discharge back to NH this AM.      Objective     Physical Examination:  /60 (Patient Position: Lying)   Pulse 76   Temp 98.2 °F (36.8 °C) (Oral)   Resp 20   Ht 6' 1" (1.854 m)   Wt 104.1 kg (229 lb 8 oz)   SpO2 96%   BMI 30.28 kg/m²    General: No acute distress, on RA   HEENT: Atraumatic, normocephalic, moist mucous membranes  Cardiovascular: tachycardic, regular rhythm, normal S1 and S2, no extra heart sounds or murmurs appreciated   Chest wall: Non-tender to palpation, no gross deformities noted   Back: No abnormal curvature noted, symmetric rise with respiration   Respiratory: Stable on RA,  some wheezing noted though improved   Abdominal: Non-distended, soft, normoactive bowel sounds, non-tender to palpation, no guarding  Extremities: Atraumatic, moving all four extremities  Skin: Non-diaphoretic, warm, dry  Neurologic: No gross focal neurologic deficits noted ; residual right sided weakness in UE     Intake/Output:    Intake/Output Summary (Last 24 hours) at 6/30/2025 0807  Last data filed at 6/30/2025 0451  Gross per 24 hour   Intake 60 ml   Output 1550 ml   Net -1490 ml     Net IO Since Admission: -4,611.76 mL [06/30/25 0807]    Laboratory data: reviewed     Microbiology data: reviewed     EKG data: reviewed     Radiology data: reviewed      Current Medications:     Infusions:       Scheduled:   acetylcysteine 100 mg/ml (10%)  4 mL Nebulization QID    albuterol-ipratropium  3 mL Nebulization Q4H    allopurinoL  300 mg Oral Daily    amLODIPine  5 mg Oral Daily    apixaban  5 mg Oral BID    ascorbic acid (vitamin C)  500 mg Oral BID    atorvastatin  40 mg Oral Daily    calcium " carbonate  500 mg Oral Daily    clopidogreL  75 mg Oral Daily    fenofibrate  145 mg Oral Daily    ferrous sulfate  1 tablet Oral Every other day    FLUoxetine  20 mg Oral Daily    fluticasone propionate  2 spray Each Nostril Daily    guaiFENesin  1,200 mg Oral BID    levothyroxine  25 mcg Oral Before breakfast    multivitamin  1 tablet Oral Daily    pantoprazole  40 mg Oral Daily    predniSONE  40 mg Oral Once    valACYclovir  500 mg Oral BID    vitamin D  1,000 Units Oral Daily        PRN:    Current Facility-Administered Medications:     benzonatate, 100 mg, Oral, TID PRN    bisacodyL, 10 mg, Oral, Daily PRN    dextrose 50%, 12.5 g, Intravenous, PRN    dextrose 50%, 25 g, Intravenous, PRN    glucagon (human recombinant), 1 mg, Intramuscular, PRN    glucose, 16 g, Oral, PRN    glucose, 24 g, Oral, PRN    naloxone, 0.02 mg, Intravenous, PRN    sodium chloride 0.9%, 10 mL, Intravenous, Q12H PRN      Assessment/Plan:     Harrison Haywood is a 70 y.o. male with past medical history of multiple myeloma with spinal lytic lesions, hx of DVT, CVA with residual left sided weakness, HTN, HLD, and anxiety who presented on 2025 for Respiratory Distress.     Acute Hypoxic Respiratory Failure, resolved   Parainfluenza Virus   Adenovirus   SIRS +   Lactic acidosis, resolved  - pt presenting with SOB x 3 days with acute worsening the day of presentation  - started on levaquin at NH   - SIRS criteria met in ED with tachycardia to 123, tachypneic to 30   - WBC 6.67 (increased from baseline ~3)  - procal 0.20   - flu negative, resp panel positive for adenovirus and parainfluenza virus, resp culture rare GNRs, rare GPC   - d-dimer 0.52, pt has been on eliquis for DVT    - BNP 75, trop 0.021  - lactic acid 4.3 > 2.4 > 1.0   - CXR without focal consolidation  - VB.352, pCO2 41.7, pO2 41.6    - received ctx and azithro in ED   - initially requiring BiPAP in ED due to work of breathing, able to wean to NC   - BC / + Staph, MRSA  negative likely contaminent  PLAN:   - continue supportive care: duonebs, chest physiotherapy, mucinex, flonase   -s/p CAP coverage and prednisone     Multiple Myeloma   - Dx in 2023, imitated on tx in 1/2025 and on current regimen since 3/2024: q28 day cycles with Revlimid 25 po daily days 1-21, Daratumumab 1800-30K units sub Q day 1, Decadron 20 po daily days 1, 8 , 15, 22, tylenol 650 po and benadryl 25 po to be given 1 hr prior to injections   - ECOG 3 with known lytic lesions   - last cycle on 6/17 with q28 cycles followed by Dr. Howard   - continue ppx: allopurinol 300 mg daily and valtrex 500 mg daily   - continue calcium carbonate, vitamin D       Hypertension   Hyperlipidemia   - continue home amlodipine 5 mg   - continue lipitor and fenofibrate      Bilateral LE DVT  - DVT study + for bilateral LE DVT on 4/15/2025  - continue eliquis 5 mg BID      Hx of CVA with residual left sided deficits  - CVA in 2024   - continue plavix      Hypothyroidism   - continue home synthroid 25 mcg   - TSH wnl      Macrocytic anemia  - hgb 11.2,  (baseline hgb ~ 10)  - home med: ferrous sulfate   - b12 683, folate 14.8, iron studies TSAT 29, ferritin 644     Anxiety   Depression   - continue zoloft      GERD  - continue protonix         Healthcare maintenance   -primary care provider is Guanakito Mendez MD      Diet: cardiac   VTE PPx: eliquis   Code Status: Full      Dispo: d/c to NH today   Estimated LOS: 0 days      Sara Arellano MD   [employer] Internal Medicine PGY-1

## 2025-06-30 NOTE — DISCHARGE SUMMARY
[facility] Medicine Discharge Summary    Primary Team: [facility] Hospitalist Team A  Attending Physician: Debbie att. providers found  Resident: MD Drew  Intern: MD Drew    Date of Admit: 6/25/2025  Date of Discharge: 6/30/2025    Discharge to: NH  Condition: Fair    Discharge Diagnoses     Problem List[1]    Consultants and Procedures     Consultants:  PEDRITO  Respiratory therapy     Procedures:   None     Imaging:  Chest x ray     HPI   Harrison Haywood is a 70 y.o. male with past medical history of multiple myeloma with spinal lytic lesions, hx of DVT, CVA with residual left sided weakness, HTN, HLD, and anxiety who presented on 6/25/2025 for acute hypoxic respiratory failure. Two days prior to presentation he began having some shortness of breath. He had a CXR performed at the facility and was started on albuterol treatments and levaquin. On the morning of presentation, his breathing acutely worsened and he felt short of breath leading to his presentation. He reported having a cough with some sputum production.     Patient initially required BiPAP but was able to be weaned to RA. Found to have parainflluenza and adenovirus. Patient had improvement with supportive care. He was stable for discharge back to [facility].     Hospital Course By Problem with Pertinent Findings     Acute Hypoxic Respiratory Failure, resolved   Parainfluenza Virus   Adenovirus   Lactic acidosis, resolved  Patient presented with SOB x 3 days with acute worsening the day of presentation  - started on levaquin at NH   - flu negative, resp panel positive for adenovirus and parainfluenza virus, resp culture rare GNRs, rare GPC   - CXR without focal consolidation  - s/p CAP coverage and prednisone x 5 days   - provided supportive care: duonebs, mucinex, flonase outpatient PRN  - outpatient follow up with Pulm, PCP at discharge      Multiple Myeloma   - Dx in 2023, imitated on tx in 1/2025 and on current regimen since 3/2024: q28 day cycles with  Revlimid 25 po daily days 1-21, Daratumumab 1800-30K units sub Q day 1, Decadron 20 po daily days 1, 8 , 15, 22, tylenol 650 po and benadryl 25 po to be given 1 hr prior to injections   - ECOG 3 with known lytic lesions   - last cycle on 6/17 with q28 cycles followed by Dr. Howard   - continue ppx: allopurinol 300 mg daily and valtrex 500 mg daily   - continue calcium carbonate, vitamin D       Hypertension   Hyperlipidemia   - continue home amlodipine 5 mg   - continue lipitor and fenofibrate      Bilateral LE DVT  In setting of malignancy   - DVT study + for bilateral LE DVT on 4/15/2025  - continue eliquis 5 mg BID      Hx of CVA with residual left sided deficits  - CVA in 2024   - continue plavix      Hypothyroidism   - continue home synthroid 25 mcg   - TSH wnl      Macrocytic anemia  - hgb 11.2,  (baseline hgb ~ 10)  - continue ferrous sulfate   -folate and b12 wnl      Anxiety   Depression   - continue zoloft      GERD  - continue protonix        Healthcare maintenance   -primary care provider is Guanakito Mendez MD     Discharge Medications        Medication List        PAUSE taking these medications      celecoxib 200 MG capsule  Wait to take this until your doctor or other care provider tells you to start again.  Commonly known as: CeleBREX     furosemide 20 MG tablet  Wait to take this until your doctor or other care provider tells you to start again.  Commonly known as: LASIX     potassium chloride SA 20 MEQ tablet  Wait to take this until your doctor or other care provider tells you to start again.  Commonly known as: K-DUR,KLOR-CON            START taking these medications      budesonide-formoterol 160-4.5 mcg 160-4.5 mcg/actuation Hfaa  Commonly known as: SYMBICORT  Inhale 2 puffs into the lungs 2 (two) times daily as needed (shortness of breath, wheezing). Controller     fluticasone propionate 50 mcg/actuation nasal spray  Commonly known as: FLONASE  2 sprays (100 mcg total) by Each  Nostril route once daily.            CHANGE how you take these medications      guaiFENesin 600 mg 12 hr tablet  Commonly known as: MUCINEX  What changed: Another medication with the same name was removed. Continue taking this medication, and follow the directions you see here.            CONTINUE taking these medications      acetaminophen 325 MG tablet  Commonly known as: TYLENOL     albuterol-ipratropium 2.5 mg-0.5 mg/3 mL nebulizer solution  Commonly known as: DUO-NEB     allopurinoL 300 MG tablet  Commonly known as: ZYLOPRIM     amLODIPine 5 MG tablet  Commonly known as: NORVASC     ARGINAID 4.5 gram-156 mg/9.2 gram Pwpk  Generic drug: arginine-vitamin C-vitamin E     atorvastatin 40 MG tablet  Commonly known as: LIPITOR     benzonatate 100 MG capsule  Commonly known as: TESSALON     bisacodyL 5 mg EC tablet  Commonly known as: DULCOLAX     calcium carbonate 600 mg calcium (1,500 mg) Tab  Commonly known as: OS-SONIA     clopidogreL 75 mg tablet  Commonly known as: PLAVIX     dexAMETHasone 4 MG Tab  Commonly known as: DECADRON     ELIQUIS 5 mg Tab  Generic drug: apixaban  Take 1 tablet (5 mg total) by mouth 2 (two) times daily.     fenofibrate 145 MG tablet  Commonly known as: TRICOR     ferrous sulfate 325 (65 FE) MG EC tablet     FLUoxetine 20 MG capsule     HYDROcodone-acetaminophen 5-325 mg per tablet  Commonly known as: NORCO     lenalidomide 25 mg Cap     levothyroxine 25 MCG tablet  Commonly known as: SYNTHROID     ondansetron 4 MG tablet  Commonly known as: ZOFRAN     ONE DAILY MULTIVITAMIN per tablet  Generic drug: multivitamin     pantoprazole 40 MG tablet  Commonly known as: PROTONIX     PROMOD PROTEIN Liqd  Generic drug: protein supplement     REFRESH TEARS 0.5 % Drop  Generic drug: carboxymethylcellulose sodium     Saline NasaL 0.65 % nasal spray  Generic drug: sodium chloride     valACYclovir 500 MG tablet  Commonly known as: VALTREX     VITAMIN C 500 MG tablet  Generic drug: ascorbic acid (vitamin  C)     vitamin D 1000 units Tab  Commonly known as: VITAMIN D3            STOP taking these medications      budesonide 90 mcg/actuation Aepb  Commonly known as: PULMICORT FLEXHALER     levoFLOXacin 500 MG tablet  Commonly known as: LEVAQUIN     levoFLOXacin 750 MG tablet  Commonly known as: LEVAQUIN               Where to Get Your Medications        These medications were sent to [facility]  [address], [place] [address]      Hours: Mon-Fri, 8a-5:30p Phone: [phone]   budesonide-formoterol 160-4.5 mcg 160-4.5 mcg/actuation Hfaa  ELIQUIS 5 mg Tab  fluticasone propionate 50 mcg/actuation nasal spray         Discharge Information:   Diet:  Cardiac     Physical Activity:  As tolerated             Instructions:  1. Take all medications as prescribed  2. Keep all follow-up appointments  3. Return to the hospital or call your primary care physicians if any worsening symptoms such as fever, chest pain, shortness of breath, return of symptoms, or any other concerns.    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

## 2025-06-30 NOTE — PLAN OF CARE
The sw [facility]who apologized for what happened over the weekend. She states she was made aware by [facility] on Friday the pt was returning over the weekend. The sw asked the team to change the date on the orders. The sw faxed the orders to [facility]. She states she will get them to the staff and they will accept this resident back today. She apologized for all the miscommunication on their part. The sw spoke to the pt's nurse and notified her the pt will d/c today.     10:00am The sw received a call from [facility] stating the pt's clear to return. The sw gave the pt's nurse the contact info to call report. The pt's copied chart is in his cubby. The sw requested a [facility] w/c van transport for the pt to return to [facility]. The cesar notified the pt's sister Arminda and she's in agreement        06/30/25 0754   Post-Acute Status   Post-Acute Authorization Placement   Post-Acute Placement Status Pending post-acute provider review/more information requested   Discharge Delays (!) Post-Acute Set-up   Discharge Plan   Discharge Plan A Return to nursing home   Discharge Plan B Other  (TBD)

## 2025-07-02 LAB
BACTERIA BLD CULT: NORMAL
GRAM STN SPEC: NORMAL